# Patient Record
Sex: MALE | Race: WHITE | Employment: OTHER | URBAN - METROPOLITAN AREA
[De-identification: names, ages, dates, MRNs, and addresses within clinical notes are randomized per-mention and may not be internally consistent; named-entity substitution may affect disease eponyms.]

---

## 2017-01-19 RX ORDER — METOPROLOL SUCCINATE 25 MG/1
TABLET, EXTENDED RELEASE ORAL
Qty: 90 TAB | Refills: 0 | Status: SHIPPED | OUTPATIENT
Start: 2017-01-19 | End: 2017-04-17 | Stop reason: SDUPTHER

## 2017-01-30 RX ORDER — COLESEVELAM HYDROCHLORIDE 625 MG/1
TABLET, FILM COATED ORAL
Qty: 540 TAB | Refills: 1 | Status: SHIPPED | OUTPATIENT
Start: 2017-01-30 | End: 2017-07-29 | Stop reason: SDUPTHER

## 2017-03-09 ENCOUNTER — OFFICE VISIT (OUTPATIENT)
Dept: INTERNAL MEDICINE CLINIC | Age: 82
End: 2017-03-09

## 2017-03-09 VITALS
HEART RATE: 70 BPM | OXYGEN SATURATION: 96 % | SYSTOLIC BLOOD PRESSURE: 118 MMHG | RESPIRATION RATE: 20 BRPM | HEIGHT: 68 IN | BODY MASS INDEX: 33.34 KG/M2 | WEIGHT: 220 LBS | TEMPERATURE: 97.8 F | DIASTOLIC BLOOD PRESSURE: 72 MMHG

## 2017-03-09 DIAGNOSIS — R42 DIZZINESS: Primary | ICD-10-CM

## 2017-03-09 DIAGNOSIS — I10 ESSENTIAL HYPERTENSION: ICD-10-CM

## 2017-03-09 DIAGNOSIS — I48.91 ATRIAL FIBRILLATION, UNSPECIFIED TYPE (HCC): ICD-10-CM

## 2017-03-09 LAB
CREATININE, EXTERNAL: 1.08
INR, EXTERNAL: 1.1
PT, EXTERNAL: 11.8

## 2017-03-09 NOTE — PROGRESS NOTES
Reviewed record in preparation for visit and have obtained necessary documentation. Identified pt with two pt identifiers(name and ). Health Maintenance Due   Topic    DTaP/Tdap/Td series (1 - Tdap)    ZOSTER VACCINE AGE 60>     GLAUCOMA SCREENING Q2Y          No chief complaint on file. Wt Readings from Last 3 Encounters:   17 220 lb (99.8 kg)   10/11/16 228 lb (103.4 kg)   16 230 lb (104.3 kg)     Temp Readings from Last 3 Encounters:   17 97.8 °F (36.6 °C) (Oral)   04/08/15 97.8 °F (36.6 °C) (Oral)   14 97.5 °F (36.4 °C)     BP Readings from Last 3 Encounters:   10/11/16 115/77   16 132/70   10/08/15 (!) 142/91     Pulse Readings from Last 3 Encounters:   10/11/16 70   16 (!) 55   10/08/15 71           Learning Assessment:  :     Learning Assessment 3/9/2017 2014   PRIMARY LEARNER Patient Patient   PRIMARY LANGUAGE ENGLISH ENGLISH   LEARNER PREFERENCE PRIMARY READING READING   ANSWERED BY self patient   RELATIONSHIP SELF SELF       Depression Screening:  :     PHQ 2 / 9, over the last two weeks 2016   Little interest or pleasure in doing things Not at all   Feeling down, depressed or hopeless Not at all   Total Score PHQ 2 0       Fall Risk Assessment:  :     Fall Risk Assessment, last 12 mths 2016   Able to walk? Yes   Fall in past 12 months? No       Abuse Screening:  :     Abuse Screening Questionnaire 3/9/2017 2014   Do you ever feel afraid of your partner? N N   Are you in a relationship with someone who physically or mentally threatens you? N N   Is it safe for you to go home? Y Y       Coordination of Care Questionnaire:  :     1) Have you been to an emergency room, urgent care clinic since your last visit? yes   Hospitalized since your last visit? no             2) Have you seen or consulted any other health care providers outside of [facility] since your last visit?  yes  (Include any pap smears or colon screenings in this section.)    3) Do you have an Advance Directive on file? no    4) Are you interested in receiving information on Advance Directives? NO      Patient is accompanied by self I have received verbal consent from Alisha Cortes to discuss any/all medical information while they are present in the room.

## 2017-03-09 NOTE — PATIENT INSTRUCTIONS
N4MD Activation    Thank you for requesting access to N4MD. Please follow the instructions below to securely access and download your online medical record. N4MD allows you to send messages to your doctor, view your test results, renew your prescriptions, schedule appointments, and more. How Do I Sign Up? 1. In your internet browser, go to [email or web address]  2. Click on the First Time User? Click Here link in the Sign In box. You will be redirect to the New Member Sign Up page. 3. Enter your N4MD Access Code exactly as it appears below. You will not need to use this code after youve completed the sign-up process. If you do not sign up before the expiration date, you must request a new code. N4MD Access Code: [email or web address]  Expires: 2017  2:37 PM (This is the date your N4MD access code will )    4. Enter the last four digits of your Social Security Number (xxxx) and Date of Birth (mm/dd/yyyy) as indicated and click Submit. You will be taken to the next sign-up page. 5. Create a N4MD ID. This will be your N4MD login ID and cannot be changed, so think of one that is secure and easy to remember. 6. Create a N4MD password. You can change your password at any time. 7. Enter your Password Reset Question and Answer. This can be used at a later time if you forget your password. 8. Enter your e-mail address. You will receive e-mail notification when new information is available in [address] 9. Click Sign Up. You can now view and download portions of your medical record. 10. Click the Download Summary menu link to download a portable copy of your medical information. Additional Information    If you have questions, please visit the Frequently Asked Questions section of the N4MD website at [email or web address] Remember, N4MD is NOT to be used for urgent needs. For medical emergencies, dial 911.

## 2017-03-09 NOTE — MR AVS SNAPSHOT
Visit Information Date & Time Provider Department Dept. Phone Encounter #  
 3/9/2017  2:20 PM Brian Villalobos PA-C [facility] [phone] [id number] Your Appointments 4/12/2017 10:00 AM  
ROUTINE CARE with Aiden Collins MD  
[facility]) Appt Note: 6 month f/u  
 [place] [address] [place] [address] [facility] [address]  
[facility] [address] [facility] [address] [address] Upcoming Health Maintenance Date Due DTaP/Tdap/Td series (1 - Tdap) 9[birth year]4/1956 ZOSTER VACCINE AGE 60> 9/4/1995 GLAUCOMA SCREENING Q2Y 9/4/2000 MEDICARE YEARLY EXAM 4/9/2017 Allergies as of 3/9/2017  Review Complete On: 3/9/2017 By: Brian Villalobos PA-C Severity Noted Reaction Type Reactions Adhesive  07/07/2014    Other (comments) BLISTERS Current Immunizations  Reviewed on 10/11/2016 Name Date Influenza High Dose Vaccine PF 10/11/2016, 10/8/2015 Influenza Vaccine 9/18/2012 Pneumococcal Polysaccharide (PPSV-23) 1/18/2013 Pneumococcal Vaccine (Unspecified Type) 1/2/2006 Not reviewed this visit You Were Diagnosed With   
  
 Codes Comments Dizziness    -  Primary ICD-10-CM: D67 ICD-9-CM: 780.4 Essential hypertension     ICD-10-CM: I10 
ICD-9-CM: 401.9 Vitals BP Pulse Temp Resp Height(growth percentile) Weight(growth percentile) 118/72 (BP 1 Location: Right arm, BP Patient Position: Standing) 70 97.8 °F (36.6 °C) (Oral) 20 5' 8\" (1.727 m) 220 lb (99.8 kg) SpO2 BMI Smoking Status 96% 33.45 kg/m2 Former Smoker Vitals History BMI and BSA Data Body Mass Index Body Surface Area  
 33.45 kg/m 2 2.19 m 2 Preferred Pharmacy Pharmacy Name Phone [address] [phone] Your Updated Medication List  
  
   
 This list is accurate as of: 3/9/17  3:33 PM.  Always use your most recent med list.  
  
  
  
  
 aspirin 325 mg tablet Commonly known as:  ASPIRIN Take 325 mg by mouth daily. atorvastatin 80 mg tablet Commonly known as:  LIPITOR Take 1 Tab by mouth daily. fluticasone-salmeterol 500-50 mcg/dose diskus inhaler Commonly known as:  ADVAIR DISKUS Take 1 Puff by inhalation two (2) times daily as needed. losartan 25 mg tablet Commonly known as:  COZAAR  
TAKE ONE-HALF (1/2) TABLET DAILY MULTIVITAMIN PO Take 1 Tab by mouth daily. TOPROL XL 25 mg XL tablet Generic drug:  metoprolol succinate TAKE 1 TABLET DAILY WELCHOL 625 mg tablet Generic drug:  colesevelam  
TAKE 3 TABLETS TWICE A DAY WITH MEALS Patient Instructions Ecowellhart Activation Thank you for requesting access to LiveOffice. Please follow the instructions below to securely access and download your online medical record. LiveOffice allows you to send messages to your doctor, view your test results, renew your prescriptions, schedule appointments, and more. How Do I Sign Up? 1. In your internet browser, go to [email or web address] 
2. Click on the First Time User? Click Here link in the Sign In box. You will be redirect to the New Member Sign Up page. 3. Enter your LiveOffice Access Code exactly as it appears below. You will not need to use this code after youve completed the sign-up process. If you do not sign up before the expiration date, you must request a new code. LiveOffice Access Code: [email or web address] Expires: 2017  2:37 PM (This is the date your LiveOffice access code will ) 4. Enter the last four digits of your Social Security Number (xxxx) and Date of Birth (mm/dd/yyyy) as indicated and click Submit. You will be taken to the next sign-up page. 5. Create a LiveOffice ID.  This will be your LiveOffice login ID and cannot be changed, so think of one that is secure and easy to remember. 6. Create a micecloud password. You can change your password at any time. 7. Enter your Password Reset Question and Answer. This can be used at a later time if you forget your password. 8. Enter your e-mail address. You will receive e-mail notification when new information is available in [address] 9. Click Sign Up. You can now view and download portions of your medical record. 10. Click the Download Summary menu link to download a portable copy of your medical information. Additional Information If you have questions, please visit the Frequently Asked Questions section of the micecloud website at [email or web address] Remember, micecloud is NOT to be used for urgent needs. For medical emergencies, dial 911. Introducing [facility]! Nancy Sutherland introduces micecloud patient portal. Now you can access parts of your medical record, email your doctor's office, and request medication refills online. 1. In your internet browser, go to [email or web address] 2. Click on the First Time User? Click Here link in the Sign In box. You will see the New Member Sign Up page. 3. Enter your micecloud Access Code exactly as it appears below. You will not need to use this code after youve completed the sign-up process. If you do not sign up before the expiration date, you must request a new code. · micecloud Access Code: [email or web address] Expires: 6/7/2017  2:37 PM 
 
4. Enter the last four digits of your Social Security Number (xxxx) and Date of Birth (mm/dd/yyyy) as indicated and click Submit. You will be taken to the next sign-up page. 5. Create a Ketsut ID. This will be your micecloud login ID and cannot be changed, so think of one that is secure and easy to remember. 6. Create a micecloud password. You can change your password at any time. 7. Enter your Password Reset Question and Answer. This can be used at a later time if you forget your password. 8. Enter your e-mail address. You will receive e-mail notification when new information is available in [address] 9. Click Sign Up. You can now view and download portions of your medical record. 10. Click the Download Summary menu link to download a portable copy of your medical information. If you have questions, please visit the Frequently Asked Questions section of the Tinybeans website. Remember, Tinybeans is NOT to be used for urgent needs. For medical emergencies, dial 911. Now available from your iPhone and Android! Please provide this summary of care documentation to your next provider. Your primary care clinician is listed as Aidee Ryder. If you have any questions after today's visit, please call [phone].

## 2017-03-09 NOTE — PROGRESS NOTES
HISTORY OF PRESENT ILLNESS  Johnette Severe is a 80 y.o. male. HPI  Patient presents to the office for evaluation of dizziness. He is here today with his daughter. He reports for the past 3 weeks he has been having episodes of dizziness. It seems to have the dizziness with standing and walking. He reports the most recent episode was yesterday at the grocery store. He was walking up to the grocery store and started to feel dizzy. He had his grand daughter get a cart to lean on. Then when he went back to the car the episode happened again. His daughter states he complained about these same symptoms back in December. This am when he got up he felt sweaty for about 40 minutes but then with sitting and having his coffee it went away. Also he reports if he sleeps on his left side he feels dizzy. He does have an extensive cardiac history. With quadruple bypass in the past.  Review of Systems   Cardiovascular: Positive for leg swelling. Negative for chest pain. Neurological: Positive for dizziness. Blood pressure 118/72, pulse 70, temperature 97.8 °F (36.6 °C), temperature source Oral, resp. rate 20, height 5' 8\" (1.727 m), weight 220 lb (99.8 kg), SpO2 96 %. Physical Exam   Constitutional: No distress. Cardiovascular:   Irregular irregular rate. Pulmonary/Chest: Effort normal and breath sounds normal. He has no wheezes. Musculoskeletal: He exhibits edema. Peripheral edema noted. ASSESSMENT and PLAN  Dalila Edge was seen today for dizziness. Diagnoses and all orders for this visit:    Dizziness    Essential hypertension    Atrial fibrillation, unspecified type [facility])    advised the patient to go to the ER for new onset Atrial fibrillation. Advised him to go to [facility] because Dr. Kalyn James is at that hospital. He said he would rather go to [facility] instead. This patient was discussed with Dr. Mee Flores.

## 2017-03-10 ENCOUNTER — PATIENT OUTREACH (OUTPATIENT)
Dept: CARDIOLOGY CLINIC | Age: 82
End: 2017-03-10

## 2017-03-15 PROBLEM — I48.91 ATRIAL FIBRILLATION (HCC): Status: ACTIVE | Noted: 2017-03-15

## 2017-03-23 ENCOUNTER — PATIENT OUTREACH (OUTPATIENT)
Dept: CARDIOLOGY CLINIC | Age: 82
End: 2017-03-23

## 2017-03-24 ENCOUNTER — OFFICE VISIT (OUTPATIENT)
Dept: CARDIOLOGY CLINIC | Age: 82
End: 2017-03-24

## 2017-03-24 ENCOUNTER — CLINICAL SUPPORT (OUTPATIENT)
Dept: CARDIOLOGY CLINIC | Age: 82
End: 2017-03-24

## 2017-03-24 VITALS
HEIGHT: 68 IN | WEIGHT: 221.5 LBS | DIASTOLIC BLOOD PRESSURE: 80 MMHG | BODY MASS INDEX: 33.57 KG/M2 | HEART RATE: 64 BPM | SYSTOLIC BLOOD PRESSURE: 122 MMHG | RESPIRATION RATE: 16 BRPM

## 2017-03-24 DIAGNOSIS — I10 ESSENTIAL HYPERTENSION: ICD-10-CM

## 2017-03-24 DIAGNOSIS — E78.2 MIXED HYPERLIPIDEMIA: ICD-10-CM

## 2017-03-24 DIAGNOSIS — I48.19 PERSISTENT ATRIAL FIBRILLATION (HCC): ICD-10-CM

## 2017-03-24 DIAGNOSIS — I25.10 CORONARY ARTERY DISEASE INVOLVING NATIVE CORONARY ARTERY OF NATIVE HEART WITHOUT ANGINA PECTORIS: ICD-10-CM

## 2017-03-24 DIAGNOSIS — I25.10 CORONARY ARTERY DISEASE INVOLVING NATIVE CORONARY ARTERY OF NATIVE HEART WITHOUT ANGINA PECTORIS: Primary | ICD-10-CM

## 2017-03-24 NOTE — PROGRESS NOTES
HISTORY OF PRESENT ILLNESS  Anthony Taylor is a 80 y.o. male     SUMMARY:   Problem List  Date Reviewed: 3/24/2017          Codes Class Noted    Atrial fibrillation ([facility] [address] ICD-10-CM: I48.91  ICD-9-CM: 427.31  3/15/2017        Venous insufficiency ICD-10-CM: I49.3  ICD-9-CM: 459.81  10/11/2016        Alcohol abuse counseling and surveillance ICD-10-CM: Z71.41  ICD-9-CM: V65.42  10/11/2016        Advanced directives, counseling/discussion ICD-10-CM: Z71.89  ICD-9-CM: V65.49  10/11/2016        Venous insufficiency of both lower extremities ICD-10-CM: I87.2  ICD-9-CM: 459.81  10/11/2016        Essential hypertension ICD-10-CM: I10  ICD-9-CM: 401.9  65/4/1862        Umbilical hernia LJB-39-GY: K42.9  ICD-9-CM: 553.1  7/11/2014        Colon polyp ICD-10-CM: K63.5  ICD-9-CM: 211.3  6/8/2011        CAD (coronary artery disease) ICD-10-CM: I25.10  ICD-9-CM: 414.00  4/8/2010    Overview Addendum 2/28/2011  8:08 AM by Mango Ramachandran MD     Mr. 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 He ultimately underwent a bypass surgery with a vein graft to the right coronary, sequential vein graft to two marginals and a LIMA to the LAD. In 1997, he presented with unstable angina, at which time his right coronary and circumflex grafts were occluded. He then underwent successful stenting of one obtuse marginal branch. In 1999, he had recurrent symptoms and a second obtuse marginal branch was stented             HLD (hyperlipidemia) ICD-10-CM: E78.5  ICD-9-CM: 272.4  4/8/2010        Borderline diabetes mellitus ICD-10-CM: R73.03  ICD-9-CM: 790.29  4/8/2010        Prostate cancer ([facility] [address] ICD-10-CM: C61  ICD-9-CM: 185  4/8/2010        Asthma ICD-10-CM: J45.909  ICD-9-CM: 493.90  4/8/2010              Current Outpatient Prescriptions on File Prior to Visit   Medication Sig    apixaban (ELIQUIS) 5 mg tablet Take 5 mg by mouth two (2) times a day.     WELCHOL 625 mg tablet TAKE 3 TABLETS TWICE A DAY WITH MEALS    TOPROL XL 25 mg XL tablet TAKE 1 TABLET DAILY    losartan (COZAAR) 25 mg tablet TAKE ONE-HALF (1/2) TABLET DAILY    fluticasone-salmeterol (ADVAIR DISKUS) 500-50 mcg/dose diskus inhaler Take 1 Puff by inhalation two (2) times daily as needed.  atorvastatin (LIPITOR) 80 mg tablet Take 1 Tab by mouth daily.  MULTIVITAMIN PO Take 1 Tab by mouth daily.  aspirin (ASPIRIN) 325 mg tablet Take 325 mg by mouth daily. No current facility-administered medications on file prior to visit. CARDIOLOGY STUDIES TO DATE:  4/24/08. The type of test was a treadmill - Standard Ellis Protocol with myocardial perfusion imaging. Exercise tolerance was fair. Cardiac stress testing was positive for. for anginal equivalent of left shoulder pain. No reversible defects, no inducible ischemia. Fixed anterior defect in the distal anterior wall and fixed inferior wall defect. EF 52      Chief Complaint   Patient presents with    Coronary Artery Disease     HPI :  A few weeks ago Mr. Valdez Davis went in to see his primary care with some complaints of dizziness and was noted to have an irregular heartbeat, and an EKG showed A-fib with a controlled ventricular response. He was sent to [facility] where he had complete blood work which looked good and was started on Eliquis. He still has some dizziness, some of which sounds like it is central and some of it sounds like he may be mildly orthostatic. He freely admits that he does not drink enough water during the day, probably because he urinates frequently anyway and does not want to be bothered with that. He tries to stay fairly active but is getting no regular exercise. He has no worrisome cardiac symptoms, and he is completely unaware of his irregular heartbeat.      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     Visit Vitals    /80 (BP 1 Location: Left arm, BP Patient Position: Sitting)    Pulse 64    Resp 16    Ht 5' 8\" (1.727 m)    Wt 221 lb 8 oz (100.5 kg)    BMI 33.68 kg/m2       Wt Readings from Last 3 Encounters:   03/24/17 221 lb 8 oz (100.5 kg)   03/09/17 220 lb (99.8 kg)   10/11/16 228 lb (103.4 kg)            BP Readings from Last 3 Encounters:   03/24/17 122/80   03/09/17 118/72   10/11/16 115/77       PHYSICAL EXAM  General appearance: alert, cooperative, no distress, appears stated age  Neck: supple, symmetrical, trachea midline, no adenopathy, no carotid bruit and no JVD  Lungs: clear to auscultation bilaterally  Heart: irregularly irregular rhythm, S1, S2 normal, no S3 or S4  Extremities: extremities normal, atraumatic, no cyanosis or edema    Lab Results   Component Value Date/Time    Cholesterol, total 208 10/11/2016 11:01 AM    Cholesterol, total 168 04/08/2016 09:50 AM    Cholesterol, total 191 10/08/2015 10:42 AM    Cholesterol, total 203 04/08/2015 10:27 AM    Cholesterol, total 211 01/21/2014 12:00 AM    HDL Cholesterol 46 10/11/2016 11:01 AM    HDL Cholesterol 46 04/08/2016 09:50 AM    HDL Cholesterol 54 10/08/2015 10:42 AM    HDL Cholesterol 48 04/08/2015 10:27 AM    HDL Cholesterol 55 01/21/2014 12:00 AM    LDL, calculated 129 10/11/2016 11:01 AM    LDL, calculated 92 04/08/2016 09:50 AM    LDL, calculated 111 10/08/2015 10:42 AM    LDL, calculated 113 04/08/2015 10:27 AM    LDL, calculated 117 01/21/2014 12:00 AM    Triglyceride 167 10/11/2016 11:01 AM Triglyceride 150 04/08/2016 09:50 AM    Triglyceride 130 10/08/2015 10:42 AM    Triglyceride 209 04/08/2015 10:27 AM    Triglyceride 197 01/21/2014 12:00 AM    CHOL/HDL Ratio 3.3 06/11/2010 08:25 AM    CHOL/HDL Ratio 3.9 12/11/2009 09:06 AM    CHOL/HDL Ratio 3.3 07/01/2009 10:50 AM     ASSESSMENT  We had a long talk about atrial fibrillation, and I do not think that is contributing at all to his symptoms right now. He needs an echocardiogram to reevaluate his valves and LV function and to stay on Eliquis. I think he can stop the Cozaar, and I encouraged him to drink more fluids. current treatment plan is effective, no change in therapy  lab results and schedule of future lab studies reviewed with patient  reviewed diet, exercise and weight control    Encounter Diagnoses   Name Primary?  Coronary artery disease involving native coronary artery of native heart without angina pectoris Yes    Persistent atrial fibrillation (HCC)     Essential hypertension     Mixed hyperlipidemia      No orders of the defined types were placed in this encounter. Follow-up Disposition:  Return in about 3 months (around 6/24/2017).     Kimmy Garcia MD  3/24/2017

## 2017-03-24 NOTE — MR AVS SNAPSHOT
Visit Information Date & Time Provider Department Dept. Phone Encounter #  
 3/24/2017  8:20 AM Arminda Gomez MD [facility] [phone] [id number] Follow-up Instructions Return in about 3 months (around 6/24/2017). Your Appointments 4/12/2017 10:00 AM  
ROUTINE CARE with Derald Goodpasture, MD  
[facility]) Appt Note: 6 month f/u  
 [address] [facility] [address]  
[address] [address]  
  
    
 6/30/2017  8:40 AM  
ESTABLISHED PATIENT with Arminda Gomez MD  
[facility] ([facility]) Appt Note: 3 mo f/u  
 [address] [place] [address]  
[address] [address] Upcoming Health Maintenance Date Due DTaP/Tdap/Td series (1 - Tdap) 9/4/1956 ZOSTER VACCINE AGE 60> 9/4/1995 GLAUCOMA SCREENING Q2Y 9/4/2000 MEDICARE YEARLY EXAM 4/9/2017 Allergies as of 3/24/2017  Review Complete On: 3/24/2017 By: Arminda Gomez MD  
  
 Severity Noted Reaction Type Reactions Adhesive  07/07/2014    Other (comments) BLISTERS Current Immunizations  Reviewed on 10/11/2016 Name Date Influenza High Dose Vaccine PF 10/11/2016, 10/8/2015 Influenza Vaccine 9/18/2012 Pneumococcal Polysaccharide (PPSV-23) 1/18/2013 Pneumococcal Vaccine (Unspecified Type) 1/2/2006 Not reviewed this visit You Were Diagnosed With   
  
 Codes Comments Coronary artery disease involving native coronary artery of native heart without angina pectoris    -  Primary ICD-10-CM: I25.10 ICD-9-CM: 414.01 Persistent atrial fibrillation (HCC)     ICD-10-CM: I48.1 ICD-9-CM: 427.31 Essential hypertension     ICD-10-CM: I10 
ICD-9-CM: 401.9 Mixed hyperlipidemia     ICD-10-CM: E78.2 ICD-9-CM: 272.2 Vitals BP Pulse Resp Height(growth percentile) Weight(growth percentile) BMI  
 122/80 (BP 1 Location: Left arm, BP Patient Position: Sitting) 64 16 5' 8\" (1.727 m) 221 lb 8 oz (100.5 kg) 33.68 kg/m2 Smoking Status Former Smoker Vitals History BMI and BSA Data Body Mass Index Body Surface Area  
 33.68 kg/m 2 2.2 m 2 Preferred Pharmacy Pharmacy Name [address] [facility] [phone] Your Updated Medication List  
  
   
This list is accurate as of: 3/24/17  9:57 AM.  Always use your most recent med list.  
  
  
  
  
 apixaban 5 mg tablet Commonly known as:  Tonja Falling Take 1 Tab by mouth two (2) times a day. aspirin 325 mg tablet Commonly known as:  ASPIRIN Take 325 mg by mouth daily. atorvastatin 80 mg tablet Commonly known as:  LIPITOR Take 1 Tab by mouth daily. fluticasone-salmeterol 500-50 mcg/dose diskus inhaler Commonly known as:  ADVAIR DISKUS Take 1 Puff by inhalation two (2) times daily as needed. losartan 25 mg tablet Commonly known as:  COZAAR  
TAKE ONE-HALF (1/2) TABLET DAILY MULTIVITAMIN PO Take 1 Tab by mouth daily. TOPROL XL 25 mg XL tablet Generic drug:  metoprolol succinate TAKE 1 TABLET DAILY WELCHOL 625 mg tablet Generic drug:  colesevelam  
TAKE 3 TABLETS TWICE A DAY WITH MEALS Follow-up Instructions Return in about 3 months (around 6/24/2017). To-Do List   
 03/24/2017 ECHO:  2D ECHO COMPLETE ADULT (TTE) W OR WO CONTR Introducing [facility] & HE[facility] SERVICES! Aretha Hui introduces [employer] patient portal. Now you can access parts of your medical record, email your doctor's office, and request medication refills online. 1. In your internet browser, go to [email or web address] Online 2. Click on the First Time User? Click Here link in the Sign In box.  You will see the New Member Sign Up page. 3. Enter your [employer] Access Code exactly as it appears below. You will not need to use this code after youve completed the sign-up process. If you do not sign up before the expiration date, you must request a new code. · [employer] Access Code: [email or web address] Expires: 6/7/2017  3:37 PM 
 
4. Enter the last four digits of your Social Security Number (xxxx) and Date of Birth (mm/dd/yyyy) as indicated and click Submit. You will be taken to the next sign-up page. 5. Create a [employer] ID. This will be your [employer] login ID and cannot be changed, so think of one that is secure and easy to remember. 6. Create a [employer] password. You can change your password at any time. 7. Enter your Password Reset Question and Answer. This can be used at a later time if you forget your password. 8. Enter your e-mail address. You will receive e-mail notification when new information is available in [address]. 9. Click Sign Up. You can now view and download portions of your medical record. 10. Click the Download Summary menu link to download a portable copy of your medical information. If you have questions, please visit the Frequently Asked Questions section of the [employer] website. Remember, [employer] is NOT to be used for urgent needs. For medical emergencies, dial 911. Now available from your iPhone and Android! Please provide this summary of care documentation to your next provider. Your primary care clinician is listed as Abdifatah Zelaya. If you have any questions after today's visit, please call [phone].

## 2017-03-29 ENCOUNTER — TELEPHONE (OUTPATIENT)
Dept: CARDIOLOGY CLINIC | Age: 82
End: 2017-03-29

## 2017-03-29 DIAGNOSIS — E78.5 HYPERLIPIDEMIA, UNSPECIFIED HYPERLIPIDEMIA TYPE: ICD-10-CM

## 2017-03-29 RX ORDER — ATORVASTATIN CALCIUM 80 MG/1
80 TABLET, FILM COATED ORAL DAILY
Qty: 90 TAB | Refills: 3 | Status: SHIPPED | OUTPATIENT
Start: 2017-03-29 | End: 2018-04-05 | Stop reason: SDUPTHER

## 2017-03-29 NOTE — PROGRESS NOTES
Heart muscle slightly weak, but not bad at all and not a concern. Some calcium deposits on one heart valve, but valve functioning normally. Looks good.  Stay on meds

## 2017-03-29 NOTE — TELEPHONE ENCOUNTER
----- Message from Arminda Gomez MD sent at 3/29/2017 10:06 AM EDT -----  Heart muscle slightly weak, but not bad at all and not a concern. Some calcium deposits on one heart valve, but valve functioning normally. Looks good.  Stay on meds

## 2017-03-29 NOTE — TELEPHONE ENCOUNTER
Called patient. Verified patient's identity with two identifiers. Notified patient of results and message below. Patient verbalizes understanding and denies further questions or concerns.

## 2017-04-12 ENCOUNTER — HOSPITAL ENCOUNTER (OUTPATIENT)
Dept: LAB | Age: 82
Discharge: HOME | End: 2017-04-12
Payer: MEDICARE

## 2017-04-12 ENCOUNTER — OFFICE VISIT (OUTPATIENT)
Dept: INTERNAL MEDICINE CLINIC | Age: 82
End: 2017-04-12

## 2017-04-12 VITALS
WEIGHT: 221 LBS | HEIGHT: 68 IN | HEART RATE: 66 BPM | SYSTOLIC BLOOD PRESSURE: 126 MMHG | BODY MASS INDEX: 33.49 KG/M2 | RESPIRATION RATE: 16 BRPM | DIASTOLIC BLOOD PRESSURE: 70 MMHG | OXYGEN SATURATION: 98 %

## 2017-04-12 DIAGNOSIS — E78.00 PURE HYPERCHOLESTEROLEMIA: ICD-10-CM

## 2017-04-12 DIAGNOSIS — I48.0 PAROXYSMAL ATRIAL FIBRILLATION (HCC): Primary | ICD-10-CM

## 2017-04-12 DIAGNOSIS — I10 ESSENTIAL HYPERTENSION: ICD-10-CM

## 2017-04-12 DIAGNOSIS — Z13.39 SCREENING FOR ALCOHOLISM: ICD-10-CM

## 2017-04-12 DIAGNOSIS — Z00.00 ROUTINE GENERAL MEDICAL EXAMINATION AT A HEALTH CARE FACILITY: ICD-10-CM

## 2017-04-12 DIAGNOSIS — I87.2 VENOUS INSUFFICIENCY: ICD-10-CM

## 2017-04-12 PROCEDURE — 36415 COLL VENOUS BLD VENIPUNCTURE: CPT

## 2017-04-12 PROCEDURE — 80061 LIPID PANEL: CPT

## 2017-04-12 PROCEDURE — 84443 ASSAY THYROID STIM HORMONE: CPT

## 2017-04-12 NOTE — MR AVS SNAPSHOT
Visit Information Date & Time Provider Department Dept. Phone Encounter #  
 4/12/2017 10:00 AM Anabel Litten, MD [facility] [phone] [id number] Your Appointments 6/30/2017  8:40 AM  
ESTABLISHED PATIENT with James Saeed MD  
[facility] ([facility]) Appt Note: 3 mo f/u  
 [address] [place] [address]  
[address] [address] Upcoming Health Maintenance Date Due DTaP/Tdap/Td series (1 - Tdap) 9[birth year]4/1956 ZOSTER VACCINE AGE 60> 9/4/1995 GLAUCOMA SCREENING Q2Y 9/4/2000 MEDICARE YEARLY EXAM 4/9/2017 Allergies as of 4/12/2017  Review Complete On: 4/12/2017 By: Dave Amin LPN Severity Noted Reaction Type Reactions Adhesive  07/07/2014    Other (comments) BLISTERS Current Immunizations  Reviewed on 10/11/2016 Name Date Influenza High Dose Vaccine PF 10/11/2016, 10/8/2015 Influenza Vaccine 9/18/2012 Pneumococcal Polysaccharide (PPSV-23) 1/18/2013 Pneumococcal Vaccine (Unspecified Type) 1/2/2006 Not reviewed this visit You Were Diagnosed With   
  
 Codes Comments Paroxysmal atrial fibrillation (HCC)    -  Primary ICD-10-CM: I48.0 ICD-9-CM: 427.31 Pure hypercholesterolemia     ICD-10-CM: E78.00 ICD-9-CM: 272.0 Essential hypertension     ICD-10-CM: I10 
ICD-9-CM: 401.9 Venous insufficiency     ICD-10-CM: I87.2 ICD-9-CM: 459.81 Vitals BP Pulse Resp Height(growth percentile) Weight(growth percentile) SpO2  
 126/70 66 16 5' 8\" (1.727 m) 221 lb (100.2 kg) 98% BMI Smoking Status 33.6 kg/m2 Former Smoker Vitals History BMI and BSA Data Body Mass Index Body Surface Area  
 33.6 kg/m 2 2.19 m 2 Preferred Pharmacy Pharmacy Name Phone [address] [facility] [phone] Your Updated Medication List  
  
   
This list is accurate as of: 4/12/17 11:02 AM.  Always use your most recent med list.  
  
  
  
  
 apixaban 5 mg tablet Commonly known as:  Alayna Eleuterio Take 1 Tab by mouth two (2) times a day. aspirin 325 mg tablet Commonly known as:  ASPIRIN Take 325 mg by mouth daily. atorvastatin 80 mg tablet Commonly known as:  LIPITOR Take 1 Tab by mouth daily. fluticasone-salmeterol 500-50 mcg/dose diskus inhaler Commonly known as:  ADVAIR DISKUS Take 1 Puff by inhalation two (2) times daily as needed. losartan 25 mg tablet Commonly known as:  COZAAR  
TAKE ONE-HALF (1/2) TABLET DAILY MULTIVITAMIN PO Take 1 Tab by mouth daily. TOPROL XL 25 mg XL tablet Generic drug:  metoprolol succinate TAKE 1 TABLET DAILY WELCHOL 625 mg tablet Generic drug:  colesevelam  
TAKE 3 TABLETS TWICE A DAY WITH MEALS We Performed the Following LIPID PANEL [89688 CPT(R)] TSH 3RD GENERATION [12349 CPT(R)] Introducing [facility] SERVICES! Alena Marcos introduces [employer] patient portal. Now you can access parts of your medical record, email your doctor's office, and request medication refills online. 1. In your internet browser, go to [email or web address] 2. Click on the First Time User? Click Here link in the Sign In box. You will see the New Member Sign Up page. 3. Enter your [employer] Access Code exactly as it appears below. You will not need to use this code after youve completed the sign-up process. If you do not sign up before the expiration date, you must request a new code. · [employer] Access Code: [email or web address] Expires: 6/7/2017  3:37 PM 
 
4. Enter the last four digits of your Social Security Number (xxxx) and Date of Birth (mm/dd/yyyy) as indicated and click Submit. You will be taken to the next sign-up page. 5. Create a [employer] ID.  This will be your [employer] login ID and cannot be changed, so think of one that is secure and easy to remember. 6. Create a [employer] password. You can change your password at any time. 7. Enter your Password Reset Question and Answer. This can be used at a later time if you forget your password. 8. Enter your e-mail address. You will receive e-mail notification when new information is available in [address] 9. Click Sign Up. You can now view and download portions of your medical record. 10. Click the Download Summary menu link to download a portable copy of your medical information. If you have questions, please visit the Frequently Asked Questions section of the [employer] website. Remember, [employer] is NOT to be used for urgent needs. For medical emergencies, dial 911. Now available from your iPhone and Android! Please provide this summary of care documentation to your next provider. Your primary care clinician is listed as Erick Roberson. If you have any questions after today's visit, please call [phone].

## 2017-04-12 NOTE — PROGRESS NOTES
Wt Readings from Last 3 Encounters:   04/12/17 221 lb (100.2 kg)   03/24/17 221 lb 8 oz (100.5 kg)   03/09/17 220 lb (99.8 kg)       BP Readings from Last 3 Encounters:   04/12/17 118/70   03/24/17 122/80   03/09/17 118/72       Cardiovascular Review:  The patient has hypertension, hyperlipidemia, coronary artery disease and status post CABG. Diet and Lifestyle: generally follows a low fat low cholesterol diet, sedentary, nonsmoker  Home BP Monitoring: is not measured at home. In er with new a fib now on eliquis  Pertinent ROS: taking medications as instructed, no medication side effects noted, no TIA's, no chest pain on exertion, no dyspnea on exertion, no swelling of ankles, does note some dizziness when arising, no palpitations. Asthma Review:  The patient is being seen for follow up of asthma, not currently in exacerbation. Asthma symptoms occur: weekly, infrequently. Wheezing when present is described as mild and easily relieved with rescue bronchodilator. Current limitations in activity from asthma: none. Number of days of school or work missed in the last month: 0. Frequency of use of quick-relief meds: prn. Regimen compliance: The patient reports adherence to this regimen. Patient does not smoke cigarettes. Osteoarthritis and Chronic Pain:  Patient has osteoarthritis, primarily affecting the neck. Symptoms onset: several  years ago. Rheumatological ROS: stable, mild-to-moderate joint symptoms intermittently, reasonably well controlled by PRN meds. Response to treatment plan: stable, well controlled and basically asymptomatic.    Patient Active Problem List    Diagnosis Date Noted    Atrial fibrillation ([facility] [address] 03/15/2017    Venous insufficiency 10/11/2016    Alcohol abuse counseling and surveillance 10/11/2016    Advanced directives, counseling/discussion 10/11/2016    Venous insufficiency of both lower extremities 10/11/2016    Essential hypertension 48/97/0833    Umbilical hernia 07/11/2014    Colon polyp 06/08/2011    CAD (coronary artery disease) 04/08/2010    HLD (hyperlipidemia) 04/08/2010    Borderline diabetes mellitus 04/08/2010    Prostate cancer ([facility] [address] 04/08/2010    Asthma 04/08/2010     Current Outpatient Prescriptions   Medication Sig Dispense Refill    atorvastatin (LIPITOR) 80 mg tablet Take 1 Tab by mouth daily. 90 Tab 3    apixaban (ELIQUIS) 5 mg tablet Take 1 Tab by mouth two (2) times a day. 42 Tab 0    WELCHOL 625 mg tablet TAKE 3 TABLETS TWICE A DAY WITH MEALS 540 Tab 1    TOPROL XL 25 mg XL tablet TAKE 1 TABLET DAILY 90 Tab 0    fluticasone-salmeterol (ADVAIR DISKUS) 500-50 mcg/dose diskus inhaler Take 1 Puff by inhalation two (2) times daily as needed. 1 Inhaler 5    aspirin (ASPIRIN) 325 mg tablet Take 325 mg by mouth daily.  losartan (COZAAR) 25 mg tablet TAKE ONE-HALF (1/2) TABLET DAILY 90 Tab 2    MULTIVITAMIN PO Take 1 Tab by mouth daily. Lab Results   Component Value Date/Time    Cholesterol, total 208 10/11/2016 11:01 AM    HDL Cholesterol 46 10/11/2016 11:01 AM    LDL, calculated 129 10/11/2016 11:01 AM    Triglyceride 167 10/11/2016 11:01 AM    CHOL/HDL Ratio 3.3 06/11/2010 08:25 AM        Results for orders placed or performed in visit on 10/11/16   CBC WITH AUTOMATED DIFF   Result Value Ref Range    WBC 8.9 3.4 - 10.8 x10E3/uL    RBC 4.52 4.14 - 5.80 x10E6/uL    HGB 14.5 12.6 - 17.7 g/dL    HCT 43.8 37.5 - 51.0 %    MCV 97 79 - 97 fL    MCH 32.1 26.6 - 33.0 pg    MCHC 33.1 31.5 - 35.7 g/dL    RDW 13.8 12.3 - 15.4 %    PLATELET 804 086 - 493 x10E3/uL    NEUTROPHILS 51 %    Lymphocytes 29 %    MONOCYTES 11 %    EOSINOPHILS 9 %    BASOPHILS 0 %    ABS. NEUTROPHILS 4.5 1.4 - 7.0 x10E3/uL    Abs Lymphocytes 2.5 0.7 - 3.1 x10E3/uL    ABS. MONOCYTES 1.0 (H) 0.1 - 0.9 x10E3/uL    ABS. EOSINOPHILS 0.8 (H) 0.0 - 0.4 x10E3/uL    ABS. BASOPHILS 0.0 0.0 - 0.2 x10E3/uL    IMMATURE GRANULOCYTES 0 %    ABS. IMM.  GRANS. 0.0 0.0 - 0.1 x10E3/uL   LIPID PANEL   Result Value Ref Range    Cholesterol, total 208 (H) 100 - 199 mg/dL    Triglyceride 167 (H) 0 - 149 mg/dL    HDL Cholesterol 46 >39 mg/dL    VLDL, calculated 33 5 - 40 mg/dL    LDL, calculated 129 (H) 0 - 99 mg/dL   METABOLIC PANEL, COMPREHENSIVE   Result Value Ref Range    Glucose 108 (H) 65 - 99 mg/dL    BUN 25 8 - 27 mg/dL    Creatinine 1.09 0.76 - 1.27 mg/dL    GFR est non-AA 63 >59 mL/min/1.73    GFR est AA 73 >59 mL/min/1.73    BUN/Creatinine ratio 23 (H) 10 - 22    Sodium 142 134 - 144 mmol/L    Potassium 4.8 3.5 - 5.2 mmol/L    Chloride 99 97 - 108 mmol/L    CO2 27 18 - 29 mmol/L    Calcium 9.4 8.6 - 10.2 mg/dL    Protein, total 6.7 6.0 - 8.5 g/dL    Albumin 4.0 3.5 - 4.7 g/dL    GLOBULIN, TOTAL 2.7 1.5 - 4.5 g/dL    A-G Ratio 1.5 1.1 - 2.5    Bilirubin, total 0.5 0.0 - 1.2 mg/dL    Alk. phosphatase 54 39 - 117 IU/L    AST (SGOT) 27 0 - 40 IU/L    ALT (SGPT) 22 0 - 44 IU/L   MAGNESIUM   Result Value Ref Range    Magnesium 2.2 1.6 - 2.3 mg/dL   HEMOGLOBIN A1C W/O EAG   Result Value Ref Range    Hemoglobin A1c 6.3 (H) 4.8 - 5.6 %   CVD REPORT   Result Value Ref Range    INTERPRETATION Note        Vitals:    04/12/17 1017   BP: 118/70   Pulse: 66   Resp: 16   SpO2: 98%   Weight: 221 lb (100.2 kg)   Height: 5' 8\" (1.727 m)     Wt Readings from Last 3 Encounters:   04/12/17 221 lb (100.2 kg)   03/24/17 221 lb 8 oz (100.5 kg)   03/09/17 220 lb (99.8 kg)   S1 and S2 normal, no murmurs, clicks, gallops or rubs. Regular rate and rhythm. Chest is clear; no wheezes or rales. Trace  Edema   With venous disease  Stable status on multiple high risk medications for multiple comorbidities, will not change any of the present treatment plans       The patient is advised to begin progressive daily aerobic exercise program, follow a low fat, low cholesterol diet and attempt to lose weight.   ER labs reviewed        Hypertension controlled  Last lipids stable on high doses  Asthma stable    Low saturated fat diet with plenty of insoluble fiber advised. Eat more nuts, whole grain foods, green leafy vegetables, avoid red meats, egg yolks, fried food, and milk products made with whole milk. Drink skim milk if using milk. Refer to [address] [place] [address] for Heart Healthy Dietary advice  Reviewed plan of care, patient has provided input and agrees with goals    Stable status on multiple high risk medications for multiple comorbidities, will not change any of the present treatment plans  Hypertension controlled for age based on guidelines  Cad stable  High risk medications reviewed  There are no diagnoses linked to this encounter. Last 3 Recorded Weights in this Encounter    04/12/17 1017   Weight: 221 lb (100.2 kg)     Discussed the patient's above normal BMI with him. I have recommended the following interventions: dietary management education, guidance, and counseling . The BMI follow up plan is as follows: BMI is out of normal parameters and plan is as follows: I have counseled this patient on diet and exercise regimens    Stable status on multiple high risk medications for multiple comorbidities, will not change any of the present treatment plans  There are no diagnoses linked to this encounter. Discusses 1 pound weight loss per week is 3500 calories per week   MEDICARE WELLNESS    Current Outpatient Prescriptions   Medication Sig Dispense Refill    atorvastatin (LIPITOR) 80 mg tablet Take 1 Tab by mouth daily. 90 Tab 3    apixaban (ELIQUIS) 5 mg tablet Take 1 Tab by mouth two (2) times a day. 42 Tab 0    WELCHOL 625 mg tablet TAKE 3 TABLETS TWICE A DAY WITH MEALS 540 Tab 1    TOPROL XL 25 mg XL tablet TAKE 1 TABLET DAILY 90 Tab 0    fluticasone-salmeterol (ADVAIR DISKUS) 500-50 mcg/dose diskus inhaler Take 1 Puff by inhalation two (2) times daily as needed. 1 Inhaler 5    aspirin (ASPIRIN) 325 mg tablet Take 325 mg by mouth daily.       losartan (COZAAR) 25 mg tablet TAKE ONE-HALF (1/2) TABLET DAILY 90 Tab 2    MULTIVITAMIN PO Take 1 Tab by mouth daily. Allergies   Allergen Reactions    Adhesive Other (comments)     BLISTERS     Family History   Problem Relation Age of Onset    Stroke Father     Anesth Problems Neg Hx      Social History   Substance Use Topics    Smoking status: Former Smoker     Packs/day: 1.00     Types: Cigarettes     Quit date: 6/10/1964    Smokeless tobacco: Never Used    Alcohol use 9.0 oz/week     12 Cans of beer, 6 Standard drinks or equivalent per week      Comment: casual     Patient Active Problem List   Diagnosis Code    CAD (coronary artery disease) I25.10    HLD (hyperlipidemia) E78.5    Borderline diabetes mellitus R73.03    Prostate cancer ([facility] [address] C61    Asthma J45.909    Colon polyp R96.4    Umbilical hernia R56.6    Essential hypertension I10    Venous insufficiency I87.2    Alcohol abuse counseling and surveillance Z71.41    Advanced directives, counseling/discussion Z71.89    Venous insufficiency of both lower extremities I87.2    Atrial fibrillation (HCC) I48.91       Depression Risk Factor Screening:     PHQ 2 / 9, over the last two weeks 4/12/2017   Little interest or pleasure in doing things Not at all   Feeling down, depressed or hopeless Not at all   Total Score PHQ 2 0     Alcohol Risk Factor Screening: On any occasion during the past 3 months, have you had more than 4 drinks containing alcohol? No    Do you average more than 14 drinks per week? Borderline usually 2 per day may be less      Functional Ability and Level of Safety:     Hearing Loss   borderline normal    Activities of Daily Living   Self-care. Requires assistance with: no ADLs    Fall Risk     Fall Risk Assessment, last 12 mths 4/12/2017   Able to walk? Yes   Fall in past 12 months? No     Abuse Screen   Patient is not abused    Review of Systems   A comprehensive review of systems was negative except for that written in the HPI.     Physical Examination     Evaluation of Cognitive Function:  Mood/affect:  neutral  Appearance: age appropriate and casually dressed  Family member/caregiver input: no    Visit Vitals    /70    Pulse 66    Resp 16    Ht 5' 8\" (1.727 m)    Wt 221 lb (100.2 kg)    SpO2 98%    BMI 33.6 kg/m2     General appearance: alert, cooperative, no distress, appears stated age  Lungs: clear to auscultation bilaterally  Heart: regular rate and rhythm, S1, S2 normal, no murmur, click, rub or gallop  Abdomen: abnormal findings:  Obese  Ex      Patient Care Team:  Chinmay House MD as PCP - General (Internal Medicine)  Salvador Miller MD as Physician (Cardiology)  Nichole Renteria RN as [address] (Cardiology)    Advice/Referrals/Counseling   Education and counseling provided:  Are appropriate based on today's review and evaluation  End-of-Life planning (with patient's consent)  Pneumococcal Vaccine  Influenza Vaccine  Cardiovascular screening blood test  Diabetes screening test    Assessment/Plan         Jeannette Godoy was seen today for follow-up.     Diagnoses and all orders for this visit:    Paroxysmal atrial fibrillation ([facility] [address]  -     TSH 3RD GENERATION    Pure hypercholesterolemia  -     LIPID PANEL    Essential hypertension    Venous insufficiency    Routine general medical examination at a health care facility    Screening for alcoholism              Stable status on multiple high risk medications for multiple comorbidities, will not change any of the present treatment plans    Compression stocks advised previously and not done      He is on eliquis and a beta blocker so a fib not a big issue

## 2017-04-13 LAB
CHOLEST SERPL-MCNC: 175 MG/DL (ref 100–199)
HDLC SERPL-MCNC: 48 MG/DL
INTERPRETATION, 910389: NORMAL
LDLC SERPL CALC-MCNC: 90 MG/DL (ref 0–99)
TRIGL SERPL-MCNC: 187 MG/DL (ref 0–149)
TSH SERPL DL<=0.005 MIU/L-ACNC: 3.15 UIU/ML (ref 0.45–4.5)
VLDLC SERPL CALC-MCNC: 37 MG/DL (ref 5–40)

## 2017-04-17 RX ORDER — METOPROLOL SUCCINATE 25 MG/1
TABLET, EXTENDED RELEASE ORAL
Qty: 90 TAB | Refills: 1 | Status: SHIPPED | OUTPATIENT
Start: 2017-04-17 | End: 2017-10-14 | Stop reason: SDUPTHER

## 2017-07-29 RX ORDER — COLESEVELAM HYDROCHLORIDE 625 MG/1
TABLET, FILM COATED ORAL
Qty: 540 TAB | Refills: 0 | Status: SHIPPED | OUTPATIENT
Start: 2017-07-29 | End: 2017-10-27 | Stop reason: SDUPTHER

## 2017-09-11 ENCOUNTER — PATIENT OUTREACH (OUTPATIENT)
Dept: INTERNAL MEDICINE CLINIC | Age: 82
End: 2017-09-11

## 2017-09-11 RX ORDER — FLUTICASONE PROPIONATE AND SALMETEROL 500; 50 UG/1; UG/1
1 POWDER RESPIRATORY (INHALATION)
Qty: 1 INHALER | Refills: 5 | Status: SHIPPED | OUTPATIENT
Start: 2017-09-11 | End: 2017-09-16 | Stop reason: SDUPTHER

## 2017-09-11 NOTE — PROGRESS NOTES
Patient is listed on daily census Passport report for visit to [facility] 9/8 for vertigo. Presented with c/o dizziness, blurred vision, and the room spinning. He was given meclizine and felt much better. CT head and MRI brain positive for extensive paranasal sinus disease. Instructed to f/u with Dr. Weston Feliz, cardiology. Contacted patient for ANNETTE follow up. Introduced self and Nurse Navigator role. He reports the only dizzy episode he has had since Friday was an episode this morning lasting around 30 seconds. He took a dose of meclizine this morning. He has 10 tablets total. Encouraged him to call the office to request a refill if he is running low and is still having dizzy spells and he verbalized understanding. Reports he has eye surgery scheduled this week and will call Dr. Weston Feliz afterwards to schedule a follow up. His appointment in June was canceled d/t Dr. Weston Felzi being out of the office for about a month and he never got around to calling back to reschedule. He is aware that this is important. He is aware of appointment with Dr. Sheila Hutson next month. He requested a refill on Advair, so this NN routed request to Dr. Sheila Hutson. Denies any further questions or concerns at this time.

## 2017-09-17 RX ORDER — FLUTICASONE PROPIONATE AND SALMETEROL 500; 50 UG/1; UG/1
1 POWDER RESPIRATORY (INHALATION)
Qty: 3 INHALER | Refills: 1 | Status: SHIPPED | OUTPATIENT
Start: 2017-09-17 | End: 2018-05-23 | Stop reason: SDUPTHER

## 2017-10-11 ENCOUNTER — PATIENT OUTREACH (OUTPATIENT)
Dept: INTERNAL MEDICINE CLINIC | Age: 82
End: 2017-10-11

## 2017-10-11 ENCOUNTER — OFFICE VISIT (OUTPATIENT)
Dept: INTERNAL MEDICINE CLINIC | Age: 82
End: 2017-10-11

## 2017-10-11 ENCOUNTER — HOSPITAL ENCOUNTER (OUTPATIENT)
Dept: LAB | Age: 82
Discharge: HOME | End: 2017-10-11
Payer: MEDICARE

## 2017-10-11 VITALS
DIASTOLIC BLOOD PRESSURE: 77 MMHG | BODY MASS INDEX: 32.13 KG/M2 | RESPIRATION RATE: 18 BRPM | HEART RATE: 69 BPM | WEIGHT: 212 LBS | SYSTOLIC BLOOD PRESSURE: 120 MMHG | OXYGEN SATURATION: 95 % | TEMPERATURE: 97.5 F | HEIGHT: 68 IN

## 2017-10-11 DIAGNOSIS — I10 ESSENTIAL HYPERTENSION: Primary | ICD-10-CM

## 2017-10-11 DIAGNOSIS — Z23 ENCOUNTER FOR IMMUNIZATION: ICD-10-CM

## 2017-10-11 DIAGNOSIS — I48.0 PAROXYSMAL ATRIAL FIBRILLATION (HCC): ICD-10-CM

## 2017-10-11 DIAGNOSIS — I87.2 VENOUS INSUFFICIENCY OF BOTH LOWER EXTREMITIES: ICD-10-CM

## 2017-10-11 DIAGNOSIS — R73.03 BORDERLINE DIABETES MELLITUS: ICD-10-CM

## 2017-10-11 PROCEDURE — 85025 COMPLETE CBC W/AUTO DIFF WBC: CPT

## 2017-10-11 PROCEDURE — 36415 COLL VENOUS BLD VENIPUNCTURE: CPT

## 2017-10-11 PROCEDURE — 80053 COMPREHEN METABOLIC PANEL: CPT

## 2017-10-11 PROCEDURE — 83036 HEMOGLOBIN GLYCOSYLATED A1C: CPT

## 2017-10-11 PROCEDURE — 80061 LIPID PANEL: CPT

## 2017-10-11 NOTE — PROGRESS NOTES
Chief Complaint   Patient presents with    Hypertension     Wt Readings from Last 3 Encounters:   10/11/17 212 lb (96.2 kg)   04/12/17 221 lb (100.2 kg)   03/24/17 221 lb 8 oz (100.5 kg)       BP Readings from Last 3 Encounters:   10/11/17 118/71   04/12/17 126/70   03/24/17 122/80       Cardiovascular Review:  The patient has hypertension, hyperlipidemia, coronary artery disease and status post CABG. Diet and Lifestyle: generally follows a low fat low cholesterol diet, sedentary, nonsmoker  Home BP Monitoring: is not measured at home. In er with new a fib now on eliquis  Pertinent ROS: taking medications as instructed, no medication side effects noted, no TIA's, no chest pain on exertion, no dyspnea on exertion, no swelling of ankles, does note some dizziness when arising, no palpitations. Asthma Review:  The patient is being seen for follow up of asthma, not currently in exacerbation. Asthma symptoms occur: weekly, infrequently. Wheezing when present is described as mild and easily relieved with rescue bronchodilator. Current limitations in activity from asthma: none. Number of days of school or work missed in the last month: 0. Frequency of use of quick-relief meds: prn. Regimen compliance: The patient reports adherence to this regimen. Patient does not smoke cigarettes. Osteoarthritis and Chronic Pain:  Patient has osteoarthritis, primarily affecting the neck. Symptoms onset: several  years ago. Rheumatological ROS: stable, mild-to-moderate joint symptoms intermittently, reasonably well controlled by PRN meds. Response to treatment plan: stable, well controlled and basically asymptomatic.    Patient Active Problem List    Diagnosis Date Noted    Atrial fibrillation ([facility] [address] 03/15/2017    Venous insufficiency 10/11/2016    Alcohol abuse counseling and surveillance 10/11/2016    Advanced directives, counseling/discussion 10/11/2016    Venous insufficiency of both lower extremities 10/11/2016  Essential hypertension 04/99/3231    Umbilical hernia 33/96/8347    Colon polyp 06/08/2011    CAD (coronary artery disease) 04/08/2010    HLD (hyperlipidemia) 04/08/2010    Borderline diabetes mellitus 04/08/2010    Prostate cancer ([facility] [address] 04/08/2010    Asthma 04/08/2010     Current Outpatient Prescriptions   Medication Sig Dispense Refill    fluticasone-salmeterol (ADVAIR DISKUS) 500-50 mcg/dose diskus inhaler Take 1 Puff by inhalation two (2) times daily as needed. 3 Inhaler 1    WELCHOL 625 mg tablet TAKE 3 TABLETS TWICE A DAY WITH MEALS 540 Tab 0    TOPROL XL 25 mg XL tablet TAKE 1 TABLET DAILY 90 Tab 1    atorvastatin (LIPITOR) 80 mg tablet Take 1 Tab by mouth daily. 90 Tab 3    apixaban (ELIQUIS) 5 mg tablet Take 1 Tab by mouth two (2) times a day. 42 Tab 0    MULTIVITAMIN PO Take 1 Tab by mouth daily.  aspirin (ASPIRIN) 325 mg tablet Take 325 mg by mouth daily.  losartan (COZAAR) 25 mg tablet TAKE ONE-HALF (1/2) TABLET DAILY 90 Tab 2      Lab Results   Component Value Date/Time    Cholesterol, total 175 04/12/2017 11:18 AM    HDL Cholesterol 48 04/12/2017 11:18 AM    LDL, calculated 90 04/12/2017 11:18 AM    Triglyceride 187 04/12/2017 11:18 AM    CHOL/HDL Ratio 3.3 06/11/2010 08:25 AM        Results for orders placed or performed in visit on 05/09/17   AMB PT/INR EXTERNAL   Result Value Ref Range    Prothrombin time, External 11.8     INR, External 1.1    AMB EXT CREATININE   Result Value Ref Range    Creatinine, External 1.08        Vitals:    10/11/17 0955   BP: 118/71   Pulse: 69   Resp: 18   Temp: 97.5 °F (36.4 °C)   TempSrc: Oral   SpO2: 95%   Weight: 212 lb (96.2 kg)   Height: 5' 8\" (1.727 m)     Wt Readings from Last 3 Encounters:   10/11/17 212 lb (96.2 kg)   04/12/17 221 lb (100.2 kg)   03/24/17 221 lb 8 oz (100.5 kg)   S1 and S2 normal, no murmurs, clicks, gallops or rubs. Regular rate and rhythm. Chest is clear; no wheezes or rales.  Trace  Edema   With venous disease  Stable status on multiple high risk medications for multiple comorbidities, will not change any of the present treatment plans       The patient is advised to begin progressive daily aerobic exercise program, follow a low fat, low cholesterol diet and attempt to lose weight. Hypertension controlled  Last lipids stable on high doses  Asthma stable    Low saturated fat diet with plenty of insoluble fiber advised. Eat more nuts, whole grain foods, green leafy vegetables, avoid red meats, egg yolks, fried food, and milk products made with whole milk. Drink skim milk if using milk. Refer to [address] [place] [address] for Heart Healthy Dietary advice  Reviewed plan of care, patient has provided input and agrees with goals    Stable status on multiple high risk medications for multiple comorbidities, will not change any of the present treatment plans  Hypertension controlled for age based on guidelines  Cad stable  High risk medications reviewed    Last 3 Recorded Weights in this Encounter    10/11/17 0955   Weight: 212 lb (96.2 kg)     Discussed the patient's above normal BMI with him. I have recommended the following interventions: dietary management education, guidance, and counseling . The BMI follow up plan is as follows: BMI is out of normal parameters and plan is as follows: I have counseled this patient on diet and exercise regimens    Stable status on multiple high risk medications for multiple comorbidities, will not change any of the present treatment plans  Discusses 1 pound weight loss per week is 3500 calories per week       Review of Systems   A comprehensive review of systems was negative except for that written in the HPI.     Physical Examination     Evaluation of Cognitive Function:  Mood/affect:  neutral  Appearance: age appropriate and casually dressed  Family member/caregiver input: no    Visit Vitals    /71 (BP 1 Location: Left arm, BP Patient Position: Sitting)  Pulse 69    Temp 97.5 °F (36.4 °C) (Oral)    Resp 18    Ht 5' 8\" (1.727 m)    Wt 212 lb (96.2 kg)    SpO2 95%    BMI 32.23 kg/m2     General appearance: alert, cooperative, no distress, appears stated age  Lungs: clear to auscultation bilaterally  Heart: irreg irreg rhythm controlled rate  , S1, S2 normal, no murmur, click, rub or gallop  Abdomen: abnormal findings:  Obese        Patient Care Team:  Sylvia Gu MD as PCP - General (Internal Medicine)  Martha Yeager MD as Physician (Cardiology)  Iqra Martines RN as [address] (Cardiology)  Sonya Dasilva, RN as Ambulatory Care Navigator (Internal Medicine)    Advice/Referrals/Counseling   Education and counseling provided:  Are appropriate based on today's review and evaluation  End-of-Life planning (with patient's consent)  Pneumococcal Vaccine  Influenza Vaccine  Cardiovascular screening blood test  Diabetes screening test    Assessment/Plan                 Stable status on multiple high risk medications for multiple comorbidities, will not change any of the present treatment plans    Compression stocks advised previously and not done      He is on eliquis and a beta blocker so a fib not a big issue now    1. Encounter for immunization    - Administration fee () for Medicare insured patients  - Influenza virus vaccine ([address]) 72 years and older ([address])    2. Essential hypertension  controlled  - CBC WITH AUTOMATED DIFF  - LIPID PANEL  - METABOLIC PANEL, COMPREHENSIVE    3. Venous insufficiency of both lower extremities  Stockings advised    4. Paroxysmal atrial fibrillation (HCC)  In a fib today    5.  Borderline diabetes mellitus  monitor  - HEMOGLOBIN A1C W/O EAG

## 2017-10-11 NOTE — MR AVS SNAPSHOT
Visit Information Date & Time Provider Department Dept. Phone Encounter #  
 10/11/2017  9:45 AM Nai Whatley MD [facility] [phone] [id number] Upcoming Health Maintenance Date Due DTaP/Tdap/Td series (1 - Tdap) 9[birth year]4/1956 ZOSTER VACCINE AGE 60> 7/4/1995 GLAUCOMA SCREENING Q2Y 9/4/2000 INFLUENZA AGE 9 TO ADULT 8/1/2017 MEDICARE YEARLY EXAM 4/13/2018 Allergies as of 10/11/2017  Review Complete On: 10/11/2017 By: Matt Kline LPN Severity Noted Reaction Type Reactions Adhesive  07/07/2014    Other (comments) BLISTERS Current Immunizations  Reviewed on 10/11/2017 Name Date Influenza High Dose Vaccine PF  Incomplete, 10/11/2016, 10/8/2015 Influenza Vaccine 9/18/2012 Pneumococcal Polysaccharide (PPSV-23) 1/18/2013 ZZZ-RETIRED (DO NOT USE) Pneumococcal Vaccine (Unspecified Type) 1/2/2006 Reviewed by Matt Kline LPN on 30/94/7768 at 10:01 AM  
You Were Diagnosed With   
  
 Codes Comments Essential hypertension    -  Primary ICD-10-CM: I10 
ICD-9-CM: 401.9 Encounter for immunization     ICD-10-CM: D06 ICD-9-CM: V03.89 Venous insufficiency of both lower extremities     ICD-10-CM: I87.2 ICD-9-CM: 459.81 Paroxysmal atrial fibrillation (HCC)     ICD-10-CM: I48.0 ICD-9-CM: 427.31 Borderline diabetes mellitus     ICD-10-CM: R73.03 
ICD-9-CM: 790.29 Vitals BP Pulse Temp Resp Height(growth percentile) Weight(growth percentile) 120/77 69 97.5 °F (36.4 °C) (Oral) 18 5' 8\" (1.727 m) 212 lb (96.2 kg) SpO2 BMI Smoking Status 95% 32.23 kg/m2 Former Smoker Vitals History BMI and BSA Data Body Mass Index Body Surface Area  
 32.23 kg/m 2 2.15 m 2 Preferred Pharmacy Pharmacy Name Phone [address] [facility] [phone] Your Updated Medication List  
  
   
 This list is accurate as of: 10/11/17 10:20 AM.  Always use your most recent med list.  
  
  
  
  
 apixaban 5 mg tablet Commonly known as:  Katelin Haver Take 1 Tab by mouth two (2) times a day. atorvastatin 80 mg tablet Commonly known as:  LIPITOR Take 1 Tab by mouth daily. fluticasone-salmeterol 500-50 mcg/dose diskus inhaler Commonly known as:  ADVAIR DISKUS Take 1 Puff by inhalation two (2) times daily as needed. MULTIVITAMIN PO Take 1 Tab by mouth daily. TOPROL XL 25 mg XL tablet Generic drug:  metoprolol succinate TAKE 1 TABLET DAILY WELCHOL 625 mg tablet Generic drug:  colesevelam  
TAKE 3 TABLETS TWICE A DAY WITH MEALS We Performed the Following ADMIN INFLUENZA VIRUS VAC [ [facility]] CBC WITH AUTOMATED DIFF [48020 CPT(R)] HEMOGLOBIN A1C W/O EAG [63236 CPT(R)] INFLUENZA VIRUS VACCINE, HIGH DOSE SEASONAL, PRESERVATIVE FREE [43934 CPT(R)] LIPID PANEL [04755 CPT(R)] METABOLIC PANEL, COMPREHENSIVE [10214 CPT(R)] Introducing [facility]! 3 [facility] introduces InboxFever patient portal. Now you can access parts of your medical record, email your doctor's office, and request medication refills online. 1. In your internet browser, go to [email or web address] 2. Click on the First Time User? Click Here link in the Sign In box. You will see the New Member Sign Up page. 3. Enter your InboxFever Access Code exactly as it appears below. You will not need to use this code after youve completed the sign-up process. If you do not sign up before the expiration date, you must request a new code. · InboxFever Access Code: [email or web address] Expires: 1/9/2018 10:20 AM 
 
4. Enter the last four digits of your Social Security Number (xxxx) and Date of Birth (mm/dd/yyyy) as indicated and click Submit. You will be taken to the next sign-up page. 5. Create a InboxFever ID.  This will be your InboxFever login ID and cannot be changed, so think of one that is secure and easy to remember. 6. Create a Emotient password. You can change your password at any time. 7. Enter your Password Reset Question and Answer. This can be used at a later time if you forget your password. 8. Enter your e-mail address. You will receive e-mail notification when new information is available in [address] 9. Click Sign Up. You can now view and download portions of your medical record. 10. Click the Download Summary menu link to download a portable copy of your medical information. If you have questions, please visit the Frequently Asked Questions section of the Emotient website. Remember, Emotient is NOT to be used for urgent needs. For medical emergencies, dial 911. Now available from your iPhone and Android! Please provide this summary of care documentation to your next provider. Your primary care clinician is listed as Casey Rai. If you have any questions after today's visit, please call [phone].

## 2017-10-11 NOTE — PROGRESS NOTES
Patient has completed 30 day transition of care. Attended follow up [facility] appointment with Dr. Jamil Smith on 10/11. Contacted patient for follow up. Inquired about f/u with Dr. Debra Blanchard and he reports he will call Dr. Debra Blanchard to schedule an appointment. Reiterated the importance of f/u since he is taking Eliquis and metoprolol. He verbalized understanding. Reports he feels great and has lost 10 lbs due to cutting back on portion sizes. Denies any complaints currently. Goals met. No other needs identified to Nurse Navigator at this time. Resolving ANNETTE episode.

## 2017-10-11 NOTE — PROGRESS NOTES
Coordination of Care Questions    1. Have you been to the ER, urgent care clinic since your last visit? Yes at [facility] for dizziness      Hospitalized since your last visit? No    2. Have you seen or consulted any other health care providers outside of the [address] [place] [address] since your last visit? Include any pap smears or colon screening.  Yes seen by Dr Kemi Pinzon (cardio)

## 2017-10-12 LAB
ALBUMIN SERPL-MCNC: 4.1 G/DL (ref 3.5–4.7)
ALBUMIN/GLOB SERPL: 1.3 {RATIO} (ref 1.2–2.2)
ALP SERPL-CCNC: 76 IU/L (ref 39–117)
ALT SERPL-CCNC: 28 IU/L (ref 0–44)
AST SERPL-CCNC: 29 IU/L (ref 0–40)
BASOPHILS # BLD AUTO: 0 X10E3/UL (ref 0–0.2)
BASOPHILS NFR BLD AUTO: 0 %
BILIRUB SERPL-MCNC: 0.5 MG/DL (ref 0–1.2)
BUN SERPL-MCNC: 20 MG/DL (ref 8–27)
BUN/CREAT SERPL: 19 (ref 10–24)
CALCIUM SERPL-MCNC: 9.6 MG/DL (ref 8.6–10.2)
CHLORIDE SERPL-SCNC: 99 MMOL/L (ref 96–106)
CHOLEST SERPL-MCNC: 241 MG/DL (ref 100–199)
CO2 SERPL-SCNC: 30 MMOL/L (ref 18–29)
CREAT SERPL-MCNC: 1.08 MG/DL (ref 0.76–1.27)
EOSINOPHIL # BLD AUTO: 1 X10E3/UL (ref 0–0.4)
EOSINOPHIL NFR BLD AUTO: 10 %
ERYTHROCYTE [DISTWIDTH] IN BLOOD BY AUTOMATED COUNT: 13.8 % (ref 12.3–15.4)
GLOBULIN SER CALC-MCNC: 3.1 G/DL (ref 1.5–4.5)
GLUCOSE SERPL-MCNC: 92 MG/DL (ref 65–99)
HBA1C MFR BLD: 5.9 % (ref 4.8–5.6)
HCT VFR BLD AUTO: 45.2 % (ref 37.5–51)
HDLC SERPL-MCNC: 52 MG/DL
HGB BLD-MCNC: 15.2 G/DL (ref 12.6–17.7)
IMM GRANULOCYTES # BLD: 0 X10E3/UL (ref 0–0.1)
IMM GRANULOCYTES NFR BLD: 0 %
INTERPRETATION, 910389: NORMAL
LDLC SERPL CALC-MCNC: 141 MG/DL (ref 0–99)
LYMPHOCYTES # BLD AUTO: 2.8 X10E3/UL (ref 0.7–3.1)
LYMPHOCYTES NFR BLD AUTO: 29 %
MCH RBC QN AUTO: 33 PG (ref 26.6–33)
MCHC RBC AUTO-ENTMCNC: 33.6 G/DL (ref 31.5–35.7)
MCV RBC AUTO: 98 FL (ref 79–97)
MONOCYTES # BLD AUTO: 1 X10E3/UL (ref 0.1–0.9)
MONOCYTES NFR BLD AUTO: 11 %
NEUTROPHILS # BLD AUTO: 4.8 X10E3/UL (ref 1.4–7)
NEUTROPHILS NFR BLD AUTO: 50 %
PLATELET # BLD AUTO: 257 X10E3/UL (ref 150–379)
POTASSIUM SERPL-SCNC: 4.8 MMOL/L (ref 3.5–5.2)
PROT SERPL-MCNC: 7.2 G/DL (ref 6–8.5)
RBC # BLD AUTO: 4.6 X10E6/UL (ref 4.14–5.8)
SODIUM SERPL-SCNC: 143 MMOL/L (ref 134–144)
TRIGL SERPL-MCNC: 241 MG/DL (ref 0–149)
VLDLC SERPL CALC-MCNC: 48 MG/DL (ref 5–40)
WBC # BLD AUTO: 9.7 X10E3/UL (ref 3.4–10.8)

## 2017-10-15 RX ORDER — METOPROLOL SUCCINATE 25 MG/1
TABLET, EXTENDED RELEASE ORAL
Qty: 90 TAB | Refills: 1 | Status: SHIPPED | OUTPATIENT
Start: 2017-10-15 | End: 2018-04-05 | Stop reason: SDUPTHER

## 2017-10-17 NOTE — PROGRESS NOTES
Writer spoke with patient and informed him per Dr Amrit Herman No diabetes, Writer asked if patient was compliant with Lipitor 80mg patient states he is complaint and has not stopped taking it.  Please advise if there is anything else the patient should do

## 2017-10-27 RX ORDER — COLESEVELAM HYDROCHLORIDE 625 MG/1
TABLET, FILM COATED ORAL
Qty: 540 TAB | Refills: 0 | Status: SHIPPED | OUTPATIENT
Start: 2017-10-27 | End: 2018-01-25 | Stop reason: SDUPTHER

## 2018-01-25 RX ORDER — COLESEVELAM HYDROCHLORIDE 625 MG/1
TABLET, FILM COATED ORAL
Qty: 540 TAB | Refills: 0 | Status: SHIPPED | OUTPATIENT
Start: 2018-01-25 | End: 2018-04-05 | Stop reason: SDUPTHER

## 2018-04-05 ENCOUNTER — HOSPITAL ENCOUNTER (OUTPATIENT)
Dept: LAB | Age: 83
Discharge: HOME | End: 2018-04-05
Payer: MEDICARE

## 2018-04-05 ENCOUNTER — OFFICE VISIT (OUTPATIENT)
Dept: INTERNAL MEDICINE CLINIC | Age: 83
End: 2018-04-05

## 2018-04-05 VITALS
WEIGHT: 215 LBS | OXYGEN SATURATION: 94 % | HEIGHT: 68 IN | DIASTOLIC BLOOD PRESSURE: 80 MMHG | SYSTOLIC BLOOD PRESSURE: 141 MMHG | RESPIRATION RATE: 15 BRPM | TEMPERATURE: 97.8 F | HEART RATE: 55 BPM | BODY MASS INDEX: 32.58 KG/M2

## 2018-04-05 DIAGNOSIS — I48.91 ATRIAL FIBRILLATION BY ELECTROCARDIOGRAM (HCC): ICD-10-CM

## 2018-04-05 DIAGNOSIS — E78.5 HYPERLIPIDEMIA, UNSPECIFIED HYPERLIPIDEMIA TYPE: ICD-10-CM

## 2018-04-05 DIAGNOSIS — I25.10 CORONARY ARTERY DISEASE INVOLVING NATIVE CORONARY ARTERY OF NATIVE HEART WITHOUT ANGINA PECTORIS: Primary | ICD-10-CM

## 2018-04-05 DIAGNOSIS — I48.21 PERMANENT ATRIAL FIBRILLATION (HCC): ICD-10-CM

## 2018-04-05 DIAGNOSIS — Z71.41 ALCOHOL ABUSE COUNSELING AND SURVEILLANCE: ICD-10-CM

## 2018-04-05 DIAGNOSIS — Z00.00 MEDICARE ANNUAL WELLNESS VISIT, SUBSEQUENT: ICD-10-CM

## 2018-04-05 DIAGNOSIS — Z13.39 SCREENING FOR ALCOHOLISM: ICD-10-CM

## 2018-04-05 PROCEDURE — 80053 COMPREHEN METABOLIC PANEL: CPT

## 2018-04-05 PROCEDURE — 80061 LIPID PANEL: CPT

## 2018-04-05 PROCEDURE — 36415 COLL VENOUS BLD VENIPUNCTURE: CPT

## 2018-04-05 PROCEDURE — 85025 COMPLETE CBC W/AUTO DIFF WBC: CPT

## 2018-04-05 RX ORDER — METOPROLOL SUCCINATE 25 MG/1
TABLET, EXTENDED RELEASE ORAL
Qty: 90 TAB | Refills: 1 | Status: SHIPPED | OUTPATIENT
Start: 2018-04-05 | End: 2021-07-02 | Stop reason: SDUPTHER

## 2018-04-05 RX ORDER — COLESEVELAM 180 1/1
TABLET ORAL
Qty: 540 TAB | Refills: 0 | Status: SHIPPED | OUTPATIENT
Start: 2018-04-05 | End: 2021-07-21 | Stop reason: SDUPTHER

## 2018-04-05 RX ORDER — ATORVASTATIN CALCIUM 80 MG/1
80 TABLET, FILM COATED ORAL DAILY
Qty: 90 TAB | Refills: 3 | Status: SHIPPED | OUTPATIENT
Start: 2018-04-05 | End: 2019-10-24

## 2018-04-05 NOTE — PROGRESS NOTES
Coordination of Care Questions    1. Have you been to the ER, urgent care clinic since your last visit? No       Hospitalized since your last visit? No    2. Have you seen or consulted any other health care providers outside of the [address] [place] [address] since your last visit? Include any pap smears or colon screening.  No

## 2018-04-05 NOTE — PROGRESS NOTES
Chief Complaint   Patient presents with    Hypertension     Chief Complaint   Patient presents with    Hypertension     Wt Readings from Last 3 Encounters:   04/05/18 215 lb (97.5 kg)   10/11/17 212 lb (96.2 kg)   04/12/17 221 lb (100.2 kg)       BP Readings from Last 3 Encounters:   04/05/18 141/80   10/11/17 120/77   04/12/17 126/70       Cardiovascular Review:  The patient has hypertension, hyperlipidemia, coronary artery disease and status post CABG. Diet and Lifestyle: generally follows a low fat low cholesterol diet, sedentary, nonsmoker  Home BP Monitoring: is not measured at home. In er with new a fib now on eliquis  Pertinent ROS: taking medications as instructed, no medication side effects noted, no TIA's, no chest pain on exertion, no dyspnea on exertion, no swelling of ankles, does note some dizziness when arising, no palpitations. Asthma Review:  The patient is being seen for follow up of asthma, not currently in exacerbation. Asthma symptoms occur: weekly, infrequently. Wheezing when present is described as mild and easily relieved with rescue bronchodilator. Current limitations in activity from asthma: none. Number of days of school or work missed in the last month: 0. Frequency of use of quick-relief meds: prn. Regimen compliance: The patient reports adherence to this regimen. Patient does not smoke cigarettes. Osteoarthritis and Chronic Pain:  Patient has osteoarthritis, primarily affecting the neck. Symptoms onset: several  years ago. Rheumatological ROS: stable, mild-to-moderate joint symptoms intermittently, reasonably well controlled by PRN meds. Response to treatment plan: stable, well controlled and basically asymptomatic.    Patient Active Problem List    Diagnosis Date Noted    Atrial fibrillation ([address] 03/15/2017    Venous insufficiency 10/11/2016    Alcohol abuse counseling and surveillance 10/11/2016    Advanced directives, counseling/discussion 10/11/2016    Venous insufficiency of both lower extremities 10/11/2016    Essential hypertension 61/93/2239    Umbilical hernia 37/84/6636    Colon polyp 06/08/2011    CAD (coronary artery disease) 04/08/2010    HLD (hyperlipidemia) 04/08/2010    Borderline diabetes mellitus 04/08/2010    Prostate cancer ([address] 04/08/2010    Asthma 04/08/2010     Current Outpatient Prescriptions   Medication Sig Dispense Refill    varicella-zoster recombinant, PF, (SHINGRIX, PF,) 50 mcg/0.5 mL susr injection 0.5 mL by IntraMUSCular route once for 1 dose. Indications: PREVENTION OF HERPES ZOSTER 0.5 mL 1    WELCHOL 625 mg tablet TAKE 3 TABLETS TWICE A DAY WITH MEALS 540 Tab 0    TOPROL XL 25 mg XL tablet TAKE 1 TABLET DAILY 90 Tab 1    fluticasone-salmeterol (ADVAIR DISKUS) 500-50 mcg/dose diskus inhaler Take 1 Puff by inhalation two (2) times daily as needed. 3 Inhaler 1    atorvastatin (LIPITOR) 80 mg tablet Take 1 Tab by mouth daily. 90 Tab 3    apixaban (ELIQUIS) 5 mg tablet Take 1 Tab by mouth two (2) times a day. 42 Tab 0    MULTIVITAMIN PO Take 1 Tab by mouth daily. Lab Results   Component Value Date/Time    Cholesterol, total 241 (H) 10/11/2017 10:26 AM    HDL Cholesterol 52 10/11/2017 10:26 AM    LDL, calculated 141 (H) 10/11/2017 10:26 AM    Triglyceride 241 (H) 10/11/2017 10:26 AM    CHOL/HDL Ratio 3.3 06/11/2010 08:25 AM        Results for orders placed or performed in visit on 10/11/17   CBC WITH AUTOMATED DIFF   Result Value Ref Range    WBC 9.7 3.4 - 10.8 x10E3/uL    RBC 4.60 4.14 - 5.80 x10E6/uL    HGB 15.2 12.6 - 17.7 g/dL    HCT 45.2 37.5 - 51.0 %    MCV 98 (H) 79 - 97 fL    MCH 33.0 26.6 - 33.0 pg    MCHC 33.6 31.5 - 35.7 g/dL    RDW 13.8 12.3 - 15.4 %    PLATELET 178 495 - 850 x10E3/uL    NEUTROPHILS 50 Not Estab. %    Lymphocytes 29 Not Estab. %    MONOCYTES 11 Not Estab. %    EOSINOPHILS 10 Not Estab. %    BASOPHILS 0 Not Estab. %    ABS.  NEUTROPHILS 4.8 1.4 - 7.0 x10E3/uL    Abs Lymphocytes 2.8 0.7 - 3.1 x10E3/uL    ABS. MONOCYTES 1.0 (H) 0.1 - 0.9 x10E3/uL    ABS. EOSINOPHILS 1.0 (H) 0.0 - 0.4 x10E3/uL    ABS. BASOPHILS 0.0 0.0 - 0.2 x10E3/uL    IMMATURE GRANULOCYTES 0 Not Estab. %    ABS. IMM. GRANS. 0.0 0.0 - 0.1 x10E3/uL   LIPID PANEL   Result Value Ref Range    Cholesterol, total 241 (H) 100 - 199 mg/dL    Triglyceride 241 (H) 0 - 149 mg/dL    HDL Cholesterol 52 >39 mg/dL    VLDL, calculated 48 (H) 5 - 40 mg/dL    LDL, calculated 141 (H) 0 - 99 mg/dL   METABOLIC PANEL, COMPREHENSIVE   Result Value Ref Range    Glucose 92 65 - 99 mg/dL    BUN 20 8 - 27 mg/dL    Creatinine 1.08 0.76 - 1.27 mg/dL    GFR est non-AA 64 >59 mL/min/1.73    GFR est AA 74 >59 mL/min/1.73    BUN/Creatinine ratio 19 10 - 24    Sodium 143 134 - 144 mmol/L    Potassium 4.8 3.5 - 5.2 mmol/L    Chloride 99 96 - 106 mmol/L    CO2 30 (H) 18 - 29 mmol/L    Calcium 9.6 8.6 - 10.2 mg/dL    Protein, total 7.2 6.0 - 8.5 g/dL    Albumin 4.1 3.5 - 4.7 g/dL    GLOBULIN, TOTAL 3.1 1.5 - 4.5 g/dL    A-G Ratio 1.3 1.2 - 2.2    Bilirubin, total 0.5 0.0 - 1.2 mg/dL    Alk. phosphatase 76 39 - 117 IU/L    AST (SGOT) 29 0 - 40 IU/L    ALT (SGPT) 28 0 - 44 IU/L   HEMOGLOBIN A1C W/O EAG   Result Value Ref Range    Hemoglobin A1c 5.9 (H) 4.8 - 5.6 %   CVD REPORT   Result Value Ref Range    INTERPRETATION Note        Vitals:    04/05/18 0943 04/05/18 0959   BP: 131/83 141/80   Pulse: (!) 55    Resp: 15    Temp: 97.8 °F (36.6 °C)    TempSrc: Oral    SpO2: 94%    Weight: 215 lb (97.5 kg)    Height: 5' 8\" (1.727 m)      Wt Readings from Last 3 Encounters:   04/05/18 215 lb (97.5 kg)   10/11/17 212 lb (96.2 kg)   04/12/17 221 lb (100.2 kg)   S1 and S2 normal, no murmurs, clicks, gallops or rubs. Regular rate and rhythm. Chest is clear; no wheezes or rales.  Trace  Edema   With venous disease  Stable status on multiple high risk medications for multiple comorbidities, will not change any of the present treatment plans       The patient is advised to begin progressive daily aerobic exercise program, follow a low fat, low cholesterol diet and attempt to lose weight. Hypertension controlled  Last lipids stable on high doses  Asthma stable    Low saturated fat diet with plenty of insoluble fiber advised. Eat more nuts, whole grain foods, green leafy vegetables, avoid red meats, egg yolks, fried food, and milk products made with whole milk. Drink skim milk if using milk. Refer to [address] [place] [address] for Heart Healthy Dietary advice  Reviewed plan of care, patient has provided input and agrees with goals    Stable status on multiple high risk medications for multiple comorbidities, will not change any of the present treatment plans  Hypertension controlled for age based on guidelines  Cad stable  High risk medications reviewed    Last 3 Recorded Weights in this Encounter    04/05/18 0943   Weight: 215 lb (97.5 kg)     Discussed the patient's above normal BMI with him. I have recommended the following interventions: dietary management education, guidance, and counseling . The BMI follow up plan is as follows: BMI is out of normal parameters and plan is as follows: I have counseled this patient on diet and exercise regimens    Stable status on multiple high risk medications for multiple comorbidities, will not change any of the present treatment plans  Discusses 1 pound weight loss per week is 3500 calories per week       Review of Systems   A comprehensive review of systems was negative except for that written in the HPI.     Physical Examination     Evaluation of Cognitive Function:  Mood/affect:  neutral  Appearance: age appropriate and casually dressed  Family member/caregiver input: no    Visit Vitals    /80    Pulse (!) 55    Temp 97.8 °F (36.6 °C) (Oral)    Resp 15    Ht 5' 8\" (1.727 m)    Wt 215 lb (97.5 kg)    SpO2 94%    BMI 32.69 kg/m2     General appearance: alert, cooperative, no distress, appears stated age  Lungs: clear to auscultation bilaterally  Heart: irreg irreg rhythm controlled rate  , S1, S2 normal, no murmur, click, rub or gallop  Abdomen: abnormal findings:  Obese        Patient Care Team:  Chu Sparks MD as PCP - General (Internal Medicine)  William De La Rosa MD as Physician (Cardiology)  Zehra Schaeffer, RN as [facility] (Cardiology)  Afua Lopez, RN as Ambulatory Care Navigator (Internal Medicine)    Advice/Referrals/Counseling   Education and counseling provided:  Are appropriate based on today's review and evaluation  End-of-Life planning (with patient's consent)  Pneumococcal Vaccine  Influenza Vaccine  Cardiovascular screening blood test  Diabetes screening test    Assessment/Plan                 Stable status on multiple high risk medications for multiple comorbidities, will not change any of the present treatment plans    Compression stocks advised previously and not done      He is on eliquis and a beta blocker so a fib not a big issue now    1. Encounter for immunization    - Administration fee () for Medicare insured patients  - Influenza virus vaccine ([address]) 72 years and older ([address])    2. Essential hypertension  controlled  - CBC WITH AUTOMATED DIFF  - LIPID PANEL  - METABOLIC PANEL, COMPREHENSIVE    3. Venous insufficiency of both lower extremities  Stockings advised    4. Paroxysmal atrial fibrillation (HCC)  In a fib today    5. Borderline diabetes mellitus  monitor  - HEMOGLOBIN A1C W/O EAG      This is the Subsequent Medicare Annual Wellness Exam, performed 12 months or more after the Initial AWV or the last Subsequent AWV    I have reviewed the patient's medical history in detail and updated the computerized patient record.      History     Past Medical History:   Diagnosis Date    Asthma     BRONCITITS, INHALER USE PRN    CAD (coronary artery disease)     MI    Cancer ([facility] 2006    PROTATE    Diabetes ([facility]     BORDERLINE    Hypercholesterolemia     Hypertension     Umbilical hernia 0/41/8382      Past Surgical History:   Procedure Laterality Date    CARDIAC SURG PROCEDURE UNLIST  1993    CABG X4 VESSEL    CARDIAC SURG PROCEDURE UNLIST  1997, 1999    CARDIAC CATH, STENTS    ENDOSCOPY, COLON, DIAGNOSTIC  01/02/2006    HX GI      COLONOSCOPY    HX HEENT Bilateral     CATARACTS/ IOL    HX HERNIA REPAIR  7/2014    HX PROSTATECTOMY  01/02/2006     Current Outpatient Prescriptions   Medication Sig Dispense Refill    varicella-zoster recombinant, PF, (SHINGRIX, PF,) 50 mcg/0.5 mL susr injection 0.5 mL by IntraMUSCular route once for 1 dose. Indications: PREVENTION OF HERPES ZOSTER 0.5 mL 1    atorvastatin (LIPITOR) 80 mg tablet Take 1 Tab by mouth daily. 90 Tab 3    metoprolol succinate (TOPROL XL) 25 mg XL tablet TAKE 1 TABLET DAILY 90 Tab 1    colesevelam (WELCHOL) 625 mg tablet TAKE 3 TABLETS TWICE A DAY WITH MEALS 540 Tab 0    fluticasone-salmeterol (ADVAIR DISKUS) 500-50 mcg/dose diskus inhaler Take 1 Puff by inhalation two (2) times daily as needed. 3 Inhaler 1    apixaban (ELIQUIS) 5 mg tablet Take 1 Tab by mouth two (2) times a day. 42 Tab 0    MULTIVITAMIN PO Take 1 Tab by mouth daily.        Allergies   Allergen Reactions    Adhesive Other (comments)     BLISTERS     Family History   Problem Relation Age of Onset    Stroke Father     Anesth Problems Neg Hx      Social History   Substance Use Topics    Smoking status: Former Smoker     Packs/day: 1.00     Types: Cigarettes     Quit date: 6/10/1964    Smokeless tobacco: Never Used    Alcohol use 9.0 oz/week     12 Cans of beer, 6 Standard drinks or equivalent per week      Comment: casual     Patient Active Problem List   Diagnosis Code    CAD (coronary artery disease) I25.10    HLD (hyperlipidemia) E78.5    Borderline diabetes mellitus R73.03    Prostate cancer ([facility] C61    Asthma J45.909    Colon polyp S60.7    Umbilical hernia J19.1    Essential hypertension I10    Venous insufficiency I87.2    Alcohol abuse counseling and surveillance Z71.41    Advanced directives, counseling/discussion Z71.89    Venous insufficiency of both lower extremities I87.2    Atrial fibrillation (HCC) I48.91       Depression Risk Factor Screening:     PHQ over the last two weeks 4/5/2018   Little interest or pleasure in doing things Not at all   Feeling down, depressed or hopeless Not at all   Total Score PHQ 2 0     Alcohol Risk Factor Screening: On any occasion in the past three months you have had more than 7 drinks containing alcohol    Functional Ability and Level of Safety:   Hearing Loss  Hearing is good. Activities of Daily Living  The home contains: no safety equipment. Patient does total self care    Fall Risk  Fall Risk Assessment, last 12 mths 4/5/2018   Able to walk? Yes   Fall in past 12 months? No       Abuse Screen  Patient is not abused    Cognitive Screening   Evaluation of Cognitive Function:  Has your family/caregiver stated any concerns about your memory: no  Normal    Patient Care Team   Patient Care Team:  Eva Ordaz MD as PCP - General (Internal Medicine)  Arsalan Long MD as Physician (Cardiology)  Shady Iraheta RN as [address] [place] [address] (Cardiology)  Donte Rose RN as Ambulatory Care Navigator (Internal Medicine)    Assessment/Plan   Education and counseling provided:  Are appropriate based on today's review and evaluation  shingles vaccine    Diagnoses and all orders for this visit:    1. Coronary artery disease involving native coronary artery of native heart without angina pectoris    2. Hyperlipidemia, unspecified hyperlipidemia type  -     atorvastatin (LIPITOR) 80 mg tablet; Take 1 Tab by mouth daily. 3. Atrial fibrillation by electrocardiogram ([facility]    4. Alcohol abuse counseling and surveillance    5. Medicare annual wellness visit, subsequent    6.  Screening for alcoholism  -     Annual  Alcohol Screen 15 min ()    Other orders  - varicella-zoster recombinant, PF, (SHINGRIX, PF,) 50 mcg/0.5 mL susr injection; 0.5 mL by IntraMUSCular route once for 1 dose. Indications: PREVENTION OF HERPES ZOSTER  -     metoprolol succinate (TOPROL XL) 25 mg XL tablet; TAKE 1 TABLET DAILY  -     colesevelam (WELCHOL) 625 mg tablet; TAKE 3 TABLETS TWICE A DAY WITH MEALS        Health Maintenance Due   Topic Date Due    DTaP/Tdap/Td series (1 - Tdap) 09/04/1956    ZOSTER VACCINE AGE 60>  07/04/1995    GLAUCOMA SCREENING Q2Y  09/04/2000   1. Hyperlipidemia, unspecified hyperlipidemia type  He is compliant now  - atorvastatin (LIPITOR) 80 mg tablet; Take 1 Tab by mouth daily. Dispense: 90 Tab; Refill: 3    2. Coronary artery disease involving native coronary artery of native heart without angina pectoris  No symptoms  - CBC WITH AUTOMATED DIFF  - LIPID PANEL  - METABOLIC PANEL, COMPREHENSIVE    3. Atrial fibrillation by electrocardiogram ([facility]  At times    4. Alcohol abuse counseling and surveillance  moderatye beer only    5. Medicare annual wellness visit, subsequent  exercise    6. Screening for alcoholism  yes  - Annual  Alcohol Screen 15 min ()    7.  Permanent atrial fibrillation (HCC)  On noac

## 2018-04-05 NOTE — MR AVS SNAPSHOT
[address] [place] [address] [address] 
[phone] Patient: Bharti Hernández MRN:  [facility]:6/3/1886 Visit Information Date & Time Provider Department Dept. Phone Encounter #  
 4/5/2018  9:45 AM Sydney Carranza MD [facility] [phone] [id number] Your Appointments 10/9/2018 10:00 AM  
ROUTINE CARE with Sydney Carranza MD  
[facility] Appt Note: R F/U  
 [place] [address] [facility] [address]  
[address] [address] [address] [facility] [address] [address] Upcoming Health Maintenance Date Due DTaP/Tdap/Td series (1 - Tdap) 9/4/1956 ZOSTER VACCINE AGE 60> 7/4/1995 GLAUCOMA SCREENING Q2Y 9/4/2000 MEDICARE YEARLY EXAM 4/13/2018 Allergies as of 4/5/2018  Review Complete On: 4/5/2018 By: Josef Wooten LPN Severity Noted Reaction Type Reactions Adhesive  07/07/2014    Other (comments) BLISTERS Current Immunizations  Reviewed on 10/11/2017 Name Date Influenza High Dose Vaccine PF 10/11/2017, 10/11/2016, 10/8/2015 Influenza Vaccine 9/18/2012 Pneumococcal Polysaccharide (PPSV-23) 1/18/2013 ZZZ-RETIRED (DO NOT USE) Pneumococcal Vaccine (Unspecified Type) 1/2/2006 Not reviewed this visit You Were Diagnosed With   
  
 Codes Comments Coronary artery disease involving native coronary artery of native heart without angina pectoris    -  Primary ICD-10-CM: I25.10 ICD-9-CM: 414.01 Hyperlipidemia, unspecified hyperlipidemia type     ICD-10-CM: E78.5 ICD-9-CM: 272.4 Atrial fibrillation by electrocardiogram [facility])     ICD-10-CM: I48.91 
ICD-9-CM: 427.31 Alcohol abuse counseling and surveillance     ICD-10-CM: Z71.41 
ICD-9-CM: V65.42 Medicare annual wellness visit, subsequent     ICD-10-CM: Z00.00 ICD-9-CM: V70.0 Screening for alcoholism     ICD-10-CM: Z13.89 ICD-9-CM: V79.1 Permanent atrial fibrillation (HCC)     ICD-10-CM: H56.6 ICD-9-CM: 427.31 Vitals BP Pulse Temp Resp Height(growth percentile) Weight(growth percentile) 141/80 (!) 55 97.8 °F (36.6 °C) (Oral) 15 5' 8\" (1.727 m) 215 lb (97.5 kg) SpO2 BMI Smoking Status 94% 32.69 kg/m2 Former Smoker Vitals History BMI and BSA Data Body Mass Index Body Surface Area  
 32.69 kg/m 2 2.16 m 2 Preferred Pharmacy Pharmacy Name Phone Isidro Strange, [facility] [phone] Your Updated Medication List  
  
   
This list is accurate as of 18 10:12 AM.  Always use your most recent med list.  
  
  
  
  
 apixaban 5 mg tablet Commonly known as:  Theora Boots Take 1 Tab by mouth two (2) times a day. atorvastatin 80 mg tablet Commonly known as:  LIPITOR Take 1 Tab by mouth daily. colesevelam 625 mg tablet Commonly known as:  WELCHOL  
TAKE 3 TABLETS TWICE A DAY WITH MEALS  
  
 fluticasone-salmeterol 500-50 mcg/dose diskus inhaler Commonly known as:  ADVAIR DISKUS Take 1 Puff by inhalation two (2) times daily as needed. metoprolol succinate 25 mg XL tablet Commonly known as:  TOPROL XL  
TAKE 1 TABLET DAILY MULTIVITAMIN PO Take 1 Tab by mouth daily. varicella-zoster recombinant (PF) 50 mcg/0.5 mL Susr injection Commonly known as:  SHINGRIX (PF)  
0.5 mL by IntraMUSCular route once for 1 dose. Indications: PREVENTION OF HERPES ZOSTER Prescriptions Printed Refills  
 varicella-zoster recombinant, PF, (SHINGRIX, PF,) 50 mcg/0.5 mL susr injection 1 Si.5 mL by IntraMUSCular route once for 1 dose. Indications: PREVENTION OF HERPES ZOSTER Class: Print Route: IntraMUSCular Prescriptions Sent to Pharmacy  Refills  
 atorvastatin (LIPITOR) 80 mg tablet 3  
 Sig: Take 1 Tab by mouth daily. Class: Normal  
 Pharmacy: [address], [facility] Ph #: [phone] Route: Oral  
 metoprolol succinate (TOPROL XL) 25 mg XL tablet 1 Sig: TAKE 1 TABLET DAILY Class: Normal  
 Pharmacy: [address], [address] [place] [address] Ph #: [phone]  
 [facility]) 625 mg tablet 0 Sig: TAKE 3 TABLETS TWICE A DAY WITH MEALS Class: Normal  
 Pharmacy: [address], [address] [place] [address] Ph #: [phone] We Performed the Following CBC WITH AUTOMATED DIFF [28376 CPT(R)] LIPID PANEL [89860 CPT(R)] METABOLIC PANEL, COMPREHENSIVE [34112 CPT(R)] GA ANNUAL ALCOHOL SCREEN 15 MIN [id number] [facility]] Patient Instructions Medicare Wellness Visit, Male The best way to live healthy is to have a healthy lifestyle by eating a well-balanced diet, exercising regularly, limiting alcohol and stopping smoking. Regular physical exams and screening tests are another way to keep healthy. Preventive exams provided by your health care provider can find health problems before they become diseases or illnesses. Preventive services including immunizations, screening tests, monitoring and exams can help you take care of your own health. All people over age 72 should have a pneumovax  and and a prevnar shot to prevent pneumonia. These are once in a lifetime unless you and your provider decide differently. All people over 65 should have a yearly flu shot and a tetanus vaccine every 10 years. Screening for diabetes mellitus with a blood sugar test should be done every year.  
 
Glaucoma is a disease of the eye due to increased ocular pressure that can lead to blindness and it should be done every year by an eye professional. 
 
Cardiovascular screening tests that check for elevated lipids (fatty part of blood) which can lead to heart disease and strokes should be done every 5 years. Colorectal screening that evaluates for blood or polyps in your colon should be done yearly as a stool test or every five years as a flexible sigmoidoscope or every 10 years as a colonoscopy up to age 76. Men up to age 76 may need a screening blood test for prostate cancer at certain intervals, depending on their personal and family history. This decision is between the patient and his provider. If you have been a smoker or had family history of abdominal aortic aneurysms, you and your provider may decide to schedule an ultrasound test of your aorta. Hepatitis C screening is also recommended for anyone born between [birth year] through [address]. A shingles vaccine is also recommended once in a lifetime after age 61. Your Medicare Wellness Exam is recommended annually. Here is a list of your current Health Maintenance items with a due date: 
Health Maintenance Due Topic Date Due  
 DTaP/Tdap/Td  (1 - Tdap) 09/04/1956  Shingles Vaccine  07/04/1995  Glaucoma Screening   09/04/2000 Introducing [facility] SERVICES! Sheldon Curtis introduces [employer] patient portal. Now you can access parts of your medical record, email your doctor's office, and request medication refills online. 1. In your internet browser, go to [email or web address] 2. Click on the First Time User? Click Here link in the Sign In box. You will see the New Member Sign Up page. 3. Enter your [employer] Access Code exactly as it appears below. You will not need to use this code after youve completed the sign-up process. If you do not sign up before the expiration date, you must request a new code. · [employer] Access Code: [email or web address] Expires: 7/4/2018 10:05 AM 
 
4. Enter the last four digits of your Social Security Number (xxxx) and Date of Birth (mm/dd/yyyy) as indicated and click Submit.  You will be taken to the next sign-up page. 5. Create a [employer] ID. This will be your [employer] login ID and cannot be changed, so think of one that is secure and easy to remember. 6. Create a [employer] password. You can change your password at any time. 7. Enter your Password Reset Question and Answer. This can be used at a later time if you forget your password. 8. Enter your e-mail address. You will receive e-mail notification when new information is available in [address] 9. Click Sign Up. You can now view and download portions of your medical record. 10. Click the Download Summary menu link to download a portable copy of your medical information. If you have questions, please visit the Frequently Asked Questions section of the [employer] website. Remember, [employer] is NOT to be used for urgent needs. For medical emergencies, dial 911. Now available from your iPhone and Android! Please provide this summary of care documentation to your next provider. Your primary care clinician is listed as Carmella Estimable. If you have any questions after today's visit, please call [phone].

## 2018-04-05 NOTE — PATIENT INSTRUCTIONS

## 2018-04-05 NOTE — LETTER
4/5/2018 Orlando Riggins [address] [id number] Dear Orlando Riggins, Our records indicate that you are due for a Medicare Annual Wellness visit after 4/13/2018. This is a benefit provided by Medicare, for all part B beneficiaries. I would like all of our patients to take advantage of this visit because it allows us to work with you to review your preventative care plan in addition to your regularly scheduled follow ups. Please call our office at [phone] to schedule this appointment at your earliest convenience. Sincerely, Corinna Ward MD  
[facility] [place] [address] [facility] 
[phone]

## 2018-04-06 LAB
ALBUMIN SERPL-MCNC: 4 G/DL (ref 3.5–4.7)
ALBUMIN/GLOB SERPL: 1.2 {RATIO} (ref 1.2–2.2)
ALP SERPL-CCNC: 70 IU/L (ref 39–117)
ALT SERPL-CCNC: 23 IU/L (ref 0–44)
AST SERPL-CCNC: 29 IU/L (ref 0–40)
BASOPHILS # BLD AUTO: 0 X10E3/UL (ref 0–0.2)
BASOPHILS NFR BLD AUTO: 0 %
BILIRUB SERPL-MCNC: 0.3 MG/DL (ref 0–1.2)
BUN SERPL-MCNC: 25 MG/DL (ref 8–27)
BUN/CREAT SERPL: 21 (ref 10–24)
CALCIUM SERPL-MCNC: 9.7 MG/DL (ref 8.6–10.2)
CHLORIDE SERPL-SCNC: 102 MMOL/L (ref 96–106)
CHOLEST SERPL-MCNC: 227 MG/DL (ref 100–199)
CO2 SERPL-SCNC: 24 MMOL/L (ref 18–29)
CREAT SERPL-MCNC: 1.17 MG/DL (ref 0.76–1.27)
EOSINOPHIL # BLD AUTO: 0.9 X10E3/UL (ref 0–0.4)
EOSINOPHIL NFR BLD AUTO: 12 %
ERYTHROCYTE [DISTWIDTH] IN BLOOD BY AUTOMATED COUNT: 13.8 % (ref 12.3–15.4)
GFR SERPLBLD CREATININE-BSD FMLA CKD-EPI: 58 ML/MIN/1.73
GFR SERPLBLD CREATININE-BSD FMLA CKD-EPI: 67 ML/MIN/1.73
GLOBULIN SER CALC-MCNC: 3.3 G/DL (ref 1.5–4.5)
GLUCOSE SERPL-MCNC: 117 MG/DL (ref 65–99)
HCT VFR BLD AUTO: 45.7 % (ref 37.5–51)
HDLC SERPL-MCNC: 52 MG/DL
HGB BLD-MCNC: 14.6 G/DL (ref 13–17.7)
IMM GRANULOCYTES # BLD: 0 X10E3/UL (ref 0–0.1)
IMM GRANULOCYTES NFR BLD: 0 %
INTERPRETATION, 910389: NORMAL
INTERPRETATION: NORMAL
LDLC SERPL CALC-MCNC: 142 MG/DL (ref 0–99)
LYMPHOCYTES # BLD AUTO: 2.5 X10E3/UL (ref 0.7–3.1)
LYMPHOCYTES NFR BLD AUTO: 32 %
MCH RBC QN AUTO: 31.7 PG (ref 26.6–33)
MCHC RBC AUTO-ENTMCNC: 31.9 G/DL (ref 31.5–35.7)
MCV RBC AUTO: 99 FL (ref 79–97)
MONOCYTES # BLD AUTO: 0.8 X10E3/UL (ref 0.1–0.9)
MONOCYTES NFR BLD AUTO: 11 %
NEUTROPHILS # BLD AUTO: 3.4 X10E3/UL (ref 1.4–7)
NEUTROPHILS NFR BLD AUTO: 45 %
PDF IMAGE, 910387: NORMAL
PLATELET # BLD AUTO: 224 X10E3/UL (ref 150–379)
POTASSIUM SERPL-SCNC: 5 MMOL/L (ref 3.5–5.2)
PROT SERPL-MCNC: 7.3 G/DL (ref 6–8.5)
RBC # BLD AUTO: 4.61 X10E6/UL (ref 4.14–5.8)
SODIUM SERPL-SCNC: 145 MMOL/L (ref 134–144)
TRIGL SERPL-MCNC: 164 MG/DL (ref 0–149)
VLDLC SERPL CALC-MCNC: 33 MG/DL (ref 5–40)
WBC # BLD AUTO: 7.8 X10E3/UL (ref 3.4–10.8)

## 2018-05-23 RX ORDER — FLUTICASONE PROPIONATE AND SALMETEROL 50; 500 UG/1; UG/1
POWDER RESPIRATORY (INHALATION)
Qty: 180 EACH | Refills: 1 | Status: SHIPPED | OUTPATIENT
Start: 2018-05-23 | End: 2019-07-16 | Stop reason: SDUPTHER

## 2018-09-25 DIAGNOSIS — I25.10 CORONARY ARTERY DISEASE INVOLVING NATIVE CORONARY ARTERY OF NATIVE HEART WITHOUT ANGINA PECTORIS: ICD-10-CM

## 2018-09-25 DIAGNOSIS — I10 ESSENTIAL HYPERTENSION: ICD-10-CM

## 2018-09-25 DIAGNOSIS — E78.2 MIXED HYPERLIPIDEMIA: ICD-10-CM

## 2018-09-25 DIAGNOSIS — I48.19 PERSISTENT ATRIAL FIBRILLATION (HCC): ICD-10-CM

## 2018-10-09 ENCOUNTER — OFFICE VISIT (OUTPATIENT)
Dept: INTERNAL MEDICINE CLINIC | Age: 83
End: 2018-10-09

## 2018-10-09 ENCOUNTER — HOSPITAL ENCOUNTER (OUTPATIENT)
Dept: LAB | Age: 83
Discharge: HOME | End: 2018-10-09
Payer: MEDICARE

## 2018-10-09 VITALS
OXYGEN SATURATION: 94 % | TEMPERATURE: 96.9 F | RESPIRATION RATE: 20 BRPM | HEART RATE: 70 BPM | BODY MASS INDEX: 32.98 KG/M2 | WEIGHT: 217.6 LBS | SYSTOLIC BLOOD PRESSURE: 134 MMHG | HEIGHT: 68 IN | DIASTOLIC BLOOD PRESSURE: 83 MMHG

## 2018-10-09 DIAGNOSIS — I10 ESSENTIAL HYPERTENSION: ICD-10-CM

## 2018-10-09 DIAGNOSIS — E78.2 MIXED HYPERLIPIDEMIA: ICD-10-CM

## 2018-10-09 DIAGNOSIS — I48.19 PERSISTENT ATRIAL FIBRILLATION (HCC): ICD-10-CM

## 2018-10-09 DIAGNOSIS — I25.10 CORONARY ARTERY DISEASE INVOLVING NATIVE CORONARY ARTERY OF NATIVE HEART WITHOUT ANGINA PECTORIS: ICD-10-CM

## 2018-10-09 PROCEDURE — 80061 LIPID PANEL: CPT

## 2018-10-09 PROCEDURE — 85025 COMPLETE CBC W/AUTO DIFF WBC: CPT

## 2018-10-09 PROCEDURE — 36415 COLL VENOUS BLD VENIPUNCTURE: CPT

## 2018-10-09 PROCEDURE — 80053 COMPREHEN METABOLIC PANEL: CPT

## 2018-10-09 NOTE — PROGRESS NOTES
Chief Complaint   Patient presents with    Asthma    Coronary Artery Disease    Irregular Heart Beat     a fib    Hypertension    Cholesterol Problem    Other     hx of prostate ca     Chief Complaint   Patient presents with    Asthma    Coronary Artery Disease    Irregular Heart Beat     a fib    Hypertension    Cholesterol Problem    Other     hx of prostate ca     Wt Readings from Last 3 Encounters:   10/09/18 217 lb 9.6 oz (98.7 kg)   04/05/18 215 lb (97.5 kg)   10/11/17 212 lb (96.2 kg)       BP Readings from Last 3 Encounters:   10/09/18 134/83   04/05/18 141/80   10/11/17 120/77       Cardiovascular Review:  The patient has hypertension, hyperlipidemia, coronary artery disease and status post CABG. Diet and Lifestyle: generally follows a low fat low cholesterol diet, sedentary, nonsmoker  Home BP Monitoring: is not measured at home. In er with new a fib now on eliquis  Pertinent ROS: taking medications as instructed, no medication side effects noted, no TIA's, no chest pain on exertion, no dyspnea on exertion, no swelling of ankles, does note some dizziness when arising, no palpitations. Asthma Review:  The patient is being seen for follow up of asthma, not currently in exacerbation. Asthma symptoms occur: weekly, infrequently. Wheezing when present is described as mild and easily relieved with rescue bronchodilator. Current limitations in activity from asthma: none. Number of days of school or work missed in the last month: 0. Regimen compliance: The patient reports adherence to this regimen. Patient does not smoke cigarettes. Osteoarthritis and Chronic Pain:  Patient has osteoarthritis, primarily affecting the neck. Symptoms onset: several  years ago. Rheumatological ROS: stable, mild-to-moderate joint symptoms intermittently, reasonably well controlled by PRN meds. Response to treatment plan: stable, well controlled and basically asymptomatic.    Patient Active Problem List Diagnosis Date Noted    Atrial fibrillation ([facility] 03/15/2017    Venous insufficiency 10/11/2016    Alcohol abuse counseling and surveillance 10/11/2016    Advanced directives, counseling/discussion 10/11/2016    Venous insufficiency of both lower extremities 10/11/2016    Essential hypertension 10/84/2340    Umbilical hernia 91/50/0991    Colon polyp 06/08/2011    CAD (coronary artery disease) 04/08/2010    HLD (hyperlipidemia) 04/08/2010    Borderline diabetes mellitus 04/08/2010    Prostate cancer ([facility] 04/08/2010    Asthma 04/08/2010     Current Outpatient Prescriptions   Medication Sig Dispense Refill    apixaban (ELIQUIS) 5 mg tablet Take 1 Tab by mouth two (2) times a day. Indications: PREVENT THROMBOEMBOLISM IN CHRONIC ATRIAL FIBRILLATION 180 Tab 3    ADVAIR DISKUS 500-50 mcg/dose diskus inhaler USE 1 INHALATION TWICE A DAY AS NEEDED 180 Each 1    atorvastatin (LIPITOR) 80 mg tablet Take 1 Tab by mouth daily. 90 Tab 3    metoprolol succinate (TOPROL XL) 25 mg XL tablet TAKE 1 TABLET DAILY 90 Tab 1    colesevelam (WELCHOL) 625 mg tablet TAKE 3 TABLETS TWICE A DAY WITH MEALS 540 Tab 0    MULTIVITAMIN PO Take 1 Tab by mouth daily.         Lab Results   Component Value Date/Time    Cholesterol, total 227 (H) 04/05/2018 10:20 AM    HDL Cholesterol 52 04/05/2018 10:20 AM    LDL, calculated 142 (H) 04/05/2018 10:20 AM    Triglyceride 164 (H) 04/05/2018 10:20 AM    CHOL/HDL Ratio 3.3 06/11/2010 08:25 AM        Results for orders placed or performed in visit on 04/05/18   CBC WITH AUTOMATED DIFF   Result Value Ref Range    WBC 7.8 3.4 - 10.8 x10E3/uL    RBC 4.61 4.14 - 5.80 x10E6/uL    HGB 14.6 13.0 - 17.7 g/dL    HCT 45.7 37.5 - 51.0 %    MCV 99 (H) 79 - 97 fL    MCH 31.7 26.6 - 33.0 pg    MCHC 31.9 31.5 - 35.7 g/dL    RDW 13.8 12.3 - 15.4 %    PLATELET 893 057 - 646 x10E3/uL    NEUTROPHILS 45 Not Estab. %    Lymphocytes 32 Not Estab. %    MONOCYTES 11 Not Estab. %    EOSINOPHILS 12 Not Estab. % BASOPHILS 0 Not Estab. %    ABS. NEUTROPHILS 3.4 1.4 - 7.0 x10E3/uL    Abs Lymphocytes 2.5 0.7 - 3.1 x10E3/uL    ABS. MONOCYTES 0.8 0.1 - 0.9 x10E3/uL    ABS. EOSINOPHILS 0.9 (H) 0.0 - 0.4 x10E3/uL    ABS. BASOPHILS 0.0 0.0 - 0.2 x10E3/uL    IMMATURE GRANULOCYTES 0 Not Estab. %    ABS. IMM. GRANS. 0.0 0.0 - 0.1 x10E3/uL   LIPID PANEL   Result Value Ref Range    Cholesterol, total 227 (H) 100 - 199 mg/dL    Triglyceride 164 (H) 0 - 149 mg/dL    HDL Cholesterol 52 >39 mg/dL    VLDL, calculated 33 5 - 40 mg/dL    LDL, calculated 142 (H) 0 - 99 mg/dL   METABOLIC PANEL, COMPREHENSIVE   Result Value Ref Range    Glucose 117 (H) 65 - 99 mg/dL    BUN 25 8 - 27 mg/dL    Creatinine 1.17 0.76 - 1.27 mg/dL    GFR est non-AA 58 (L) >59 mL/min/1.73    GFR est AA 67 >59 mL/min/1.73    BUN/Creatinine ratio 21 10 - 24    Sodium 145 (H) 134 - 144 mmol/L    Potassium 5.0 3.5 - 5.2 mmol/L    Chloride 102 96 - 106 mmol/L    CO2 24 18 - 29 mmol/L    Calcium 9.7 8.6 - 10.2 mg/dL    Protein, total 7.3 6.0 - 8.5 g/dL    Albumin 4.0 3.5 - 4.7 g/dL    GLOBULIN, TOTAL 3.3 1.5 - 4.5 g/dL    A-G Ratio 1.2 1.2 - 2.2    Bilirubin, total 0.3 0.0 - 1.2 mg/dL    Alk. phosphatase 70 39 - 117 IU/L    AST (SGOT) 29 0 - 40 IU/L    ALT (SGPT) 23 0 - 44 IU/L   CVD REPORT   Result Value Ref Range    INTERPRETATION Note     PDF IMAGE Not applicable    CKD REPORT   Result Value Ref Range    Interpretation Note        Vitals:    10/09/18 1000   BP: 134/83   Pulse: 70   Resp: 20   Temp: 96.9 °F (36.1 °C)   TempSrc: Oral   SpO2: 94%   Weight: 217 lb 9.6 oz (98.7 kg)   Height: 5' 8\" (1.727 m)     Wt Readings from Last 3 Encounters:   10/09/18 217 lb 9.6 oz (98.7 kg)   04/05/18 215 lb (97.5 kg)   10/11/17 212 lb (96.2 kg)   S1 and S2 normal, no murmurs, clicks, gallops or rubs. Regular rate and rhythm. Chest is clear; no wheezes or rales.  Trace  Edema   With venous disease  Stable status on multiple high risk medications for multiple comorbidities, will not change any of the present treatment plans       The patient is advised to begin progressive daily aerobic exercise program, follow a low fat, low cholesterol diet and attempt to lose weight. Hypertension controlled  Last lipids stable on high doses  Asthma stable  As long as creatinine < 1.5 will continue 5 mg dose eliquis      Diagnoses and all orders for this visit:    1. Persistent atrial fibrillation (HCC)  -     apixaban (ELIQUIS) 5 mg tablet; Take 1 Tab by mouth two (2) times a day. Indications: PREVENT THROMBOEMBOLISM IN CHRONIC ATRIAL FIBRILLATION    2. Essential hypertension  -     apixaban (ELIQUIS) 5 mg tablet; Take 1 Tab by mouth two (2) times a day. Indications: PREVENT THROMBOEMBOLISM IN CHRONIC ATRIAL FIBRILLATION    3. Coronary artery disease involving native coronary artery of native heart without angina pectoris  -     apixaban (ELIQUIS) 5 mg tablet; Take 1 Tab by mouth two (2) times a day. Indications: PREVENT THROMBOEMBOLISM IN CHRONIC ATRIAL FIBRILLATION    4. Mixed hyperlipidemia  -     apixaban (ELIQUIS) 5 mg tablet; Take 1 Tab by mouth two (2) times a day.  Indications: PREVENT THROMBOEMBOLISM IN CHRONIC ATRIAL FIBRILLATION  -     CBC WITH AUTOMATED DIFF  -     LIPID PANEL  -     METABOLIC PANEL, COMPREHENSIVE      High risk medications reviewed  Still too much beer

## 2018-10-09 NOTE — PROGRESS NOTES
1. Have you been to the ER, urgent care clinic since your last visit? Hospitalized since your last visit? No    2. Have you seen or consulted any other health care providers outside of the [address] [place] [address] since your last visit? Include any pap smears or colon screening.  No

## 2018-10-10 LAB
ALBUMIN SERPL-MCNC: 3.9 G/DL (ref 3.5–4.7)
ALBUMIN/GLOB SERPL: 1.3 {RATIO} (ref 1.2–2.2)
ALP SERPL-CCNC: 62 IU/L (ref 39–117)
ALT SERPL-CCNC: 22 IU/L (ref 0–44)
AST SERPL-CCNC: 28 IU/L (ref 0–40)
BASOPHILS # BLD AUTO: 0 X10E3/UL (ref 0–0.2)
BASOPHILS NFR BLD AUTO: 0 %
BILIRUB SERPL-MCNC: 0.6 MG/DL (ref 0–1.2)
BUN SERPL-MCNC: 21 MG/DL (ref 8–27)
BUN/CREAT SERPL: 19 (ref 10–24)
CALCIUM SERPL-MCNC: 9.2 MG/DL (ref 8.6–10.2)
CHLORIDE SERPL-SCNC: 104 MMOL/L (ref 96–106)
CHOLEST SERPL-MCNC: 188 MG/DL (ref 100–199)
CO2 SERPL-SCNC: 26 MMOL/L (ref 20–29)
CREAT SERPL-MCNC: 1.12 MG/DL (ref 0.76–1.27)
EOSINOPHIL # BLD AUTO: 0.9 X10E3/UL (ref 0–0.4)
EOSINOPHIL NFR BLD AUTO: 11 %
ERYTHROCYTE [DISTWIDTH] IN BLOOD BY AUTOMATED COUNT: 14 % (ref 12.3–15.4)
GLOBULIN SER CALC-MCNC: 2.9 G/DL (ref 1.5–4.5)
GLUCOSE SERPL-MCNC: 97 MG/DL (ref 65–99)
HCT VFR BLD AUTO: 44 % (ref 37.5–51)
HDLC SERPL-MCNC: 48 MG/DL
HGB BLD-MCNC: 14.5 G/DL (ref 13–17.7)
IMM GRANULOCYTES # BLD: 0 X10E3/UL (ref 0–0.1)
IMM GRANULOCYTES NFR BLD: 0 %
INTERPRETATION, 910389: NORMAL
LDLC SERPL CALC-MCNC: 106 MG/DL (ref 0–99)
LYMPHOCYTES # BLD AUTO: 2.4 X10E3/UL (ref 0.7–3.1)
LYMPHOCYTES NFR BLD AUTO: 32 %
MCH RBC QN AUTO: 32.2 PG (ref 26.6–33)
MCHC RBC AUTO-ENTMCNC: 33 G/DL (ref 31.5–35.7)
MCV RBC AUTO: 98 FL (ref 79–97)
MONOCYTES # BLD AUTO: 0.8 X10E3/UL (ref 0.1–0.9)
MONOCYTES NFR BLD AUTO: 11 %
NEUTROPHILS # BLD AUTO: 3.5 X10E3/UL (ref 1.4–7)
NEUTROPHILS NFR BLD AUTO: 46 %
PLATELET # BLD AUTO: 206 X10E3/UL (ref 150–379)
POTASSIUM SERPL-SCNC: 4.9 MMOL/L (ref 3.5–5.2)
PROT SERPL-MCNC: 6.8 G/DL (ref 6–8.5)
RBC # BLD AUTO: 4.5 X10E6/UL (ref 4.14–5.8)
SODIUM SERPL-SCNC: 145 MMOL/L (ref 134–144)
TRIGL SERPL-MCNC: 170 MG/DL (ref 0–149)
VLDLC SERPL CALC-MCNC: 34 MG/DL (ref 5–40)
WBC # BLD AUTO: 7.6 X10E3/UL (ref 3.4–10.8)

## 2019-07-16 RX ORDER — FLUTICASONE PROPIONATE AND SALMETEROL 50; 500 UG/1; UG/1
POWDER RESPIRATORY (INHALATION)
Qty: 180 EACH | Refills: 1 | Status: SHIPPED | OUTPATIENT
Start: 2019-07-16 | End: 2019-07-22

## 2019-07-22 RX ORDER — FLUTICASONE PROPIONATE AND SALMETEROL 500; 50 UG/1; UG/1
1 POWDER RESPIRATORY (INHALATION) 2 TIMES DAILY
Qty: 3 INHALER | Refills: 1 | Status: SHIPPED | OUTPATIENT
Start: 2019-07-22 | End: 2019-12-03 | Stop reason: ALTCHOICE

## 2019-09-27 DIAGNOSIS — I25.10 CORONARY ARTERY DISEASE INVOLVING NATIVE CORONARY ARTERY OF NATIVE HEART WITHOUT ANGINA PECTORIS: ICD-10-CM

## 2019-09-27 DIAGNOSIS — I48.19 PERSISTENT ATRIAL FIBRILLATION (HCC): ICD-10-CM

## 2019-09-27 DIAGNOSIS — E78.2 MIXED HYPERLIPIDEMIA: ICD-10-CM

## 2019-09-27 DIAGNOSIS — I10 ESSENTIAL HYPERTENSION: ICD-10-CM

## 2019-09-28 RX ORDER — APIXABAN 5 MG/1
TABLET, FILM COATED ORAL
Qty: 180 TAB | Refills: 4 | Status: SHIPPED | OUTPATIENT
Start: 2019-09-28 | End: 2020-12-20

## 2019-10-24 ENCOUNTER — HOSPITAL ENCOUNTER (INPATIENT)
Age: 84
LOS: 2 days | Discharge: HOME | DRG: 292 | End: 2019-10-26
Attending: EMERGENCY MEDICINE | Admitting: INTERNAL MEDICINE
Payer: MEDICARE

## 2019-10-24 ENCOUNTER — APPOINTMENT (OUTPATIENT)
Dept: VASCULAR SURGERY | Age: 84
DRG: 292 | End: 2019-10-24
Attending: EMERGENCY MEDICINE
Payer: MEDICARE

## 2019-10-24 ENCOUNTER — APPOINTMENT (OUTPATIENT)
Dept: GENERAL RADIOLOGY | Age: 84
DRG: 292 | End: 2019-10-24
Attending: EMERGENCY MEDICINE
Payer: MEDICARE

## 2019-10-24 ENCOUNTER — APPOINTMENT (OUTPATIENT)
Dept: VASCULAR SURGERY | Age: 84
DRG: 292 | End: 2019-10-24
Attending: STUDENT IN AN ORGANIZED HEALTH CARE EDUCATION/TRAINING PROGRAM
Payer: MEDICARE

## 2019-10-24 DIAGNOSIS — M79.89 LEG SWELLING: Primary | ICD-10-CM

## 2019-10-24 DIAGNOSIS — E87.70 HYPERVOLEMIA, UNSPECIFIED HYPERVOLEMIA TYPE: ICD-10-CM

## 2019-10-24 DIAGNOSIS — I82.431 ACUTE DEEP VEIN THROMBOSIS (DVT) OF POPLITEAL VEIN OF RIGHT LOWER EXTREMITY (HCC): ICD-10-CM

## 2019-10-24 PROBLEM — O22.30 DVT (DEEP VEIN THROMBOSIS) IN PREGNANCY: Status: ACTIVE | Noted: 2019-10-24

## 2019-10-24 PROBLEM — L97.909 LEG ULCER (HCC): Status: ACTIVE | Noted: 2019-10-24

## 2019-10-24 PROBLEM — I10 HTN (HYPERTENSION): Status: ACTIVE | Noted: 2019-10-24

## 2019-10-24 PROBLEM — I50.9 CHF (CONGESTIVE HEART FAILURE) (HCC): Status: ACTIVE | Noted: 2019-10-24

## 2019-10-24 LAB
ALBUMIN SERPL-MCNC: 3.2 G/DL (ref 3.5–5)
ALBUMIN/GLOB SERPL: 0.7 {RATIO} (ref 1.1–2.2)
ALP SERPL-CCNC: 121 U/L (ref 45–117)
ALT SERPL-CCNC: 20 U/L (ref 12–78)
ANION GAP SERPL CALC-SCNC: 3 MMOL/L (ref 5–15)
APPEARANCE UR: CLEAR
AST SERPL-CCNC: 29 U/L (ref 15–37)
BACTERIA URNS QL MICRO: NEGATIVE /HPF
BASOPHILS # BLD: 0 K/UL (ref 0–0.1)
BASOPHILS NFR BLD: 0 % (ref 0–1)
BILIRUB SERPL-MCNC: 0.8 MG/DL (ref 0.2–1)
BILIRUB UR QL: NEGATIVE
BNP SERPL-MCNC: 2290 PG/ML
BUN SERPL-MCNC: 18 MG/DL (ref 6–20)
BUN/CREAT SERPL: 16 (ref 12–20)
CALCIUM SERPL-MCNC: 8.9 MG/DL (ref 8.5–10.1)
CHLORIDE SERPL-SCNC: 106 MMOL/L (ref 97–108)
CO2 SERPL-SCNC: 32 MMOL/L (ref 21–32)
COLOR UR: NORMAL
COMMENT, HOLDF: NORMAL
CREAT SERPL-MCNC: 1.16 MG/DL (ref 0.7–1.3)
DIFFERENTIAL METHOD BLD: ABNORMAL
EOSINOPHIL # BLD: 0.2 K/UL (ref 0–0.4)
EOSINOPHIL NFR BLD: 3 % (ref 0–7)
EPITH CASTS URNS QL MICRO: NORMAL /LPF
ERYTHROCYTE [DISTWIDTH] IN BLOOD BY AUTOMATED COUNT: 14.6 % (ref 11.5–14.5)
GLOBULIN SER CALC-MCNC: 4.4 G/DL (ref 2–4)
GLUCOSE SERPL-MCNC: 110 MG/DL (ref 65–100)
GLUCOSE UR STRIP.AUTO-MCNC: NEGATIVE MG/DL
HCT VFR BLD AUTO: 44.6 % (ref 36.6–50.3)
HGB BLD-MCNC: 14 G/DL (ref 12.1–17)
HGB UR QL STRIP: NEGATIVE
HYALINE CASTS URNS QL MICRO: NORMAL /LPF (ref 0–5)
IMM GRANULOCYTES # BLD AUTO: 0 K/UL (ref 0–0.04)
IMM GRANULOCYTES NFR BLD AUTO: 0 % (ref 0–0.5)
KETONES UR QL STRIP.AUTO: NEGATIVE MG/DL
LEUKOCYTE ESTERASE UR QL STRIP.AUTO: NEGATIVE
LYMPHOCYTES # BLD: 1.7 K/UL (ref 0.8–3.5)
LYMPHOCYTES NFR BLD: 24 % (ref 12–49)
MCH RBC QN AUTO: 32.2 PG (ref 26–34)
MCHC RBC AUTO-ENTMCNC: 31.4 G/DL (ref 30–36.5)
MCV RBC AUTO: 102.5 FL (ref 80–99)
MONOCYTES # BLD: 0.9 K/UL (ref 0–1)
MONOCYTES NFR BLD: 13 % (ref 5–13)
NEUTS SEG # BLD: 4.1 K/UL (ref 1.8–8)
NEUTS SEG NFR BLD: 60 % (ref 32–75)
NITRITE UR QL STRIP.AUTO: NEGATIVE
NRBC # BLD: 0 K/UL (ref 0–0.01)
NRBC BLD-RTO: 0 PER 100 WBC
PH UR STRIP: 5.5 [PH] (ref 5–8)
PLATELET # BLD AUTO: 159 K/UL (ref 150–400)
PMV BLD AUTO: 9.6 FL (ref 8.9–12.9)
POTASSIUM SERPL-SCNC: 3.8 MMOL/L (ref 3.5–5.1)
PROT SERPL-MCNC: 7.6 G/DL (ref 6.4–8.2)
PROT UR STRIP-MCNC: NEGATIVE MG/DL
RBC # BLD AUTO: 4.35 M/UL (ref 4.1–5.7)
RBC #/AREA URNS HPF: NORMAL /HPF (ref 0–5)
SAMPLES BEING HELD,HOLD: NORMAL
SODIUM SERPL-SCNC: 141 MMOL/L (ref 136–145)
SP GR UR REFRACTOMETRY: 1.01 (ref 1–1.03)
TROPONIN I BLD-MCNC: <0.04 NG/ML (ref 0–0.08)
UR CULT HOLD, URHOLD: NORMAL
UROBILINOGEN UR QL STRIP.AUTO: 1 EU/DL (ref 0.2–1)
WBC # BLD AUTO: 6.9 K/UL (ref 4.1–11.1)
WBC URNS QL MICRO: NORMAL /HPF (ref 0–4)

## 2019-10-24 PROCEDURE — 77030038269 HC DRN EXT URIN PURWCK BARD -A

## 2019-10-24 PROCEDURE — 93970 EXTREMITY STUDY: CPT

## 2019-10-24 PROCEDURE — 74011250636 HC RX REV CODE- 250/636: Performed by: INTERNAL MEDICINE

## 2019-10-24 PROCEDURE — 36415 COLL VENOUS BLD VENIPUNCTURE: CPT

## 2019-10-24 PROCEDURE — 77030010545

## 2019-10-24 PROCEDURE — 74011250637 HC RX REV CODE- 250/637: Performed by: STUDENT IN AN ORGANIZED HEALTH CARE EDUCATION/TRAINING PROGRAM

## 2019-10-24 PROCEDURE — 96374 THER/PROPH/DIAG INJ IV PUSH: CPT

## 2019-10-24 PROCEDURE — 84484 ASSAY OF TROPONIN QUANT: CPT

## 2019-10-24 PROCEDURE — 74011250636 HC RX REV CODE- 250/636: Performed by: EMERGENCY MEDICINE

## 2019-10-24 PROCEDURE — 74011250637 HC RX REV CODE- 250/637: Performed by: INTERNAL MEDICINE

## 2019-10-24 PROCEDURE — 65660000000 HC RM CCU STEPDOWN

## 2019-10-24 PROCEDURE — 80053 COMPREHEN METABOLIC PANEL: CPT

## 2019-10-24 PROCEDURE — 74011250637 HC RX REV CODE- 250/637: Performed by: EMERGENCY MEDICINE

## 2019-10-24 PROCEDURE — 99285 EMERGENCY DEPT VISIT HI MDM: CPT

## 2019-10-24 PROCEDURE — 94762 N-INVAS EAR/PLS OXIMTRY CONT: CPT

## 2019-10-24 PROCEDURE — 93922 UPR/L XTREMITY ART 2 LEVELS: CPT

## 2019-10-24 PROCEDURE — 65270000029 HC RM PRIVATE

## 2019-10-24 PROCEDURE — 81001 URINALYSIS AUTO W/SCOPE: CPT

## 2019-10-24 PROCEDURE — 71046 X-RAY EXAM CHEST 2 VIEWS: CPT

## 2019-10-24 PROCEDURE — 83880 ASSAY OF NATRIURETIC PEPTIDE: CPT

## 2019-10-24 PROCEDURE — 93005 ELECTROCARDIOGRAM TRACING: CPT

## 2019-10-24 PROCEDURE — 85025 COMPLETE CBC W/AUTO DIFF WBC: CPT

## 2019-10-24 RX ORDER — MUPIROCIN 20 MG/G
OINTMENT TOPICAL
COMMUNITY
Start: 2019-10-22 | End: 2020-03-04

## 2019-10-24 RX ORDER — ATORVASTATIN CALCIUM 80 MG/1
80 TABLET, FILM COATED ORAL
COMMUNITY
End: 2021-07-27 | Stop reason: SDUPTHER

## 2019-10-24 RX ORDER — ATORVASTATIN CALCIUM 20 MG/1
80 TABLET, FILM COATED ORAL
Status: DISCONTINUED | OUTPATIENT
Start: 2019-10-24 | End: 2019-10-26 | Stop reason: HOSPADM

## 2019-10-24 RX ORDER — FUROSEMIDE 10 MG/ML
40 INJECTION INTRAMUSCULAR; INTRAVENOUS 2 TIMES DAILY
Status: DISCONTINUED | OUTPATIENT
Start: 2019-10-25 | End: 2019-10-26

## 2019-10-24 RX ORDER — SODIUM CHLORIDE 0.9 % (FLUSH) 0.9 %
5-40 SYRINGE (ML) INJECTION AS NEEDED
Status: DISCONTINUED | OUTPATIENT
Start: 2019-10-24 | End: 2019-10-26 | Stop reason: HOSPADM

## 2019-10-24 RX ORDER — BUDESONIDE 0.5 MG/2ML
500 INHALANT ORAL
Status: DISCONTINUED | OUTPATIENT
Start: 2019-10-24 | End: 2019-10-26 | Stop reason: HOSPADM

## 2019-10-24 RX ORDER — ARFORMOTEROL TARTRATE 15 UG/2ML
15 SOLUTION RESPIRATORY (INHALATION)
Status: DISCONTINUED | OUTPATIENT
Start: 2019-10-24 | End: 2019-10-26 | Stop reason: HOSPADM

## 2019-10-24 RX ORDER — LISINOPRIL 5 MG/1
5 TABLET ORAL DAILY
Status: DISCONTINUED | OUTPATIENT
Start: 2019-10-24 | End: 2019-10-26 | Stop reason: HOSPADM

## 2019-10-24 RX ORDER — CEPHALEXIN 250 MG/1
500 CAPSULE ORAL 2 TIMES DAILY
COMMUNITY
Start: 2019-10-22 | End: 2019-10-26

## 2019-10-24 RX ORDER — FUROSEMIDE 10 MG/ML
20 INJECTION INTRAMUSCULAR; INTRAVENOUS
Status: COMPLETED | OUTPATIENT
Start: 2019-10-24 | End: 2019-10-24

## 2019-10-24 RX ORDER — SODIUM CHLORIDE 0.9 % (FLUSH) 0.9 %
5-40 SYRINGE (ML) INJECTION EVERY 8 HOURS
Status: DISCONTINUED | OUTPATIENT
Start: 2019-10-24 | End: 2019-10-26 | Stop reason: HOSPADM

## 2019-10-24 RX ORDER — METOPROLOL SUCCINATE 25 MG/1
25 TABLET, EXTENDED RELEASE ORAL DAILY
Status: DISCONTINUED | OUTPATIENT
Start: 2019-10-25 | End: 2019-10-24

## 2019-10-24 RX ORDER — FUROSEMIDE 10 MG/ML
40 INJECTION INTRAMUSCULAR; INTRAVENOUS DAILY
Status: DISCONTINUED | OUTPATIENT
Start: 2019-10-24 | End: 2019-10-24

## 2019-10-24 RX ADMIN — Medication 10 ML: at 15:00

## 2019-10-24 RX ADMIN — ATORVASTATIN CALCIUM 80 MG: 20 TABLET, FILM COATED ORAL at 21:46

## 2019-10-24 RX ADMIN — Medication 10 ML: at 21:47

## 2019-10-24 RX ADMIN — FUROSEMIDE 20 MG: 10 INJECTION, SOLUTION INTRAMUSCULAR; INTRAVENOUS at 14:31

## 2019-10-24 RX ADMIN — APIXABAN 5 MG: 5 TABLET, FILM COATED ORAL at 14:29

## 2019-10-24 RX ADMIN — LISINOPRIL 5 MG: 5 TABLET ORAL at 18:12

## 2019-10-24 RX ADMIN — FUROSEMIDE 40 MG: 10 INJECTION, SOLUTION INTRAMUSCULAR; INTRAVENOUS at 15:51

## 2019-10-24 RX ADMIN — APIXABAN 10 MG: 5 TABLET, FILM COATED ORAL at 18:53

## 2019-10-24 NOTE — H&P
Hospitalist Admission Note    NAME: Myriam Resendiz   :  1935   MRN:  [id number]     Date/Time:  10/24/2019 2:50 PM    Patient PCP: Shilpa Samuel MD  ________________________________________________________________________    My assessment of this patient's clinical condition and my plan of care is as follows. Assessment / Plan:    1) DVT : U/S \" Acute non-occlusive thrombus present in the right popliteal vein\" he stopped eliquis he normalle takes for A. Fib about 2 days ago because he was spending time at his daughters place and did not have it with him. Will continue Eliquis 10mg BID    2) BLE edema: 3+, likely HF related. BNP 2,290. Last time he saw a cardiologist about 2 1/2 years ago. Will do IV lasix     3) Hx HTN:  Controlled, continue home meds and adjust as needed    4) Hx A.fib: Rate controlled, continue B blocker,    5) LL Ulcer: likely related to HF, will consult wound care for eval and management    6) HF: CXR \" Probable mild pulmonary vascular congestive changes. \" BNP 2,290, BLE. Will do Lasix IV, order echocardiogram. Last echo 3/2017 EF 45%. Cardiology for eval and recs    7) Hx CAB years ago. No issues at this time, no CP     8)  COPD: Continue advir    Code Status: full  Surrogate Decision Maker:    DVT Prophylaxis: yes  GI Prophylaxis: not indicated    Baseline: lives at home        Subjective:   CHIEF COMPLAINT: BLE edema    HISTORY OF PRESENT ILLNESS:     Nino Parisi is a 80 y.o.  ,male PMH A. Fib, HTN, CABG, HTN , COPD who presents to the ED because BLE edema that has been getting worse over the last 2 weeks. In the last day he had a blister in LLE that got open and started draining clear material., Denies, fevers, chills, SOB or CP    We were asked to admit for work up and evaluation of the above problems.      Past Medical History:   Diagnosis Date    Asthma     BRONCITITS, INHALER USE PRN    CAD (coronary artery disease)     MI    Cancer ([facility] 2006 PROTATE    Chronic obstructive pulmonary disease ([facility]     Diabetes ([facility]     BORDERLINE    Hypercholesterolemia     Hypertension     Umbilical hernia [id number]        Past Surgical History:   Procedure Laterality Date    CARDIAC SURG PROCEDURE UNLIST      CABG X4 VESSEL    CARDIAC SURG PROCEDURE UNLIST  ,     CARDIAC CATH, STENTS    ENDOSCOPY, COLON, DIAGNOSTIC  2006    HX GI      COLONOSCOPY    HX HEENT Bilateral     CATARACTS/ IOL    HX HERNIA REPAIR  2014    HX PROSTATECTOMY  2006       Social History     Tobacco Use    Smoking status: Former Smoker     Packs/day: 1.00     Types: Cigarettes     Last attempt to quit: 6/10/1964     Years since quittin.4    Smokeless tobacco: Never Used   Substance Use Topics    Alcohol use: Yes     Alcohol/week: 15.0 standard drinks     Types: 12 Cans of beer, 6 Standard drinks or equivalent per week     Comment: casual        Family History   Problem Relation Age of Onset    Stroke Father     Anesth Problems Neg Hx      Allergies   Allergen Reactions    Adhesive Other (comments)     BLISTERS        Prior to Admission medications    Medication Sig Start Date End Date Taking? Authorizing Provider   ELIQUIS 5 mg tablet TAKE 1 TABLET TWICE A DAY TO PREVENT THROMBOEMBOLISM IN CHRONIC ATRIAL FIBRILLATION 19   Lori Vaughn MD   fluticasone propion-salmeterol (ADVAIR DISKUS) 500-50 mcg/dose diskus inhaler Take 1 Puff by inhalation two (2) times a day. 19   Lori Vaughn MD   atorvastatin (LIPITOR) 80 mg tablet Take 1 Tab by mouth daily. 18   Lori Vaughn MD   metoprolol succinate (TOPROL XL) 25 mg XL tablet TAKE 1 TABLET DAILY 18   Lori Vaughn MD   [facility]) 625 mg tablet TAKE 3 TABLETS TWICE A DAY WITH MEALS 18   Lori Vaughn MD   MULTIVITAMIN PO Take 1 Tab by mouth daily.     Provider, Historical       REVIEW OF SYSTEMS:     I am not able to complete the review of systems because: The patient is intubated and sedated    The patient has altered mental status due to his acute medical problems    The patient has baseline aphasia from prior stroke(s)    The patient has baseline dementia and is not reliable historian    The patient is in acute medical distress and unable to provide information           Total of 12 systems reviewed as follows:       POSITIVE= underlined text  Negative = text not underlined  General:  fever, chills, sweats, generalized weakness, weight loss/gain,      loss of appetite   Eyes:    blurred vision, eye pain, loss of vision, double vision  ENT:    rhinorrhea, pharyngitis   Respiratory:   cough, sputum production, SOB, YUAN, wheezing, pleuritic pain   Cardiology:   chest pain, palpitations, orthopnea, PND, edema, syncope   Gastrointestinal:  abdominal pain , N/V, diarrhea, dysphagia, constipation, bleeding   Genitourinary:  frequency, urgency, dysuria, hematuria, incontinence   Muskuloskeletal :  arthralgia, myalgia, back pain  Hematology:  easy bruising, nose or gum bleeding, lymphadenopathy   Dermatological: rash, ulceration, pruritis, color change / jaundice  Endocrine:   hot flashes or polydipsia   Neurological:  headache, dizziness, confusion, focal weakness, paresthesia,     Speech difficulties, memory loss, gait difficulty  Psychological: Feelings of anxiety, depression, agitation    Objective:   VITALS:    Visit Vitals  /87   Pulse 77   Temp 98.3 °F (36.8 °C)   Resp 23   Wt 98.4 kg (217 lb)   SpO2 93%   BMI 32.99 kg/m²       PHYSICAL EXAM:    General:    Alert, cooperative, no distress, appears stated age. HEENT: Atraumatic, anicteric sclerae, pink conjunctivae     No oral ulcers, mucosa moist, throat clear, dentition fair  Neck:  Supple, symmetrical,  thyroid: non tender  Lungs:   Clear to auscultation bilaterally. No Wheezing or Rhonchi. No rales. Chest wall:  No tenderness  No Accessory muscle use.   Heart:   Irregular-Regular  rhythm,  No murmur   , BLE edema  3+  Abdomen:   Soft, non-tender. Not distended. Bowel sounds normal  Extremities: No cyanosis. No clubbing,      Skin turgor normal, Capillary refill normal, Radial dial pulse 2+  Skin:     LLE ulcer 4x4 cm looks clean Not pale. Not Jaundiced  No rashes   Psych:  Good insight. Not depressed. Not anxious or agitated. Neurologic: EOMs intact. No facial asymmetry. No aphasia or slurred speech. Symmetrical strength, Sensation grossly intact. Alert and oriented X 3.     _______________________________________________________________________  Care Plan discussed with:    Comments   Patient x    Family  x    RN x    Care Manager                    Consultant:      _______________________________________________________________________  Expected  Disposition:   Home with Family x   HH/PT/OT/RN    SNF/LTC    AIMEE    ________________________________________________________________________  TOTAL TIME:  28 Minutes    Critical Care Provided     Minutes non procedure based      Comments     Reviewed previous records   >50% of visit spent in counseling and coordination of care  Discussion with patient and/or family and questions answered       ________________________________________________________________________  Signed: Alvarez Rainey MD    Procedures: see electronic medical records for all procedures/Xrays and details which were not copied into this note but were reviewed prior to creation of Plan.     LAB DATA REVIEWED:    Recent Results (from the past 24 hour(s))   NT-PRO BNP    Collection Time: 10/24/19 12:35 PM   Result Value Ref Range    NT pro-BNP 2,290 (H) <450 PG/ML   CBC WITH AUTOMATED DIFF    Collection Time: 10/24/19 12:35 PM   Result Value Ref Range    WBC 6.9 4.1 - 11.1 K/uL    RBC 4.35 4.10 - 5.70 M/uL    HGB 14.0 12.1 - 17.0 g/dL    HCT 44.6 36.6 - 50.3 %    .5 (H) 80.0 - 99.0 FL    MCH 32.2 26.0 - 34.0 PG    MCHC 31.4 30.0 - 36.5 g/dL    RDW 14.6 (H) 11.5 - 14.5 % PLATELET 432 519 - 000 K/uL    MPV 9.6 8.9 - 12.9 FL    NRBC 0.0 0  WBC    ABSOLUTE NRBC 0.00 0.00 - 0.01 K/uL    NEUTROPHILS 60 32 - 75 %    LYMPHOCYTES 24 12 - 49 %    MONOCYTES 13 5 - 13 %    EOSINOPHILS 3 0 - 7 %    BASOPHILS 0 0 - 1 %    IMMATURE GRANULOCYTES 0 0.0 - 0.5 %    ABS. NEUTROPHILS 4.1 1.8 - 8.0 K/UL    ABS. LYMPHOCYTES 1.7 0.8 - 3.5 K/UL    ABS. MONOCYTES 0.9 0.0 - 1.0 K/UL    ABS. EOSINOPHILS 0.2 0.0 - 0.4 K/UL    ABS. BASOPHILS 0.0 0.0 - 0.1 K/UL    ABS. IMM. GRANS. 0.0 0.00 - 0.04 K/UL    DF AUTOMATED     METABOLIC PANEL, COMPREHENSIVE    Collection Time: 10/24/19 12:35 PM   Result Value Ref Range    Sodium 141 136 - 145 mmol/L    Potassium 3.8 3.5 - 5.1 mmol/L    Chloride 106 97 - 108 mmol/L    CO2 32 21 - 32 mmol/L    Anion gap 3 (L) 5 - 15 mmol/L    Glucose 110 (H) 65 - 100 mg/dL    BUN 18 6 - 20 MG/DL    Creatinine 1.16 0.70 - 1.30 MG/DL    BUN/Creatinine ratio 16 12 - 20      GFR est AA >60 >60 ml/min/1.73m2    GFR est non-AA 60 (L) >60 ml/min/1.73m2    Calcium 8.9 8.5 - 10.1 MG/DL    Bilirubin, total 0.8 0.2 - 1.0 MG/DL    ALT (SGPT) 20 12 - 78 U/L    AST (SGOT) 29 15 - 37 U/L    Alk. phosphatase 121 (H) 45 - 117 U/L    Protein, total 7.6 6.4 - 8.2 g/dL    Albumin 3.2 (L) 3.5 - 5.0 g/dL    Globulin 4.4 (H) 2.0 - 4.0 g/dL    A-G Ratio 0.7 (L) 1.1 - 2.2     SAMPLES BEING HELD    Collection Time: 10/24/19 12:35 PM   Result Value Ref Range    SAMPLES BEING HELD 1SST,1RED,1BL     COMMENT        Add-on orders for these samples will be processed based on acceptable specimen integrity and analyte stability, which may vary by analyte.    URINALYSIS W/MICROSCOPIC    Collection Time: 10/24/19  1:20 PM   Result Value Ref Range    Color YELLOW/STRAW      Appearance CLEAR CLEAR      Specific gravity 1.013 1.003 - 1.030      pH (UA) 5.5 5.0 - 8.0      Protein NEGATIVE  NEG mg/dL    Glucose NEGATIVE  NEG mg/dL    Ketone NEGATIVE  NEG mg/dL    Bilirubin NEGATIVE  NEG      Blood NEGATIVE  NEG Urobilinogen 1.0 0.2 - 1.0 EU/dL    Nitrites NEGATIVE  NEG      Leukocyte Esterase NEGATIVE  NEG      WBC 0-4 0 - 4 /hpf    RBC 0-5 0 - 5 /hpf    Epithelial cells FEW FEW /lpf    Bacteria NEGATIVE  NEG /hpf    Hyaline cast 0-2 0 - 5 /lpf   URINE CULTURE HOLD SAMPLE    Collection Time: 10/24/19  1:20 PM   Result Value Ref Range    Urine culture hold        URINE ON HOLD IN MICROBIOLOGY DEPT FOR 3 DAYS. IF UNPRESERVED URINE IS SUBMITTED, IT CANNOT BE USED FOR ADDITIONAL TESTING AFTER 24 HRS, RECOLLECTION WILL BE REQUIRED.    POC TROPONIN-I    Collection Time: 10/24/19  2:23 PM   Result Value Ref Range    Troponin-I (POC) <0.04 0.00 - 0.08 ng/mL

## 2019-10-24 NOTE — ED PROVIDER NOTES
80 y.o. male with past medical history significant for hypercholesterolemia, HTN, CABG, CAD, MI, asthma, DM, COPD, and prostate cancer who presents from home with chief complaint of leg swelling. Pt complains of increased leg swelling and weeping for the past 2 days. Family reports clear drainage. Pt reports chronic redness and states he was previously diagnosed with venous stasis. Pt states he was evaluated at [facility] 2 days and was given an ointment and keflex to cover for possible cellulitis. Pt notes he had a clear chest x-ray at that visit. Pt also reports a chronic cough and wheeze and states he uses an Advair inhaler with relief and thinks his cough and wheezing is 2/2 to COPD. Pt denies anticoagulation. Pt denies fever, or shortness of breath. There are no other acute medical concerns at this time. Social hx: Former Smoker. EtOH Use. PCP: Luisito Liao MD    Note written by Izabel Gallagher, as dictated by Nadeen Maradiaga MD 1:12 PM      The history is provided by the patient and a relative.         Past Medical History:   Diagnosis Date    Asthma     BRONCITITS, INHALER USE PRN    CAD (coronary artery disease)     MI    Cancer ([facility] 2006    PROTATE    Chronic obstructive pulmonary disease (HCC)     Diabetes ([facility]     BORDERLINE    Hypercholesterolemia     Hypertension     Umbilical hernia 8/58/1541       Past Surgical History:   Procedure Laterality Date    CARDIAC SURG PROCEDURE UNLIST  1993    CABG X4 VESSEL    CARDIAC SURG PROCEDURE UNLIST  1997, 1999    CARDIAC CATH, STENTS    ENDOSCOPY, COLON, DIAGNOSTIC  01/02/2006    HX GI      COLONOSCOPY    HX HEENT Bilateral     CATARACTS/ IOL    HX HERNIA REPAIR  7/2014    HX PROSTATECTOMY  01/02/2006         Family History:   Problem Relation Age of Onset    Stroke Father     Anesth Problems Neg Hx        Social History     Socioeconomic History    Marital status: SINGLE     Spouse name: Not on file    Number of children: Not on file    Years of education: Not on file    Highest education level: Not on file   Occupational History    Not on file   Social Needs    Financial resource strain: Not on file    Food insecurity:     Worry: Not on file     Inability: Not on file    Transportation needs:     Medical: Not on file     Non-medical: Not on file   Tobacco Use    Smoking status: Former Smoker     Packs/day: 1.00     Types: Cigarettes     Last attempt to quit: 6/10/1964     Years since quittin.4    Smokeless tobacco: Never Used   Substance and Sexual Activity    Alcohol use: Yes     Alcohol/week: 15.0 standard drinks     Types: 12 Cans of beer, 6 Standard drinks or equivalent per week     Comment: casual    Drug use: No    Sexual activity: Not Currently   Lifestyle    Physical activity:     Days per week: Not on file     Minutes per session: Not on file    Stress: Not on file   Relationships    Social connections:     Talks on phone: Not on file     Gets together: Not on file     Attends Orthodox service: Not on file     Active member of club or organization: Not on file     Attends meetings of clubs or organizations: Not on file     Relationship status: Not on file    Intimate partner violence:     Fear of current or ex partner: Not on file     Emotionally abused: Not on file     Physically abused: Not on file     Forced sexual activity: Not on file   Other Topics Concern    Not on file   Social History Narrative    Not on file         ALLERGIES: Adhesive    Review of Systems   Constitutional: Negative for chills and fever. HENT: Negative for congestion. Eyes: Negative for visual disturbance. Respiratory: Positive for cough and wheezing. Negative for shortness of breath. Cardiovascular: Positive for leg swelling. Negative for chest pain. Gastrointestinal: Negative for abdominal pain, nausea and vomiting. Genitourinary: Negative for dysuria and hematuria.    Musculoskeletal: Negative for arthralgias and myalgias. Skin: Positive for color change and wound. Negative for rash. Neurological: Negative for dizziness, numbness and headaches. Psychiatric/Behavioral: The patient is not nervous/anxious. All other systems reviewed and are negative. Vitals:    10/24/19 1222 10/24/19 1245   BP: 136/80 137/72   Pulse: 94 77   Resp: 16 24   Temp: 98.3 °F (36.8 °C)    SpO2: 93% 91%   Weight: 98.4 kg (217 lb)             Physical Exam   Constitutional: He is oriented to person, place, and time. He appears well-developed and well-nourished. HENT:   Head: Normocephalic and atraumatic. Nose: Nose normal.   Eyes: Pupils are equal, round, and reactive to light. Conjunctivae and EOM are normal.   Neck: Normal range of motion. Neck supple. Cardiovascular: Normal rate, regular rhythm, normal heart sounds and intact distal pulses. Pulmonary/Chest: Effort normal. He has wheezes. Abdominal: Soft. Bowel sounds are normal.   Musculoskeletal: Normal range of motion. He exhibits edema. Pitting edema bilaterally. Pedal edema bilaterally. Neurological: He is oriented to person, place, and time. He has normal reflexes. Skin: Skin is warm and dry. Skin tear over the left anterior tibial area. Two wounds on the right lower leg- 1 cm with serosanguinous drainage and the other looks like a puncture wound. Chronic skin changes to bilateral lower legs- mostly redness, little warmth, but non tender. Psychiatric: He has a normal mood and affect. His behavior is normal.   Nursing note and vitals reviewed. Note written by Joy Louise. Ron Gonzalez, as dictated by Zina Hood MD 1:13 PM       [facility]       Procedures      Hospitalist Lainey for Admission  2:25 PM    ED Room Number: ER04/04  Patient Name and age:  Linnette Pearce 80 y.o.  male  Working Diagnosis:   1. Leg swelling    2. Acute deep vein thrombosis (DVT) of popliteal vein of right lower extremity (HCC)    3.  Hypervolemia, unspecified hypervolemia type      Readmission: no  Isolation Requirements:  no  Recommended Level of Care:  telemetry  Code Status:  Full Code  Department:[facility] - [phone]  Other:  Leg swelling on keflex for possible infection. [facility]  Has DVT right popliteal.  Also has elevated bnp without known CHF. He has been having SOB and cough and wheezing but felt it was from copd. Congestion noted on CXR. Given lasix and eliquis in ED. Recommend admit for atleaast obs for echo and eval for possible chf    CONSULT NOTE:  2:32 PM Beverly Wright MD communicated with Dr. Elsie Sahu, Consult for Hospitalist via [facility] Text. Discussed available diagnostic tests and clinical findings. Dr. Elsie Sahu will admit the patient to the hospital.     ED EKG interpretation:  Rhythm: atrial fib; and irregular. Rate (approx.): 81; Axis: normal; ST/T wave: non-specific changes; Intervals: Prolonged QT. Mild prolonged QRS. No P wave consistent with a-fib; Incomplete LBBB. Note written by Jinny Deluca  [facility], as dictated by Beverly Wright MD 2:42 PM       Bryant Burns MD

## 2019-10-24 NOTE — ED NOTES
TRANSFER - OUT REPORT:    Verbal report given to Rosaura Obrien (name) on Hattie Roberts  being transferred to [facility](unit) for routine progression of care       Report consisted of patients Situation, Background, Assessment and   Recommendations(SBAR). Information from the following report(s) SBAR, Kardex, ED Summary, Procedure Summary, Intake/Output, MAR, Accordion, Recent Results and Med Rec Status was reviewed with the receiving nurse. Lines:   Peripheral IV 10/24/19 Left Antecubital (Active)   Site Assessment Clean, dry, & intact 10/24/2019 12:38 PM   Phlebitis Assessment 0 10/24/2019 12:38 PM   Infiltration Assessment 0 10/24/2019 12:38 PM   Dressing Status Clean, dry, & intact 10/24/2019 12:38 PM   Dressing Type Transparent;Tape 10/24/2019 12:38 PM   Hub Color/Line Status Pink 10/24/2019 12:38 PM        Opportunity for questions and clarification was provided.       Patient transported with:   Monitor

## 2019-10-24 NOTE — ED NOTES
Patient Throughput:  Charge nurse on [facility] made aware of patient's room assignment, room 331    Current MEWS score of 2          Myra Krabbe, RN  Shift Resource Nurse  Emergency Department

## 2019-10-24 NOTE — CONSULTS
[address] [place] [address]., [address], [id number] [place] [address] [phone]; Fax [phone]      Patient: Jeremiah Jimenez  : 1935      Today's Date: 10/24/2019      HISTORY OF PRESENT ILLNESS:     History of Present Illness:    81 y/o male with past medical history of Afib, CAD s/p CABG in , COPD and  Type 2 DM who presented to the ER for worsening of bilateral LE edema. History provided by patient and daughter who was at bedside. Daughter reports that patient came to stay with her 3 days ago when she noticed worsening of bilateral leg swelling and drainage from the left leg. Subsequently she took him to [facility] where he was treated with Keflex and mupirocin with no improvement of symptoms, which prompted presentation to the ER. Patient is now found to have DVT and eliquis was restarted. Daughter states that  he miissed doses of eliquis for the past 3 days. On further work up, CXR showed vascular congestion and pro-BNP was elevated to 2k. Patient states that he has sob at baseline due to COPD and uses 2 pillows to sleep at night time. Also has lower extremity swelling L>R s/p venous graft for CABG since . Patient states that it has been 2.5 years since he saw his cardiologist- Dr. Jeffrey Mosley.         PAST MEDICAL HISTORY:     Past Medical History:   Diagnosis Date    Asthma     BRONCITITS, INHALER USE PRN    CAD (coronary artery disease)     MI    Cancer ([facility] [address]     PROTATE    Chronic obstructive pulmonary disease ([facility] [address] [facility]     Diabetes ([facility] [address] [facility]     BORDERLINE    Hypercholesterolemia     Hypertension     Umbilical hernia 9099           Past Surgical History:   Procedure Laterality Date    CARDIAC SURG PROCEDURE UNLIST      CABG X4 VESSEL   [address] CATH, STENTS    ENDOSCOPY, COLON, DIAGNOSTIC  2006    HX GI      COLONOSCOPY    HX HEENT Bilateral     CATARACTS/ IOL    HX HERNIA REPAIR  2014    HX PROSTATECTOMY  2006           Patient Active Problem List   Diagnosis Code    CAD (coronary artery disease) I25.10    HLD (hyperlipidemia) E78.5    Borderline diabetes mellitus R73.03    Prostate cancer ([facility] C61    Asthma J45.909    Colon polyp I90.7    Umbilical hernia P72.2    Essential hypertension I10    Venous insufficiency I87.2    Alcohol abuse counseling and surveillance Z71.41    Advanced directives, counseling/discussion Z71.89    Venous insufficiency of both lower extremities I87.2    Atrial fibrillation (HCC) I48.91    CHF (congestive heart failure) ([facility] I50.9    HTN (hypertension) I10    Leg ulcer ([facility] L97.909    DVT (deep vein thrombosis) in pregnancy O22.30             FAMILY HISTORY:     Family History   Problem Relation Age of Onset    Stroke Father     Anesth Problems Neg Hx              SOCIAL HISTORY:     Social History     Tobacco Use    Smoking status: Former Smoker     Packs/day: 1.00     Types: Cigarettes     Last attempt to quit: 6/10/1964     Years since quittin.4    Smokeless tobacco: Never Used   Substance Use Topics    Alcohol use: Yes     Alcohol/week: 15.0 standard drinks     Types: 12 Cans of beer, 6 Standard drinks or equivalent per week     Comment: casual    Drug use: No               REVIEW OF SYMPTOMS:     Review of Symptoms:  Constitutional: Negative for fever, chills  HEENT: Negative for vision changes. Respiratory: positive for cough, wheezing  Cardiovascular: positive for orthopnea, negative for claudication, syncope, and PND. Gastrointestinal: Negative for abdominal pain, diarrhea, melena. Genitourinary: Negative for dysuria  Musculoskeletal: Negative for myalgias. Skin: Negative for rash  Heme: No problems bleeding. Neurological: Negative for speech change and focal weakness.              PHYSICAL EXAM:     Physical Exam:  Visit Vitals  BP (!) 162/109   Pulse 70   Temp 98.3 °F (36.8 °C)   Resp 23   Wt 217 lb (98.4 kg)   SpO2 93% BMI 32.99 kg/m²     Patient appears generally well, mood and affect are appropriate and pleasant. HEENT:  Hearing intact, non-icteric, normocephalic, atraumatic. Neck Exam: Supple, no carotid bruits. Lung Exam: + air movement, diffuse expiratory wheezing  Cardiac Exam: irregular rate and rhythm with no murmur  Abdomen: Soft, non-tender  Extremities: 2+/3+ pitting edema L>R, with venous stasis ulcer & serosanguinous drainage in the left lower extremity   Vascular: 1+ dorsalis pedis pulses bilaterally. Psych: Appropriate affect  Neuro - Grossly intact          LABS / OTHER STUDIES:     Recent Results (from the past 24 hour(s))   NT-PRO BNP    Collection Time: 10/24/19 12:35 PM   Result Value Ref Range    NT pro-BNP 2,290 (H) <450 PG/ML   CBC WITH AUTOMATED DIFF    Collection Time: 10/24/19 12:35 PM   Result Value Ref Range    WBC 6.9 4.1 - 11.1 K/uL    RBC 4.35 4.10 - 5.70 M/uL    HGB 14.0 12.1 - 17.0 g/dL    HCT 44.6 36.6 - 50.3 %    .5 (H) 80.0 - 99.0 FL    MCH 32.2 26.0 - 34.0 PG    MCHC 31.4 30.0 - 36.5 g/dL    RDW 14.6 (H) 11.5 - 14.5 %    PLATELET 046 032 - 687 K/uL    MPV 9.6 8.9 - 12.9 FL    NRBC 0.0 0  WBC    ABSOLUTE NRBC 0.00 0.00 - 0.01 K/uL    NEUTROPHILS 60 32 - 75 %    LYMPHOCYTES 24 12 - 49 %    MONOCYTES 13 5 - 13 %    EOSINOPHILS 3 0 - 7 %    BASOPHILS 0 0 - 1 %    IMMATURE GRANULOCYTES 0 0.0 - 0.5 %    ABS. NEUTROPHILS 4.1 1.8 - 8.0 K/UL    ABS. LYMPHOCYTES 1.7 0.8 - 3.5 K/UL    ABS. MONOCYTES 0.9 0.0 - 1.0 K/UL    ABS. EOSINOPHILS 0.2 0.0 - 0.4 K/UL    ABS. BASOPHILS 0.0 0.0 - 0.1 K/UL    ABS. IMM.  GRANS. 0.0 0.00 - 0.04 K/UL    DF AUTOMATED     METABOLIC PANEL, COMPREHENSIVE    Collection Time: 10/24/19 12:35 PM   Result Value Ref Range    Sodium 141 136 - 145 mmol/L    Potassium 3.8 3.5 - 5.1 mmol/L    Chloride 106 97 - 108 mmol/L    CO2 32 21 - 32 mmol/L    Anion gap 3 (L) 5 - 15 mmol/L    Glucose 110 (H) 65 - 100 mg/dL    BUN 18 6 - 20 MG/DL    Creatinine 1.16 0.70 - 1.30 MG/DL    BUN/Creatinine ratio 16 12 - 20      GFR est AA >60 >60 ml/min/1.73m2    GFR est non-AA 60 (L) >60 ml/min/1.73m2    Calcium 8.9 8.5 - 10.1 MG/DL    Bilirubin, total 0.8 0.2 - 1.0 MG/DL    ALT (SGPT) 20 12 - 78 U/L    AST (SGOT) 29 15 - 37 U/L    Alk. phosphatase 121 (H) 45 - 117 U/L    Protein, total 7.6 6.4 - 8.2 g/dL    Albumin 3.2 (L) 3.5 - 5.0 g/dL    Globulin 4.4 (H) 2.0 - 4.0 g/dL    A-G Ratio 0.7 (L) 1.1 - 2.2     SAMPLES BEING HELD    Collection Time: 10/24/19 12:35 PM   Result Value Ref Range    SAMPLES BEING HELD 1SST,1RED,1BL     COMMENT        Add-on orders for these samples will be processed based on acceptable specimen integrity and analyte stability, which may vary by analyte. URINALYSIS W/MICROSCOPIC    Collection Time: 10/24/19  1:20 PM   Result Value Ref Range    Color YELLOW/STRAW      Appearance CLEAR CLEAR      Specific gravity 1.013 1.003 - 1.030      pH (UA) 5.5 5.0 - 8.0      Protein NEGATIVE  NEG mg/dL    Glucose NEGATIVE  NEG mg/dL    Ketone NEGATIVE  NEG mg/dL    Bilirubin NEGATIVE  NEG      Blood NEGATIVE  NEG      Urobilinogen 1.0 0.2 - 1.0 EU/dL    Nitrites NEGATIVE  NEG      Leukocyte Esterase NEGATIVE  NEG      WBC 0-4 0 - 4 /hpf    RBC 0-5 0 - 5 /hpf    Epithelial cells FEW FEW /lpf    Bacteria NEGATIVE  NEG /hpf    Hyaline cast 0-2 0 - 5 /lpf   URINE CULTURE HOLD SAMPLE    Collection Time: 10/24/19  1:20 PM   Result Value Ref Range    Urine culture hold        URINE ON HOLD IN MICROBIOLOGY DEPT FOR 3 DAYS. IF UNPRESERVED URINE IS SUBMITTED, IT CANNOT BE USED FOR ADDITIONAL TESTING AFTER 24 HRS, RECOLLECTION WILL BE REQUIRED.    POC TROPONIN-I    Collection Time: 10/24/19  2:23 PM   Result Value Ref Range    Troponin-I (POC) <0.04 0.00 - 0.08 ng/mL   EKG, 12 LEAD, INITIAL    Collection Time: 10/24/19  2:24 PM   Result Value Ref Range    Ventricular Rate 81 BPM    Atrial Rate 68 BPM    QRS Duration 118 ms    Q-T Interval 430 ms    QTC Calculation (Bezet) 499 ms    Calculated R Axis -9 degrees    Calculated T Axis 173 degrees    Diagnosis       Atrial fibrillation  Incomplete left bundle branch block  Nonspecific ST and T wave abnormality  Prolonged QT  Abnormal ECG  No previous ECGs available               CARDIAC DIAGNOSTICS:     Cardiac Evaluation Includes:      4/24/08. treadmill - Standard Ellis Protocol with myocardial perfusion imaging. Exercise tolerance was fair. Cardiac stress testing was positive for. for anginal equivalent of left shoulder pain. No reversible defects, no inducible ischemia. Fixed anterior defect in the distal anterior wall and fixed inferior wall defect. EF 47    3/24/2017: LV EF of 45% with no regional wall motion abnormalities        10/24/2019: EKG Afib with LBBB, non specific ST and T wave abnormality, prolonged QT            ASSESSMENT AND PLAN:     Assessment and Plan:    1. Acute on Chronic HFrEF: Worsening of LE edema, Orthopnea, COPD, non compliance and history of Afib. BNP elevated, CXR with increased vascular congestion.   - Continue diuresis Lasix 40 mg BID at 8 AM & 2 PM  - Optimize CHF meds, will add low dose Ace-I,  - Hold BB for now, once volume status is stabilized consider adding coreg  - Strict I&Os and monitor electrolytes  -  Check TSH& A1C  - Follow up on echo    2. Lower extremity edema with venous stasis  - Duplex with non occlusive right popliteal DVT  - Will gent an GIBSON    3. Afib: Irregular rhythm, non compliant with eliquis  - Continue eliquis   - Start coreg once volume status is stable  - Check TSH,     4. CAD s/p CABG in 1995  - Last echo EF 45% in 2017, given symptomatic changes will repeat an echo  - Consider stress test once volume status is optimized     5.  COPD: medical management per primary      Patient seen and discussed with Dr. Erickson Hopkins MD  Family Medicine Resident

## 2019-10-24 NOTE — ED NOTES
Patient requested urinary assistance d/t diuretic; purewick placed with device, padding and underwear and patient education provided.

## 2019-10-24 NOTE — ED NOTES
Verbal shift change report given to Miriam (oncoming nurse) by Mario Segura  (offgoing nurse). Report included the following information SBAR, Kardex, ED Summary, Procedure Summary, Intake/Output, MAR, Accordion, Recent Results and Med Rec Status.

## 2019-10-24 NOTE — PROGRESS NOTES
[facility]: MED RECONCILIATION    Comments/Recommendations:   Pharmacist was unable to verify a recent prescription refill for all of the medications listed below. The patient reports he has a back log of medications at home due to using mail order. The patient verifies he is taking the medications as listed below. Patient's daughter is present for the interview. Verifies allergies and verifies preferred pharmacy listed. The patient has not taken his medications except for his inhaler and new antibiotic since Tuesday morning. He has been staying with his daughter as he was feeling unwell and he did not have his medications with him at his daughter's house. Patient counseled that missing doses of Eliquis places him at increased risk of a clot. Medications added:     Mupirocin  cephalexin    Medications removed:    None    Medications adjusted:    None    Allergies: Adhesive    Prior to Admission Medications:     Medication Documentation Review Audit       Reviewed by JESÚS SouthD (Pharmacist) on 10/24/19 at [phone]      Medication Sig Documenting Provider Last Dose Status Taking?   atorvastatin (LIPITOR) 80 mg tablet Take 80 mg by mouth nightly. Provider, Historical 10/23/2019 Unknown time Active Yes   cephALEXin (KEFLEX) 250 mg capsule Take 500 mg by mouth two (2) times a day. Provider, Historical 10/24/2019 am Active Yes   colesevelam (WELCHOL) 625 mg tablet TAKE 3 TABLETS TWICE A DAY WITH MEALS Vanesa Mckinley MD 10/22/2019 am Active Yes   ELIQUIS 5 mg tablet TAKE 1 TABLET TWICE A DAY TO PREVENT THROMBOEMBOLISM IN CHRONIC ATRIAL FIBRILLATION Vanesa Mckinley MD 10/22/2019 am Active Yes   fluticasone propion-salmeterol (ADVAIR DISKUS) 500-50 mcg/dose diskus inhaler Take 1 Puff by inhalation two (2) times a day.  Vanesa Mckinley MD 10/24/2019 Unknown time Active Yes   metoprolol succinate (TOPROL XL) 25 mg XL tablet TAKE 1 TABLET DAILY Vanesa Mckinley MD 10/22/2019 am Active Yes   MULTIVITAMIN PO Take 1 Tab by mouth daily. Provider, Historical 10/22/2019 am Active Yes   mupirocin (BACTROBAN) 2 % ointment Apply  to affected area nightly.  Apply to wounds on legs when changing bandages Provider, Historical 10/23/2019 Unknown time Active Yes                    Thank you,      Sohail Monday, PharmD, BCPS

## 2019-10-25 ENCOUNTER — APPOINTMENT (OUTPATIENT)
Dept: NON INVASIVE DIAGNOSTICS | Age: 84
DRG: 292 | End: 2019-10-25
Attending: INTERNAL MEDICINE
Payer: MEDICARE

## 2019-10-25 PROBLEM — E11.59 DM TYPE 2 CAUSING VASCULAR DISEASE (HCC): Status: ACTIVE | Noted: 2019-10-25

## 2019-10-25 PROBLEM — O22.30 DVT (DEEP VEIN THROMBOSIS) IN PREGNANCY: Status: RESOLVED | Noted: 2019-10-24 | Resolved: 2019-10-25

## 2019-10-25 PROBLEM — I82.459 DEEP VENOUS THROMBOSIS (DVT) OF PERONEAL VEIN (HCC): Status: ACTIVE | Noted: 2019-10-25

## 2019-10-25 LAB
ANION GAP SERPL CALC-SCNC: 2 MMOL/L (ref 5–15)
ATRIAL RATE: 68 BPM
BUN SERPL-MCNC: 18 MG/DL (ref 6–20)
BUN/CREAT SERPL: 16 (ref 12–20)
CALCIUM SERPL-MCNC: 8.9 MG/DL (ref 8.5–10.1)
CALCULATED R AXIS, ECG10: -9 DEGREES
CALCULATED T AXIS, ECG11: 173 DEGREES
CHLORIDE SERPL-SCNC: 103 MMOL/L (ref 97–108)
CO2 SERPL-SCNC: 34 MMOL/L (ref 21–32)
CREAT SERPL-MCNC: 1.11 MG/DL (ref 0.7–1.3)
DIAGNOSIS, 93000: NORMAL
ECHO AO ASC DIAM: 3.54 CM
ECHO AO ROOT DIAM: 3.41 CM
ECHO AV PEAK GRADIENT: 13.2 MMHG
ECHO AV PEAK VELOCITY: 181.76 CM/S
ECHO IVC SNIFF: 2.11 CM
ECHO LA AREA 4C: 22.7 CM2
ECHO LA MAJOR AXIS: 4.38 CM
ECHO LA TO AORTIC ROOT RATIO: 1.28
ECHO LA VOL 2C: 91.88 ML (ref 18–58)
ECHO LA VOL 4C: 66.77 ML (ref 18–58)
ECHO LA VOL BP: 78 ML (ref 18–58)
ECHO LA VOL/BSA BIPLANE: 37.28 ML/M2 (ref 16–28)
ECHO LA VOLUME INDEX A2C: 43.91 ML/M2 (ref 16–28)
ECHO LA VOLUME INDEX A4C: 31.91 ML/M2 (ref 16–28)
ECHO LV E' SEPTAL VELOCITY: 6.92 CENTIMETER/SECOND
ECHO LV EDV A2C: 76.8 ML
ECHO LV EDV A4C: 88 ML
ECHO LV EDV BP: 82.3 ML (ref 67–155)
ECHO LV EDV INDEX A4C: 42.1 ML/M2
ECHO LV EDV INDEX BP: 39.3 ML/M2
ECHO LV EDV NDEX A2C: 36.7 ML/M2
ECHO LV EDV TEICHHOLZ: 47.54 ML
ECHO LV EJECTION FRACTION A2C: 39 %
ECHO LV EJECTION FRACTION A4C: 41 %
ECHO LV EJECTION FRACTION BIPLANE: 39.6 % (ref 55–100)
ECHO LV ESV A2C: 47.3 ML
ECHO LV ESV A4C: 51.7 ML
ECHO LV ESV BP: 49.7 ML (ref 22–58)
ECHO LV ESV INDEX A2C: 22.6 ML/M2
ECHO LV ESV INDEX A4C: 24.7 ML/M2
ECHO LV ESV INDEX BP: 23.8 ML/M2
ECHO LV ESV TEICHHOLZ: 32.61 ML
ECHO LV INTERNAL DIMENSION DIASTOLIC: 4.72 CM (ref 4.2–5.9)
ECHO LV INTERNAL DIMENSION SYSTOLIC: 4.02 CM
ECHO LV IVSD: 0.95 CM (ref 0.6–1)
ECHO LV MASS 2D: 175.4 G (ref 88–224)
ECHO LV MASS INDEX 2D: 83.8 G/M2 (ref 49–115)
ECHO LV POSTERIOR WALL DIASTOLIC: 0.93 CM (ref 0.6–1)
ECHO LVOT DIAM: 1.92 CM
ECHO MV A VELOCITY: 0.08 CM/S
ECHO MV AREA PHT: 3.6 CM2
ECHO MV E DECELERATION TIME (DT): 211.4 MS
ECHO MV E VELOCITY: 82.09 CM/S
ECHO MV E/A RATIO: 1026.13
ECHO MV PRESSURE HALF TIME (PHT): 61.3 MS
ECHO PV MAX VELOCITY: 132.66 CM/S
ECHO PV PEAK GRADIENT: 7 MMHG
ECHO RV INTERNAL DIMENSION: 4.43 CM
ECHO RV TAPSE: 0.91 CM (ref 1.5–2)
ECHO TV REGURGITANT MAX VELOCITY: 211.94 CM/S
ECHO TV REGURGITANT PEAK GRADIENT: 18 MMHG
ERYTHROCYTE [DISTWIDTH] IN BLOOD BY AUTOMATED COUNT: 14.5 % (ref 11.5–14.5)
EST. AVERAGE GLUCOSE BLD GHB EST-MCNC: 128 MG/DL
GLUCOSE BLD STRIP.AUTO-MCNC: 107 MG/DL (ref 65–100)
GLUCOSE BLD STRIP.AUTO-MCNC: 117 MG/DL (ref 65–100)
GLUCOSE BLD STRIP.AUTO-MCNC: 126 MG/DL (ref 65–100)
GLUCOSE BLD STRIP.AUTO-MCNC: 129 MG/DL (ref 65–100)
GLUCOSE SERPL-MCNC: 96 MG/DL (ref 65–100)
HBA1C MFR BLD: 6.1 % (ref 4.2–6.3)
HCT VFR BLD AUTO: 43.5 % (ref 36.6–50.3)
HGB BLD-MCNC: 13.9 G/DL (ref 12.1–17)
LEFT ABI: 1.09
LEFT ANTERIOR TIBIAL: 160 MMHG
LEFT ARM BP: 133 MMHG
LEFT POSTERIOR TIBIAL: 149 MMHG
LEFT TBI: 0.95
LEFT TOE PRESSURE: 139 MMHG
LVFS 2D: 14.79 %
LVSV (MOD BI): 14.97 ML
LVSV (MOD SINGLE 4C): 16.69 ML
LVSV (MOD SINGLE): 13.61 ML
LVSV (TEICH): 14.93 ML
MCH RBC QN AUTO: 32.3 PG (ref 26–34)
MCHC RBC AUTO-ENTMCNC: 32 G/DL (ref 30–36.5)
MCV RBC AUTO: 100.9 FL (ref 80–99)
MV DEC SLOPE: 3.88
NRBC # BLD: 0 K/UL (ref 0–0.01)
NRBC BLD-RTO: 0 PER 100 WBC
PLATELET # BLD AUTO: 172 K/UL (ref 150–400)
PMV BLD AUTO: 9.8 FL (ref 8.9–12.9)
POTASSIUM SERPL-SCNC: 3.4 MMOL/L (ref 3.5–5.1)
Q-T INTERVAL, ECG07: 430 MS
QRS DURATION, ECG06: 118 MS
QTC CALCULATION (BEZET), ECG08: 499 MS
RBC # BLD AUTO: 4.31 M/UL (ref 4.1–5.7)
RIGHT ABI: 1.1
RIGHT ANTERIOR TIBIAL: 161 MMHG
RIGHT ARM BP: 147 MMHG
RIGHT TBI: 0.76
RIGHT TOE PRESSURE: 112 MMHG
SERVICE CMNT-IMP: ABNORMAL
SODIUM SERPL-SCNC: 139 MMOL/L (ref 136–145)
TSH SERPL DL<=0.05 MIU/L-ACNC: 3.72 UIU/ML (ref 0.36–3.74)
VENTRICULAR RATE, ECG03: 81 BPM
WBC # BLD AUTO: 7.3 K/UL (ref 4.1–11.1)

## 2019-10-25 PROCEDURE — 82962 GLUCOSE BLOOD TEST: CPT

## 2019-10-25 PROCEDURE — 74011250637 HC RX REV CODE- 250/637: Performed by: INTERNAL MEDICINE

## 2019-10-25 PROCEDURE — 83036 HEMOGLOBIN GLYCOSYLATED A1C: CPT

## 2019-10-25 PROCEDURE — 65660000000 HC RM CCU STEPDOWN

## 2019-10-25 PROCEDURE — 94664 DEMO&/EVAL PT USE INHALER: CPT

## 2019-10-25 PROCEDURE — 77010033678 HC OXYGEN DAILY

## 2019-10-25 PROCEDURE — 85027 COMPLETE CBC AUTOMATED: CPT

## 2019-10-25 PROCEDURE — 77030038269 HC DRN EXT URIN PURWCK BARD -A

## 2019-10-25 PROCEDURE — 36415 COLL VENOUS BLD VENIPUNCTURE: CPT

## 2019-10-25 PROCEDURE — 94760 N-INVAS EAR/PLS OXIMETRY 1: CPT

## 2019-10-25 PROCEDURE — 74011250637 HC RX REV CODE- 250/637: Performed by: STUDENT IN AN ORGANIZED HEALTH CARE EDUCATION/TRAINING PROGRAM

## 2019-10-25 PROCEDURE — 74011000250 HC RX REV CODE- 250: Performed by: INTERNAL MEDICINE

## 2019-10-25 PROCEDURE — 74011250636 HC RX REV CODE- 250/636: Performed by: STUDENT IN AN ORGANIZED HEALTH CARE EDUCATION/TRAINING PROGRAM

## 2019-10-25 PROCEDURE — 80048 BASIC METABOLIC PNL TOTAL CA: CPT

## 2019-10-25 PROCEDURE — 84443 ASSAY THYROID STIM HORMONE: CPT

## 2019-10-25 PROCEDURE — 94640 AIRWAY INHALATION TREATMENT: CPT

## 2019-10-25 PROCEDURE — 74011250637 HC RX REV CODE- 250/637: Performed by: NURSE PRACTITIONER

## 2019-10-25 PROCEDURE — 77030029684 HC NEB SM VOL KT MONA -A

## 2019-10-25 PROCEDURE — 93306 TTE W/DOPPLER COMPLETE: CPT

## 2019-10-25 RX ORDER — LISINOPRIL 5 MG/1
5 TABLET ORAL DAILY
Qty: 30 TAB | Refills: 0 | Status: SHIPPED | OUTPATIENT
Start: 2019-10-26 | End: 2019-12-03 | Stop reason: SDUPTHER

## 2019-10-25 RX ORDER — BUMETANIDE 1 MG/1
2 TABLET ORAL DAILY
Status: DISCONTINUED | OUTPATIENT
Start: 2019-10-26 | End: 2019-10-26 | Stop reason: HOSPADM

## 2019-10-25 RX ORDER — METOPROLOL SUCCINATE 25 MG/1
25 TABLET, EXTENDED RELEASE ORAL DAILY
Status: DISCONTINUED | OUTPATIENT
Start: 2019-10-26 | End: 2019-10-26 | Stop reason: HOSPADM

## 2019-10-25 RX ORDER — BUMETANIDE 2 MG/1
2 TABLET ORAL DAILY
Qty: 30 TAB | Refills: 0 | Status: SHIPPED | OUTPATIENT
Start: 2019-10-26 | End: 2019-11-25

## 2019-10-25 RX ORDER — POTASSIUM CHLORIDE 750 MG/1
40 TABLET, FILM COATED, EXTENDED RELEASE ORAL 2 TIMES DAILY
Status: COMPLETED | OUTPATIENT
Start: 2019-10-25 | End: 2019-10-25

## 2019-10-25 RX ADMIN — POTASSIUM CHLORIDE 40 MEQ: 750 TABLET, FILM COATED, EXTENDED RELEASE ORAL at 17:52

## 2019-10-25 RX ADMIN — APIXABAN 5 MG: 5 TABLET, FILM COATED ORAL at 20:45

## 2019-10-25 RX ADMIN — ATORVASTATIN CALCIUM 80 MG: 20 TABLET, FILM COATED ORAL at 20:45

## 2019-10-25 RX ADMIN — Medication 10 ML: at 23:21

## 2019-10-25 RX ADMIN — BUDESONIDE 500 MCG: 0.5 INHALANT RESPIRATORY (INHALATION) at 19:54

## 2019-10-25 RX ADMIN — ARFORMOTEROL TARTRATE 15 MCG: 15 SOLUTION RESPIRATORY (INHALATION) at 07:44

## 2019-10-25 RX ADMIN — BUDESONIDE 500 MCG: 0.5 INHALANT RESPIRATORY (INHALATION) at 07:44

## 2019-10-25 RX ADMIN — Medication 10 ML: at 15:19

## 2019-10-25 RX ADMIN — Medication 10 ML: at 06:45

## 2019-10-25 RX ADMIN — FUROSEMIDE 40 MG: 10 INJECTION, SOLUTION INTRAMUSCULAR; INTRAVENOUS at 15:19

## 2019-10-25 RX ADMIN — FUROSEMIDE 40 MG: 10 INJECTION, SOLUTION INTRAMUSCULAR; INTRAVENOUS at 09:23

## 2019-10-25 RX ADMIN — LISINOPRIL 5 MG: 5 TABLET ORAL at 09:23

## 2019-10-25 RX ADMIN — APIXABAN 10 MG: 5 TABLET, FILM COATED ORAL at 09:23

## 2019-10-25 RX ADMIN — ARFORMOTEROL TARTRATE 15 MCG: 15 SOLUTION RESPIRATORY (INHALATION) at 19:54

## 2019-10-25 RX ADMIN — POTASSIUM CHLORIDE 40 MEQ: 750 TABLET, FILM COATED, EXTENDED RELEASE ORAL at 12:51

## 2019-10-25 NOTE — PROGRESS NOTES
Cardiology Progress Note                             [address] [facility] [address], [place], [address] [facility]                                 Phone [phone]; Fax [phone]        10/25/2019 11:20 AM     Admit Date:           10/24/2019  Admit Diagnosis:  DVT (deep vein thrombosis) in pregnancy [O22.30]  CHF (congestive heart failure) ([address] [I50.9]  Leg ulcer ([address] [C63.880]  HTN (hypertension) [I10]  :          1935   MRN:          [id number]   ASSESSMENT/RECOMMENDATION:   Acute on Chronic HFrEF:EF 45% in 2017- awaiting echo today. Worsening of LE edema, Orthopnea, COPD, non compliance and history of Afib. BNP elevated, CXR with increased vascular congestion. Has not followed up w Dr Lynette Abreu since 2017  - Continue IV Lasix 40 mg BID- good UOP per patient and daughter - not accurate UOP recorded in computer   - Optimize CHF meds, tolerating low dose ACE that was added  - Hold BB for now, consider adding toprol once wheezing has improved   - Strict I&Os and monitor electrolytes    Acute DVT  - Duplex with non occlusive right popliteal DVT  - GIBSON negative   -eliquis DVT dose started      Afib: Irregular rhythm, non compliant with eliquis. Rate controlled intrinsically   - Continue eliquis   - resume PTA toprol- once wheezing is better  - TSH WNL     CAD s/p CABG in   - Last echo EF 45% in 2017, given symptomatic changes will repeat an echo  - cath vs stress test once compensated /echo results      Acute COPD, exacerbation : appears this may be more an acute COPD excerbation than acute CHF. Medical management per primary    Leg ulcer - Left -GIBSON normal bilaterally. Probably venous ulcer. Disp: continue IV diuretics. Consider pulmonary consult              Pt personally seen and examined. Chart reviewed. Agree with advanced NP's history, exam and  A/P with changes/additons.     Neck-JVD+  CVS-S1-S2 present, Irr.  2/6 systolic murmur present  RS- Bilateral rhonchi present  Abdomen-  Obese/soft/NT  LE-  2+ edema    Continue IV diuretics today. NT proBNP in a.m. Probable transition to p.o. in a.m. On Advair/albuterol MDI. Significant rhonchi on exam.?  May need steroids. Per primary care/consider pulmonary consult. Patient's family states that they have seen pulmonary previously-Dr. Dori Abel      Discussed with patient/nursing- RN/family (daughters)    Ehsan Chavarria MD, [facility]          10/25 0701 - 10/25 1900  In: 240 [P.O.:240]  Out: -     Last 3 Recorded Weights in this Encounter    10/24/19 1222 10/24/19 1953 10/25/19 0626   Weight: 217 lb (98.4 kg) 209 lb 7 oz (95 kg) 206 lb 2.1 oz (93.5 kg)         10/23 1901 - 10/25 0700  In: -   Out: 150 [Urine:150]    [address] denies palpitations, irregular heart beat,  chest pain   + SOB- slightly improved   +LE edema.    No lightheadedness or dizziness          Current Facility-Administered Medications   Medication Dose Route Frequency    potassium chloride SR (KLOR-CON 10) tablet 40 mEq  40 mEq Oral BID    [START ON 10/26/2019] bumetanide (BUMEX) tablet 2 mg  2 mg Oral DAILY    apixaban (ELIQUIS) tablet 5 mg  5 mg Oral BID    atorvastatin (LIPITOR) tablet 80 mg  80 mg Oral QHS    sodium chloride (NS) flush 5-40 mL  5-40 mL IntraVENous Q8H    sodium chloride (NS) flush 5-40 mL  5-40 mL IntraVENous PRN    budesonide (PULMICORT) 500 mcg/2 ml nebulizer suspension  500 mcg Nebulization BID RT    And    arformoterol (BROVANA) neb solution 15 mcg  15 mcg Nebulization BID RT    furosemide (LASIX) injection 40 mg  40 mg IntraVENous BID    lisinopril (PRINIVIL, ZESTRIL) tablet 5 mg  5 mg Oral DAILY      OBJECTIVE               Intake/Output Summary (Last 24 hours) at 10/25/2019 1120  Last data filed at 10/25/2019 0811  Gross per 24 hour   Intake 240 ml   Output 150 ml   Net 90 ml       Review of Systems - History obtained from the patient AS PER  HPI    Telemetry AF v rate 70's    PHYSICAL EXAM        Visit Vitals  /75 (BP 1 Location: Right arm, BP Patient Position: Sitting; At rest)   Pulse 75   Temp 98 °F (36.7 °C)   Resp 16   Ht 5' 9\" (1.753 m)   Wt 206 lb 2.1 oz (93.5 kg)   SpO2 93%   BMI 30.44 kg/m²       Gen: Well-developed, elderly, in no acute distress  alert and oriented x 3  HEENT:  Pink conjunctivae, Hearing grossly normal.No scleral icterus or conjunctival, moist mucous membranes  Neck: Supple,+ JVD  Resp: No accessory muscle use, coarse breath sounds/exp wheezing  Card: irregular Rate,Rythm, 2/6 murmur, no rubs or gallop. No thrills. GI:          soft, non-tender   MSK: No cyanosis or clubbing, good capillary refill  Skin: No rashes or ulcers, +bruising  Neuro:  Cranial nerves are grossly intact, moving all four extremities, no focal deficit, follows commands appropriately  Psych:  Good insight, oriented to person, place and time, alert, Nml Affect  LE: +2 BLE LE edema  Edema. LLE wrapped in CDI gauze        DATA REVIEW            Laboratory and Imaging have been reviewed by me and are notable for  No results for input(s): CPK, CKMB, TROIQ in the last 72 hours.   Recent Labs     10/25/19  0142 10/24/19  1235    141   K 3.4* 3.8   CO2 34* 32   BUN 18 18   CREA 1.11 1.16   GLU 96 110*   WBC 7.3 6.9   HGB 13.9 14.0   HCT 43.5 44.6    [address] [facility], NP

## 2019-10-25 NOTE — DISCHARGE SUMMARY
Physician Discharge Summary     Patient ID:  Hattie Roberts  [id number]  80 y.o.  1935    Admit date: 10/24/2019    Discharge date and time: 10/26/2019    Admission Diagnoses: DVT (deep vein thrombosis) in pregnancy [O22.30]  CHF (congestive heart failure) ([address] [I50.9]  Leg ulcer ([address] [L97.909]  HTN (hypertension) [I10]    Discharge Diagnoses:    Principal Diagnosis     Acute and chronic CHF (congestive heart failure) exacerbation                                             Other Diagnoses  Active Problems:    CAD (coronary artery disease) (4/8/2010)    HLD (hyperlipidemia) (4/8/2010)    Borderline diabetes mellitus (4/8/2010)    Prostate cancer ([address] (4/8/2010)    Asthma (4/8/2010)    Essential hypertension (10/8/2015)    Venous insufficiency (10/11/2016)    Atrial fibrillation ([address] (3/15/2017)    CHF (congestive heart failure) ([address] (10/24/2019)    HTN (hypertension) (10/24/2019)    Leg ulcer ([address] (10/24/2019)    DM type 2 causing vascular disease ([address] (10/25/2019)    Deep venous thrombosis (DVT) of peroneal vein (10/25/2019)     Hospital Course:   Acute and chronic CHF (congestive heart failure) exacerbation - POA. Cariology consulted. ECHO showed \"Left Ventricle: Normal cavity size and wall thickness. Mild systolic dysfunction. Estimated left ventricular ejection fraction is 41 - 45%. Abnormal left ventricular wall motion. \"  Improved breathing and edema after lasix IV, with PO ACE. Daily weight. Na and fluid restriction. Check 6 minute walk. Can go home today, outpatient cardiology follow up     CAD (coronary artery disease) / HTN (hypertension) - Appears stable. Continue statin, metoprolol, Eliquis, lasix, lisinopril.       Atrial fibrillation / Chronic anticoagulation - Stable rate. Continue Eliquis and metoprolol     LE edema / Leg ulcer / Venous insufficiency - POA, driven by CHF. Wound consulted, now with dressing.   Diuresis     HLD (hyperlipidemia) - Continue atorvastatin     Hypokalemia - Replete PO     COPD / Asthma - cough is due to CHF, not COPD. Xray with edema. Not COPD exacerbation. Continue brovana/pulmicort.  Prn nebs. Okay for metoprolol.     Obese - Advise weight loss.     Prostate cancer - Outpatient follow up.     DM type 2 causing vascular disease - Dx in chart, but BG normal.  Diabetic diet and counseling. SSI per protocol was net needed. Not listing home meds. Check A1c. I think this Dx is wrong.     Deep venous thrombosis (DVT) of peroneal vein - One small non occlusive clot. Unlikely to be causing any symptoms. Already on Eliquis. No further workup.      PCP: Jaskaran Mendes MD    Consults: Cardiology    Significant Diagnostic Studies: See Hospital Course    Discharged home in improved condition. Discharge Exam:  /68 (BP 1 Location: Left arm, BP Patient Position: At rest)   Pulse 88   Temp 98.1 °F (36.7 °C)   Resp 18   Ht 5' 9\" (1.753 m)   Wt 93.5 kg (206 lb 2.1 oz)   SpO2 92%   BMI 30.44 kg/m²      Gen:  Obese, in no acute distress  HEENT:  Pink conjunctivae, PERRL, hearing intact to voice, moist mucous membranes  Neck:  Supple, without masses, thyroid non-tender  Resp:  No accessory muscle use, clear breath sounds without wheezes rales or rhonchi  Card:  No murmurs, normal S1, S2 without thrills, bruits, 2+ peripheral edema  Abd:  Soft, non-tender, non-distended, normoactive bowel sounds are present, no mass  Lymph:  No cervical or inguinal adenopathy  Musc:  No cyanosis or clubbing  Skin:  No rashes or ulcers, skin turgor is good  Neuro:  Cranial nerves are grossly intact, no focal motor weakness, follows commands   Psych:  Good insight, oriented to person, place and time, alert    Patient Instructions:   Current Discharge Medication List      START taking these medications    Details   bumetanide (BUMEX) 2 mg tablet Take 1 Tab by mouth daily for 30 days.   Qty: 30 Tab, Refills: 0      lisinopril (PRINIVIL, ZESTRIL) 5 mg tablet Take 1 Tab by mouth daily for 30 days.  Qty: 30 Tab, Refills: 0         CONTINUE these medications which have NOT CHANGED    Details   atorvastatin (LIPITOR) 80 mg tablet Take 80 mg by mouth nightly. mupirocin (BACTROBAN) 2 % ointment Apply  to affected area nightly. Apply to wounds on legs when changing bandages      ELIQUIS 5 mg tablet TAKE 1 TABLET TWICE A DAY TO PREVENT THROMBOEMBOLISM IN CHRONIC ATRIAL FIBRILLATION  Qty: 180 Tab, Refills: 4    Associated Diagnoses: Persistent atrial fibrillation; Essential hypertension; Coronary artery disease involving native coronary artery of native heart without angina pectoris; Mixed hyperlipidemia      fluticasone propion-salmeterol (ADVAIR DISKUS) 500-50 mcg/dose diskus inhaler Take 1 Puff by inhalation two (2) times a day. Qty: 3 Inhaler, Refills: 1      metoprolol succinate (TOPROL XL) 25 mg XL tablet TAKE 1 TABLET DAILY  Qty: 90 Tab, Refills: 1      colesevelam (WELCHOL) 625 mg tablet TAKE 3 TABLETS TWICE A DAY WITH MEALS  Qty: 540 Tab, Refills: 0      MULTIVITAMIN PO Take 1 Tab by mouth daily. STOP taking these medications       cephALEXin (KEFLEX) 250 mg capsule Comments:   Reason for Stopping:             Activity: Activity as tolerated  Diet: Cardiac Diet, Diabetic Diet, Low fat, Low cholesterol and Resume previous diet  Wound Care: Keep wound clean and dry, Reinforce dressing PRN and As directed    Follow-up with your PCP and cardiology in a few weeks.   Follow-up tests/labs - none    Signed:  Kev Lilly MD  10/26/2019  11:10 AM

## 2019-10-25 NOTE — WOUND CARE
80 y.o. Male with PMH including A. Fib, HTN, Prostate Ca, and COPD presented to the ED with BLE edema worse over the last 2 weeks with a blister to the LLE. Admitted for work up and evaluation. Assessment: 
Patient is sitting up on the side of the bed. States he had an accident on the floor. PCT in room to help clean up floor and patient. Assisted back to bed for skin evaluation. Patient has Bilateral LE edema. Pulses are intermittently/minimally palpable (irregular pulse to radial palp). There is mild discoloration to bilateral calf regions. Small scabbed areas to the right anterior shin and a large 7x7cm partial thickness open wound to the anterior left leg (appearance of a ruptured blister. Cleansed wound and removed loose devitalized tissue with gauze. Applied lotion to BLE. Xeroform to cover the left left wound. Covered with 4x4 gauze. Secured with kerlex and tape. Recommendation: 
Daily with skin care apply lotion to extremities and bony prominences for protection from friction/shear. Float heels and protect when in bed. Have patient Turn Q2h while in bed. Protect from lines and devices. When up in chair use a waffle cushion and reposition every 20 minutes. To the Left leg Wound: Cleanse, apply Xeroform to cover the wound. Cover with 4x4 gauze. Secure with kerlex and tape. Change every other day.   
 
Consult as needed,  
Red RUSSON LUCIO [facility]

## 2019-10-25 NOTE — PROGRESS NOTES
[facility]    Medical Progress Note      NAME: Schuyler Zhu   :  1935  MRM:  [id number]    Date/Time: 10/25/2019  11:53 AM          Assessment and Plan:     Acute and chronic CHF (congestive heart failure) exacerbation - POA. Cariology consulted. ECHO pending. Improved symptoms after lasix IV, with PO ACE. Daily weight. Na and fluid restriction. Check 6 minute walk. Can go home when cleared by cardiology. CAD (coronary artery disease) / HTN (hypertension) - Appears stable. Continue statin, Eliquis, lasix, lisinopril. Not on BB, unclear why. Atrial fibrillation / Chronic anticoagulation - Stable rate. Continue Eliquis, but not on rate control. LE edema / Leg ulcer / Venous insufficiency - POA, driven by CHF. Wound consulted, now with dressing. Diuresis    HLD (hyperlipidemia) - Continue atorvastatin    Hypokalemia - Replete PO    COPD / Asthma - cough is due to CHF, not COPD. Xray with edema. Not COPD exacerbation. Continue brovana/pulmicort. Prn nebs. Okay for metoprolol.     Obese - Advise weight loss. Prostate cancer - Outpatient follow up. DM type 2 causing vascular disease - Dx in chart, but BG normal.  Diabetic diet and counseling. SSI per protocol was net needed. Not listing home meds. Check A1c. I think this Dx is wrong. Deep venous thrombosis (DVT) of peroneal vein - One small non occlusive clot. Unlikely to be causing any symptoms. Already on Eliquis. No further workup. Subjective:     Chief Complaint:  Edema of legs is better, wants to go home    ROS:  (bold if positive, if negative)    Tolerating some PT  Tolerating Diet        Objective:     Last 24hrs VS reviewed since prior progress note.  Most recent are:    Visit Vitals  /63 (BP 1 Location: Left arm, BP Patient Position: At rest)   Pulse 83   Temp 98 °F (36.7 °C)   Resp 18   Ht 5' 9\" (1.753 m)   Wt 93.5 kg (206 lb 2.1 oz)   SpO2 94%   BMI 30.44 kg/m²     SpO2 Readings from Last 6 Encounters:   10/25/19 94%   10/09/18 94%   04/05/18 94%   10/11/17 95%   04/12/17 98%   03/09/17 96%    O2 Flow Rate (L/min): 2 l/min   No intake or output data in the 24 hours ending 10/25/19 7314     Physical Exam:    Gen:  Obese, in no acute distress  HEENT:  Pink conjunctivae, PERRL, hearing intact to voice, moist mucous membranes  Neck:  Supple, without masses, thyroid non-tender  Resp:  No accessory muscle use, clear breath sounds without wheezes rales or rhonchi  Card:  No murmurs, normal S1, S2 without thrills, bruits, 2+ peripheral edema  Abd:  Soft, non-tender, non-distended, normoactive bowel sounds are present, no mass  Lymph:  No cervical or inguinal adenopathy  Musc:  No cyanosis or clubbing  Skin:  No rashes or ulcers, skin turgor is good  Neuro:  Cranial nerves are grossly intact, no focal motor weakness, follows commands   Psych:  Good insight, oriented to person, place and time, alert    Telemetry reviewed:   AFIB  __________________________________________________________________  Medications Reviewed: (see below)  Medications:     Current Facility-Administered Medications   Medication Dose Route Frequency    atorvastatin (LIPITOR) tablet 80 mg  80 mg Oral QHS    sodium chloride (NS) flush 5-40 mL  5-40 mL IntraVENous Q8H    sodium chloride (NS) flush 5-40 mL  5-40 mL IntraVENous PRN    budesonide (PULMICORT) 500 mcg/2 ml nebulizer suspension  500 mcg Nebulization BID RT    And    arformoterol (BROVANA) neb solution 15 mcg  15 mcg Nebulization BID RT    apixaban (ELIQUIS) tablet 10 mg  10 mg Oral BID    furosemide (LASIX) injection 40 mg  40 mg IntraVENous BID    lisinopril (PRINIVIL, ZESTRIL) tablet 5 mg  5 mg Oral DAILY        Lab Data Reviewed: (see below)  Lab Review:     Recent Labs     10/25/19  0142 10/24/19  1235   WBC 7.3 6.9   HGB 13.9 14.0   HCT 43.5 44.6    159     Recent Labs     10/25/19  0142 10/24/19  1235    141   K 3.4* 3.8    106   CO2 34* 32   GLU 96 110*   BUN 18 18   CREA 1.11 1.16   CA 8.9 8.9   ALB  --  3.2*   TBILI  --  0.8   SGOT  --  29   ALT  --  20     Lab Results   Component Value Date/Time    Glucose (POC) 107 (H) 10/25/2019 06:59 AM     No results for input(s): PH, PCO2, PO2, HCO3, FIO2 in the last 72 hours. No results for input(s): INR, INREXT in the last 72 hours. All Micro Results     Procedure Component Value Units Date/Time    URINE CULTURE HOLD SAMPLE [[id number]] Collected:  10/24/19 1320    Order Status:  Completed Specimen:  Urine from Serum Updated:  10/24/19 1326     Urine culture hold       URINE ON HOLD IN MICROBIOLOGY DEPT FOR 3 DAYS. IF UNPRESERVED URINE IS SUBMITTED, IT CANNOT BE USED FOR ADDITIONAL TESTING AFTER 24 HRS, RECOLLECTION WILL BE REQUIRED. Other pertinent lab: none    Total time spent with patient: 39 Minutes I reviewed chart, notes, data and current medications in the medical record. I have examined and treated the patient at bedside during this period.                  Care Plan discussed with: Patient, Care Manager, Nursing Staff, Consultant/Specialist and >50% of time spent in counseling and coordination of care    Discussed:  Care Plan and D/C Planning    Prophylaxis:  H2B/PPI    Disposition:  Home w/Family           ___________________________________________________    Attending Physician: Carlo Syed MD

## 2019-10-25 NOTE — NURSE NAVIGATOR
Chart reviewed by Heart Failure Nurse Navigator. Heart Failure database completed. EF:  Current Echo is pending. ACEi/ARB/ARNi:  Lisinopril 5 mg daily. BB: Metoprolol succinate 25 mg daily. Aldosterone Antagonist: **    Obstructive Sleep Apnea Screening:    CRT **. NYHA Functional Class **. Heart Failure Teach Back in Patient Education. Heart Failure Avoiding Triggers on Discharge Instructions. Cardiologist:  Has been lost to f/u for 2 1/2 years. Previously had seen Dr. Lillian Duke. Cardiology consult with [facility]STER. Post discharge follow up phone call to be made within 48-72 hours of discharge.

## 2019-10-25 NOTE — PHYSICIAN ADVISORY
Short Stay Review Pt Name:  Fallon Harrell MR#  [id number] [facility]#   [id number] Room and Hospital  331/01  @ [address] [facility] Hospitalization date  10/24/2019 12:24 PM 
No discharge date for patient encounter. Current Attending Physician  Carlos Petty MD  
 
A discharge order has been placed for this episode of hospital care for Mr. Fallon Harrell; since this hospital stay is less than two midnights, I reviewed Mr. Kristin WALLACE OF [facility] chart. Mr. Kristin WALLACE OF [facility] insurance/benefit include: 
Payor: [place] MEDICARE / Plan: [place] MEDICARE PART A & B / Product Type: Medicare /  
 
Utilization Review related case summary:  
Age  80 y.o.  
BMI  Body mass index is 30.44 kg/m². PMHx includes  CAD, HTN, COPD,  CA Prostate hx . Hospital course  The pt developed acute on chronic CHF along with finding of acute non-occlusive DVT in the popliteal vein. Multiple tasks are still pending including six min walk test, echo. Risk of deterioration at the time this patient  was hospitalized     Moderate On the basis of chart review, this patient's hospitalization status     
is appropriate for INPATIENT The information in this document is a recommendation to be used for utilization review and utilization management purposes only. This recommendation is not an order. The recommendation is made based on the information reviewed at the time of the referral, is pursuant to the [facility] Conditions of Participation (42 CFR Part 482), and is neither a judgment nor an assessment with regard to the appropriateness or quality of clinical care. For all Managed Care patients:  The Criteria are intended solely for use as screening guidelines with respect to the medical appropriateness of healthcare services and not for final clinical or payment determinations concerning the type or level of medical care provided, or proposed to be provided, to a patient. They help the reviewers determine whether a patient is appropriate for observation or inpatient admission at the time a decision to admit the patient is being made. All efforts are made to apply the pertinent payor criteria ([employer] or [employer]) as well as the clinical judgements based on the information reviewed at the time of the referral.\" Nothing in this document may be used to limit, alter, or affect clinical services provided to the patient named below. Wilford Meckel MD MPH FACP Cell : [phone] Physician Advisor Adriana  [address]  
Utilization Review, Care Management CSN:  [id number] LUIS:   [id number] Admitted on :  10/24/2019 Discharge order

## 2019-10-25 NOTE — PROGRESS NOTES
Pharmacist Discharge Medication Reconciliation    Discharge Provider:  Dr. Twin Stapleton       Discharge Medications:      My Medications        START taking these medications        Instructions Each Dose to Equal Morning Noon Evening Bedtime   bumetanide 2 mg tablet  Commonly known as:  [address]  Start taking on:  10/26/2019    Your last dose was: Your next dose is: Take 1 Tab by mouth daily for 30 days. 2 mg                 lisinopril 5 mg tablet  Commonly known as:  Elenore Boone  Start taking on:  10/26/2019    Your last dose was: Your next dose is: Take 1 Tab by mouth daily for 30 days. 5 mg                        CONTINUE taking these medications        Instructions Each Dose to Equal Morning Noon Evening Bedtime   colesevelam 625 mg tablet  Commonly known as:  [facility]    Your last dose was: Your next dose is:          TAKE 3 TABLETS TWICE A DAY WITH MEALS                  ELIQUIS 5 mg tablet  Generic drug:  apixaban    Your last dose was: Your next dose is:          TAKE 1 TABLET TWICE A DAY TO PREVENT THROMBOEMBOLISM IN CHRONIC ATRIAL FIBRILLATION                  fluticasone propion-salmeterol 500-50 mcg/dose diskus inhaler  Commonly known as:  ADVAIR/WIXELA    Your last dose was: Your next dose is: Take 1 Puff by inhalation two (2) times a day. 1 Puff                 LIPITOR 80 mg tablet  Generic drug:  atorvastatin    Your last dose was: Your next dose is: Take 80 mg by mouth nightly. 80 mg                 metoprolol succinate 25 mg XL tablet  Commonly known as:  TOPROL-XL    Your last dose was: Your next dose is:          TAKE 1 TABLET DAILY                  MULTIVITAMIN PO    Your last dose was: Your next dose is: Take 1 Tab by mouth daily. 1 Tab                 mupirocin 2 % ointment  Commonly known as:  BACTROBAN    Your last dose was: Your next dose is:          Apply  to affected area nightly.  Apply to wounds on legs when changing bandages                         STOP taking these medications      cephALEXin 250 mg capsule  Commonly known as:  Verdis Bence                  Where to Get Your Medications        Information on where to get these meds will be given to you by the nurse or doctor.     Ask your nurse or doctor about these medications  bumetanide 2 mg tablet  lisinopril 5 mg tablet       Reviewed by:  Kae Jimenez, WandaD

## 2019-10-25 NOTE — PROGRESS NOTES
Bedside and Verbal shift change report given to Flores Rose (oncoming nurse) by Nereida Gallo (offgoing nurse). Report included the following information SBAR, Kardex, ED Summary, Intake/Output, MAR and Recent Results. 1500 pt on 1.5L NC and continuous pulse ox placed on pt.    1515 incontinent of urine episode. Sissy changed, 1527 Delilah changed. Melissa Moctezuma RN and student RN assumed care for this pt at [phone].

## 2019-10-25 NOTE — PROGRESS NOTES
Care Plan Summary:     Problem: Falls - Risk of  Goal: *Absence of Falls  Description  Document Dai Louise Fall Risk and appropriate interventions in the flowsheet.   Outcome: Progressing Towards Goal  Note:   Fall Risk Interventions:  Mobility Interventions: PT Consult for assist device competence, PT Consult for mobility concerns, OT consult for ADLs, Strengthening exercises (ROM-active/passive)    Medication Interventions: Patient to call before getting OOB, Teach patient to arise slowly    Problem: Patient Education: Go to Patient Education Activity  Goal: Patient/Family Education  Outcome: Progressing Towards Goal

## 2019-10-25 NOTE — PROGRESS NOTES
Reason for Admission:  CHF exacerbation                RRAT Score:  21                Resources/supports as identified by patient/family:   Patient has 2 daughters that assist with care. Huong Weldon [phone]   Rolando Lesch [phone]                Top Challenges facing patient (as identified by patient/family and CM): Finances/Medication cost?      Patient has Medicare A/B and               [facility]              Support system or lack thereof? Patient received help/support from 2 daughters. Living arrangements? Lives alone           Self-care/ADLs/Cognition? Patient may need assistance with care needs upon discharge          Current Advanced Directive/Advance Care Plan:  Not on file. Plan for utilizing home health:    Has not used home health the past.  DME - has walker. Transition of Care Plan:       Chart reviewed. Demographics verified. Met with patient at bedside. Daughter Rolando Lesch present. 1. Patient admitted for CHF exacerbation, current DVT and LLE wound. Hx of CAD, HTN, diabetes type 2, CHF. 2. Patient lives alone but receives assistance with care needs from his 2 daughters. 3. Has prescription drug coverage, uses [address] [facility] [address] mail order pharmacy for routine medications. 4. Lives in 2 story home with bedroom on the 2nd level, 3 steps to enter home and 12 steps to 2nd level. 5. Care management discussed, will continue to monitor for discharge needs. May benefit from [facility] - CHF home visit vs consider home health for wound management and disease management. Care Management Interventions  PCP Verified by CM: Yes(Dr. Brigitte Barton)  Palliative Care Criteria Met (RRAT>21 & CHF Dx)?: Yes  Palliative Consult Recommended?: No  Reason Palliative Care Not Recommended?: Provider preference to wait  Mode of Transport at Discharge:  Other (see comment)(per family)  Transition of Care Consult (CM Consult): Discharge Planning  Discharge Durable Medical Equipment: No  Physical Therapy Consult: No  Occupational Therapy Consult: No  Speech Therapy Consult: No  Current Support Network: Lives Alone  Confirm Follow Up Transport: Family  Plan discussed with Pt/Family/Caregiver: Yes  Discharge Location  Discharge Placement: Unable to determine at this time     Jose Greer RN, MSN/Care manager

## 2019-10-25 NOTE — DISCHARGE SUMMARY
Physician Discharge Summary     Patient ID:  Vance Mijares  [id number]  80 y.o.  1935    Admit date: 10/24/2019    Discharge date and time: 10/25/2019    Admission Diagnoses: DVT (deep vein thrombosis) in pregnancy [O22.30]  CHF (congestive heart failure) ([facility] [I50.9]  Leg ulcer ([address] [L97.909]  HTN (hypertension) [I10]    Discharge Diagnoses:    Principal Diagnosis     Acute and chronic CHF (congestive heart failure) exacerbation                                             Other Diagnoses  Active Problems:    CAD (coronary artery disease) (4/8/2010)    HLD (hyperlipidemia) (4/8/2010)    Borderline diabetes mellitus (4/8/2010)    Prostate cancer ([address] (4/8/2010)    Asthma (4/8/2010)    Essential hypertension (10/8/2015)    Venous insufficiency (10/11/2016)    Atrial fibrillation ([address] (3/15/2017)    CHF (congestive heart failure) ([facility] (10/24/2019)    HTN (hypertension) (10/24/2019)    Leg ulcer ([facility] (10/24/2019)    DM type 2 causing vascular disease ([facility] [address] (10/25/2019)    Deep venous thrombosis (DVT) of peroneal vein (10/25/2019)     Hospital Course:   Acute and chronic CHF (congestive heart failure) exacerbation - POA. Cariology consulted. ECHO pending. Improved symptoms after lasix IV, with PO ACE. Daily weight. Na and fluid restriction. Check 6 minute walk. Can go home when cleared by cardiology.     CAD (coronary artery disease) / HTN (hypertension) - Appears stable. Resume statin, metoprolol, Eliquis, adding lasix, lisinopril.      Atrial fibrillation / Chronic anticoagulation - Stable rate. Continue Eliquis, and resume metoprolol     LE edema / Leg ulcer / Venous insufficiency - POA, driven by CHF. Wound consulted, now with dressing. Diuresis     HLD (hyperlipidemia) - Continue atorvastatin     Hypokalemia - Replete PO     COPD / Asthma - cough is due to CHF, not COPD. Xray with edema. Not COPD exacerbation. Continue brovana/pulmicort. Prn nebs.   Okay for metoprolol.     Obese - Advise weight loss.     Prostate cancer - Outpatient follow up.     DM type 2 causing vascular disease - Dx in chart, but BG normal.  Diabetic diet and counseling. SSI per protocol was net needed. Not listing home meds. Check A1c. I think this Dx is wrong.     Deep venous thrombosis (DVT) of peroneal vein - One small non occlusive clot. Unlikely to be causing any symptoms. Already on Eliquis. No further workup.     PCP: Baron Laura MD    Consults: Cardiology    Significant Diagnostic Studies: See Hospital Course    Discharged home in improved condition. Discharge Exam:  /63 (BP 1 Location: Left arm, BP Patient Position: At rest)   Pulse 83   Temp 98 °F (36.7 °C)   Resp 18   Ht 5' 9\" (1.753 m)   Wt 93.5 kg (206 lb 2.1 oz)   SpO2 94%   BMI 30.44 kg/m²      Gen:  Obese, in no acute distress  HEENT:  Pink conjunctivae, PERRL, hearing intact to voice, moist mucous membranes  Neck:  Supple, without masses, thyroid non-tender  Resp:  No accessory muscle use, clear breath sounds without wheezes rales or rhonchi  Card:  No murmurs, normal S1, S2 without thrills, bruits, 2+ peripheral edema  Abd:  Soft, non-tender, non-distended, normoactive bowel sounds are present, no mass  Lymph:  No cervical or inguinal adenopathy  Musc:  No cyanosis or clubbing  Skin:  No rashes or ulcers, skin turgor is good  Neuro:  Cranial nerves are grossly intact, no focal motor weakness, follows commands   Psych:  Good insight, oriented to person, place and time, alert    Patient Instructions:   Current Discharge Medication List      START taking these medications    Details   bumetanide (BUMEX) 2 mg tablet Take 1 Tab by mouth daily for 30 days. Qty: 30 Tab, Refills: 0      lisinopril (PRINIVIL, ZESTRIL) 5 mg tablet Take 1 Tab by mouth daily for 30 days. Qty: 30 Tab, Refills: 0         CONTINUE these medications which have NOT CHANGED    Details   atorvastatin (LIPITOR) 80 mg tablet Take 80 mg by mouth nightly.       mupirocin (BACTROBAN) 2 % ointment Apply  to affected area nightly. Apply to wounds on legs when changing bandages      ELIQUIS 5 mg tablet TAKE 1 TABLET TWICE A DAY TO PREVENT THROMBOEMBOLISM IN CHRONIC ATRIAL FIBRILLATION  Qty: 180 Tab, Refills: 4    Associated Diagnoses: Persistent atrial fibrillation; Essential hypertension; Coronary artery disease involving native coronary artery of native heart without angina pectoris; Mixed hyperlipidemia      fluticasone propion-salmeterol (ADVAIR DISKUS) 500-50 mcg/dose diskus inhaler Take 1 Puff by inhalation two (2) times a day. Qty: 3 Inhaler, Refills: 1      metoprolol succinate (TOPROL XL) 25 mg XL tablet TAKE 1 TABLET DAILY  Qty: 90 Tab, Refills: 1      colesevelam (WELCHOL) 625 mg tablet TAKE 3 TABLETS TWICE A DAY WITH MEALS  Qty: 540 Tab, Refills: 0      MULTIVITAMIN PO Take 1 Tab by mouth daily. STOP taking these medications       cephALEXin (KEFLEX) 250 mg capsule Comments:   Reason for Stopping:             Activity: Activity as tolerated  Diet: Cardiac Diet, Diabetic Diet, Low fat, Low cholesterol and Resume previous diet  Wound Care: Keep wound clean and dry, Reinforce dressing PRN and As directed    Follow-up with your PCP and cardiology in a few weeks.   Follow-up tests/labs - none    Signed:  Josiah George MD  10/25/2019  10:23 AM

## 2019-10-25 NOTE — PROGRESS NOTES
Bedside shift change report given to [id number] and Black Nguyen (oncoming nurse) by Collin Serrano (offgoing nurse). Report included the following information SBAR, Intake/Output, MAR, Accordion and Cardiac Rhythm Afib.

## 2019-10-25 NOTE — PROGRESS NOTES
Clinical Shift Summary:     1900: TRANSFER - IN REPORT:    Verbal report received from [facility], RN(name) on Chu Wood  being received from ED(unit) for routine progression of care      Report consisted of patients Situation, Background, Assessment and   Recommendations(SBAR). Information from the following report(s) SBAR, Kardex, ED Summary, Intake/Output, MAR, Accordion, Recent Results, Med Rec Status and Cardiac Rhythm A-fib was reviewed with the receiving nurse. Opportunity for questions and clarification was provided. Assessment completed upon patients arrival to unit and care assumed. 2018: Patient arrived on the unit. Conducted admission assessment. Patient left for duplex imaging. 2137: Patient returned from duplex imaging. Administered scheduled medications (see MAR). 2303: Patient O2 saturation decreased to high 80s. Administered 2 L/Min via nasal cannula. 0145: Patient c/o cramping discomfort in the lower extremities. He stated \"it is not pain. It is as if muscles are costantly maria luisa. \" Notified MD.  MD does not recommend muscle relaxant or severe pain medication regimen. No changes occurred overnight. Patient rested throughout the night. Patient stating cramping improved after Lasix administration before entering the unit.    0700: Bedside and Verbal shift change report given to Maldonado Ring RN (oncoming nurse) by Nico Stearns RN (offgoing nurse). Report included the following information SBAR, Kardex, ED Summary, Intake/Output, MAR, Accordion, Recent Results and Cardiac Rhythm A-fib.

## 2019-10-26 VITALS
HEART RATE: 89 BPM | HEIGHT: 69 IN | WEIGHT: 206.13 LBS | TEMPERATURE: 98.4 F | RESPIRATION RATE: 18 BRPM | DIASTOLIC BLOOD PRESSURE: 74 MMHG | OXYGEN SATURATION: 93 % | SYSTOLIC BLOOD PRESSURE: 119 MMHG | BODY MASS INDEX: 30.53 KG/M2

## 2019-10-26 LAB
BNP SERPL-MCNC: 2017 PG/ML
GLUCOSE BLD STRIP.AUTO-MCNC: 114 MG/DL (ref 65–100)
SERVICE CMNT-IMP: ABNORMAL

## 2019-10-26 PROCEDURE — 74011250637 HC RX REV CODE- 250/637: Performed by: STUDENT IN AN ORGANIZED HEALTH CARE EDUCATION/TRAINING PROGRAM

## 2019-10-26 PROCEDURE — 77010033678 HC OXYGEN DAILY

## 2019-10-26 PROCEDURE — 77030038269 HC DRN EXT URIN PURWCK BARD -A

## 2019-10-26 PROCEDURE — 74011250637 HC RX REV CODE- 250/637: Performed by: INTERNAL MEDICINE

## 2019-10-26 PROCEDURE — 94761 N-INVAS EAR/PLS OXIMETRY MLT: CPT

## 2019-10-26 PROCEDURE — 83880 ASSAY OF NATRIURETIC PEPTIDE: CPT

## 2019-10-26 PROCEDURE — 94760 N-INVAS EAR/PLS OXIMETRY 1: CPT

## 2019-10-26 PROCEDURE — 36415 COLL VENOUS BLD VENIPUNCTURE: CPT

## 2019-10-26 PROCEDURE — 74011250636 HC RX REV CODE- 250/636: Performed by: STUDENT IN AN ORGANIZED HEALTH CARE EDUCATION/TRAINING PROGRAM

## 2019-10-26 PROCEDURE — 82962 GLUCOSE BLOOD TEST: CPT

## 2019-10-26 RX ADMIN — METOPROLOL SUCCINATE 25 MG: 25 TABLET, EXTENDED RELEASE ORAL at 08:40

## 2019-10-26 RX ADMIN — FUROSEMIDE 40 MG: 10 INJECTION, SOLUTION INTRAMUSCULAR; INTRAVENOUS at 08:40

## 2019-10-26 RX ADMIN — LISINOPRIL 5 MG: 5 TABLET ORAL at 08:40

## 2019-10-26 RX ADMIN — Medication 10 ML: at 15:16

## 2019-10-26 RX ADMIN — BUMETANIDE 2 MG: 1 TABLET ORAL at 08:40

## 2019-10-26 RX ADMIN — Medication 10 ML: at 06:21

## 2019-10-26 RX ADMIN — APIXABAN 5 MG: 5 TABLET, FILM COATED ORAL at 08:40

## 2019-10-26 NOTE — PROGRESS NOTES
Called and informed respiratory tech that client still had nebs from this am that had not been documented against.

## 2019-10-26 NOTE — PROGRESS NOTES
10/26/2019  3:08 PM  Case management note    Patient had been reluctant to accept oxygen, he decided to take oxygen  His daughter was in room, we call [facility] and they are bringing a machine he can use 24/7 till he gets back to his home after staying with daughter for a couple of days.   Referral sent to [facility]  Choice letter signed  SKYLAR Nichols

## 2019-10-26 NOTE — PROGRESS NOTES
Progress Note    Patient: Aranza Arguelles MRN: [id number]  SSN: [id number]    YOB: 1935  Age: 80 y.o. Sex: male      Admit Date: 10/24/2019    LOS: 2 days     Subjective:     Mr. Pepe Espino is an 79-year-old white male with known history of coronary artery disease status post coronary artery bypass graft surgery , COPD, atrial fibrillation, right popliteal DVT, mild LV dysfunction, admitted with shortness of breath and lower extremity edema. Repeat echocardiogram shows stability of his mild LV dysfunction. He has done well with diuresis with resolution of symptoms. Objective:     Vitals:    10/26/19 0743 10/26/19 0813 10/26/19 1139 10/26/19 1406   BP:  122/68 105/55    Pulse: (!) 101 88 77 85   Resp:  18 16    Temp:  98.1 °F (36.7 °C) 98.3 °F (36.8 °C)    SpO2:  92% 91%    Weight:       Height:          Temp (24hrs), Av.1 °F (36.7 °C), Min:97.7 °F (36.5 °C), Max:98.4 °F (36.9 °C)      Intake and Output:  Current Shift: 10/26 0701 - 10/26 1900  In: 480 [P.O.:480]  Out: -   Last three shifts: 10/24 1901 - 10/26 07  In: 720 [P.O.:720]  Out: 1790 [Urine:1790]    Physical Exam: Overweight  General:  Alert, cooperative, well nourished, well developed, appears stated age   Eyes:  Conjunctivae/corneas clear    Nose: Nares normal. Septum midline. Mucosa normal. No drainage or sinus tenderness. Neck: Supple, symmetrical, trachea midline, no JVD   Lungs:   Clear to auscultation bilaterally, good effort   Heart:  Irregular rate   Abdomen:   Soft, non-tender, bowel sounds normal, non-distended   Extremities: 2+ Edema   Skin: Ulcer left leg in bandage.    Neurologic: Non focal       Current Facility-Administered Medications:     bumetanide (BUMEX) tablet 2 mg, 2 mg, Oral, DAILY, Rebekah Mcleod MD, 2 mg at 10/26/19 0840    apixaban (ELIQUIS) tablet 5 mg, 5 mg, Oral, BID, Rebekah Mcleod MD, 5 mg at 10/26/19 0840    metoprolol succinate (TOPROL-XL) XL tablet 25 mg, 25 mg, Oral, DAILY, Amanda Sorensen MD, 25 mg at 10/26/19 0840    atorvastatin (LIPITOR) tablet 80 mg, 80 mg, Oral, QHS, Jovana Ayon MD, 80 mg at 10/25/19 2045    sodium chloride (NS) flush 5-40 mL, 5-40 mL, IntraVENous, Q8H, Jose Luis Ayon MD, 10 mL at 10/26/19 9923    sodium chloride (NS) flush 5-40 mL, 5-40 mL, IntraVENous, PRN, Jose Luis Ayon MD    budesonide (PULMICORT) 500 mcg/2 ml nebulizer suspension, 500 mcg, Nebulization, BID RT, Stopped at 10/26/19 0800 **AND** arformoterol (BROVANA) neb solution 15 mcg, 15 mcg, Nebulization, BID RT, Jose Luis Ayon MD, Stopped at 10/26/19 0800    lisinopril (PRINIVIL, ZESTRIL) tablet 5 mg, 5 mg, Oral, DAILY, Matthew Briggs MD, 5 mg at 10/26/19 0840    Lab/Data Review:  Labs Latest Ref Rng & Units 10/25/2019 10/24/2019   WBC 4.1 - 11.1 K/uL 7.3 6.9   RBC 4.10 - 5.70 M/uL 4.31 4.35   Hemoglobin 12.1 - 17.0 g/dL 13.9 14.0   Hematocrit 36.6 - 50.3 % 43.5 44.6   MCV 80.0 - 99.0 . 9(H) 102. 5(H)   Platelets 790 - 071 K/uL 172 159   Lymphocytes 12 - 49 % - 24   Monocytes 5 - 13 % - 13   Eosinophils 0 - 7 % - 3   Basophils 0 - 1 % - 0   Albumin 3.5 - 5.0 g/dL - 3. 2(L)   Calcium 8.5 - 10.1 MG/DL 8.9 8.9   SGOT 15 - 37 U/L - 29   Glucose 65 - 100 mg/dL 96 110(H)   BUN 6 - 20 MG/DL 18 18   Creatinine 0.70 - 1.30 MG/DL 1.11 1.16   Sodium 136 - 145 mmol/L 139 141   Potassium 3.5 - 5.1 mmol/L 3.4(L) 3.8   TSH 0.36 - 3.74 uIU/mL 3.72 -   Some recent data might be hidden     10/24/19   ECHO ADULT COMPLETE 10/25/2019 10/25/2019    Narrative · Left Ventricle: Normal cavity size and wall thickness. Mild systolic   dysfunction. Estimated left ventricular ejection fraction is 41 - 45%. Abnormal left ventricular wall motion. · Left Atrium: Mildly dilated left atrium. · Aortic Valve: Probably trileaflet aortic valve. Aortic valve sclerosis. Aortic valve leaflet calcification present. · Mitral Valve: Mild mitral annular calcification. Signed by: Luzmaria Santillan MD           Assessment:     Active Problems:    CAD (coronary artery disease) (4/8/2010)      Overview: Mr. Ariel Carolina presented in 1993 with a non-Q wave MI. He ultimately       underwent a bypass surgery with a vein graft to the right coronary,       sequential vein graft to two marginals and a LIMA to the LAD. In 1997, he       presented with unstable angina, at which time his right coronary and       circumflex grafts were occluded. He then underwent successful stenting of       one obtuse marginal branch. In 1999, he had recurrent symptoms and a       second obtuse marginal branch was stented      HLD (hyperlipidemia) (4/8/2010)      Borderline diabetes mellitus (4/8/2010)      Prostate cancer ([address] (4/8/2010)      Asthma (4/8/2010)      Overview: advair jhelpful      Essential hypertension (10/8/2015)      Venous insufficiency (10/11/2016)      Atrial fibrillation ([address] (3/15/2017)      CHF (congestive heart failure) ([address] (10/24/2019)      HTN (hypertension) (10/24/2019)      Leg ulcer ([address] (10/24/2019)      DM type 2 causing vascular disease ([address] (10/25/2019)      Deep venous thrombosis (DVT) of peroneal vein (10/25/2019)        Plan:     Mr. Ariel Carolina is an 80-year-old white male with mild LV dysfunction, atrial fibrillation, and venous insufficiency admitted with shortness of breath and worsening lower extremity edema. The patient has diuresed well with significant improvement in symptoms. Wound care to the left leg ulcer will continue. Further ischemic evaluation will be considered in outpatient setting. Agree with discharge planning.     Signed By: Fitz Bartholomew MD     October 26, 2019      Interventional Cardiology  [facility] [address] [place] [address]  P: [phone]  F: [phone]

## 2019-10-26 NOTE — PROGRESS NOTES
CMS Note   10/26/2019    Patient received and signed their 2nd IM letter. Patient was given a copy for their record.   Lonny Hough CMS

## 2019-10-26 NOTE — PROGRESS NOTES
Client received oxygen concentrator and has been prepared for discharge. Client and family have a question based on statements made by Dr. Tamir Gomez regarding how long to stay on diuretics. They believe he stated to remain on diuretics for two days. Will clarify.

## 2019-10-26 NOTE — PROGRESS NOTES
Dr. Ger See states client needs to stay on Bumex as prescribed. Will inform client and assist him to family POV.

## 2019-10-26 NOTE — PROGRESS NOTES
Home Care Face to Face Encounter    Patients Name: Aranza Arguelles    YOB: 1935    Primary Diagnosis: Acute and chronic systolic CHF    Date of Face to Face: October 26, 2019                                  Face to Face Encounter findings are related to primary reason for home care:   yes. 1. I certify that the patient needs home oxygen by [place] at 2L/min continuously with portable oxygen    2. I certify that this patient is under my care and that I had a Face-to-Face Encounter that meets the physician Face-to-Face Encounter requirements.   The following are the clinical findings from the [address] [place] [address] encounter that support the need for skilled services and is a summary of the encounter: RT note reports    10/26/19 1200 10/26/19 1202 10/26/19 1204   RT Walking Oximetry   Stage Resting (Room Air) During Walk (Room Air) During Walk (Room Air)   SpO2 91 % 93 % (!) 88 %   HR 76 bpm 76 bpm 60 bpm   Rate of Dyspnea 0 0 0   Symptoms  --   --     (No sob, however skin color grayish and lips cyanotic)   O2 Device None (Room air) None (Room air) None (Room air)   O2 Flow Rate (L/min)  --   --   --         10/26/19 1205   RT Walking Oximetry   Stage During Walk (on O2)   SpO2 93 %   HR 54 bpm   Rate of Dyspnea 0   Symptoms  --    O2 Device Nasal cannula   O2 Flow Rate (L/min) 2 l/min          See discharge summary      Kev Lilly MD  10/26/2019

## 2019-10-26 NOTE — PROGRESS NOTES
Bedside and Verbal shift change report given to [id number] and Vida YI (oncoming nurse) by Santino Espinal RN (offgoing nurse). Report included the following information SBAR, Kardex, Intake/Output, Recent Results and Cardiac Rhythm afib.

## 2019-10-26 NOTE — DISCHARGE INSTRUCTIONS
Patient Education        Patient Discharge Instructions    Madhav Rhodes / [id number] : 1935    Admitted 10/24/2019 Discharged: 10/25/2019     Primary Diagnoses  Problem List as of 10/25/2019 Date Reviewed: 10/9/2018           DM type 2 causing vascular disease ([facility] [address]   Deep venous thrombosis (DVT) of peroneal vein   CHF (congestive heart failure) (HCC)   HTN (hypertension)   Leg ulcer ([facility] [address]   Atrial fibrillation ([facility] [address]   Venous insufficiency   Essential hypertension   CAD (coronary artery disease)   HLD (hyperlipidemia)   Borderline diabetes mellitus   Prostate cancer ([facility] [address]   Asthma          Take Home Medications     · It is important that you take the medication exactly as they are prescribed. · Keep your medication in the bottles provided by the pharmacist and keep a list of the medication names, dosages, and times to be taken in your wallet. · Do not take other medications without consulting your doctor. What to do at Home    Recommended diet: Cardiac Diet, Diabetic Diet, Low fat, Low cholesterol and limit salt and fluid intake    Recommended activity: Activity as tolerated    If you experience worse symptoms, please follow up with your PCP or cardiology. Follow-up with your PCP in a few weeks    Patient Education        Avoiding Triggers With Heart Failure: Care Instructions  Your Care Instructions    Triggers are anything that make your heart failure flare up. A flare-up is also called \"sudden heart failure\" or \"acute heart failure. \" When you have a flare-up, fluid builds up in your lungs, and you have problems breathing. You might need to go to the hospital. By watching for changes in your condition and avoiding triggers, you can prevent heart failure flare-ups. Follow-up care is a key part of your treatment and safety. Be sure to make and go to all appointments, and call your doctor if you are having problems.  It's also a good idea to know your test results and keep a list of the medicines you take. How can you care for yourself at home? Watch for changes in your weight and condition  · Weigh yourself without clothing at the same time each day. Record your weight. Call your doctor if you have sudden weight gain, such as more than 2 to 3 pounds in a day or 5 pounds in a week. (Your doctor may suggest a different range of weight gain.) A sudden weight gain may mean that your heart failure is getting worse. · Keep a daily record of your symptoms. Write down any changes in how you feel, such as new shortness of breath, cough, or problems eating. Also record if your ankles are more swollen than usual and if you feel more tired than usual. Note anything that you ate or did that could have triggered these changes. Limit sodium  Sodium causes your body to hold on to extra water. This may cause your heart failure symptoms to get worse. People get most of their sodium from processed foods. Fast food and restaurant meals also tend to be very high in sodium. · Your doctor may suggest that you limit sodium to 2,000 milligrams (mg) a day or less. That is less than 1 teaspoon of salt a day, including all the salt you eat in cooking or in packaged foods. · Read food labels on cans and food packages. They tell you how much sodium you get in one serving. Check the serving size. If you eat more than one serving, you are getting more sodium. · Be aware that sodium can come in forms other than salt, including monosodium glutamate (MSG), sodium citrate, and sodium bicarbonate (baking soda). MSG is often added to Asian food. You can sometimes ask for food without MSG or salt. · Slowly reducing salt will help you adjust to the taste. Take the salt shaker off the table. · Flavor your food with garlic, lemon juice, onion, vinegar, herbs, and spices instead of salt.  Do not use soy sauce, steak sauce, onion salt, garlic salt, mustard, or ketchup on your food, unless it is labeled \"low-sodium\" or \"low-salt. \"  · Make your own salad dressings, sauces, and ketchup without adding salt. · Use fresh or frozen ingredients, instead of canned ones, whenever you can. Choose low-sodium canned goods. · Eat less processed food and food from restaurants, including fast food. Exercise as directed  Moderate, regular exercise is very good for your heart. It improves your blood flow and helps control your weight. But too much exercise can stress your heart and cause a heart failure flare-up. · Check with your doctor before you start an exercise program.  · Walking is an easy way to get exercise. Start out slowly. Gradually increase the length and pace of your walk. Swimming, riding a bike, and using a treadmill are also good forms of exercise. · When you exercise, watch for signs that your heart is working too hard. You are pushing yourself too hard if you cannot talk while you are exercising. If you become short of breath or dizzy or have chest pain, stop, sit down, and rest.  · Do not exercise when you do not feel well. Take medicines correctly  · Take your medicines exactly as prescribed. Call your doctor if you think you are having a problem with your medicine. · Make a list of all the medicines you take. Include those prescribed to you by other doctors and any over-the-counter medicines, vitamins, or supplements you take. Take this list with you when you go to any doctor. · Take your medicines at the same time every day. It may help you to post a list of all the medicines you take every day and what time of day you take them. · Make taking your medicine as simple as you can. Plan times to take your medicines when you are doing other things, such as eating a meal or getting ready for bed. This will make it easier to remember to take your medicines. · Get organized. Use helpful tools, such as daily or weekly pill containers. When should you call for help?   Call 911 if you have symptoms of sudden heart failure such as:    · You have severe trouble breathing.     · You cough up pink, foamy mucus.     · You have a new irregular or rapid heartbeat.    Call your doctor now or seek immediate medical care if:    · You have new or increased shortness of breath.     · You are dizzy or lightheaded, or you feel like you may faint.     · You have sudden weight gain, such as more than 2 to 3 pounds in a day or 5 pounds in a week. (Your doctor may suggest a different range of weight gain.)     · You have increased swelling in your legs, ankles, or feet.     · You are suddenly so tired or weak that you cannot do your usual activities.    Watch closely for changes in your health, and be sure to contact your doctor if you develop new symptoms. Where can you learn more? Go to http://yoli-vamsi.info/. Enter Q726 in the search box to learn more about \"Avoiding Triggers With Heart Failure: Care Instructions. \"  Current as of: April 9, 2019  Content Version: 12.2  © 5622-2276 [employer]. Care instructions adapted under license by [employer] (which disclaims liability or warranty for this information). If you have questions about a medical condition or this instruction, always ask your healthcare professional. [address] any warranty or liability for your use of this information. Information obtained by :  I understand that if any problems occur once I am at home I am to contact my physician. I understand and acknowledge receipt of the instructions indicated above.                                                                                                                                            Physician's or R.N.'s Signature                                                                  Date/Time                                                                                                                                              Patient or Representative Signature                                                          Date/Time

## 2019-10-26 NOTE — PROGRESS NOTES
[facility]    Medical Progress Note      NAME: Dale Oswald   :  1935  MRM:  [id number]    Date/Time: 10/26/2019  11:07 AM          Assessment and Plan:     Acute and chronic CHF (congestive heart failure) exacerbation - POA. Cariology consulted. ECHO showed \"Left Ventricle: Normal cavity size and wall thickness. Mild systolic dysfunction. Estimated left ventricular ejection fraction is 41 - 45%. Abnormal left ventricular wall motion. \"  Improved breathing and edema after lasix IV, with PO ACE. Daily weight. Na and fluid restriction. Check 6 minute walk. Can go home today, outpatient cardiology follow up    CAD (coronary artery disease) / HTN (hypertension) - Appears stable. Continue statin, metoprolol, Eliquis, lasix, lisinopril. Atrial fibrillation / Chronic anticoagulation - Stable rate. Continue Eliquis and metoprolol    LE edema / Leg ulcer / Venous insufficiency - POA, driven by CHF. Wound consulted, now with dressing. Diuresis    HLD (hyperlipidemia) - Continue atorvastatin    Hypokalemia - Replete PO    COPD / Asthma - cough is due to CHF, not COPD. Xray with edema. Not COPD exacerbation. Continue brovana/pulmicort. Prn nebs. Okay for metoprolol.     Obese - Advise weight loss. Prostate cancer - Outpatient follow up. DM type 2 causing vascular disease - Dx in chart, but BG normal.  Diabetic diet and counseling. SSI per protocol was net needed. Not listing home meds. Check A1c. I think this Dx is wrong. Deep venous thrombosis (DVT) of peroneal vein - One small non occlusive clot. Unlikely to be causing any symptoms. Already on Eliquis. No further workup. Subjective:     Chief Complaint:  Edema of legs is better, wants to go home    ROS:  (bold if positive, if negative)    Tolerating some PT  Tolerating Diet        Objective:     Last 24hrs VS reviewed since prior progress note.  Most recent are:    Visit Vitals  /68 (BP 1 Location: Left arm, BP Patient Position: At rest)   Pulse 88   Temp 98.1 °F (36.7 °C)   Resp 18   Ht 5' 9\" (1.753 m)   Wt 93.5 kg (206 lb 2.1 oz)   SpO2 92%   BMI 30.44 kg/m²     SpO2 Readings from Last 6 Encounters:   10/26/19 92%   10/09/18 94%   04/05/18 94%   10/11/17 95%   04/12/17 98%   03/09/17 96%    O2 Flow Rate (L/min): 1 l/min       Intake/Output Summary (Last 24 hours) at 10/26/2019 1107  Last data filed at 10/26/2019 1010  Gross per 24 hour   Intake 960 ml   Output 1640 ml   Net -680 ml        Physical Exam:    Gen:  Obese, in no acute distress  HEENT:  Pink conjunctivae, PERRL, hearing intact to voice, moist mucous membranes  Neck:  Supple, without masses, thyroid non-tender  Resp:  No accessory muscle use, clear breath sounds without wheezes rales or rhonchi  Card:  No murmurs, normal S1, S2 without thrills, bruits, 2+ peripheral edema  Abd:  Soft, non-tender, non-distended, normoactive bowel sounds are present, no mass  Lymph:  No cervical or inguinal adenopathy  Musc:  No cyanosis or clubbing  Skin:  No rashes or ulcers, skin turgor is good  Neuro:  Cranial nerves are grossly intact, no focal motor weakness, follows commands   Psych:  Good insight, oriented to person, place and time, alert    Telemetry reviewed:   AFIB  __________________________________________________________________  Medications Reviewed: (see below)  Medications:     Current Facility-Administered Medications   Medication Dose Route Frequency    bumetanide (BUMEX) tablet 2 mg  2 mg Oral DAILY    apixaban (ELIQUIS) tablet 5 mg  5 mg Oral BID    metoprolol succinate (TOPROL-XL) XL tablet 25 mg  25 mg Oral DAILY    atorvastatin (LIPITOR) tablet 80 mg  80 mg Oral QHS    sodium chloride (NS) flush 5-40 mL  5-40 mL IntraVENous Q8H    sodium chloride (NS) flush 5-40 mL  5-40 mL IntraVENous PRN    budesonide (PULMICORT) 500 mcg/2 ml nebulizer suspension  500 mcg Nebulization BID RT    And    arformoterol (BROVANA) neb solution 15 mcg  15 mcg Nebulization BID RT    furosemide (LASIX) injection 40 mg  40 mg IntraVENous BID    lisinopril (PRINIVIL, ZESTRIL) tablet 5 mg  5 mg Oral DAILY        Lab Data Reviewed: (see below)  Lab Review:     Recent Labs     10/25/19  0142 10/24/19  1235   WBC 7.3 6.9   HGB 13.9 14.0   HCT 43.5 44.6    159     Recent Labs     10/25/19  0142 10/24/19  1235    141   K 3.4* 3.8    106   CO2 34* 32   GLU 96 110*   BUN 18 18   CREA 1.11 1.16   CA 8.9 8.9   ALB  --  3.2*   TBILI  --  0.8   SGOT  --  29   ALT  --  20     Lab Results   Component Value Date/Time    Glucose (POC) 114 (H) 10/26/2019 06:40 AM    Glucose (POC) 117 (H) 10/25/2019 08:42 PM    Glucose (POC) 126 (H) 10/25/2019 04:36 PM    Glucose (POC) 129 (H) 10/25/2019 11:41 AM    Glucose (POC) 107 (H) 10/25/2019 06:59 AM     No results for input(s): PH, PCO2, PO2, HCO3, FIO2 in the last 72 hours. No results for input(s): INR, INREXT, INREXT in the last 72 hours. All Micro Results     Procedure Component Value Units Date/Time    URINE CULTURE HOLD SAMPLE [[id number]] Collected:  10/24/19 1320    Order Status:  Completed Specimen:  Urine from Serum Updated:  10/24/19 1326     Urine culture hold       URINE ON HOLD IN MICROBIOLOGY DEPT FOR 3 DAYS. IF UNPRESERVED URINE IS SUBMITTED, IT CANNOT BE USED FOR ADDITIONAL TESTING AFTER 24 HRS, RECOLLECTION WILL BE REQUIRED. Other pertinent lab: none    Total time spent with patient: 35 Minutes I reviewed chart, notes, data and current medications in the medical record. I have examined and treated the patient at bedside during this period.                  Care Plan discussed with: Patient, Care Manager, Nursing Staff, Consultant/Specialist and >50% of time spent in counseling and coordination of care    Discussed:  Care Plan and D/C Planning    Prophylaxis:  H2B/PPI    Disposition:  Home w/Family           ___________________________________________________    Attending Physician: Chico Nunez Rubin Avalos MD

## 2019-10-26 NOTE — PROGRESS NOTES
Pharmacist Discharge Medication Reconciliation    Discharge Provider:  Dr Juan Ritchie       Discharge Medications:      My Medications        START taking these medications        Instructions Each Dose to Equal Morning Noon Evening Bedtime   bumetanide 2 mg tablet  Commonly known as:  [address] next dose is:  10/26/2019 8am      Take 1 Tab by mouth daily for 30 days. 2 mg         lisinopril 5 mg tablet  Commonly known as:  Madisyn Morel  Your last dose was:  10/25/2019 923am  Your next dose is:  10/26/2019 9am      Take 1 Tab by mouth daily for 30 days. 5 mg                CONTINUE taking these medications        Instructions Each Dose to Equal Morning Noon Evening Bedtime   colesevelam 625 mg tablet  Commonly known as:  [address] next dose is:  10/26/2019 with dinner      TAKE 3 TABLETS TWICE A DAY WITH MEALS          ELIQUIS 5 mg tablet  Generic drug:  apixaban  Your last dose was:  10/25/2019 930am  Your next dose is:  10/25/2019 7pm      TAKE 1 TABLET TWICE A DAY TO PREVENT THROMBOEMBOLISM IN CHRONIC ATRIAL FIBRILLATION          fluticasone propion-salmeterol 500-50 mcg/dose diskus inhaler  Commonly known as:  ADVAIR/WIXELA  Your next dose is:  10/25/2019 7pm      Take 1 Puff by inhalation two (2) times a day. 1 Puff         LIPITOR 80 mg tablet  Generic drug:  atorvastatin  Your last dose was:  10/24/2019 2146  Your next dose is:  10/25/2019 bedtime      Take 80 mg by mouth nightly. 80 mg         metoprolol succinate 25 mg XL tablet  Commonly known as:  TOPROL-XL  Your next dose is:  10/26/2019 9am      TAKE 1 TABLET DAILY          MULTIVITAMIN PO  Your next dose is:  10/26/2019 8am      Take 1 Tab by mouth daily. 1 Tab         mupirocin 2 % ointment  Commonly known as:  BACTROBAN  Your next dose is: With bandage changes. Apply  to affected area nightly.  Apply to wounds on legs when changing bandages                 STOP taking these medications      cephALEXin 250 mg capsule  Commonly known as:  Malena Ritchie                  Where to Get Your Medications        Information on where to get these meds will be given to you by the nurse or doctor.     Ask your nurse or doctor about these medications  bumetanide 2 mg tablet  lisinopril 5 mg tablet         The patient's chart, MAR, and AVS were reviewed by   Harriett Epps,   Contact: [phone]

## 2019-10-26 NOTE — PROGRESS NOTES
10/26/19 1200 10/26/19 1202 10/26/19 1204   RT Walking Oximetry   Stage Resting (Room Air) During Walk (Room Air) During Walk (Room Air)   SpO2 91 % 93 % (!) 88 %   HR 76 bpm 76 bpm 60 bpm   Rate of Dyspnea 0 0 0   Symptoms  --   --    (No sob, however skin color grayish and lips cyanotic)   O2 Device None (Room air) None (Room air) None (Room air)   O2 Flow Rate (L/min)  --   --   --       10/26/19 1205   RT Walking Oximetry   Stage During Walk (on O2)   SpO2 93 %   HR 54 bpm   Rate of Dyspnea 0   Symptoms  --    O2 Device Nasal cannula   O2 Flow Rate (L/min) 2 l/min

## 2019-10-26 NOTE — PROGRESS NOTES
Bedside and Verbal shift change report given to LUCIO Foreman (oncoming nurse) by Nino cardenas and Barrett Orozco RN (offgoing nurse). Report included the following information SBAR, Kardex, Intake/Output, Recent Results and Cardiac Rhythm afib. Introduced self as primary RN. Initial assessment completed. VSS. Pt denies additional complaints at this time. Plan for the evening discussed with pt and he verbalized understanding. Bed locked and in low position with call bell within reach. Instructed pt to press call ghosh when needed. White board updated with this RN's name. 2045 scheduled medication administered. Family/visitors present. No acute distress noted. Rates pain 0/10. Call bell within reach. 2130 Pt resting quietly in bed with eyes closed, respirations even and unlabored. No acute distress noted. Call bell within reach. 2315 No acute distress. Call bell within reach. 0000 Incontinence care provided. 0100 Purposeful hourly rounding completed. Patient has no complaints at this time. Rates pain a 0/10. Incontinence care provided. Patient in a position of comfort. Belongings secure in their room, side rails up x 2, bedside table within reach, and call bell within reach. Will continue to monitor and re-assess as appropriate. 0230 Pt resting quietly in bed with eyes closed, respirations even and unlabored. No acute distress noted. Call bell within reach. 0430 No acute distress. Incontinence care provided. 6430 Incontinence care provided. 0730 Bedside and Verbal shift change report given to Barrett Orozco RN (oncoming nurse) by Duke Umana RN (offgoing nurse). Report included the following information SBAR, Kardex, Intake/Output, Recent Results and Cardiac Rhythm Afib.

## 2019-10-28 ENCOUNTER — PATIENT OUTREACH (OUTPATIENT)
Dept: FAMILY MEDICINE CLINIC | Age: 84
End: 2019-10-28

## 2019-10-28 NOTE — PROGRESS NOTES
5:33 PM  Uploaded all 02 orders and sent to [email or web address] in [employer]. PAOLO Jack        I was asked by weekend case Yvette Marx to fax Oxygen order to [email or web address] [phone].  PAOLO Jack

## 2019-10-30 ENCOUNTER — PATIENT OUTREACH (OUTPATIENT)
Dept: FAMILY MEDICINE CLINIC | Age: 84
End: 2019-10-30

## 2019-11-20 ENCOUNTER — OFFICE VISIT (OUTPATIENT)
Dept: CARDIOLOGY CLINIC | Age: 84
End: 2019-11-20

## 2019-11-20 VITALS
HEIGHT: 69 IN | SYSTOLIC BLOOD PRESSURE: 110 MMHG | WEIGHT: 205 LBS | DIASTOLIC BLOOD PRESSURE: 70 MMHG | BODY MASS INDEX: 30.36 KG/M2 | HEART RATE: 52 BPM | OXYGEN SATURATION: 93 %

## 2019-11-20 DIAGNOSIS — I50.32 CHRONIC DIASTOLIC CONGESTIVE HEART FAILURE (HCC): ICD-10-CM

## 2019-11-20 DIAGNOSIS — I10 ESSENTIAL HYPERTENSION: ICD-10-CM

## 2019-11-20 DIAGNOSIS — E11.59 DM TYPE 2 CAUSING VASCULAR DISEASE (HCC): ICD-10-CM

## 2019-11-20 DIAGNOSIS — I87.2 VENOUS INSUFFICIENCY: ICD-10-CM

## 2019-11-20 DIAGNOSIS — E78.00 PURE HYPERCHOLESTEROLEMIA: ICD-10-CM

## 2019-11-20 DIAGNOSIS — I48.21 PERMANENT ATRIAL FIBRILLATION (HCC): ICD-10-CM

## 2019-11-20 DIAGNOSIS — I25.10 CORONARY ARTERY DISEASE INVOLVING NATIVE CORONARY ARTERY OF NATIVE HEART WITHOUT ANGINA PECTORIS: Primary | ICD-10-CM

## 2019-11-20 NOTE — PATIENT INSTRUCTIONS
Compress script given to pt. Venous doppler test in 2 months Follow up in 3 months with Dr. Ivy Rivers

## 2019-11-20 NOTE — PROGRESS NOTES
Progress Note    Patient: Thierry Way MRN: [id number]  SSN: [id number]    YOB: 1935  Age: 80 y.o. Sex: male        Subjective:     Mr. Ulysses Fortune is an 28-year-old white male with known history of coronary artery disease status post coronary artery bypass graft surgery 1995, COPD, atrial fibrillation, right popliteal DVT, mild LV dysfunction, recently admitted to [facility] with shortness of breath and lower extremity edema. Repeat echocardiogram showed stability of his mild LV dysfunction. He did done well with diuresis with resolution of symptoms. Anticoagulated for DVT. Edema improved    Moving around and trying to increase activity. Objective:     Vitals:    11/20/19 1159   BP: 110/70   Pulse: (!) 52   SpO2: 93%   Weight: 205 lb (93 kg)   Height: 5' 9\" (1.753 m)        Physical Exam:   Head: Normocephalic, atraumatic. Eyes: Pupils equal, round, reactive to light and accomodation, Extra ocular muscles intact. Sclera anicteric. Ears: Grossly responsive to sound. Neck: No adenopathy. No bruits. Throat: No sores or erythema. Heart: Regular rate and rhythm. Normal S1 and S2. No murmurs, gallops, or rub. Lungs: Clear to auscultation bilaterally. No wheezing, rales, or rhonchi. Abdomen: Soft, non-tender. No guarding or rebound. No hepatosplenomegaly. Bowel sounds active. Ext: 1+ pedal edema, stasis dermatitis, healed ulcer left shin  Skin: Normal coloration. Warm and dry. No rash. Neuro: Cranial nerves II through XII intact. Motor and sensory grossly intact. Affect: Appropriate. Alert and interactive. Current Outpatient Medications   Medication Sig Dispense    atorvastatin (LIPITOR) 80 mg tablet Take 80 mg by mouth nightly.  mupirocin (BACTROBAN) 2 % ointment Apply  to affected area nightly.  Apply to wounds on legs when changing bandages     ELIQUIS 5 mg tablet TAKE 1 TABLET TWICE A DAY TO PREVENT THROMBOEMBOLISM IN CHRONIC ATRIAL FIBRILLATION 180 Tab    metoprolol succinate (TOPROL XL) 25 mg XL tablet TAKE 1 TABLET DAILY 90 Tab    colesevelam (WELCHOL) 625 mg tablet TAKE 3 TABLETS TWICE A DAY WITH MEALS 540 Tab    MULTIVITAMIN PO Take 1 Tab by mouth daily.  bumetanide (BUMEX) 2 mg tablet Take 1 Tab by mouth daily for 30 days. 30 Tab    lisinopril (PRINIVIL, ZESTRIL) 5 mg tablet Take 1 Tab by mouth daily for 30 days. 30 Tab    fluticasone propion-salmeterol (ADVAIR DISKUS) 500-50 mcg/dose diskus inhaler Take 1 Puff by inhalation two (2) times a day. 3 Inhaler     No current facility-administered medications for this visit. Lab/Data Review:  Labs Latest Ref Rng & Units 10/25/2019 10/24/2019   WBC 4.1 - 11.1 K/uL 7.3 6.9   RBC 4.10 - 5.70 M/uL 4.31 4.35   Hemoglobin 12.1 - 17.0 g/dL 13.9 14.0   Hematocrit 36.6 - 50.3 % 43.5 44.6   MCV 80.0 - 99.0 . 9(H) 102. 5(H)   Platelets 571 - 896 K/uL 172 159   Lymphocytes 12 - 49 % - 24   Monocytes 5 - 13 % - 13   Eosinophils 0 - 7 % - 3   Basophils 0 - 1 % - 0   Albumin 3.5 - 5.0 g/dL - 3. 2(L)   Calcium 8.5 - 10.1 MG/DL 8.9 8.9   SGOT 15 - 37 U/L - 29   Glucose 65 - 100 mg/dL 96 110(H)   BUN 6 - 20 MG/DL 18 18   Creatinine 0.70 - 1.30 MG/DL 1.11 1.16   Sodium 136 - 145 mmol/L 139 141   Potassium 3.5 - 5.1 mmol/L 3.4(L) 3.8   TSH 0.36 - 3.74 uIU/mL 3.72 -   Some recent data might be hidden     10/24/19   ECHO ADULT COMPLETE 10/25/2019 10/25/2019    Narrative · Left Ventricle: Normal cavity size and wall thickness. Mild systolic   dysfunction. Estimated left ventricular ejection fraction is 41 - 45%. Abnormal left ventricular wall motion. · Left Atrium: Mildly dilated left atrium. · Aortic Valve: Probably trileaflet aortic valve. Aortic valve sclerosis. Aortic valve leaflet calcification present. · Mitral Valve: Mild mitral annular calcification. Signed by: Alex See MD     10/24/19 GIBSON:  1. No evidence of significant peripheral arterial disease at rest in the right leg.   2. No evidence of significant peripheral arterial disease at rest in the left leg. 3. The right ankle/brachial index is 1.10 and the left ankle/brachial index is 1.09.  4. The right toe/brachial index is 0.76 and the left toe/brachial index is 0.95.     10/24/19 Venous duplex:  Interpretation Summary     Acute non-occlusive thrombus present in the right popliteal vein. Bilateral lower extremity venous duplex positive for deep venous thrombosis. Negative for thrombophlebitis. Lower Extremity Venous Findings     Right Lower Venous     Acute non-occlusive thrombus present in the right popliteal vein. The common femoral, greater saphenous, profunda femoral, proximal femoral, mid femoral, distal femoral, gastrocnemius and saphenous femoral junction vein(s) were imaged in the transverse and longitudinal planes. The vessels showed normal color filling and compressibility. Doppler interrogation of the veins showed phasic and spontaneous flow. The posterior tibial and peroneal vein(s) were not well visualized. Left Lower Venous     The common femoral, greater saphenous, saphenous femoral junction, profunda femoral, proximal femoral, mid femoral, distal femoral and popliteal vein(s) were imaged in the transverse and longitudinal planes. The vessels showed normal color filling and compressibility. Doppler interrogation of the veins showed phasic and spontaneous flow. The peroneal vein(s) were not well visualized. Assessment/Plan:       ICD-10-CM ICD-9-CM    1. Coronary artery disease involving native coronary artery of native heart without angina pectoris I25.10 414.01    2. Essential hypertension I10 401.9    3. Venous insufficiency I87.2 459.81    4. Permanent atrial fibrillation I48.21 427.31    5. Chronic diastolic congestive heart failure (HCC) I50.32 428.32      428.0    6. DM type 2 causing vascular disease (HCC) E11.59 250.70      443.81    7. Pure hypercholesterolemia E78.00 272.0      Mr. Saji Abel is an 60-year-old white male with coronary artery disease, mild LV dysfunction, dyslipidemia, diastolic heart failure, lower extremity edema with recent right popliteal DVT The patient also has left stasis ulcer of the shin. Overall he is doing well since hospitalization with loss of 20 pounds. He is tolerating anticoagulation without issues. He is still very weak compared to one year ago due to medical issues and hospitalization. We discussed control of edema with support stockings elevation and size. Emilie Marie Also discussed the need to exercise for cardiovascular health and strengthening. We discussed follow-up of venous disease with ultrasound in 8 weeks. I discussed strategies to control his edema and venous insufficiency with support stockings. The patient understands and agrees with plan. Diet and exercise  Discussed cardio- 30 minutes per day and strengthening with weights or rubber bands. Lower extremity socks or stockings. Try OTC first. Jobst 18-20 mmHg if that fails.     Venous ultrasound in 8 weeks to f/u DVT and assess reflux  RTC 3 months    Signed By: Adela Barrow MD     November 20, 2019

## 2019-12-03 ENCOUNTER — HOSPITAL ENCOUNTER (OUTPATIENT)
Dept: LAB | Age: 84
Discharge: HOME | End: 2019-12-03

## 2019-12-03 ENCOUNTER — OFFICE VISIT (OUTPATIENT)
Dept: INTERNAL MEDICINE CLINIC | Age: 84
End: 2019-12-03

## 2019-12-03 VITALS
RESPIRATION RATE: 20 BRPM | DIASTOLIC BLOOD PRESSURE: 72 MMHG | WEIGHT: 206 LBS | BODY MASS INDEX: 30.51 KG/M2 | HEART RATE: 68 BPM | SYSTOLIC BLOOD PRESSURE: 128 MMHG | HEIGHT: 69 IN | OXYGEN SATURATION: 92 % | TEMPERATURE: 97.1 F

## 2019-12-03 DIAGNOSIS — I25.10 CORONARY ARTERY DISEASE INVOLVING NATIVE CORONARY ARTERY OF NATIVE HEART WITHOUT ANGINA PECTORIS: ICD-10-CM

## 2019-12-03 DIAGNOSIS — Z00.00 MEDICARE ANNUAL WELLNESS VISIT, SUBSEQUENT: ICD-10-CM

## 2019-12-03 DIAGNOSIS — F10.10 ALCOHOL ABUSE: ICD-10-CM

## 2019-12-03 DIAGNOSIS — Z13.39 SCREENING FOR ALCOHOLISM: ICD-10-CM

## 2019-12-03 DIAGNOSIS — E78.2 MIXED HYPERLIPIDEMIA: ICD-10-CM

## 2019-12-03 DIAGNOSIS — E87.70 HYPERVOLEMIA, UNSPECIFIED HYPERVOLEMIA TYPE: ICD-10-CM

## 2019-12-03 DIAGNOSIS — J45.40 MODERATE PERSISTENT ASTHMA WITHOUT COMPLICATION: ICD-10-CM

## 2019-12-03 DIAGNOSIS — E87.70 HYPERVOLEMIA, UNSPECIFIED HYPERVOLEMIA TYPE: Primary | ICD-10-CM

## 2019-12-03 DIAGNOSIS — I50.41 ACUTE COMBINED SYSTOLIC AND DIASTOLIC ACC/AHA STAGE C CONGESTIVE HEART FAILURE (HCC): ICD-10-CM

## 2019-12-03 LAB
ALBUMIN SERPL-MCNC: 3.1 G/DL (ref 3.5–5)
ALBUMIN/GLOB SERPL: 0.7 {RATIO} (ref 1.1–2.2)
ALP SERPL-CCNC: 144 U/L (ref 45–117)
ALT SERPL-CCNC: 47 U/L (ref 12–78)
ANION GAP SERPL CALC-SCNC: 4 MMOL/L (ref 5–15)
AST SERPL-CCNC: 48 U/L (ref 15–37)
BILIRUB SERPL-MCNC: 0.5 MG/DL (ref 0.2–1)
BUN SERPL-MCNC: 18 MG/DL (ref 6–20)
BUN/CREAT SERPL: 18 (ref 12–20)
CALCIUM SERPL-MCNC: 9.2 MG/DL (ref 8.5–10.1)
CHLORIDE SERPL-SCNC: 106 MMOL/L (ref 97–108)
CHOLEST SERPL-MCNC: 166 MG/DL
CO2 SERPL-SCNC: 31 MMOL/L (ref 21–32)
CREAT SERPL-MCNC: 1 MG/DL (ref 0.7–1.3)
GLOBULIN SER CALC-MCNC: 4.5 G/DL (ref 2–4)
GLUCOSE SERPL-MCNC: 105 MG/DL (ref 65–100)
HDLC SERPL-MCNC: 57 MG/DL
HDLC SERPL: 2.9 {RATIO} (ref 0–5)
LDLC SERPL CALC-MCNC: 88.8 MG/DL (ref 0–100)
LIPID PROFILE,FLP: NORMAL
MAGNESIUM SERPL-MCNC: 2.2 MG/DL (ref 1.6–2.4)
POTASSIUM SERPL-SCNC: 4.6 MMOL/L (ref 3.5–5.1)
PROT SERPL-MCNC: 7.6 G/DL (ref 6.4–8.2)
SODIUM SERPL-SCNC: 141 MMOL/L (ref 136–145)
TRIGL SERPL-MCNC: 101 MG/DL (ref ?–150)
VLDLC SERPL CALC-MCNC: 20.2 MG/DL

## 2019-12-03 RX ORDER — LISINOPRIL 5 MG/1
5 TABLET ORAL DAILY
Qty: 90 TAB | Refills: 1 | Status: SHIPPED | OUTPATIENT
Start: 2019-12-03 | End: 2020-06-08

## 2019-12-03 NOTE — PROGRESS NOTES
This is the Subsequent Medicare Annual Wellness Exam, performed 12 months or more after the Initial AWV or the last Subsequent AWV    I have reviewed the patient's medical history in detail and updated the computerized patient record. History     Patient Active Problem List   Diagnosis Code    CAD (coronary artery disease) I25.10    HLD (hyperlipidemia) E78.5    Borderline diabetes mellitus R73.03    Prostate cancer ([facility] [address] C61    Asthma J45.909    Essential hypertension I10    Venous insufficiency I87.2    Atrial fibrillation (HCC) I48.91    CHF (congestive heart failure) (HCC) I50.9    HTN (hypertension) I10    Leg ulcer ([facility] [address] L97.909    DM type 2 causing vascular disease (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 [address]     Diabetes ([facility] [address]     BORDERLINE    Hypercholesterolemia     Hypertension     Umbilical hernia 1/33/0678      Past Surgical History:   Procedure Laterality Date    CARDIAC SURG PROCEDURE UNLIST  1993    CABG X4 VESSEL    CARDIAC SURG PROCEDURE UNLIST  1997, 1999    CARDIAC CATH, STENTS    ENDOSCOPY, COLON, DIAGNOSTIC  01/02/2006    HX GI      COLONOSCOPY    HX HEENT Bilateral     CATARACTS/ IOL    HX HERNIA REPAIR  7/2014    HX PROSTATECTOMY  01/02/2006     Current Outpatient Medications   Medication Sig Dispense Refill    fluticasone-umeclidinium-vilanterol (TRELEGY ELLIPTA) 100-62.5-25 mcg inhaler Take 1 Puff by inhalation daily.  lisinopril (PRINIVIL, ZESTRIL) 5 mg tablet Take 1 Tab by mouth daily. 90 Tab 1    atorvastatin (LIPITOR) 80 mg tablet Take 80 mg by mouth nightly.  mupirocin (BACTROBAN) 2 % ointment Apply  to affected area nightly.  Apply to wounds on legs when changing bandages      ELIQUIS 5 mg tablet TAKE 1 TABLET TWICE A DAY TO PREVENT THROMBOEMBOLISM IN CHRONIC ATRIAL FIBRILLATION 180 Tab 4    metoprolol succinate (TOPROL XL) 25 mg XL tablet TAKE 1 TABLET DAILY 90 Tab 1    colesevelam (WELCHOL) 625 mg tablet TAKE 3 TABLETS TWICE A DAY WITH MEALS 540 Tab 0    MULTIVITAMIN PO Take 1 Tab by mouth daily. Allergies   Allergen Reactions    Adhesive Other (comments)     BLISTERS       Family History   Problem Relation Age of Onset    Stroke Father     Anesth Problems Neg Hx      Social History     Tobacco Use    Smoking status: Former Smoker     Packs/day: 1.00     Types: Cigarettes     Last attempt to quit: 6/10/1964     Years since quittin.5    Smokeless tobacco: Never Used   Substance Use Topics    Alcohol use: Yes     Alcohol/week: 15.0 standard drinks     Types: 12 Cans of beer, 6 Standard drinks or equivalent per week     Comment: casual       Depression Risk Factor Screening:     3 most recent PHQ Screens 2018   Little interest or pleasure in doing things Not at all   Feeling down, depressed, irritable, or hopeless Not at all   Total Score PHQ 2 0       Alcohol Risk Factor Screening (MALE > 65): Do you average more 1 drink per night or more than 7 drinks a week: No    In the past three months have you have had more than 4 drinks containing alcohol on one occasion: Yes  Prior to hospital stay    Functional Ability and Level of Safety:   Hearing: The patient needs further evaluation. Activities of Daily Living: The home contains: no safety equipment. Patient does total self care    Ambulation: with mild difficulty    Fall Risk:  Fall Risk Assessment, last 12 mths 2018   Able to walk? Yes   Fall in past 12 months?  No       Abuse Screen:  Patient is not abused    Cognitive Screening   Has your family/caregiver stated any concerns about your memory: no  Cognitive Screening: Normal - Verbal Fluency Test    Patient Care Team   Patient Care Team:  Galdino Dawson MD as PCP - General (Internal Medicine)  Sergio De León MD as PCP - [facility]  Isaias Bee MD as Physician (Cardiology)  Liliana Davila MD (Cardiology)    Assessment/Plan   Education and counseling provided:  Are appropriate based on today's review and evaluation  End-of-Life planning (with patient's consent)    Diagnoses and all orders for this visit:    1. Hypervolemia, unspecified hypervolemia type  -     METABOLIC PANEL, COMPREHENSIVE; Future    2. Coronary artery disease involving native coronary artery of native heart without angina pectoris  -     METABOLIC PANEL, COMPREHENSIVE; Future  -     MAGNESIUM; Future    3. Mixed hyperlipidemia  -     LIPID PANEL; Future    4. Acute combined systolic and diastolic ACC/AHA stage C congestive heart failure ([address]    5. Moderate persistent asthma without complication    6. Alcohol abuse    7. Medicare annual wellness visit, subsequent    8. Screening for alcoholism  -     [place] ANNUAL ALCOHOL SCREEN 15 MIN    Other orders  -     lisinopril (PRINIVIL, ZESTRIL) 5 mg tablet; Take 1 Tab by mouth daily. Health Maintenance Due   Topic Date Due    DTaP/Tdap/Td series (1 - Tdap) 09/04/1946    Shingrix Vaccine Age 50> (1 of 2) 09/04/1985    GLAUCOMA SCREENING Q2Y  09/04/2000    MICROALBUMIN Q1  01/18/2014    EYE EXAM RETINAL OR DILATED  03/18/2014    FOOT EXAM Q1  07/20/2014    MEDICARE YEARLY EXAM  04/06/2019    LIPID PANEL Q1  10/09/2019     Subjective:  Mr. Little Fang was hospitalized with new onset congestive heart failure associated with his atrial fibrillation. His ejection fraction on echo showed reduced EF of 40-45%. He was also noted to have a clot in one of his deep veins. It is not clear that he was compliant taking his Eliquis. He has not been seen here in a year and has not seen cardiology in over a year. He is now seeing Dr. Marcia Johnson, he has gone back to see him once.  The patient says he is not drinking alcohol any longer and states he is taking his meds, but he is not taking Bumex any longer and his ACE inhibitor, Lisinopril 5, ran out and he never went to get it refilled and he did not follow up with me within 30 days of that hospital stay. His leg ulcer that he had healed completely. He is staying on a low sodium diet, eating healthy, and has kept his weight down. Physical Examination:  His BP today was normal.  Lungs were clear. He had venous stasis changes in both lower legs, but no significant swelling, and the ulceration on the anterior shin has healed nicely. Plan:    1. I recommended lab work. 2. I will restart him on 5 mg of Lisinopril because he at least needs to be on an ACE and a beta blocker and possibly Spironolactone may work for him. 3. He is going to see cardiology again in a month. 4. Lab work from today was reasonably normal, although his LDL cholesterol still does not meet goal at a fairly high dose of statin plus Welchol. Vitals:    12/03/19 1339   BP: 128/72   Pulse: 68   Resp: 20   Temp: 97.1 °F (36.2 °C)   TempSrc: Oral   SpO2: 92%   Weight: 206 lb (93.4 kg)   Height: 5' 9\" (1.753 m)     Chest clear    1. Hypervolemia, unspecified hypervolemia type  Better labs now  - METABOLIC PANEL, COMPREHENSIVE; Future    2. Coronary artery disease involving native coronary artery of native heart without angina pectoris  mnoted  - METABOLIC PANEL, COMPREHENSIVE; Future  - MAGNESIUM; Future    3. Mixed hyperlipidemia  Get LDL < 70  - LIPID PANEL; Future    4. Acute combined systolic and diastolic ACC/AHA stage C congestive heart failure (HCC)  Restart ace now    5. Moderate persistent asthma without complication  On inhaler    6. Alcohol abuse  Seems to be off now    7. Medicare annual wellness visit, subsequent      8.  Screening for alcoholism    - [place] ANNUAL ALCOHOL SCREEN 15 MIN

## 2019-12-03 NOTE — PROGRESS NOTES
1. Have you been to the ER, urgent care clinic since your last visit? Hospitalized since your last visit?yse. [facility] admission for CHF    2. Have you seen or consulted any other health care providers outside of the [address] [place] [address] since your last visit? Include any pap smears or colon screening.  Pulmonary-dr magaly raymundo

## 2019-12-03 NOTE — PROGRESS NOTES
Admission Diagnoses: DVT (deep vein thrombosis) in pregnancy [O22.30]  CHF (congestive heart failure) ([address] [I50.9]  Leg ulcer ([address] [L97.909]  HTN (hypertension) [I10]     Discharge Diagnoses:    Principal Diagnosis     Acute and chronic CHF (congestive heart failure) exacerbation                                             Other Diagnoses  Active Problems:    CAD (coronary artery disease) (4/8/2010)    HLD (hyperlipidemia) (4/8/2010)    Borderline diabetes mellitus (4/8/2010)    Prostate cancer ([address] (4/8/2010)    Asthma (4/8/2010)    Essential hypertension (10/8/2015)    Venous insufficiency (10/11/2016)    Atrial fibrillation ([address] (3/15/2017)    CHF (congestive heart failure) ([address] (10/24/2019)    HTN (hypertension) (10/24/2019)    Leg ulcer ([address] (10/24/2019)    DM type 2 causing vascular disease ([address] (10/25/2019)    Deep venous thrombosis (DVT) of peroneal vein (10/25/2019)      Hospital Course:   Acute and chronic CHF (congestive heart failure) exacerbation - POA.  Cariology consulted. ECHO showed \"Left Ventricle: Normal cavity size and wall thickness. Mild systolic dysfunction. Estimated left ventricular ejection fraction is 41 - 45%. Abnormal left ventricular wall motion. \"  Improved breathing and edema after lasix IV, with PO ACE.  Daily weight.  Na and fluid restriction.  Check 6 minute walk. Can go home today, outpatient cardiology follow up     CAD (coronary artery disease) / HTN (hypertension) - Appears stable. Continue statin, metoprolol, Eliquis, lasix, lisinopril.       Atrial fibrillation / Chronic anticoagulation - Stable rate.  Continue Eliquis and metoprolol     LE edema / Leg ulcer / Venous insufficiency - POA, driven by CHF.  Wound consulted, now with dressing.  Diuresis     HLD (hyperlipidemia) - Continue atorvastatin     Hypokalemia - Replete PO     COPD / Asthma - cough is due to CHF, not COPD.  Xray with edema. Not COPD exacerbation. Continue brovana/pulmicort.  Prn nebs.  Okay for metoprolol.     Obese - Advise weight loss.     Prostate cancer - Outpatient follow up.     DM type 2 causing vascular disease - Dx in chart, but BG normal.  Diabetic diet and counseling.  SSI per protocol was net needed.  Not listing home meds. Check A1c.  I think this Dx is wrong.     Deep venous thrombosis (DVT) of peroneal vein - One small non occlusive clot.  Unlikely to be causing any symptoms. Curvin Damir on Eliquis.  No further workup.      PCP: Hilton Contreras MD     Consults: Cardiology     Significant Diagnostic Studies: See Hospital Course     Discharged home in improved condition.   4/24/08. treadmill - Standard Ellis Protocol with myocardial perfusion imaging. Exercise tolerance was fair. Cardiac stress testing was positive for. for anginal equivalent of left shoulder pain. No reversible defects, no inducible ischemia. Fixed anterior defect in the distal anterior wall and fixed inferior wall defect.  EF 47     3/24/2017: LV EF of 45% with no regional wall motion abnormalities           10/24/2019: EKG Afib with LBBB, non specific ST and T wave abnormality, prolonged QT           Discharge Exam:  /68 (BP 1 Location: Left arm, BP Patient Position: At rest)   Pulse 88   Temp 98.1 °F (36.7 °C)   Resp 18   Ht 5' 9\" (1.753 m)   Wt 93.5 kg (206 lb 2.1 oz)   SpO2 92%   BMI 30.44 kg/m²      Gen:  Obese, in no acute distress  HEENT:  Pink conjunctivae, PERRL, hearing intact to voice, moist mucous membranes  Neck:  Supple, without masses, thyroid non-tender  Resp:  No accessory muscle use, clear breath sounds without wheezes rales or rhonchi  Card:  No murmurs, normal S1, S2 without thrills, bruits, 2+ peripheral edema  Abd:  Soft, non-tender, non-distended, normoactive bowel sounds are present, no mass  Lymph:  No cervical or inguinal adenopathy  Musc:  No cyanosis or clubbing  Skin:  No rashes or ulcers, skin turgor is good  Neuro:  Cranial nerves are grossly intact, no focal motor weakness, follows commands   Psych:  Good insight, oriented to person, place and time, alert     Patient Instructions:         Current Discharge Medication List             START taking these medications     Details   bumetanide (BUMEX) 2 mg tablet Take 1 Tab by mouth daily for 30 days. Qty: 30 Tab, Refills: 0       lisinopril (PRINIVIL, ZESTRIL) 5 mg tablet Take 1 Tab by mouth daily for 30 days. Qty: 30 Tab, Refills: 0                 CONTINUE these medications which have NOT CHANGED     Details   atorvastatin (LIPITOR) 80 mg tablet Take 80 mg by mouth nightly.       mupirocin (BACTROBAN) 2 % ointment Apply  to affected area nightly. Apply to wounds on legs when changing bandages       ELIQUIS 5 mg tablet TAKE 1 TABLET TWICE A DAY TO PREVENT THROMBOEMBOLISM IN CHRONIC ATRIAL FIBRILLATION  Qty: 180 Tab, Refills: 4     Associated Diagnoses: Persistent atrial fibrillation; Essential hypertension; Coronary artery disease involving native coronary artery of native heart without angina pectoris; Mixed hyperlipidemia       fluticasone propion-salmeterol (ADVAIR DISKUS) 500-50 mcg/dose diskus inhaler Take 1 Puff by inhalation two (2) times a day. Qty: 3 Inhaler, Refills: 1       metoprolol succinate (TOPROL XL) 25 mg XL tablet TAKE 1 TABLET DAILY  Qty: 90 Tab, Refills: 1       colesevelam (WELCHOL) 625 mg tablet TAKE 3 TABLETS TWICE A DAY WITH MEALS  Qty: 540 Tab, Refills: 0       MULTIVITAMIN PO Take 1 Tab by mouth daily.           STOP taking these medications         cephALEXin (KEFLEX) 250 mg capsule Comments:   Reason for Stopping:                 Activity: Activity as tolerated  Diet: Cardiac Diet, Diabetic Diet, Low fat, Low cholesterol and Resume previous diet  Wound Care: Keep wound clean and dry, Reinforce dressing PRN and As directed     Follow-up with your PCP and cardiology in a few weeks.   Follow-up tests/labs - none     Signed:  Jackie Avalos MD  10/26/2019  11:10 AM      Electronically signed by Jason Wooten MD at 10/25/19 1152  Electronically signed by Angy Olivares MD at 10/26/19 1110   N

## 2020-03-04 ENCOUNTER — OFFICE VISIT (OUTPATIENT)
Dept: CARDIOLOGY CLINIC | Age: 85
End: 2020-03-04

## 2020-03-04 VITALS
OXYGEN SATURATION: 93 % | HEIGHT: 69 IN | DIASTOLIC BLOOD PRESSURE: 64 MMHG | BODY MASS INDEX: 30.96 KG/M2 | HEART RATE: 51 BPM | RESPIRATION RATE: 16 BRPM | SYSTOLIC BLOOD PRESSURE: 110 MMHG | WEIGHT: 209 LBS

## 2020-03-04 DIAGNOSIS — I48.21 PERMANENT ATRIAL FIBRILLATION (HCC): ICD-10-CM

## 2020-03-04 DIAGNOSIS — E11.59 DM TYPE 2 CAUSING VASCULAR DISEASE (HCC): ICD-10-CM

## 2020-03-04 DIAGNOSIS — I10 ESSENTIAL HYPERTENSION: ICD-10-CM

## 2020-03-04 DIAGNOSIS — I82.552 CHRONIC DEEP VEIN THROMBOSIS (DVT) OF LEFT PERONEAL VEIN (HCC): ICD-10-CM

## 2020-03-04 DIAGNOSIS — I50.32 CHRONIC DIASTOLIC CONGESTIVE HEART FAILURE (HCC): ICD-10-CM

## 2020-03-04 DIAGNOSIS — I25.10 CORONARY ARTERY DISEASE INVOLVING NATIVE CORONARY ARTERY OF NATIVE HEART WITHOUT ANGINA PECTORIS: Primary | ICD-10-CM

## 2020-03-04 DIAGNOSIS — I87.2 VENOUS INSUFFICIENCY: ICD-10-CM

## 2020-03-04 NOTE — PROGRESS NOTES
Progress Note    Patient: Brooks Hutton MRN: [id number]  SSN: [id number]    YOB: 1935  Age: 80 y.o. Sex: male        Subjective:     Mr. Valdez Davis is an 80-year-old white male with known history of coronary artery disease status post coronary artery bypass graft surgery 1995, COPD, atrial fibrillation, right popliteal DVT, mild LV dysfunction, recently admitted to [facility] with shortness of breath and lower extremity edema. Repeat echocardiogram showed stability of his mild LV dysfunction. He did done well with diuresis with resolution of symptoms. Anticoagulated for DVT. Edema improved    Recent URI with cough- treated with antibiotics and steroids. Legs \"not too bad. \" Elevated while watching TV. Not wearing support stockings. Doesn't like them. No chest pain. No dizziness. Exercising at home. Objective:     Vitals:    03/04/20 1355   BP: 110/64   Pulse: (!) 51   Resp: 16   SpO2: 93%   Weight: 209 lb (94.8 kg)   Height: 5' 9\" (1.753 m)        Physical Exam:   Head: Normocephalic, atraumatic. Eyes: Pupils equal, round, reactive to light and accomodation, Extra ocular muscles intact. Sclera anicteric. Ears: Grossly responsive to sound. Neck: No adenopathy. No bruits. Throat: No sores or erythema. Heart: Regular rate and rhythm. Normal S1 and S2. No murmurs, gallops, or rub. Lungs; Rhonchi bilaterally  Abdomen: Soft, non-tender. No guarding or rebound. No hepatosplenomegaly. Bowel sounds active. Ext: 1+ pedal edema, stasis dermatitis, healed ulcer left shin  Skin: Normal coloration. Warm and dry. No rash. Neuro: Cranial nerves II through XII intact. Motor and sensory grossly intact. Affect: Appropriate. Alert and interactive. Current Outpatient Medications   Medication Sig Dispense    fluticasone-umeclidinium-vilanterol (TRELEGY ELLIPTA) 100-62.5-25 mcg inhaler Take 1 Puff by inhalation daily.  lisinopril (PRINIVIL, ZESTRIL) 5 mg tablet Take 1 Tab by mouth daily.  90 Tab  atorvastatin (LIPITOR) 80 mg tablet Take 80 mg by mouth nightly.  ELIQUIS 5 mg tablet TAKE 1 TABLET TWICE A DAY TO PREVENT THROMBOEMBOLISM IN CHRONIC ATRIAL FIBRILLATION (Patient taking differently: Take 5 mg by mouth two (2) times a day.) 180 Tab    metoprolol succinate (TOPROL XL) 25 mg XL tablet TAKE 1 TABLET DAILY 90 Tab    colesevelam (WELCHOL) 625 mg tablet TAKE 3 TABLETS TWICE A DAY WITH MEALS 540 Tab    MULTIVITAMIN PO Take 1 Tab by mouth daily. No current facility-administered medications for this visit. Lab/Data Review:  No flowsheet data found. 10/24/19   ECHO ADULT COMPLETE 10/25/2019 10/25/2019    Narrative · Left Ventricle: Normal cavity size and wall thickness. Mild systolic   dysfunction. Estimated left ventricular ejection fraction is 41 - 45%. Abnormal left ventricular wall motion. · Left Atrium: Mildly dilated left atrium. · Aortic Valve: Probably trileaflet aortic valve. Aortic valve sclerosis. Aortic valve leaflet calcification present. · Mitral Valve: Mild mitral annular calcification. Signed by: Patricia Pimentel MD     10/24/19 GIBSON:  1. No evidence of significant peripheral arterial disease at rest in the right leg. 2. No evidence of significant peripheral arterial disease at rest in the left leg. 3. The right ankle/brachial index is 1.10 and the left ankle/brachial index is 1.09.  4. The right toe/brachial index is 0.76 and the left toe/brachial index is 0.95.     10/24/19 Venous duplex:  Interpretation Summary     Acute non-occlusive thrombus present in the right popliteal vein. Bilateral lower extremity venous duplex positive for deep venous thrombosis. Negative for thrombophlebitis. Lower Extremity Venous Findings     Right Lower Venous     Acute non-occlusive thrombus present in the right popliteal vein.      The common femoral, greater saphenous, profunda femoral, proximal femoral, mid femoral, distal femoral, gastrocnemius and saphenous femoral junction vein(s) were imaged in the transverse and longitudinal planes. The vessels showed normal color filling and compressibility. Doppler interrogation of the veins showed phasic and spontaneous flow. The posterior tibial and peroneal vein(s) were not well visualized. Left Lower Venous     The common femoral, greater saphenous, saphenous femoral junction, profunda femoral, proximal femoral, mid femoral, distal femoral and popliteal vein(s) were imaged in the transverse and longitudinal planes. The vessels showed normal color filling and compressibility. Doppler interrogation of the veins showed phasic and spontaneous flow. The peroneal vein(s) were not well visualized. 11/20/2019 Venous ultrasound:  Interpretation Summary     · There is no evidence of deep venous thrombosis within the bilateral common femoral, femoral or popliteal veins. · Pulsatility of venous flow is consistent with increased central venous pressure. · Bilateral deep venous reflux (>1.0 seconds) is present within the popliteal veins, right > left. · Study is negative for superficial venous reflux. Assessment/Plan:       ICD-10-CM ICD-9-CM    1. Coronary artery disease involving native coronary artery of native heart without angina pectoris I25.10 414.01    2. Venous insufficiency I87.2 459.81    3. Permanent atrial fibrillation I48.21 427.31    4. DM type 2 causing vascular disease (HCC) E11.59 250.70      443.81    5. 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 Essential hypertension I10 401.9    7. Chronic diastolic congestive heart failure (HCC) I50.32 428.32      428.0      Mr. Aleida Cho is an 70-year-old white male with coronary artery disease, mild LV dysfunction, dyslipidemia, diastolic heart failure, lower extremity edema with recent right popliteal DVT- now resolved. Venous reflux that we will treat with stockings. The patient also has left stasis ulcer of the shin. Overall he is doing well since hospitalization with loss of 20 pounds. He is tolerating anticoagulation without issues. Current limitation is with respiratory infection. We discussed control of edema with support stockings elevation and size. Kathryn Batista Also discussed the need to exercise for cardiovascular health and strengthening. I discussed strategies to control his edema and venous insufficiency with support stockings. The patient understands and agrees with plan. Diet and exercise  Discussed cardio- 30 minutes per day and strengthening with weights or rubber bands. Lower extremity socks or stockings. Try OTC first. Jobst 18-20 mmHg if that fails.       RTC 3 months    Signed By: Tamar Medrano MD     March 4, 2020

## 2020-05-21 ENCOUNTER — TELEPHONE (OUTPATIENT)
Dept: CARDIOLOGY CLINIC | Age: 85
End: 2020-05-21

## 2020-05-21 NOTE — TELEPHONE ENCOUNTER
Attempted to reach patient on patient on 5/20 & 5/21 to offer VV for same day and same time as office visit on 6/3 @ 1pm. No Answer & unable to LVM.

## 2020-05-26 NOTE — TELEPHONE ENCOUNTER
TC to patient's daughter, Pt ID verified. States pt is starting to get swelling and she would like for her father to see Dr. Mor Garcia in person to evaluate it. Pt is not currently short of breath. Advised we could keep his in office visit as previously scheduled. Also advised to keep an eye on his weight and if it went up 3/4 pounds over the next few days or he became short of breath to be seen in the ER. Pt's daughter understands.

## 2020-05-26 NOTE — TELEPHONE ENCOUNTER
Patient's daughter, Rose French, stated that the patient is not interested in rescheduling his appointment to a VV and would like to speak with a nurse to discuss getting him in the office to see Dr. Nelly Hagen. She stated that he is having a \"repeat in symptoms\" and needs to be seen. Please advise.     Phone #: [phone]  Thanks

## 2020-06-03 ENCOUNTER — OFFICE VISIT (OUTPATIENT)
Dept: CARDIOLOGY CLINIC | Age: 85
End: 2020-06-03

## 2020-06-03 VITALS
DIASTOLIC BLOOD PRESSURE: 64 MMHG | WEIGHT: 217 LBS | HEIGHT: 69 IN | HEART RATE: 88 BPM | SYSTOLIC BLOOD PRESSURE: 122 MMHG | BODY MASS INDEX: 32.14 KG/M2

## 2020-06-03 DIAGNOSIS — I82.552 CHRONIC DEEP VEIN THROMBOSIS (DVT) OF LEFT PERONEAL VEIN (HCC): ICD-10-CM

## 2020-06-03 DIAGNOSIS — L97.921 ULCER OF LEFT LOWER EXTREMITY, LIMITED TO BREAKDOWN OF SKIN (HCC): ICD-10-CM

## 2020-06-03 DIAGNOSIS — I25.10 CORONARY ARTERY DISEASE INVOLVING NATIVE CORONARY ARTERY OF NATIVE HEART WITHOUT ANGINA PECTORIS: Primary | ICD-10-CM

## 2020-06-03 DIAGNOSIS — I87.2 VENOUS INSUFFICIENCY: ICD-10-CM

## 2020-06-03 DIAGNOSIS — I10 ESSENTIAL HYPERTENSION: ICD-10-CM

## 2020-06-03 DIAGNOSIS — E11.59 DM TYPE 2 CAUSING VASCULAR DISEASE (HCC): ICD-10-CM

## 2020-06-03 DIAGNOSIS — I50.32 CHRONIC DIASTOLIC CONGESTIVE HEART FAILURE (HCC): ICD-10-CM

## 2020-06-03 DIAGNOSIS — I48.21 PERMANENT ATRIAL FIBRILLATION (HCC): ICD-10-CM

## 2020-06-03 RX ORDER — PREDNISONE 10 MG/1
10 TABLET ORAL DAILY
COMMUNITY
Start: 2020-05-15 | End: 2020-06-15

## 2020-06-03 NOTE — PROGRESS NOTES
Progress Note    Patient: Beth Santacruz MRN: [id number]  SSN: [id number]    YOB: 1935  Age: 80 y.o. Sex: male        Subjective:     Mr. Vikki Nicholson is an 80-year-old white male with known history of coronary artery disease status post coronary artery bypass graft surgery 1995, COPD, atrial fibrillation, right popliteal DVT, mild LV dysfunction, recently admitted to [facility] with shortness of breath and lower extremity edema. Repeat echocardiogram showed stability of his mild LV dysfunction. He did done well with diuresis with resolution of symptoms. Anticoagulated for DVT. Edema initially improved, but since April has worsened. Blistered started developing in last couple of weeks. Has not obtained support stockings. Tries to elevate for 15 -20 mins in the day. No fevers or chills. No Covid exposure. Has not been out of the house in 2 months. Not wearing support stockings. Doesn't like them. No chest pain. No dizziness. Exercising at home- walking. On prednisone for lung issues. Due for re-evaluation    Objective:     Vitals:    06/03/20 1308   BP: 122/64   Pulse: 88   Weight: 217 lb (98.4 kg)   Height: 5' 9\" (1.753 m)        Physical Exam:   Head: Normocephalic, atraumatic. Eyes: Pupils equal, round, reactive to light and accomodation, Extra ocular muscles intact. Sclera anicteric. Ears: Grossly responsive to sound. Neck: No adenopathy. No bruits. Throat: No sores or erythema. Heart: Regular rate and rhythm. Normal S1 and S2. No murmurs, gallops, or rub. Lungs; clear to auscultation  Abdomen: Soft, non-tender. No guarding or rebound. No hepatosplenomegaly. Bowel sounds active. Ext: 3+ pedal edema, stasis dermatitis, excoriations left anterior shin. No erythema or eschars. Skin: Normal coloration. Warm and dry. No rash. Neuro: Cranial nerves II through XII intact. Motor and sensory grossly intact. Affect: Appropriate. Alert and interactive.                    Current Outpatient Medications   Medication Sig Dispense    predniSONE (DELTASONE) 10 mg tablet 10 mg daily.  fluticasone-umeclidinium-vilanterol (TRELEGY ELLIPTA) 100-62.5-25 mcg inhaler Take 1 Puff by inhalation daily.  atorvastatin (LIPITOR) 80 mg tablet Take 80 mg by mouth nightly.  ELIQUIS 5 mg tablet TAKE 1 TABLET TWICE A DAY TO PREVENT THROMBOEMBOLISM IN CHRONIC ATRIAL FIBRILLATION (Patient taking differently: Take 5 mg by mouth two (2) times a day.) 180 Tab    metoprolol succinate (TOPROL XL) 25 mg XL tablet TAKE 1 TABLET DAILY 90 Tab    colesevelam (WELCHOL) 625 mg tablet TAKE 3 TABLETS TWICE A DAY WITH MEALS 540 Tab    MULTIVITAMIN PO Take 1 Tab by mouth daily.  lisinopril (PRINIVIL, ZESTRIL) 5 mg tablet Take 1 Tab by mouth daily. 90 Tab     No current facility-administered medications for this visit. Lab/Data Review:  No flowsheet data found. 10/24/19   ECHO ADULT COMPLETE 10/25/2019 10/25/2019    Narrative · Left Ventricle: Normal cavity size and wall thickness. Mild systolic   dysfunction. Estimated left ventricular ejection fraction is 41 - 45%. Abnormal left ventricular wall motion. · Left Atrium: Mildly dilated left atrium. · Aortic Valve: Probably trileaflet aortic valve. Aortic valve sclerosis. Aortic valve leaflet calcification present. · Mitral Valve: Mild mitral annular calcification. Signed by: Aftab Galo MD     10/24/19 GIBSON:  1. No evidence of significant peripheral arterial disease at rest in the right leg. 2. No evidence of significant peripheral arterial disease at rest in the left leg. 3. The right ankle/brachial index is 1.10 and the left ankle/brachial index is 1.09.  4. The right toe/brachial index is 0.76 and the left toe/brachial index is 0.95.     10/24/19 Venous duplex:  Interpretation Summary     Acute non-occlusive thrombus present in the right popliteal vein.     Bilateral lower extremity venous duplex positive for deep venous thrombosis. Negative for thrombophlebitis. Lower Extremity Venous Findings     Right Lower Venous     Acute non-occlusive thrombus present in the right popliteal vein. The common femoral, greater saphenous, profunda femoral, proximal femoral, mid femoral, distal femoral, gastrocnemius and saphenous femoral junction vein(s) were imaged in the transverse and longitudinal planes. The vessels showed normal color filling and compressibility. Doppler interrogation of the veins showed phasic and spontaneous flow. The posterior tibial and peroneal vein(s) were not well visualized. Left Lower Venous     The common femoral, greater saphenous, saphenous femoral junction, profunda femoral, proximal femoral, mid femoral, distal femoral and popliteal vein(s) were imaged in the transverse and longitudinal planes. The vessels showed normal color filling and compressibility. Doppler interrogation of the veins showed phasic and spontaneous flow. The peroneal vein(s) were not well visualized. 11/20/2019 Venous ultrasound:  Interpretation Summary     · There is no evidence of deep venous thrombosis within the bilateral common femoral, femoral or popliteal veins. · Pulsatility of venous flow is consistent with increased central venous pressure. · Bilateral deep venous reflux (>1.0 seconds) is present within the popliteal veins, right > left. · Study is negative for superficial venous reflux. Assessment/Plan:       ICD-10-CM ICD-9-CM    1. Coronary artery disease involving native coronary artery of native heart without angina pectoris I25.10 414.01    2. Essential hypertension I10 401.9    3. Permanent atrial fibrillation ([facility]) I48.21 427.31    4. Venous insufficiency I87.2 459.81    5. Chronic diastolic congestive heart failure (HCC) I50.32 428.32      428.0    6. DM type 2 causing vascular disease (HCC) E11.59 250.70      443.81    7.  Chronic deep vein thrombosis (DVT) of left peroneal vein ([facility]) I82.552 453.52    8. Ulcer of left lower extremity, limited to breakdown of skin [facility]) L97.921 707.10      Mr. Nik Hicks is an 70-year-old white male with coronary artery disease, mild LV dysfunction, dyslipidemia, diastolic heart failure, lower extremity edema with recent right popliteal DVT- now resolved. Venous reflux that we will treat with stockings. The patient also has left stasis ulcer of the shin that has worsened. He has weight gain that is likely fluid. Although, his baseline weight is still elevated. He is tolerating anticoagulation without issues. Reviewed arterial and venous dopplers with patient and daughter in detail. He needs aggressive control of edema and wound care to prevent hospitalization. No signs of active infection. Diet and exercise  Discussed cardio- 30 minutes per day and strengthening with weights or rubber bands. Lower extremity socks or stockings. Try OTC first. Jobst 18-20 mmHg if that fails.     Discuss steroid taper with pulmonology  Lasix 20 mg daily for 5 days  RTC 2-3 weeks- VV OK  Refer to wound care in [address] for LE stockings assesment        Signed By: Isabel Almanzar MD     Gertrude 3, 2020

## 2020-06-04 RX ORDER — FUROSEMIDE 20 MG/1
20 TABLET ORAL DAILY
Qty: 5 TAB | Refills: 0 | Status: SHIPPED | OUTPATIENT
Start: 2020-06-04 | End: 2020-06-09

## 2020-06-08 ENCOUNTER — HOSPITAL ENCOUNTER (OUTPATIENT)
Dept: LAB | Age: 85
Discharge: HOME | End: 2020-06-08
Payer: MEDICARE

## 2020-06-08 ENCOUNTER — HOSPITAL ENCOUNTER (OUTPATIENT)
Dept: WOUND CARE | Age: 85
Discharge: HOME | End: 2020-06-08
Payer: MEDICARE

## 2020-06-08 VITALS
HEART RATE: 64 BPM | DIASTOLIC BLOOD PRESSURE: 71 MMHG | TEMPERATURE: 97.6 F | SYSTOLIC BLOOD PRESSURE: 145 MMHG | HEIGHT: 69 IN | RESPIRATION RATE: 16 BRPM | WEIGHT: 216 LBS | BODY MASS INDEX: 31.99 KG/M2

## 2020-06-08 PROCEDURE — 87186 SC STD MICRODIL/AGAR DIL: CPT

## 2020-06-08 PROCEDURE — 11045 DBRDMT SUBQ TISS EACH ADDL: CPT

## 2020-06-08 PROCEDURE — 87070 CULTURE OTHR SPECIMN AEROBIC: CPT

## 2020-06-08 PROCEDURE — 11042 DBRDMT SUBQ TIS 1ST 20SQCM/<: CPT

## 2020-06-08 PROCEDURE — 74011000250 HC RX REV CODE- 250: Performed by: FAMILY MEDICINE

## 2020-06-08 PROCEDURE — 87077 CULTURE AEROBIC IDENTIFY: CPT

## 2020-06-08 RX ORDER — LIDOCAINE HYDROCHLORIDE 20 MG/ML
JELLY TOPICAL
Status: COMPLETED | OUTPATIENT
Start: 2020-06-08 | End: 2020-06-08

## 2020-06-08 RX ADMIN — LIDOCAINE HYDROCHLORIDE: 20 JELLY TOPICAL at 14:57

## 2020-06-08 NOTE — WOUND CARE
06/08/20 1534   Wound Leg lower Left   Date First Assessed/Time First Assessed: 06/08/20 1448   Present on Hospital Admission: Yes  Primary Wound Type: Venous Ulcer  Location: Leg lower  Wound Location Orientation: Left   Dressing Type Applied Alginate; Absorptive;Gauze;Gauze wrap (gaetano); Special tape (comment)   Wound Leg lower Right; Anterior   Date First Assessed/Time First Assessed: 06/08/20 1451   Present on Hospital Admission: Yes  Primary Wound Type: Venous Ulcer  Location: Leg lower  Wound Location Orientation: Right; Anterior   Dressing Type Applied Alginate; Absorptive;Gauze;Gauze wrap (gaetano); Special tape (comment)   Discharge Condition: Stable     Pain: 1    Ambulatory Status: Walking    Discharge Destination: Home     Transportation: Car    Accompanied by: Self  and Family/Caregiver     Discharge instructions reviewed with Patient and Family/Caregiver  and copy or written instructions have been provided. All questions/concerns have been addressed at this time.

## 2020-06-08 NOTE — WOUND CARE
06/08/20 1448   Wound Leg lower Left   Date First Assessed/Time First Assessed: 06/08/20 1448   Present on Hospital Admission: Yes  Primary Wound Type: Venous Ulcer  Location: Leg lower  Wound Location Orientation: Left   Wound Length (cm) 18.5 cm   Wound Width (cm) 44.45 cm   Wound Depth (cm) 0.2 cm   Wound Surface Area (cm^2) 822.32 cm^2   Wound Volume (cm^3) 164.46 cm^3   Condition of Base Pink;Slough   Condition of Edges Open   Drainage Amount Large   Drainage Color Serous   Wound Odor None   Wound Leg lower Right; Anterior   Date First Assessed/Time First Assessed: 06/08/20 1451   Present on Hospital Admission: Yes  Primary Wound Type: Venous Ulcer  Location: Leg lower  Wound Location Orientation: Right; Anterior   Wound Length (cm) 9.5 cm   Wound Width (cm) 7 cm   Wound Depth (cm) 0.1 cm   Wound Surface Area (cm^2) 66.5 cm^2   Wound Volume (cm^3) 6.65 cm^3   Condition of Base Pink;Slough   Drainage Amount Large   Drainage Color Serous   Wound Odor None     Visit Vitals  /71   Pulse 64   Temp 97.6 °F (36.4 °C)   Resp 16   Ht 5' 9\" (1.753 m)   Wt 98 kg (216 lb)   BMI 31.90 kg/m²

## 2020-06-12 ENCOUNTER — TELEPHONE (OUTPATIENT)
Dept: CARDIOLOGY CLINIC | Age: 85
End: 2020-06-12

## 2020-06-12 NOTE — TELEPHONE ENCOUNTER
TC from Dr. Deb Walden with wound care, pt ID verified. Per Dr. Beverly Quiroz, pt has significant venous insufficiency with cellulitis and would like to discuss what would be  appropriate compressions for this patient, and also discuss his CV back ground. Would appreciate a call from Dr. Chelsea Billy If possible. Patient has a follow up appt Monday 6/15/20.  Can be reached at [phone]

## 2020-06-12 NOTE — WOUND CARE
Patient's Cardiologist is Dr. Lissy Osborn. I spoke with his Nurse, Elmo Emerson, and asked her to please have Dr. Lissy Osborn call me so that we can discuss compression therapy for the patient. This occurred on 6/12/20.

## 2020-06-12 NOTE — WOUND CARE
History and Physical    Patient: Owen Norman MRN: [id number]  SSN: [id number]    YOB: 1935  Age: 80 y.o. Sex: male      Subjective:     Chief Complaint:  Wounds on legs. Owen Norman is an 80 y.o. male who presents today for discussion and evaluation about leg wounds.  Pt's daughter, Franko Waldron, is here giving history as well. Pt states that he has had the wounds for approximately 4 months, has been using dry dressings for treatment and states nothing makes the wounds better or worse.  Pt denies any pain associated with his wounds. Past Medical History:   Diagnosis Date    Asthma     BRONCITITS, INHALER USE PRN    CAD (coronary artery disease)     MI    Cancer ([facility] [address] [facility]     PROTATE    Chronic obstructive pulmonary disease ([facility]     Diabetes ([facility] [address] [facility]     BORDERLINE    Hypercholesterolemia     Hypertension     Umbilical hernia      Past Surgical History:   Procedure Laterality Date    CARDIAC SURG PROCEDURE UNLIST      CABG X4 VESSEL    CARDIAC SURG PROCEDURE UNLIST  ,     CARDIAC CATH, STENTS    ENDOSCOPY, COLON, DIAGNOSTIC  2006    HX GI      COLONOSCOPY    HX HEENT Bilateral     CATARACTS/ IOL    HX HERNIA REPAIR  2014    HX PROSTATECTOMY  2006      Family History   Problem Relation Age of Onset    Stroke Father     Anesth Problems Neg Hx      Social History     Tobacco Use    Smoking status: Former Smoker     Packs/day: 1.00     Types: Cigarettes     Last attempt to quit: 6/10/1964     Years since quittin.0    Smokeless tobacco: Never Used   Substance Use Topics    Alcohol use: Yes     Alcohol/week: 15.0 standard drinks     Types: 12 Cans of beer, 6 Standard drinks or equivalent per week     Comment: casual      Prior to Admission medications    Medication Sig Start Date End Date Taking? Authorizing Provider   predniSONE (DELTASONE) 10 mg tablet 10 mg daily.  5/15/20  Yes Provider, Historical   atorvastatin (LIPITOR) 80 mg tablet Take 80 mg by mouth nightly. Yes Provider, Historical   metoprolol succinate (TOPROL XL) 25 mg XL tablet TAKE 1 TABLET DAILY 4/5/18  Yes Cheryle Quintana MD   [facility]) 625 mg tablet TAKE 3 TABLETS TWICE A DAY WITH MEALS 4/5/18  Yes Cheryle Quintana MD   MULTIVITAMIN PO Take 1 Tab by mouth daily. Yes Provider, Historical   fluticasone-umeclidinium-vilanterol (TRELEGY ELLIPTA) 100-62.5-25 mcg inhaler Take 1 Puff by inhalation daily. Provider, Historical   ELIQUIS 5 mg tablet TAKE 1 TABLET TWICE A DAY TO PREVENT THROMBOEMBOLISM IN CHRONIC ATRIAL FIBRILLATION  Patient taking differently: Take 5 mg by mouth two (2) times a day.  9/28/19   Cheryle Quintana MD        Allergies   Allergen Reactions    Adhesive Other (comments)     BLISTERS       Review of Systems:  Constitutional: negative  Eyes: negative  Ears, Nose, Mouth, Throat, and Face: negative  Respiratory: negative  Cardiovascular: negative  Gastrointestinal: negative  Integument/Breast: wounds on legs  Musculoskeletal:negative  Neurological: negative  Behavioral/Psychiatric: negative       Objective:     Vitals:    06/08/20 1440 06/08/20 1443   BP: 145/71    Pulse: 64    Resp: 16    Temp: 97.6 °F (36.4 °C)    Weight:  98 kg (216 lb)   Height:  5' 9\" (1.753 m)        Physical Exam:  GENERAL: alert, cooperative, no distress, appears stated age  EYE: conjunctivae/corneas clear, EOM's intact  THROAT & NECK: normal and no erythema or exudates noted.   LUNG: clear to auscultation bilaterally  HEART: regular rate and rhythm  ABDOMEN: soft, non-tender, non-distended, no guarding   EXTREMITIES:  bilateral lower extremities show good sensation, positive doppler at PT and DP bilaterally, normal capillary refill of 3 seconds, and are nontender, moderate edema in right leg and severe edema in left leg which is patient's baseline per patient and daughter, erythema noted in both lower extremities c/w cellulitis  SKIN: wound on left anterior first toe  NEUROLOGIC: AOx3  PSYCHIATRIC: non focal     LEFT LOWER LEG WOUND     Etiology:  Lymphedema. Objective:  Healing. Wound Size (L x W x D):  18.5x44.5x0.2 cm  Surface Area:  823.25 cm2  Exudate:  Serous moderate. Red:  0%. Yellow Necrotic:  20%. Skin:  80%. Wound Progress:  N/A.          SURGICAL EXCISIONAL DEBRIDEMENT PROCEDURE       INDICATION FOR PROCEDURE:     Remove necrotic tissue and establish the margins of viable tissue.     CONSENT FOR PROCEDURE:     Treatment options, risks, and benefits, and the possible need for subsequent additional procedures on this wound were explained to the patient, and patient agreed to proceed with the procedures(s).     PROCEDURE NOTE:     Post-Procedure Wound Size (L x W x D):  18.6x44.6x0.3            After questions were answered and consent was signed, the wound was cleansed with normal saline and 2% Lidocaine gel was applied to achieve anesthesia. Time out was performed. With clean surgical technique, a #15 blade was used to surgically excise 165.9 cm2 of devitalized tissue, and necrotic subcutaneous fat and surrounding connective tissues were removed to a depth of 0.3 cm. Healthy bleeding tissue was observed. Hemostasis was achieved and no complications were noted. Patient tolerated the procedure well. A clean dressing was applied. Post-operative recommendations and updates to the plan of care are documented in the Assessment and Plan section below. RIGHT LOWER LEG WOUND     Etiology:  Venous. Objective:  Healing. Wound Size (L x W x D):  9.5x7x0.1 cm  Surface Area:  66.5 cm2  Exudate:  Serous moderate. Red:  0%. Yellow Necrotic:  35%. Skin:  65%.   Wound Progress:  N/A.          SURGICAL EXCISIONAL DEBRIDEMENT PROCEDURE       INDICATION FOR PROCEDURE:     Remove necrotic tissue and establish the margins of viable tissue.     CONSENT FOR PROCEDURE:     Treatment options, risks, and benefits, and the possible need for subsequent additional procedures on this wound were explained to the patient, and patient agreed to proceed with the procedures(s).     PROCEDURE NOTE:     Post-Procedure Wound Size (L x W x D): 9.6x7.1x0.2            After questions were answered and consent was signed, the wound was cleansed with normal saline and 2% Lidocaine gel was applied to achieve anesthesia. Time out was performed. With clean surgical technique, a #15 blade was used to surgically excise 23.86 cm2 of devitalized tissue, and necrotic subcutaneous fat and surrounding connective tissues were removed to a depth of 0.2 cm. Healthy bleeding tissue was observed. Hemostasis was achieved and no complications were noted. Patient tolerated the procedure well. A clean dressing was applied. Post-operative recommendations and updates to the plan of care are documented in the Assessment and Plan section below. Assessment:     Cellulitis of right lower limb-L03.115  Cellulitis of left lower limb-L03. 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  Discussed with Nurse the plan of care, etc...  Discussed with patient and his daughter risks of follow up given COVID-19 concerns, both voice complete understanding and would like to return as noted below.     Clinic Wound Care:  Cleanse wounds with Normal Saline, apply Silver Alginate, 4x4, Optilock, Hugo and Tape to left and right leg wounds. Do not put tape on skin.     Home Wound Care:  Cleanse wounds with Normal Saline, apply Silver Alginate, 4x4, Optilock, Hugo and Tape to left and right leg wounds q day. Do not put tape on skin.     Offload wounds. Elevate legs. Culture left and right lower leg wounds and send. Rx given for Keflex. Bilateral lower extremity GIBSON and Arterial Doppler studies ordered. Home Health 3x/week. Pt and daughter instructed to go to ER if fever, chills, shortness of breath, increasing leg redness of either leg, etc...     RTC 1 week.     All questions answered to patient's satisfaction.     Written instructions given to patient.     Patient and daughter voice complete understanding.     Signed By: Kun Roberts MD     June 12, 2020

## 2020-06-12 NOTE — TELEPHONE ENCOUNTER
Dr Jatinder De Souza is requesting a call from Dr Eusebio Jj or his nurse regarding this patient.      Phone: [phone] (cell, please leave VM if unavailable)

## 2020-06-15 ENCOUNTER — HOSPITAL ENCOUNTER (OUTPATIENT)
Dept: WOUND CARE | Age: 85
Discharge: HOME | End: 2020-06-15
Payer: MEDICARE

## 2020-06-15 VITALS
SYSTOLIC BLOOD PRESSURE: 157 MMHG | DIASTOLIC BLOOD PRESSURE: 72 MMHG | TEMPERATURE: 97.3 F | HEART RATE: 65 BPM | RESPIRATION RATE: 16 BRPM

## 2020-06-15 PROCEDURE — 11042 DBRDMT SUBQ TIS 1ST 20SQCM/<: CPT

## 2020-06-15 PROCEDURE — 74011000250 HC RX REV CODE- 250: Performed by: FAMILY MEDICINE

## 2020-06-15 RX ADMIN — Medication: at 15:40

## 2020-06-15 NOTE — WOUND CARE
06/15/20 1527   Wound Leg lower Left   Date First Assessed/Time First Assessed: 06/08/20 1448   Present on Hospital Admission: Yes  Primary Wound Type: Venous Ulcer  Location: Leg lower  Wound Location Orientation: Left   Dressing Type Xeroform   Wound Length (cm) 1.5 cm   Wound Width (cm) 11.5 cm   Wound Depth (cm) 0.1 cm   Wound Surface Area (cm^2) 17.25 cm^2   Wound Volume (cm^3) 1.72 cm^3   Change in Wound Size % 97.9   Condition of Base Pink;Slough   Drainage Amount Scant   Drainage Color Serosanguinous   Wound Odor None   Wound Leg lower Right; Anterior   Date First Assessed/Time First Assessed: 06/08/20 1451   Present on Hospital Admission: Yes  Primary Wound Type: Venous Ulcer  Location: Leg lower  Wound Location Orientation: Right; Anterior   Dressing Type Xeroform   Wound Length (cm) 2.5 cm   Wound Width (cm) 6 cm   Wound Depth (cm) 0.1 cm   Wound Surface Area (cm^2) 15 cm^2   Wound Volume (cm^3) 1.5 cm^3   Change in Wound Size % 77.44   Condition of Base Slough   Drainage Amount Scant   Drainage Color Serosanguinous   Wound Odor None   Blood pressure 157/72, pulse 65, temperature 97.3 °F (36.3 °C), resp. rate 16.

## 2020-06-15 NOTE — DISCHARGE INSTRUCTIONS
Discharge Instructions for  [facility]  [address] [place], [address] [facility]  Telephone: [phone]    NAME:  Carmella Hatfield OF BIRTH:  1935  ACCOUNT NUMBER :  [de-identified]  DATE:  6/8/2020    Needs wound care referral    Wound Cleansing:   Do not scrub or use excessive force. Cleanse wound prior to applying a clean dressing with:      [x] Cleanse wound with: cleanse with Normal Saline      [] May Shower at Discharge     []  Shower on days of dressing change, remove dressing first    [] Keep Wound Dry in Shower (may purchase a cast cover if needed)     Dressings:           Wound Location Left and Right leg     [x] Apply Primary Dressing:       [] MediHoney Gel  [] Alginate   [x] Alginate with Silver  [] Alginate        [] Collagen [] Collagen with Silver {[facility] collagen :49952}   []  Moisten saline gauze     [] Hydrocolloid   [] MediHoney Alginate [] Foam with Silver   [] Foam   [] Hydrofera Blue    [] Mepilex Border    [] Moisten with Saline [] Hydrogel [] Mepitel     [] Bactroban/Mupirocin [] Polysporin  [] Other:      [] Pack wound loosely with  [] Iodoform   [] Plain Packing  [] Other     [x] Cover and Secure with:     [x] Gauze [x] Hugo [] Kerlix   [] Ace Wrap [x] Cover Roll Tape [] ABD     [x] Other: large optilock   Avoid contact of tape with skin.   [x] Change dressing: [x] Daily    [] Every Other Day [] Three times per week   [] Once a week [] Do Not Change Dressing   [] Other:      Edema Control:  Apply: [] Compression Stocking []Right Leg []Left Leg   [] Tubigrip []Right Leg Double Layer []Left Leg Double Layer       []Right Leg Single Layer []Left Leg Single Layer   [] SpandaGrip []Right Leg  []Left Leg      []Low compression 5-10 mm/Hg      []Medium compression 10-20 mm/Hg     []High compression  20-30 mm/Hg   every morning immediately when getting up should be applied to affected leg(s) from mid foot to knee making sure to cover the heel.  Remove every night before going to bed. [] Elevate leg(s) above the level of the heart when sitting. [] Avoid prolonged standing in one place. [] Elevate arm/hand above the level of the heart []RightArm []LeftArm       Off-Loading:   [x] Off-loading when [] walking  [x] in bed [x] sitting  [] Total non-weight bearing  [] Right Leg  [] Left Leg   [] Assistive Device [] Walker [] Cane  [] Wheelchair  [] Crutches   [] Surgical shoe    [] Podus Boot(s)   [] Foam Boot(s)  [] Roll About    [] Cast Boot [] CROW Boot  [] Other:    Dietary:  [x] Increase Protein: examples ( Meat, cheese, eggs, greek yogurt, premier protein drink, fish, nuts )        Activity:  [x] Activity as tolerated:  [] Patient has no activity restrictions     [] Strict Bedrest: [] Remain off Work:     [] May return to full duty work:                                   [] Return to work with restrictions:             Return Appointment:    [] Nurse visit scheduled in *** day(s) or *** week(s)   [] Return Appointment: With Dr. Regi Potts  in  [facility])  [x] Ordered tests: Cultures of both legs, antibiotic given, Daniel's and Arterial dopplers on both legs per physician     Electronically signed on 6/8/2020 at 3:16 PM     Celina Hunter 281: Should you experience any significant changes in your wound(s) or have questions about your wound care, please contact the [facility] at [address] [place] [address] 8:00 am - 4:30. If you need help with your wound outside these hours and cannot wait until we are again available, contact your PCP or go to the hospital emergency room. PLEASE NOTE: IF YOU ARE UNABLE TO OBTAIN WOUND SUPPLIES, CONTINUE TO USE THE SUPPLIES YOU HAVE AVAILABLE UNTIL YOU ARE ABLE TO REACH US. IT IS MOST IMPORTANT TO KEEP THE WOUND COVERED AT ALL TIMES.      Physician Signature:_______________________ Dr. Regi Potts

## 2020-06-15 NOTE — WOUND CARE
06/15/20 1608   Wound Leg lower Left   Date First Assessed/Time First Assessed: 06/08/20 1448   Present on Hospital Admission: Yes  Primary Wound Type: Venous Ulcer  Location: Leg lower  Wound Location Orientation: Left   Dressing Type Applied Alginate;Silver products;Gauze wrap (gaetano);Gauze; Absorptive  (tubigrip)   Wound Leg lower Right; Anterior   Date First Assessed/Time First Assessed: 06/08/20 1451   Present on Hospital Admission: Yes  Primary Wound Type: Venous Ulcer  Location: Leg lower  Wound Location Orientation: Right; Anterior   Dressing Type Applied Alginate;Silver products; Absorptive;Gauze;Gauze wrap (gaetano)  (tubigrip)   Discharge Condition: Stable     Pain: 0    Ambulatory Status: Walking    Discharge Destination: Home     Transportation: Car    Accompanied by: Self  and Family/Caregiver     Discharge instructions reviewed with Patient and Family/Caregiver  and copy or written instructions have been provided. All questions/concerns have been addressed at this time.

## 2020-06-20 NOTE — WOUND CARE
History and Physical    Patient: Bahman Banegas MRN: [id number]  SSN: [id number]    YOB: 1935  Age: 80 y.o. Sex: male      Subjective:      Chief Complaint:  Wounds on legs. Bahman Banegas is an 80 y.o. male who presents today for follow up discussion and evaluation of leg wounds.  Pt's daughter, Karmen Bruno, is here giving history as well. Pt denies any pain associated with his wounds. Pt denies any fever, chills, or shortness of breath. Past Medical History:   Diagnosis Date    Asthma     BRONCITITS, INHALER USE PRN    CAD (coronary artery disease)     MI    Cancer ([facility] 2006    PROTATE    Chronic obstructive pulmonary disease ([facility]     Diabetes ([facility]     BORDERLINE    Hypercholesterolemia     Hypertension     Umbilical hernia [id number]     Past Surgical History:   Procedure Laterality Date    CARDIAC SURG PROCEDURE UNLIST      CABG X4 VESSEL    CARDIAC SURG PROCEDURE UNLIST  ,     CARDIAC CATH, STENTS    ENDOSCOPY, COLON, DIAGNOSTIC  2006    HX GI      COLONOSCOPY    HX HEENT Bilateral     CATARACTS/ IOL    HX HERNIA REPAIR  2014    HX PROSTATECTOMY  2006      Family History   Problem Relation Age of Onset    Stroke Father     Anesth Problems Neg Hx      Social History     Tobacco Use    Smoking status: Former Smoker     Packs/day: 1.00     Types: Cigarettes     Last attempt to quit: 6/10/1964     Years since quittin.0    Smokeless tobacco: Never Used   Substance Use Topics    Alcohol use: Yes     Alcohol/week: 15.0 standard drinks     Types: 12 Cans of beer, 6 Standard drinks or equivalent per week     Comment: casual      Prior to Admission medications    Medication Sig Start Date End Date Taking? Authorizing Provider   atorvastatin (LIPITOR) 80 mg tablet Take 80 mg by mouth nightly.    Yes Provider, Historical   ELIQUIS 5 mg tablet TAKE 1 TABLET TWICE A DAY TO PREVENT THROMBOEMBOLISM IN CHRONIC ATRIAL FIBRILLATION  Patient taking differently: Take 5 mg by mouth two (2) times a day. 9/28/19  Yes Grace Frances MD   metoprolol succinate (TOPROL XL) 25 mg XL tablet TAKE 1 TABLET DAILY 4/5/18  Yes Grace Frances MD   [facility]) 625 mg tablet TAKE 3 TABLETS TWICE A DAY WITH MEALS 4/5/18  Yes Grace Frances MD   MULTIVITAMIN PO Take 1 Tab by mouth daily. Yes Provider, Historical   fluticasone-umeclidinium-vilanterol (TRELEGY ELLIPTA) 100-62.5-25 mcg inhaler Take 1 Puff by inhalation daily. Provider, Historical        Allergies   Allergen Reactions    Adhesive Other (comments)     BLISTERS       Review of Systems:  Constitutional: negative  Eyes: negative  Ears, Nose, Mouth, Throat, and Face: negative  Respiratory: negative  Cardiovascular: negative  Gastrointestinal: negative  Integument/Breast: wounds on legs  Musculoskeletal:negative  Neurological: negative  Behavioral/Psychiatric: negative       Objective:     Vitals:    06/15/20 1523   BP: 157/72   Pulse: 65   Resp: 16   Temp: 97.3 °F (36.3 °C)        Physical Exam:  GENERAL: alert, cooperative, no distress, appears stated age  EYE: conjunctivae/corneas clear, EOM's intact  THROAT & NECK: normal and no erythema or exudates noted.   LUNG: no distress  HEART: normal capillary refill  ABDOMEN: no guarding   EXTREMITIES:  bilateral lower extremities show good sensation, positive doppler at PT and DP bilaterally, normal capillary refill of 3 seconds, and are nontender, mild edema in right leg and moderate edema in left leg which is patient's baseline per patient and daughter, erythema noted in both lower extremities is significantly improved  SKIN: wounds on left and right legs  NEUROLOGIC: AOx3  PSYCHIATRIC: non focal     LEFT LOWER LEG WOUND     Etiology:  Lymphedema. Objective:  Healing. Wound Size (L x W x D):  1.5x11.5x0.1 cm  Surface Area:  17.25 cm2  Exudate:  Serous moderate. Red:  0%. Yellow Necrotic:  80%. Skin:  20%.   Wound Progress:  Wound is improved with decreased surface area.       SURGICAL EXCISIONAL DEBRIDEMENT PROCEDURE       INDICATION FOR PROCEDURE:     Remove necrotic tissue and establish the margins of viable tissue.     CONSENT FOR PROCEDURE:     Treatment options, risks, and benefits, and the possible need for subsequent additional procedures on this wound were explained to the patient, and patient agreed to proceed with the procedures(s).     PROCEDURE NOTE:     Post-Procedure Wound Size (L x W x D):  1.6x11.6x0. 2            After questions were answered and consent was signed, the wound was cleansed with normal saline and 2% Lidocaine gel was applied to achieve anesthesia.  Time out was performed.  With clean surgical technique, a #15 blade was used to surgically excise 14.85 cm2 of devitalized tissue, and necrotic subcutaneous fat and surrounding connective tissues were removed to a depth of 0.2 cm.  Healthy bleeding tissue was observed.  Hemostasis was achieved and no complications were noted.  Patient tolerated the procedure well.  A clean dressing was applied.  Post-operative recommendations and updates to the plan of care are documented in the Assessment and Plan section below.     RIGHT LOWER LEG WOUND     Etiology:  Venous. Objective:  Healing. Wound Size (L x W x D):  2.5x6x0. 1 cm  Surface Area:  15 cm2  Exudate:  Serous moderate. Red:  0%. Yellow Necrotic:  70%. Skin:  30%. Wound Progress:  Wound is improved with decreased surface area.          SURGICAL EXCISIONAL DEBRIDEMENT PROCEDURE       INDICATION FOR PROCEDURE:     Remove necrotic tissue and establish the margins of viable tissue.     CONSENT FOR PROCEDURE:     Treatment options, risks, and benefits, and the possible need for subsequent additional procedures on this wound were explained to the patient, and patient agreed to proceed with the procedures(s).     PROCEDURE NOTE:     Post-Procedure Wound Size (L x W x D):  2.6x6. 1x0. 3            After questions were answered and consent was signed, the wound was cleansed with normal saline and 2% Lidocaine gel was applied to achieve anesthesia.  Time out was performed.  With clean surgical technique, a #15 blade was used to surgically excise 11.1 cm2 of devitalized tissue, and necrotic subcutaneous fat and surrounding connective tissues were removed to a depth of 0.3 cm.  Healthy bleeding tissue was observed.  Hemostasis was achieved and no complications were noted.  Patient tolerated the procedure well.  A clean dressing was applied.  Post-operative recommendations and updates to the plan of care are documented in the Assessment and Plan section below.       Assessment:     Cellulitis of right lower limb-L03.115  Cellulitis of left lower limb-L03. 116  Chronic venous hypertension (idiopathic) with ulcer and inflammation of right lower extremity- I87.331  Chronic venous hypertension (idiopathic) with ulcer and inflammation of left lower extremity-I87.332  Non-pressure chronic ulcer of unspecified part of left lower leg with fat layer exposed-L97.922  Non-pressure chronic ulcer of unspecified part of right lower leg with fat layer exposed-L97.912    Hospital Problems  Date Reviewed: 3/4/2020    None          Plan:     Discussed with patient and his daughter risks of follow up given COVID-19 concerns, both voice complete understanding and would like to return as noted below.     Clinic Wound Care:  Cleanse wounds with Normal Saline, apply Silver Alginate, 4x4, Optilock, Hugo and Tape to left and right leg wounds. Do not put tape on skin. Apply SLC F to both legs.     Home Wound Care:  Cleanse wounds with Normal Saline, apply Silver Alginate, 4x4, Optilock, Hugo and Tape to left and right leg wounds q day. Do not put tape on skin.   Apply SLC F to both legs q day.     Offload wounds.     Elevate legs.     Cultures of left and right lower leg wounds from 6/8/20 grew out Staph Aureus and Aeromonas Hydrophila sensitive to Bactrim.     Rx given for Bactrim DS and patient told to stop Keflex.     Bilateral lower extremity GIBSON and Arterial Doppler studies from 6/12/20 showed normal waveform pulsatility in the digits bilaterally.     Home Health 3x/week.     Pt and daughter instructed to go to ER if fever, chills, shortness of breath, increasing leg redness of either leg, etc...     RTC 1 week.     All questions answered to patient's satisfaction.     Written instructions given to patient.     Patient and daughter voice complete understanding.     Signed By: Erna Roman MD     June 20, 2020

## 2020-06-22 ENCOUNTER — HOSPITAL ENCOUNTER (OUTPATIENT)
Dept: WOUND CARE | Age: 85
Discharge: HOME | End: 2020-06-22
Payer: MEDICARE

## 2020-06-22 VITALS
DIASTOLIC BLOOD PRESSURE: 74 MMHG | RESPIRATION RATE: 18 BRPM | TEMPERATURE: 96.3 F | SYSTOLIC BLOOD PRESSURE: 121 MMHG | HEART RATE: 70 BPM

## 2020-06-22 PROCEDURE — 74011000250 HC RX REV CODE- 250: Performed by: FAMILY MEDICINE

## 2020-06-22 PROCEDURE — 11043 DBRDMT MUSC&/FSCA 1ST 20/<: CPT

## 2020-06-22 RX ADMIN — Medication: at 15:16

## 2020-06-22 NOTE — WOUND CARE
06/22/20 1511   Wound Leg lower Left #1   Date First Assessed/Time First Assessed: 06/08/20 1448   Present on Hospital Admission: Yes  Primary Wound Type: Venous Ulcer  Location: Leg lower  Wound Location Orientation: Left  Wound Description: #1   Dressing Status Removed   Dressing Type Aquacel;Gauze;Gauze wrap (gaetano); Absorptive   Wound Length (cm) 0.3 cm   Wound Width (cm) 0.5 cm   Wound Depth (cm) 0.2 cm   Wound Surface Area (cm^2) 0.15 cm^2   Wound Volume (cm^3) 0.03 cm^3   Change in Wound Size % 99.98   Condition of Base Slough;Pink   Condition of Edges Open   Drainage Amount Moderate   Drainage Color Serosanguinous   Wound Odor None   Nathalie-wound Assessment Dry; Intact; Blanchable erythema   Wound Leg lower Right; Anterior #2   Date First Assessed/Time First Assessed: 06/08/20 1451   Present on Hospital Admission: Yes  Primary Wound Type: Venous Ulcer  Location: Leg lower  Wound Location Orientation: Right; Anterior  Wound Description: #2   Dressing Status Removed   Dressing Type Aquacel;Gauze;Gauze wrap (gaetano); Absorptive   Wound Length (cm) 0.1 cm   Wound Width (cm) 0.1 cm   Wound Depth (cm) 0.1 cm   Wound Surface Area (cm^2) 0.01 cm^2   Wound Volume (cm^3) 0 cm^3   Change in Wound Size % 99.98   Condition of Base Epithelializing   Drainage Amount None   Wound Odor None     Visit Vitals  /74   Pulse 70   Temp (!) 96.3 °F (35.7 °C)   Resp 18

## 2020-06-22 NOTE — DISCHARGE INSTRUCTIONS
Discharge Instructions for  [facility]  [address] [place], [address] [address]  Telephone: [phone]    NAME:  Flex Ip OF BIRTH:  1935  ACCOUNT NUMBER :  [de-identified]  DATE:  6/15/2020    Needs wound care referral    Wound Cleansing:   Do not scrub or use excessive force. Cleanse wound prior to applying a clean dressing with:      [x] Cleanse wound with: cleanse with Normal Saline      [] May Shower at Discharge     []  Shower on days of dressing change, remove dressing first    [] Keep Wound Dry in Shower (may purchase a cast cover if needed)     Dressings:           Wound Location Left and Right leg     [x] Apply Primary Dressing:       [] MediHoney Gel  [] Alginate   [x] Alginate with Silver  [] Alginate        [] Collagen [] Collagen with Silver {lucero collagen :51644}   []  Moisten saline gauze     [] Hydrocolloid   [] MediHoney Alginate [] Foam with Silver   [] Foam   [] Hydrofera Blue    [] Mepilex Border    [] Moisten with Saline [] Hydrogel [] Mepitel     [] Bactroban/Mupirocin [] Polysporin  [] Other:      [] Pack wound loosely with  [] Iodoform   [] Plain Packing  [] Other     [x] Cover and Secure with:     [x] Gauze [x] Hugo [] Kerlix   [] Ace Wrap [x] Cover Roll Tape [] ABD     [x] Other: large optilock   Avoid contact of tape with skin.   [x] Change dressing: [] Daily    [x] Every Other Day [] Three times per week   [] Once a week [] Do Not Change Dressing   [] Other:      Edema Control:  Apply: [] Compression Stocking []Right Leg []Left Leg   [x] Tubigrip []Right Leg Double Layer []Left Leg Double Layer       [x]Right Leg Single Layer [x]Left Leg Single Layer     Off-Loading:   [x] Off-loading when [] walking  [x] in bed [x] sitting  [] Total non-weight bearing  [] Right Leg  [] Left Leg   [] Assistive Device [] Walker [] Cane  [] Wheelchair  [] Crutches   [] Surgical shoe    [] Podus Boot(s)   [] Foam Boot(s)  [] Roll About    [] Cast Boot [] CROW Boot  [] Other:    Dietary:  [x] Increase Protein: examples ( Meat, cheese, eggs, greek yogurt, premier protein drink, fish, nuts )        Activity:  [x] Activity as tolerated:  [] Patient has no activity restrictions     [] Strict Bedrest: [] Remain off Work:     [] May return to full duty work:                                   [] Return to work with restrictions:             Return Appointment:    [] Nurse visit scheduled in *** day(s) or *** week(s)   [] Return Appointment: With Dr. Nimco Mendoza  in  [facility])  [x] Ordered tests: prescription for Bactrim given     Electronically signed on 6/15/2020    [address] Information: Should you experience any significant changes in your wound(s) or have questions about your wound care, please contact the [facility] at [address] [place] [address] 8:00 am - 4:30. If you need help with your wound outside these hours and cannot wait until we are again available, contact your PCP or go to the hospital emergency room. PLEASE NOTE: IF YOU ARE UNABLE TO OBTAIN WOUND SUPPLIES, CONTINUE TO USE THE SUPPLIES YOU HAVE AVAILABLE UNTIL YOU ARE ABLE TO REACH US. IT IS MOST IMPORTANT TO KEEP THE WOUND COVERED AT ALL TIMES.      Physician Signature:_______________________ Dr. Nimco Mendoza

## 2020-06-22 NOTE — WOUND CARE
06/22/20 1550   Wound Leg lower Left #1   Date First Assessed/Time First Assessed: 06/08/20 1448   Present on Hospital Admission: Yes  Primary Wound Type: Venous Ulcer  Location: Leg lower  Wound Location Orientation: Left  Wound Description: #1   Dressing Type Applied Alginate;Silver products;Gauze;Gauze wrap (gaetano);ABD pad  (single tubi )   Discharge Condition: Stable     Pain: 0    Ambulatory Status: Walking and Walker     Discharge Destination: Home     Transportation: Car    Accompanied by: Self  and Family/Caregiver     Discharge instructions reviewed with Patient and Family/Caregiver  and copy or written instructions have been provided. All questions/concerns have been addressed at this time. Cont home wellbutrin and can take home trintellix

## 2020-06-26 ENCOUNTER — VIRTUAL VISIT (OUTPATIENT)
Dept: CARDIOLOGY CLINIC | Age: 85
End: 2020-06-26

## 2020-06-26 DIAGNOSIS — R73.03 BORDERLINE DIABETES MELLITUS: ICD-10-CM

## 2020-06-26 DIAGNOSIS — I82.552 CHRONIC DEEP VEIN THROMBOSIS (DVT) OF LEFT PERONEAL VEIN (HCC): ICD-10-CM

## 2020-06-26 DIAGNOSIS — I10 ESSENTIAL HYPERTENSION: ICD-10-CM

## 2020-06-26 DIAGNOSIS — I87.2 VENOUS INSUFFICIENCY: ICD-10-CM

## 2020-06-26 DIAGNOSIS — I25.10 CORONARY ARTERY DISEASE INVOLVING NATIVE CORONARY ARTERY OF NATIVE HEART WITHOUT ANGINA PECTORIS: ICD-10-CM

## 2020-06-26 DIAGNOSIS — E78.00 PURE HYPERCHOLESTEROLEMIA: ICD-10-CM

## 2020-06-26 DIAGNOSIS — I48.21 PERMANENT ATRIAL FIBRILLATION (HCC): ICD-10-CM

## 2020-06-26 DIAGNOSIS — L97.921 ULCER OF LEFT LOWER EXTREMITY, LIMITED TO BREAKDOWN OF SKIN (HCC): Primary | ICD-10-CM

## 2020-06-26 DIAGNOSIS — E11.59 DM TYPE 2 CAUSING VASCULAR DISEASE (HCC): ICD-10-CM

## 2020-06-26 DIAGNOSIS — I50.32 CHRONIC DIASTOLIC CONGESTIVE HEART FAILURE (HCC): ICD-10-CM

## 2020-06-26 RX ORDER — FUROSEMIDE 20 MG/1
TABLET ORAL
Qty: 30 TAB | Refills: 3 | Status: SHIPPED | OUTPATIENT
Start: 2020-06-26 | End: 2020-07-24

## 2020-06-26 NOTE — PROGRESS NOTES
[facility] Telemedicine Encounter                                                         Pursuant to the emergency declaration under the [facility], [facility] waiver authority and the [employer] Act, this Virtual  Visit was conducted, with patient's consent, to reduce the patient's risk of exposure to COVID-19 and provide continuity of care for an established patient. He and/or his healthcare decision maker is aware that this patient-initiated Telehealth encounter is a billable service, with coverage as determined by his insurance carrier. He is aware that he may receive a bill and has provided verbal consent to proceed. Services were provided through a video synchronous discussion virtually to substitute for in-person clinic visit. Due to this being a TeleHealth evaluation, many elements of the physical examination are unable to be assessed. Due to patient preference this visit was held over the phone. Subjective/HPI:   Silvia Weeks is a 80 y.o. male who was seen by synchronous (real-time) audio-video technology on 6/26/2020. Mr. Marialuisa Corbin is an 70-year-old white male with known history of coronary artery disease status post coronary artery bypass graft surgery 1995, COPD, atrial fibrillation, right popliteal DVT, mild LV dysfunction, recently admitted to [facility] 10/24/2020 to 10/26/2020 with shortness of breath and lower extremity edema. Repeat echocardiogram showed stability of his mild LV dysfunction.  He has well with diuresis with resolution of symptoms. Anticoagulated for DVT.      Edema initially improved, but since April 2020 has worsened. Blisters started developing with subsequent skin breakdown. Arterial studies do not suggest obstruction. Venous studies confirm reflux in popliteal veins. Conservative therapy pursued with support stockings, diuresis, and wound care.     His daughter reports that Lasix was very beneficial in reducing his lower extremity edema. However upon discontinuation edema recurred. Mr. Jennifer Lim was not happy about taking the water pill and does not want to take it if he does not have to. Has been wearing tubular socks on his legs, but no support stockings. Visualization of his legs today shows air edema edema and scaly skin; however, there are no ulcers. He continues to have COPD. No recent change in his breathing. No chest pain. No fevers or chills. He is now off of steroids. PCP Provider  Angelic Flores MD   Wound Care: Joan Hardy MD    Past Medical History:   Diagnosis Date    Asthma     BRONCITITS, INHALER USE PRN    CAD (coronary artery disease)     MI    Cancer [facility] 2006    PROTATE    Chronic obstructive pulmonary disease ([facility]     Diabetes ([facility]     BORDERLINE    Hypercholesterolemia     Hypertension     Umbilical hernia 1/02/2807      Past Surgical History:   Procedure Laterality Date    CARDIAC SURG PROCEDURE UNLIST  1993    CABG X4 VESSEL    CARDIAC SURG PROCEDURE UNLIST  1997, 1999    CARDIAC CATH, STENTS    ENDOSCOPY, COLON, DIAGNOSTIC  01/02/2006    HX GI      COLONOSCOPY    HX HEENT Bilateral     CATARACTS/ IOL    HX HERNIA REPAIR  7/2014    HX PROSTATECTOMY  01/02/2006     Allergies   Allergen Reactions    Adhesive Other (comments)     BLISTERS      Family History   Problem Relation Age of Onset    Stroke Father     Anesth Problems Neg Hx       Current Outpatient Medications   Medication Sig    fluticasone-umeclidinium-vilanterol (TRELEGY ELLIPTA) 100-62.5-25 mcg inhaler Take 1 Puff by inhalation daily.  atorvastatin (LIPITOR) 80 mg tablet Take 80 mg by mouth nightly.     ELIQUIS 5 mg tablet TAKE 1 TABLET TWICE A DAY TO PREVENT THROMBOEMBOLISM IN CHRONIC ATRIAL FIBRILLATION (Patient taking differently: Take 5 mg by mouth two (2) times a day.)    metoprolol succinate (TOPROL XL) 25 mg XL tablet TAKE 1 TABLET DAILY    colesevelam (WELCHOL) 625 mg tablet TAKE 3 TABLETS TWICE A DAY WITH MEALS    MULTIVITAMIN PO Take 1 Tab by mouth daily. No current facility-administered medications for this visit. There were no vitals filed for this visit. Social History     Socioeconomic History    Marital status: SINGLE     Spouse name: Not on file    Number of children: Not on file    Years of education: Not on file    Highest education level: Not on file   Occupational History    Not on file   Social Needs    Financial resource strain: Not on file    Food insecurity     Worry: Not on file     Inability: Not on file    Transportation needs     Medical: Not on file     Non-medical: Not on file   Tobacco Use    Smoking status: Former Smoker     Packs/day: 1.00     Types: Cigarettes     Last attempt to quit: 6/10/1964     Years since quittin.0    Smokeless tobacco: Never Used   Substance and Sexual Activity    Alcohol use:  Yes     Alcohol/week: 15.0 standard drinks     Types: 12 Cans of beer, 6 Standard drinks or equivalent per week     Comment: casual    Drug use: No    Sexual activity: Not Currently   Lifestyle    Physical activity     Days per week: Not on file     Minutes per session: Not on file    Stress: Not on file   Relationships    Social connections     Talks on phone: Not on file     Gets together: Not on file     Attends Evangelical service: Not on file     Active member of club or organization: Not on file     Attends meetings of clubs or organizations: Not on file     Relationship status: Not on file    Intimate partner violence     Fear of current or ex partner: Not on file     Emotionally abused: Not on file     Physically abused: Not on file     Forced sexual activity: Not on file   Other Topics Concern    Not on file   Social History Narrative    Not on file       Review of Symptoms  11 systems reviewed, negative other than as stated in the HPI    Physical Exam:    Due to this being a TeleHealth evaluation, many elements of the physical examination are unable to be assessed. General: Well developed, in no acute distress, cooperative and alert  HEENT: Pupils equal/round. No marked JVD visible on video. Respiratory: No audible wheezing, no signs of respiratory distress, lips non cyanotic  Extremities:  No edema  Neuro: A&Ox3, speech clear, no facial droop, answering questions appropriately  LE: Erythema and edema of bilateral ankles and calves. No skin breakdown. Cardiology Labs:  Lab Results   Component Value Date/Time    Cholesterol, total 166 12/03/2019 02:30 PM    HDL Cholesterol 57 12/03/2019 02:30 PM    LDL, calculated 88.8 12/03/2019 02:30 PM    Triglyceride 101 12/03/2019 02:30 PM    CHOL/HDL Ratio 2.9 12/03/2019 02:30 PM       Lab Results   Component Value Date/Time    Hemoglobin A1c 6.1 10/25/2019 01:42 AM    Hemoglobin A1c (POC) 5.8 01/18/2013 10:50 AM        Lab Results   Component Value Date/Time    Sodium 141 12/03/2019 02:30 PM    Potassium 4.6 12/03/2019 02:30 PM    Chloride 106 12/03/2019 02:30 PM    CO2 31 12/03/2019 02:30 PM    Anion gap 4 (L) 12/03/2019 02:30 PM    Glucose 105 (H) 12/03/2019 02:30 PM    BUN 18 12/03/2019 02:30 PM    Creatinine 1.00 12/03/2019 02:30 PM    BUN/Creatinine ratio 18 12/03/2019 02:30 PM    GFR est AA >60 12/03/2019 02:30 PM    GFR est non-AA >60 12/03/2019 02:30 PM    Calcium 9.2 12/03/2019 02:30 PM    Bilirubin, total 0.5 12/03/2019 02:30 PM    Alk. phosphatase 144 (H) 12/03/2019 02:30 PM    Protein, total 7.6 12/03/2019 02:30 PM    Albumin 3.1 (L) 12/03/2019 02:30 PM    Globulin 4.5 (H) 12/03/2019 02:30 PM    A-G Ratio 0.7 (L) 12/03/2019 02:30 PM    ALT (SGPT) 47 12/03/2019 02:30 PM        Lab Results   Component Value Date/Time    TSH 3.72 10/25/2019 01:42 AM        EKG Results     None        10/24/19   ECHO ADULT COMPLETE 10/25/2019 10/25/2019    Narrative · Left Ventricle: Normal cavity size and wall thickness. Mild systolic   dysfunction. Estimated left ventricular ejection fraction is 41 - 45%. Abnormal left ventricular wall motion. · Left Atrium: Mildly dilated left atrium. · Aortic Valve: Probably trileaflet aortic valve. Aortic valve sclerosis. Aortic valve leaflet calcification present. · Mitral Valve: Mild mitral annular calcification. Signed by: Velvet Love MD     10/24/19 GIBSON:  1. No evidence of significant peripheral arterial disease at rest in the right leg. 2. No evidence of significant peripheral arterial disease at rest in the left leg. 3. The right ankle/brachial index is 1.10 and the left ankle/brachial index is 1.09.  4. The right toe/brachial index is 0.76 and the left toe/brachial index is 0.95.     10/24/19 Venous duplex:  Interpretation Summary      Acute non-occlusive thrombus present in the right popliteal vein.     Bilateral lower extremity venous duplex positive for deep venous thrombosis. Negative for thrombophlebitis. Lower Extremity Venous Findings      Right Lower Venous      Acute non-occlusive thrombus present in the right popliteal vein. The common femoral, greater saphenous, profunda femoral, proximal femoral, mid femoral, distal femoral, gastrocnemius and saphenous femoral junction vein(s) were imaged in the transverse and longitudinal planes. The vessels showed normal color filling and compressibility. Doppler interrogation of the veins showed phasic and spontaneous flow. The posterior tibial and peroneal vein(s) were not well visualized. Left Lower Venous      The common femoral, greater saphenous, saphenous femoral junction, profunda femoral, proximal femoral, mid femoral, distal femoral and popliteal vein(s) were imaged in the transverse and longitudinal planes. The vessels showed normal color filling and compressibility. Doppler interrogation of the veins showed phasic and spontaneous flow.      The peroneal vein(s) were not well visualized.      11/20/2019 Venous ultrasound:  Interpretation Summary      · There is no evidence of deep venous thrombosis within the bilateral common femoral, femoral or popliteal veins. · Pulsatility of venous flow is consistent with increased central venous pressure. · Bilateral deep venous reflux (>1.0 seconds) is present within the popliteal veins, right > left. · Study is negative for superficial venous reflux. Assessment:     Diagnoses and all orders for this visit:    1. Ulcer of left lower extremity, limited to breakdown of skin ([address]    2. Pure hypercholesterolemia    3. Borderline diabetes mellitus    4. Essential hypertension    5. Coronary artery disease involving native coronary artery of native heart without angina pectoris    6. Venous insufficiency    7. Permanent atrial fibrillation ([address]    8. Chronic diastolic congestive heart failure ([address]    9. DM type 2 causing vascular disease ([address]    10. 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 Ulcer of left lower extremity, limited to breakdown of skin ([address] L97.921 707.10    2. Pure hypercholesterolemia E78.00 272.0    3. Borderline diabetes mellitus R73.03 790.29    4. Essential hypertension I10 401.9    5. Coronary artery disease involving native coronary artery of native heart without angina pectoris I25.10 414.01    6. Venous insufficiency I87.2 459.81    7. Permanent atrial fibrillation (HCC) I48.21 427.31    8. Chronic diastolic congestive heart failure (HCC) I50.32 428.32      428.0    9. DM type 2 causing vascular disease (HCC) E11.59 250.70      443.81    10. 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 Ashly Echevarria is an 79-year-old white male with coronary artery disease, mild LV dysfunction, dyslipidemia, diastolic heart failure, lower extremity edema with recent right popliteal DVT- now resolved.  Venous reflux that we will treat with stockings.       The patient also has left stasis ulcer of the shin that have resolved with diuresis and aggressive wound care. Unfortunately he still has significant edema and erythema which increases risk for skin breakdown. I discussed treatment with the patient, his wife, and his daughter in detail. We will resume Lasix as this did seem to control the swelling. I discussed use of Ace wraps and support stockings. Patient does not like wearing these, but I explained to him that this is the mainstay of therapy and without control of the edema the ulcers will return.     Reviewed arterial and venous dopplers with patient and daughter in detail. He needs aggressive control of edema and wound care to prevent hospitalization. Furthermore, we are unable to treat the venous insufficiency until edema and erythema have resolved. Regrettably, the patient is not motivated to make the changes necessary to control his edema. Appreciate the expert care provided by Dr. Kanika Shirley and the Vipin Parker. I have tried to call him, but have been unsuccessful in speaking with him.      Diet and exercise  Discussed cardio- 30 minutes per day and strengthening with weights or rubber bands. Lower extremity socks or stockings. Try OTC first. Jobst 18-20 mmHg if that fails.     Discuss steroid taper with pulmonology  Lasix 20 mg daily  Check BMP 2 weeks  ACE-wraps and support stockings. We discussed the expected course, resolution and complications of the diagnosis(es) in detail. Medication risks, benefits, costs, interactions, and alternatives were discussed as indicated. I advised him to contact the office if his condition worsens, changes or fails to improve as anticipated. He expressed understanding with the diagnosis(es) and plan    Sergio Dennison MD    Greater than 25 minutes was spent in direct video/phone patient care, planning and chart review.

## 2020-06-28 NOTE — WOUND CARE
History and Physical    Patient: Saintclair Council MRN: [id number]  SSN: [id number]    YOB: 1935  Age: 80 y.o. Sex: male      Subjective:      Chief complaint:  Wounds on legs. Saintclair Council is a 80 y.o. male who presents today for follow up discussion and evaluation of leg wounds.  Pt's daughter, Ting Jimenez, is here giving history as well.  Pt denies any pain associated with his wounds.  Pt denies any fever, chills, or shortness of breath. Past Medical History:   Diagnosis Date    Asthma     BRONCITITS, INHALER USE PRN    CAD (coronary artery disease)     MI    Cancer ([facility] [address] 2006    PROTATE    Chronic obstructive pulmonary disease ([facility] [facility]     Diabetes ([facility] [address]     BORDERLINE    Hypercholesterolemia     Hypertension     Umbilical hernia      Past Surgical History:   Procedure Laterality Date    CARDIAC SURG PROCEDURE UNLIST      CABG X4 VESSEL    CARDIAC SURG PROCEDURE UNLIST  ,     CARDIAC CATH, STENTS    ENDOSCOPY, COLON, DIAGNOSTIC  2006    HX GI      COLONOSCOPY    HX HEENT Bilateral     CATARACTS/ IOL    HX HERNIA REPAIR  2014    HX PROSTATECTOMY  2006      Family History   Problem Relation Age of Onset    Stroke Father     Anesth Problems Neg Hx      Social History     Tobacco Use    Smoking status: Former Smoker     Packs/day: 1.00     Types: Cigarettes     Last attempt to quit: 6/10/1964     Years since quittin.0    Smokeless tobacco: Never Used   Substance Use Topics    Alcohol use: Yes     Alcohol/week: 15.0 standard drinks     Types: 12 Cans of beer, 6 Standard drinks or equivalent per week     Comment: casual      Prior to Admission medications    Medication Sig Start Date End Date Taking? Authorizing Provider   fluticasone-umeclidinium-vilanterol (TRELEGY ELLIPTA) 100-62.5-25 mcg inhaler Take 1 Puff by inhalation daily. Yes Provider, Historical   atorvastatin (LIPITOR) 80 mg tablet Take 80 mg by mouth nightly. Yes Provider, Historical   ELIQUIS 5 mg tablet TAKE 1 TABLET TWICE A DAY TO PREVENT THROMBOEMBOLISM IN CHRONIC ATRIAL FIBRILLATION  Patient taking differently: Take 5 mg by mouth two (2) times a day. 9/28/19  Yes Jung Viramontes MD   metoprolol succinate (TOPROL XL) 25 mg XL tablet TAKE 1 TABLET DAILY 4/5/18  Yes Jung Viramontes MD   [facility]) 625 mg tablet TAKE 3 TABLETS TWICE A DAY WITH MEALS 4/5/18  Yes Jung Viramontes MD   MULTIVITAMIN PO Take 1 Tab by mouth daily. Yes Provider, Historical   furosemide (LASIX) 20 mg tablet One tab daily PO 6/26/20   Haim Bah MD        Allergies   Allergen Reactions    Adhesive Other (comments)     BLISTERS       Review of Systems:  Constitutional: negative  Eyes: negative  Ears, Nose, Mouth, Throat, and Face: negative  Respiratory: negative  Cardiovascular: negative  Gastrointestinal: negative  Integument/Breast: wounds on legs  Musculoskeletal:negative  Neurological: negative  Behavioral/Psychiatric: negative       Objective:     Vitals:    06/22/20 1509   BP: 121/74   Pulse: 70   Resp: 18   Temp: (!) 96.3 °F (35.7 °C)        Physical Exam:  GENERAL: alert, cooperative, no distress, appears stated age  EYE: conjunctivae/corneas clear, EOM's intact  THROAT & NECK: normal and no erythema or exudates noted.   LUNG: no distress  HEART: normal capillary refill  ABDOMEN: no guarding   EXTREMITIES:  bilateral lower extremities show good sensation, positive doppler at PT and DP bilaterally, normal capillary refill of 3 seconds, and are nontender, mild edema in right leg and moderate edema in left leg which is patient's baseline per patient and daughter, erythema noted in both lower extremities is significantly improved  SKIN: wound on left leg  NEUROLOGIC: AOx3  PSYCHIATRIC: non focal     LEFT LOWER LEG WOUND     Etiology:  Lymphedema. Objective:  Healing.   Wound Size (L x W x D):  0.3x0.5x0.2 cm  Surface Area:  0.15 cm2  Exudate:  Serous moderate. Red:  40%. Yellow Necrotic:  60%. Skin:  0%. Wound Progress:  Wound is improved with decreased surface area.       SURGICAL EXCISIONAL DEBRIDEMENT PROCEDURE      Indication For Procedure:    Remove necrotic tissue and establish the margins of viable tissue. Consent For Procedure:    Treatment options, risks, and benefits and the possible need for subsequent additional procedures on this wound were discussed with the patient who agreed to proceed with the procedures(s). Procedure Note:    Post-Procedure Wound Size (L x W x D):  0.4x0.6x0.4 cm           After questions were answered and consent was signed, wound was cleansed with Normal Saline and 2% Lidocaine gel was applied to achieve anesthesia. Time out was performed. With clean surgical technique, a #15 blade was used to surgically excise 0.24 cm2  of devitalized tissue, and necrotic tendinous and aponeurotic fibers were removed to a depth of 0.4 cm. Healthy bleeding tissue was observed. Hemostasis was achieved and no complications were noted. Patient tolerated the procedure well. A clean dressing was applied. Post-operative recommendations and updates to the plan of care are documented in the Assessment and Plan section below.     RIGHT LOWER LEG WOUND     This wound is resolved. 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     Clinic Wound Care:  Cleanse wound with Normal Saline, apply Silver Alginate, 4x4, Optilock, Hugo and Tape to left leg wound.  Do not put tape on skin.   Apply SLC F to both legs.     Home Wound Care: [facility] wound with Normal Saline, apply Silver Alginate, 4x4, Optilock, Hugo and Tape to left leg wound q day.  Do not put tape on skin. Apply SLC F to both legs q day.     Offload wound.     Elevate legs.     Bilateral lower extremity GIBSON and Arterial Doppler studies from 6/12/20 showed normal waveform pulsatility in the digits bilaterally.     Home Health 2x/week.     Pt and daughter instructed to go to ER if fever, chills, shortness of breath, increasing leg redness of either leg, etc... Pt and daughter instructed to call PCP re: Follow up for erythema and edema in legs.     RTC 1 week.     All questions answered to patient's satisfaction.     Written instructions given to patient.     Patient and daughter voice complete understanding.     Signed By: Berenice Snowden MD     June 28, 2020

## 2020-06-29 ENCOUNTER — HOSPITAL ENCOUNTER (OUTPATIENT)
Dept: WOUND CARE | Age: 85
Discharge: HOME | End: 2020-06-29
Payer: MEDICARE

## 2020-06-29 VITALS
RESPIRATION RATE: 18 BRPM | HEART RATE: 70 BPM | SYSTOLIC BLOOD PRESSURE: 137 MMHG | DIASTOLIC BLOOD PRESSURE: 60 MMHG | TEMPERATURE: 98.7 F

## 2020-06-29 PROCEDURE — 99212 OFFICE O/P EST SF 10 MIN: CPT

## 2020-06-29 NOTE — WOUND CARE
06/29/20 1539 Wound Leg lower Left #1 Date First Assessed/Time First Assessed: 06/08/20 1448   Present on Hospital Admission: Yes  Primary Wound Type: Venous Ulcer  Location: Leg lower  Wound Location Orientation: Left  Wound Description: #1 Dressing Type Applied  
(A&D oinment and single tubi ) Discharge Condition: Stable Pain: 0 Ambulatory Status: Walking Discharge Destination: Home Transportation: Car Accompanied by: patient and family Discharge instructions reviewed with Family/Caregiver  and copy or written instructions have been provided. All questions/concerns have been addressed at this time.

## 2020-06-29 NOTE — DISCHARGE INSTRUCTIONS
Discharge Instructions for  [facility]  [address] [place], [address] [facility]  Telephone: [phone]   FAX [phone]    NAME:  Leatha Saenz OF BIRTH:  1935  ACCOUNT NUMBER :  [de-identified]  DATE:  6/29/2020      Wound Cleansing:   Do not scrub or use excessive force.   Cleanse wound prior to applying a clean dressing with:      [] Cleanse wound with: cleanse with Normal Saline      [x] May Shower at Discharge     []  Shower on days of dressing change, remove dressing first    [] Keep Wound Dry in Shower (may purchase a cast cover if needed)       Right and left leg wounds healed- place A&D ointment prior to placing tubi     Edema Control:  Apply: [] Compression Stocking []Right Leg []Left Leg   [x] Tubigrip []Right Leg Double Layer []Left Leg Double Layer       [x]Right Leg Single Layer [x]Left Leg Single Layer     Off-Loading:   [x] Off-loading when [] walking  [x] in bed [x] sitting  [] Total non-weight bearing  [] Right Leg  [] Left Leg   [] Assistive Device [] Walker [] Cane  [] Wheelchair  [] Crutches   [] Surgical shoe    [] Podus Boot(s)   [] Foam Boot(s)  [] Roll About    [] Cast Boot [] CROW Boot  [] Other:    Dietary:  [x] Increase Protein: examples ( Meat, cheese, eggs, greek yogurt, premier protein drink, fish, nuts )        Activity:  [x] Activity as tolerated:  [] Patient has no activity restrictions     [] Strict Bedrest: [] Remain off Work:     [] May return to full duty work:                                   [] Return to work with restrictions:             Return Appointment:    [] Nurse visit scheduled in *** day(s) or *** week(s)   [x] Return Appointment: Healed  [] Ordered tests:      Electronically signed on 6/29/2020    [address] [place] [address] Information: Should you experience any significant changes in your wound(s) or have questions about your wound care, please contact the [facility] Main at [address] [place] [address] 8:00 am - 4:30.  If you need help with your wound outside these hours and cannot wait until we are again available, contact your PCP or go to the hospital emergency room. PLEASE NOTE: IF YOU ARE UNABLE TO OBTAIN WOUND SUPPLIES, CONTINUE TO USE THE SUPPLIES YOU HAVE AVAILABLE UNTIL YOU ARE ABLE TO REACH US. IT IS MOST IMPORTANT TO KEEP THE WOUND COVERED AT ALL TIMES. Physician Signature:_______________________ Dr. Neha Moya treatment has been completed at [facility] outpatient wound clinic on 6/29/2020  , per Dr. Floresita Curtis. We know patients have several options when choosing a health care provider. We would like to express our sincere appreciation for having had the chance to be yours. More importantly, we enjoy having you as part of our family. We also hope your experience with us has surpassed your expectations and that you have been pleased with our service. We appreciate the confidence you've shown in selecting us as your health care provider and we will continue or commitment to provide the highest quality of service. Again, thank you for choosing us for your health care needs. If you have any further questions or concerns, please call [phone]. It has been our pleasure serving you and we hope to continue our relationship in the future, if the need occurs.        Sincerely,    [facility] Team

## 2020-06-29 NOTE — WOUND CARE
06/29/20 1456 Wound Leg lower Left #1 Date First Assessed/Time First Assessed: 06/08/20 1448   Present on Hospital Admission: Yes  Primary Wound Type: Venous Ulcer  Location: Leg lower  Wound Location Orientation: Left  Wound Description: #1 Dressing Status Removed Dressing Type Absorptive;Elastic bandage Wound Length (cm) 0.1 cm Wound Width (cm) 0.1 cm Wound Depth (cm) 0.1 cm Wound Surface Area (cm^2) 0.01 cm^2 Wound Volume (cm^3) 0 cm^3 Change in Wound Size % 100 Condition of Base Epithelializing Drainage Amount Scant Drainage Color Serous Wound Odor None Nathalie-wound Assessment Intact;Dry;Edema; Red Cleansing and Cleansing Agents  Soap and water 06/29/20 1456 Wound Leg lower Left #1 Date First Assessed/Time First Assessed: 06/08/20 1448   Present on Hospital Admission: Yes  Primary Wound Type: Venous Ulcer  Location: Leg lower  Wound Location Orientation: Left  Wound Description: #1 Dressing Status Removed Dressing Type Absorptive;Elastic bandage Wound Length (cm) 0.1 cm Wound Width (cm) 0.1 cm Wound Depth (cm) 0.1 cm Wound Surface Area (cm^2) 0.01 cm^2 Wound Volume (cm^3) 0 cm^3 Change in Wound Size % 100 Condition of Base Epithelializing Drainage Amount Scant Drainage Color Serous Wound Odor None Nathalie-wound Assessment Intact;Dry;Edema; Red Cleansing and Cleansing Agents  Soap and water Visit Vitals /60 Pulse 70 Temp 98.7 °F (37.1 °C) Resp 18

## 2020-06-29 NOTE — DISCHARGE INSTRUCTIONS
Discharge Instructions for  [facility]  [address] [place], [address] [facility]  Telephone: [phone]    NAME:  Laurence Todd OF BIRTH:  1935  ACCOUNT NUMBER :  [de-identified]  DATE:  6/22/2020    Needs wound care referral    Wound Cleansing:   Do not scrub or use excessive force. Cleanse wound prior to applying a clean dressing with:      [x] Cleanse wound with: cleanse with Normal Saline      [] May Shower at Discharge     []  Shower on days of dressing change, remove dressing first    [] Keep Wound Dry in Shower (may purchase a cast cover if needed)     Dressings:           Wound Location Left      [x] Apply Primary Dressing:       [] MediHoney Gel  [] Alginate   [x] Alginate with Silver  [] Alginate        [] Collagen [] Collagen with Silver {Agra collagen :25334}   []  Moisten saline gauze     [] Hydrocolloid   [] MediHoney Alginate [] Foam with Silver   [] Foam   [] Hydrofera Blue    [] Mepilex Border    [] Moisten with Saline [] Hydrogel [] Mepitel     [] Bactroban/Mupirocin [] Polysporin  [] Other:      [] Pack wound loosely with  [] Iodoform   [] Plain Packing  [] Other     [x] Cover and Secure with:     [x] Gauze [x] Hugo [] Kerlix   [] Ace Wrap [x] Cover Roll Tape [] ABD     [] Other:    Avoid contact of tape with skin.   [x] Change dressing: [] Daily    [] Every Other Day [x] Three times per week   [] Once a week [] Do Not Change Dressing   [] Other:  Right leg wound healed- place A&D ointment prior to placing tubi     Edema Control:  Apply: [] Compression Stocking []Right Leg []Left Leg   [x] Tubigrip []Right Leg Double Layer []Left Leg Double Layer       [x]Right Leg Single Layer [x]Left Leg Single Layer     Off-Loading:   [x] Off-loading when [] walking  [x] in bed [x] sitting  [] Total non-weight bearing  [] Right Leg  [] Left Leg   [] Assistive Device [] Walker [] Cane  [] Wheelchair  [] Crutches   [] Surgical shoe    [] Podus Boot(s)   [] Foam Boot(s)  [] Roll About    [] Cast Boot [] CROW Boot  [] Other:    Dietary:  [x] Increase Protein: examples ( Meat, cheese, eggs, greek yogurt, premier protein drink, fish, nuts )        Activity:  [x] Activity as tolerated:  [] Patient has no activity restrictions     [] Strict Bedrest: [] Remain off Work:     [] May return to full duty work:                                   [] Return to work with restrictions:             Return Appointment:    [] Nurse visit scheduled in *** day(s) or *** week(s)   [] Return Appointment: With Dr. Pozo Has  in  [facility])  [x] Ordered tests: prescription for Bactrim given     Electronically signed on 6/22/2020    [address] [facility] Information: Should you experience any significant changes in your wound(s) or have questions about your wound care, please contact the [facility] at [address] [place] [address] 8:00 am - 4:30. If you need help with your wound outside these hours and cannot wait until we are again available, contact your PCP or go to the hospital emergency room. PLEASE NOTE: IF YOU ARE UNABLE TO OBTAIN WOUND SUPPLIES, CONTINUE TO USE THE SUPPLIES YOU HAVE AVAILABLE UNTIL YOU ARE ABLE TO REACH US. IT IS MOST IMPORTANT TO KEEP THE WOUND COVERED AT ALL TIMES.      Physician Signature:_______________________ Dr. Pozo Has

## 2020-07-06 NOTE — WOUND CARE
Called and spoke with Dr. Loren Rivera on 7/6/20 and informed him of need for patient follow up for monitoring for cellulitis and for ongoing compression. He stated he would have his staff set up appt. Called patient's daughter, Ms. Solomon, and left message. I was able to reach Ms. Harvinder Todd and informed her that I spoke with Dr. Valeriy Travis and Dr. Loren Rivera. Also spoke with patient today (7/6/20) and told him that I spoke with Dr. Valeriy Travis and Dr. Loren Rivera and that if he has any fever, chills, leg redness/warmth, etc... to call his PCP or Dr. Valeriy Travis or go to the ER and to make appt to follow up with Dr. Loren Rivera. Pt voiced complete understanding.

## 2020-07-06 NOTE — WOUND CARE
History and Physical 
 
Patient: Damaris Salas MRN: [id number]  SSN: [id number] YOB: 1935  Age: 80 y.o. Sex: male Subjective: Chief complaint:  Wound on leg. Damaris Salas is an 80 y.o. male who presents today for follow up discussion and evaluation of a leg wound.  Pt's daughter, Howard Menon, is here giving history as well.  Pt denies any pain associated with his wound.  Pt denies any fever, chills, or shortness of breath. 
  
 
 
Past Medical History:  
Diagnosis Date  Asthma BRONCITITS, INHALER USE PRN  
 CAD (coronary artery disease) MI  
 Cancer [facility])  PROTATE  Chronic obstructive pulmonary disease ([facility] [address]  Diabetes ([facility] [address] BORDERLINE  
 Hypercholesterolemia  Hypertension  Umbilical hernia  Past Surgical History:  
[facility] [address] CARDIAC CATH, STENTS  
 ENDOSCOPY, COLON, DIAGNOSTIC  2006  HX GI    
 COLONOSCOPY  
 HX HEENT Bilateral   
 CATARACTS/ IOL  HX HERNIA REPAIR  2014  HX PROSTATECTOMY  2006 Family History Problem Relation Age of Onset  Stroke Father  Anesth Problems Neg Hx Social History Tobacco Use  Smoking status: Former Smoker Packs/day: 1.00 Types: Cigarettes Last attempt to quit: 6/10/1964 Years since quittin.1  Smokeless tobacco: Never Used Substance Use Topics  Alcohol use: Yes Alcohol/week: 15.0 standard drinks Types: 12 Cans of beer, 6 Standard drinks or equivalent per week Comment: casual  
  
Prior to Admission medications Medication Sig Start Date End Date Taking?  Authorizing Provider  
furosemide (LASIX) 20 mg tablet One tab daily PO 20  Yes Aman Neves MD  
fluticasone-umeclidinium-vilanterol (TRELEGY ELLIPTA) 100-62.5-25 mcg inhaler Take 1 Puff by inhalation daily. Yes Provider, Historical  
atorvastatin (LIPITOR) 80 mg tablet Take 80 mg by mouth nightly. Yes Provider, Historical  
ELIQUIS 5 mg tablet TAKE 1 TABLET TWICE A DAY TO PREVENT THROMBOEMBOLISM IN CHRONIC ATRIAL FIBRILLATION Patient taking differently: Take 5 mg by mouth two (2) times a day. 9/28/19  Yes Emma Marie MD  
metoprolol succinate (TOPROL XL) 25 mg XL tablet TAKE 1 TABLET DAILY 4/5/18  Yes Emma Marie MD  
[facility]) 625 mg tablet TAKE 3 TABLETS TWICE A DAY WITH MEALS 4/5/18  Yes Emma Marie MD  
MULTIVITAMIN PO Take 1 Tab by mouth daily. Yes Provider, Historical  
  
 
Allergies Allergen Reactions  Adhesive Other (comments) BLISTERS Review of Systems: 
Constitutional: negative Eyes: negative Ears, Nose, Mouth, Throat, and Face: negative Respiratory: negative Cardiovascular: negative Gastrointestinal: negative Integument/Breast: wound on leg Musculoskeletal:negative Neurological: negative Behavioral/Psychiatric: negative Objective:  
 
Vitals:  
 06/29/20 1455 BP: 137/60 Pulse: 70 Resp: 18 Temp: 98.7 °F (37.1 °C) Physical Exam: 
GENERAL: alert, cooperative, no distress, appears stated age EYE: conjunctivae/corneas clear, EOM's intact THROAT & NECK: normal and no erythema or exudates noted.  LUNG: no distress HEART: normal capillary refill ABDOMEN: no guarding  EXTREMITIES:  bilateral lower extremities show good sensation, positive doppler at PT and DP bilaterally, normal capillary refill of 3 seconds, and are nontender, mild edema in right leg and moderate edema in left leg which is patient's baseline per patient and daughter, no erythema noted SKIN: dry skin noted on bilateral lower extremities NEUROLOGIC: AOx3 PSYCHIATRIC: non focal 
  
LEFT LOWER LEG WOUND 
  
This wound is resolved. Assessment:  
 
Xerosis cutis-L85.3 Edema, unspecified-R60.9 Hospital Problems  Date Reviewed: 3/4/2020 None Plan:  
 
Time taken to speak with the Nurse about the patient prior to the visit, reviewed the records of the patient, discussed with the patient and his daughter the need to moisturize the bilateral lower extremities q day to maintain and enhance skin integrity and how to do it (not between toes), the continued need for offloading the recently healed wounds as the new skin is very fragile, the importance of compression therapy moving forward in order to prevent wounds from occurring on the legs, the extreme importance of following up with his Cardiologist and PCP to help manage his compression therapy and any concerns of cellulitis, the plan of care, etc... Discussed with Nurse the plan of care, etc... Clinic Wound Care:  Moisturize bilateral lower extremities, not between toes.  Apply [address] [facility] [address] F to both legs. 
  
Home Wound Care:  Moisturize bilateral lower extremities q day, not between toes.  Apply [address] [facility] [address] F to both legs. 
  
Bob [place]. 
  
Pt and daughter instructed to call PCP re: Follow up for erythema and edema in legs. 
  
All questions answered to patient's satisfaction. 
  
Written instructions given to patient. 
  
Patient and daughter voice complete understanding. D/c from [address] [place] [address]. Signed By: Tutu Parker MD   
 July 5, 2020

## 2020-07-06 NOTE — WOUND CARE
Pt had an appt with his Cardiologist, Dr. Kaiden Lopez, on 6/26/20 who recommended ACE wraps and support stockings. I spoke with Dr. Kaiden Lopez on 7/3/20 and discussed the need for the patient to follow up with him and his PCP for further management of compression therapy. On the same day I called Dr. Jeff Nettles office a number of times but was unable to get anyone to answer the phone.

## 2020-07-08 ENCOUNTER — HOSPITAL ENCOUNTER (OUTPATIENT)
Dept: LAB | Age: 85
Discharge: HOME | End: 2020-07-08
Payer: MEDICARE

## 2020-07-08 PROCEDURE — 36415 COLL VENOUS BLD VENIPUNCTURE: CPT

## 2020-07-08 PROCEDURE — 80048 BASIC METABOLIC PNL TOTAL CA: CPT

## 2020-07-09 LAB
BUN SERPL-MCNC: 20 MG/DL (ref 8–27)
BUN/CREAT SERPL: 20 (ref 10–24)
CALCIUM SERPL-MCNC: 9 MG/DL (ref 8.6–10.2)
CHLORIDE SERPL-SCNC: 103 MMOL/L (ref 96–106)
CO2 SERPL-SCNC: 27 MMOL/L (ref 20–29)
CREAT SERPL-MCNC: 1 MG/DL (ref 0.76–1.27)
GLUCOSE SERPL-MCNC: 107 MG/DL (ref 65–99)
POTASSIUM SERPL-SCNC: 4.2 MMOL/L (ref 3.5–5.2)
SODIUM SERPL-SCNC: 145 MMOL/L (ref 134–144)

## 2020-07-15 ENCOUNTER — VIRTUAL VISIT (OUTPATIENT)
Dept: CARDIOLOGY CLINIC | Age: 85
End: 2020-07-15

## 2020-07-15 DIAGNOSIS — I82.552 CHRONIC DEEP VEIN THROMBOSIS (DVT) OF LEFT PERONEAL VEIN (HCC): ICD-10-CM

## 2020-07-15 DIAGNOSIS — I50.32 CHRONIC DIASTOLIC CONGESTIVE HEART FAILURE (HCC): ICD-10-CM

## 2020-07-15 DIAGNOSIS — I10 ESSENTIAL HYPERTENSION: ICD-10-CM

## 2020-07-15 DIAGNOSIS — I25.10 CORONARY ARTERY DISEASE INVOLVING NATIVE CORONARY ARTERY OF NATIVE HEART WITHOUT ANGINA PECTORIS: Primary | ICD-10-CM

## 2020-07-15 DIAGNOSIS — I87.2 VENOUS INSUFFICIENCY: ICD-10-CM

## 2020-07-15 DIAGNOSIS — E11.59 DM TYPE 2 CAUSING VASCULAR DISEASE (HCC): ICD-10-CM

## 2020-07-15 DIAGNOSIS — I48.21 PERMANENT ATRIAL FIBRILLATION (HCC): ICD-10-CM

## 2020-07-15 NOTE — PROGRESS NOTES
[facility] Cardiology Telemedicine Encounter                                                         Pursuant to the emergency declaration under the [facility] [place] 2[place], [facility] waiver authority and the [employer] Act, this Virtual  Visit was conducted, with patient's consent, to reduce the patient's risk of exposure to COVID-19 and provide continuity of care for an established patient. He and/or his healthcare decision maker is aware that this patient-initiated Telehealth encounter is a billable service, with coverage as determined by his insurance carrier. He is aware that he may receive a bill and has provided verbal consent to proceed. Services were provided through a video synchronous discussion virtually to substitute for in-person clinic visit. Due to this being a TeleHealth evaluation, many elements of the physical examination are unable to be assessed. Due to patient preference this visit was held over the phone. Subjective/HPI:   Neal Sánchez is a 80 y.o. male who was seen by synchronous (real-time) audio-video technology on 7/15/2020. Mr. Reinaldo Gant is an 71-year-old white male with known history of coronary artery disease status post coronary artery bypass graft surgery 1995, COPD, atrial fibrillation, right popliteal DVT, mild LV dysfunction, recently admitted to [facility] 10/24/2020 to 10/26/2020 with shortness of breath and lower extremity edema. Repeat echocardiogram showed stability of his mild LV dysfunction.  He has well with diuresis with resolution of symptoms. Anticoagulated for DVT.      Edema initially improved, but since April 2020 has worsened. Blisters started developing with subsequent skin breakdown. Arterial studies do not suggest obstruction. Venous studies confirm reflux in popliteal veins. Conservative therapy pursued with support stockings, diuresis, and wound care.     Wounds resolved with assistance of Dr. Marilia Carrasco. Daughter notes last visit last week with no sores. Now he has recurrent edema and blisters with weeping. He states he is taking lasix and applying ACE wraps. Unable to put stockings on. Missed maybe one dose of lasix because he was tired of urinating all the time. He continues to have COPD. No recent change in his breathing. No chest pain. No fevers or chills. He is now off of steroids. PCP Provider  Alex Mayorga MD   Wound Care: Wilfredo Maurer MD    Past Medical History:   Diagnosis Date    Asthma     BRONCITITS, INHALER USE PRN    CAD (coronary artery disease)     MI    Cancer ([facility] [address] 2006    PROTATE    Chronic obstructive pulmonary disease ([facility] [address]     Diabetes ([facility] [address]     BORDERLINE    Hypercholesterolemia     Hypertension     Umbilical hernia 0/31/4239      Past Surgical History:   Procedure Laterality Date    CARDIAC SURG PROCEDURE UNLIST  1993    CABG X4 VESSEL    CARDIAC SURG PROCEDURE UNLIST  1997, 1999    CARDIAC CATH, STENTS    ENDOSCOPY, COLON, DIAGNOSTIC  01/02/2006    HX GI      COLONOSCOPY    HX HEENT Bilateral     CATARACTS/ IOL    HX HERNIA REPAIR  7/2014    HX PROSTATECTOMY  01/02/2006     Allergies   Allergen Reactions    Adhesive Other (comments)     BLISTERS      Family History   Problem Relation Age of Onset    Stroke Father     Anesth Problems Neg Hx       Current Outpatient Medications   Medication Sig    furosemide (LASIX) 20 mg tablet One tab daily PO    fluticasone-umeclidinium-vilanterol (TRELEGY ELLIPTA) 100-62.5-25 mcg inhaler Take 1 Puff by inhalation daily.  atorvastatin (LIPITOR) 80 mg tablet Take 80 mg by mouth nightly.     ELIQUIS 5 mg tablet TAKE 1 TABLET TWICE A DAY TO PREVENT THROMBOEMBOLISM IN CHRONIC ATRIAL FIBRILLATION (Patient taking differently: Take 5 mg by mouth two (2) times a day.)    metoprolol succinate (TOPROL XL) 25 mg XL tablet TAKE 1 TABLET DAILY    colesevelam (WELCHOL) 625 mg tablet TAKE 3 TABLETS TWICE A DAY WITH MEALS    MULTIVITAMIN PO Take 1 Tab by mouth daily. No current facility-administered medications for this visit. There were no vitals filed for this visit. Social History     Socioeconomic History    Marital status: SINGLE     Spouse name: Not on file    Number of children: Not on file    Years of education: Not on file    Highest education level: Not on file   Occupational History    Not on file   Social Needs    Financial resource strain: Not on file    Food insecurity     Worry: Not on file     Inability: Not on file    Transportation needs     Medical: Not on file     Non-medical: Not on file   Tobacco Use    Smoking status: Former Smoker     Packs/day: 1.00     Types: Cigarettes     Last attempt to quit: 6/10/1964     Years since quittin.1    Smokeless tobacco: Never Used   Substance and Sexual Activity    Alcohol use:  Yes     Alcohol/week: 15.0 standard drinks     Types: 12 Cans of beer, 6 Standard drinks or equivalent per week     Comment: casual    Drug use: No    Sexual activity: Not Currently   Lifestyle    Physical activity     Days per week: Not on file     Minutes per session: Not on file    Stress: Not on file   Relationships    Social connections     Talks on phone: Not on file     Gets together: Not on file     Attends [employer]ars[employer] service: Not on file     Active member of club or organization: Not on file     Attends meetings of clubs or organizations: Not on file     Relationship status: Not on file    Intimate partner violence     Fear of current or ex partner: Not on file     Emotionally abused: Not on file     Physically abused: Not on file     Forced sexual activity: Not on file   Other Topics Concern    Not on file   Social History Narrative    Not on file       Review of Symptoms  11 systems reviewed, negative other than as stated in the HPI    Physical Exam:    Due to this being a TeleHealth evaluation, many elements of the physical examination are unable to be assessed. General: Well developed, in no acute distress, cooperative and alert  HEENT: Pupils equal/round. No marked JVD visible on video. Respiratory: No audible wheezing, no signs of respiratory distress, lips non cyanotic  Extremities:  No edema  Neuro: A&Ox3, speech clear, no facial droop, answering questions appropriately  LE: Erythema and edema of bilateral ankles and calves. Stasis dermatitis. Open sores noted on shins. Cardiology Labs:  Lab Results   Component Value Date/Time    Cholesterol, total 166 12/03/2019 02:30 PM    HDL Cholesterol 57 12/03/2019 02:30 PM    LDL, calculated 88.8 12/03/2019 02:30 PM    Triglyceride 101 12/03/2019 02:30 PM    CHOL/HDL Ratio 2.9 12/03/2019 02:30 PM       Lab Results   Component Value Date/Time    Hemoglobin A1c 6.1 10/25/2019 01:42 AM    Hemoglobin A1c (POC) 5.8 01/18/2013 10:50 AM        Lab Results   Component Value Date/Time    Sodium 145 (H) 07/08/2020 01:17 PM    Potassium 4.2 07/08/2020 01:17 PM    Chloride 103 07/08/2020 01:17 PM    CO2 27 07/08/2020 01:17 PM    Anion gap 4 (L) 12/03/2019 02:30 PM    Glucose 107 (H) 07/08/2020 01:17 PM    BUN 20 07/08/2020 01:17 PM    Creatinine 1.00 07/08/2020 01:17 PM    BUN/Creatinine ratio 20 07/08/2020 01:17 PM    GFR est AA 80 07/08/2020 01:17 PM    GFR est non-AA 69 07/08/2020 01:17 PM    Calcium 9.0 07/08/2020 01:17 PM    Bilirubin, total 0.5 12/03/2019 02:30 PM    Alk. phosphatase 144 (H) 12/03/2019 02:30 PM    Protein, total 7.6 12/03/2019 02:30 PM    Albumin 3.1 (L) 12/03/2019 02:30 PM    Globulin 4.5 (H) 12/03/2019 02:30 PM    A-G Ratio 0.7 (L) 12/03/2019 02:30 PM    ALT (SGPT) 47 12/03/2019 02:30 PM        Lab Results   Component Value Date/Time    TSH 3.72 10/25/2019 01:42 AM        EKG Results     None        10/24/19   ECHO ADULT COMPLETE 10/25/2019 10/25/2019    Narrative · Left Ventricle: Normal cavity size and wall thickness. Mild systolic   dysfunction.  Estimated left ventricular ejection fraction is 41 - 45%. Abnormal left ventricular wall motion. · Left Atrium: Mildly dilated left atrium. · Aortic Valve: Probably trileaflet aortic valve. Aortic valve sclerosis. Aortic valve leaflet calcification present. · Mitral Valve: Mild mitral annular calcification. Signed by: Sintia Raya MD     10/24/19 GIBSON:  1. No evidence of significant peripheral arterial disease at rest in the right leg. 2. No evidence of significant peripheral arterial disease at rest in the left leg. 3. The right ankle/brachial index is 1.10 and the left ankle/brachial index is 1.09.  4. The right toe/brachial index is 0.76 and the left toe/brachial index is 0.95.     10/24/19 Venous duplex:  Interpretation Summary      Acute non-occlusive thrombus present in the right popliteal vein.     Bilateral lower extremity venous duplex positive for deep venous thrombosis. Negative for thrombophlebitis. Lower Extremity Venous Findings      Right Lower Venous      Acute non-occlusive thrombus present in the right popliteal vein. The common femoral, greater saphenous, profunda femoral, proximal femoral, mid femoral, distal femoral, gastrocnemius and saphenous femoral junction vein(s) were imaged in the transverse and longitudinal planes. The vessels showed normal color filling and compressibility. Doppler interrogation of the veins showed phasic and spontaneous flow. The posterior tibial and peroneal vein(s) were not well visualized. Left Lower Venous      The common femoral, greater saphenous, saphenous femoral junction, profunda femoral, proximal femoral, mid femoral, distal femoral and popliteal vein(s) were imaged in the transverse and longitudinal planes. The vessels showed normal color filling and compressibility. Doppler interrogation of the veins showed phasic and spontaneous flow.      The peroneal vein(s) were not well visualized.      11/20/2019 Venous ultrasound:  Interpretation Summary      · There is no evidence of deep venous thrombosis within the bilateral common femoral, femoral or popliteal veins. · Pulsatility of venous flow is consistent with increased central venous pressure. · Bilateral deep venous reflux (>1.0 seconds) is present within the popliteal veins, right > left. · Study is negative for superficial venous reflux. Assessment:     Diagnoses and all orders for this visit:    1. Coronary artery disease involving native coronary artery of native heart without angina pectoris    2. Essential hypertension    3. Venous insufficiency    4. Permanent atrial fibrillation ([facility] [address]    5. DM type 2 causing vascular disease ([facility] [address]    6. Chronic diastolic congestive heart failure ([facility] [address]    7. 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 Coronary artery disease involving native coronary artery of native heart without angina pectoris  I25.10 414.01    2. Essential hypertension  I10 401.9    3. Venous insufficiency  I87.2 459.81    4. Permanent atrial fibrillation (HCC)  I48.21 427.31    5. DM type 2 causing vascular disease (HCC)  E11.59 250.70      443.81    6. Chronic diastolic congestive heart failure (HCC)  I50.32 428.32      428.0    7. 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 Kevin Sommers is an 80-year-old white male with coronary artery disease, mild LV dysfunction, dyslipidemia, diastolic heart failure, lower extremity edema with recent right popliteal DVT- now resolved. Venous reflux that we will treat with stockings.       The patient also has left stasis ulcer of the shin that had resolved with diuresis and aggressive wound care. Unfortunately symptoms have recurred. I discussed the need for aggressive diuresis and compression with the patient and his daughter. Will have to increase lasix to BID for 5 days.  Continue support stockings/ACE wraps for compression. Appreciate the expert care provided by Dr. Zoe Sparrow and the Vipin Parker.       Lasix 20 mg BID   VV next week  May need hospital admission for aggressive diuresis and wound care. Patient and daughter understand. ACE-wraps and support stockings. We discussed the expected course, resolution and complications of the diagnosis(es) in detail. Medication risks, benefits, costs, interactions, and alternatives were discussed as indicated. I advised him to contact the office if his condition worsens, changes or fails to improve as anticipated. He expressed understanding with the diagnosis(es) and plan    Sergio Drake MD    Greater than 25 minutes was spent in direct video/phone patient care, planning and chart review.

## 2020-07-20 ENCOUNTER — TELEPHONE (OUTPATIENT)
Dept: CARDIOLOGY | Age: 85
End: 2020-07-20

## 2020-07-20 ENCOUNTER — TELEPHONE (OUTPATIENT)
Dept: CARDIOLOGY CLINIC | Age: 85
End: 2020-07-20

## 2020-07-20 NOTE — TELEPHONE ENCOUNTER
Verified patient with two patient identifiers. Spoke with [employer] and Radha Conway regarding a direct admit and they will call me tomorrow with a bed. Spoke with Mrs. Luzmaria Rogers also regarding this matter.

## 2020-07-21 ENCOUNTER — HOSPITAL ENCOUNTER (INPATIENT)
Age: 85
LOS: 3 days | Discharge: HOME | DRG: 292 | End: 2020-07-24
Attending: INTERNAL MEDICINE | Admitting: INTERNAL MEDICINE
Payer: MEDICARE

## 2020-07-21 DIAGNOSIS — I87.2 VENOUS INSUFFICIENCY OF BOTH LOWER EXTREMITIES: Primary | ICD-10-CM

## 2020-07-21 PROBLEM — E87.6 HYPOKALEMIA: Status: ACTIVE | Noted: 2020-07-21

## 2020-07-21 PROBLEM — I50.33 ACUTE ON CHRONIC DIASTOLIC CHF (CONGESTIVE HEART FAILURE) (HCC): Status: ACTIVE | Noted: 2020-07-21

## 2020-07-21 PROBLEM — I83.009 VENOUS STASIS ULCER (HCC): Status: ACTIVE | Noted: 2020-07-21

## 2020-07-21 PROBLEM — L97.909 VENOUS STASIS ULCER (HCC): Status: ACTIVE | Noted: 2020-07-21

## 2020-07-21 LAB
ALBUMIN SERPL-MCNC: 2.8 G/DL (ref 3.5–5)
ALBUMIN/GLOB SERPL: 0.6 {RATIO} (ref 1.1–2.2)
ALP SERPL-CCNC: 85 U/L (ref 45–117)
ALT SERPL-CCNC: 15 U/L (ref 12–78)
ANION GAP SERPL CALC-SCNC: 5 MMOL/L (ref 5–15)
AST SERPL-CCNC: 21 U/L (ref 15–37)
BILIRUB SERPL-MCNC: 0.5 MG/DL (ref 0.2–1)
BNP SERPL-MCNC: 1629 PG/ML
BUN SERPL-MCNC: 20 MG/DL (ref 6–20)
BUN/CREAT SERPL: 19 (ref 12–20)
CALCIUM SERPL-MCNC: 8.9 MG/DL (ref 8.5–10.1)
CHLORIDE SERPL-SCNC: 102 MMOL/L (ref 97–108)
CO2 SERPL-SCNC: 32 MMOL/L (ref 21–32)
CREAT SERPL-MCNC: 1.03 MG/DL (ref 0.7–1.3)
ERYTHROCYTE [DISTWIDTH] IN BLOOD BY AUTOMATED COUNT: 13.6 % (ref 11.5–14.5)
EST. AVERAGE GLUCOSE BLD GHB EST-MCNC: 117 MG/DL
GLOBULIN SER CALC-MCNC: 4.7 G/DL (ref 2–4)
GLUCOSE SERPL-MCNC: 118 MG/DL (ref 65–100)
HBA1C MFR BLD: 5.7 % (ref 4–5.6)
HCT VFR BLD AUTO: 41.9 % (ref 36.6–50.3)
HGB BLD-MCNC: 13.1 G/DL (ref 12.1–17)
MAGNESIUM SERPL-MCNC: 2 MG/DL (ref 1.6–2.4)
MCH RBC QN AUTO: 32 PG (ref 26–34)
MCHC RBC AUTO-ENTMCNC: 31.3 G/DL (ref 30–36.5)
MCV RBC AUTO: 102.4 FL (ref 80–99)
NRBC # BLD: 0 K/UL (ref 0–0.01)
NRBC BLD-RTO: 0 PER 100 WBC
PLATELET # BLD AUTO: 244 K/UL (ref 150–400)
PMV BLD AUTO: 9.3 FL (ref 8.9–12.9)
POTASSIUM SERPL-SCNC: 3.3 MMOL/L (ref 3.5–5.1)
PROT SERPL-MCNC: 7.5 G/DL (ref 6.4–8.2)
RBC # BLD AUTO: 4.09 M/UL (ref 4.1–5.7)
SODIUM SERPL-SCNC: 139 MMOL/L (ref 136–145)
WBC # BLD AUTO: 8.8 K/UL (ref 4.1–11.1)

## 2020-07-21 PROCEDURE — 74011250636 HC RX REV CODE- 250/636: Performed by: NURSE PRACTITIONER

## 2020-07-21 PROCEDURE — 36415 COLL VENOUS BLD VENIPUNCTURE: CPT

## 2020-07-21 PROCEDURE — 94640 AIRWAY INHALATION TREATMENT: CPT

## 2020-07-21 PROCEDURE — 65660000000 HC RM CCU STEPDOWN

## 2020-07-21 PROCEDURE — 80053 COMPREHEN METABOLIC PANEL: CPT

## 2020-07-21 PROCEDURE — 74011250637 HC RX REV CODE- 250/637: Performed by: INTERNAL MEDICINE

## 2020-07-21 PROCEDURE — 83036 HEMOGLOBIN GLYCOSYLATED A1C: CPT

## 2020-07-21 PROCEDURE — 85027 COMPLETE CBC AUTOMATED: CPT

## 2020-07-21 PROCEDURE — 74011000258 HC RX REV CODE- 258: Performed by: NURSE PRACTITIONER

## 2020-07-21 PROCEDURE — 74011000250 HC RX REV CODE- 250: Performed by: NURSE PRACTITIONER

## 2020-07-21 PROCEDURE — 83735 ASSAY OF MAGNESIUM: CPT

## 2020-07-21 PROCEDURE — 83880 ASSAY OF NATRIURETIC PEPTIDE: CPT

## 2020-07-21 PROCEDURE — 74011250637 HC RX REV CODE- 250/637: Performed by: NURSE PRACTITIONER

## 2020-07-21 PROCEDURE — 94664 DEMO&/EVAL PT USE INHALER: CPT

## 2020-07-21 RX ORDER — POTASSIUM CHLORIDE 750 MG/1
40 TABLET, FILM COATED, EXTENDED RELEASE ORAL ONCE
Status: COMPLETED | OUTPATIENT
Start: 2020-07-21 | End: 2020-07-21

## 2020-07-21 RX ORDER — ATORVASTATIN CALCIUM 40 MG/1
80 TABLET, FILM COATED ORAL
Status: DISCONTINUED | OUTPATIENT
Start: 2020-07-21 | End: 2020-07-24 | Stop reason: HOSPADM

## 2020-07-21 RX ORDER — BUDESONIDE 0.5 MG/2ML
500 INHALANT ORAL
Status: DISCONTINUED | OUTPATIENT
Start: 2020-07-21 | End: 2020-07-24 | Stop reason: HOSPADM

## 2020-07-21 RX ORDER — ARFORMOTEROL TARTRATE 15 UG/2ML
15 SOLUTION RESPIRATORY (INHALATION)
Status: DISCONTINUED | OUTPATIENT
Start: 2020-07-21 | End: 2020-07-24 | Stop reason: HOSPADM

## 2020-07-21 RX ORDER — IPRATROPIUM BROMIDE 0.5 MG/2.5ML
0.5 SOLUTION RESPIRATORY (INHALATION)
Status: DISCONTINUED | OUTPATIENT
Start: 2020-07-21 | End: 2020-07-24

## 2020-07-21 RX ORDER — METOPROLOL SUCCINATE 25 MG/1
25 TABLET, EXTENDED RELEASE ORAL DAILY
Status: DISCONTINUED | OUTPATIENT
Start: 2020-07-21 | End: 2020-07-22

## 2020-07-21 RX ORDER — COLESEVELAM 180 1/1
1875 TABLET ORAL 2 TIMES DAILY WITH MEALS
Status: DISCONTINUED | OUTPATIENT
Start: 2020-07-21 | End: 2020-07-24 | Stop reason: HOSPADM

## 2020-07-21 RX ORDER — POTASSIUM CHLORIDE 750 MG/1
40 TABLET, FILM COATED, EXTENDED RELEASE ORAL
Status: COMPLETED | OUTPATIENT
Start: 2020-07-21 | End: 2020-07-21

## 2020-07-21 RX ADMIN — ARFORMOTEROL TARTRATE 15 MCG: 15 SOLUTION RESPIRATORY (INHALATION) at 20:36

## 2020-07-21 RX ADMIN — BUDESONIDE 500 MCG: 0.5 INHALANT RESPIRATORY (INHALATION) at 20:36

## 2020-07-21 RX ADMIN — APIXABAN 5 MG: 5 TABLET, FILM COATED ORAL at 14:52

## 2020-07-21 RX ADMIN — METOPROLOL SUCCINATE 25 MG: 25 TABLET, EXTENDED RELEASE ORAL at 14:52

## 2020-07-21 RX ADMIN — WHITE PETROLATUM: 1.75 OINTMENT TOPICAL at 18:15

## 2020-07-21 RX ADMIN — COLESEVELAM HCL 1875 MG: 625 TABLET, FILM COATED ORAL at 18:15

## 2020-07-21 RX ADMIN — FUROSEMIDE 10 MG/HR: 10 INJECTION, SOLUTION INTRAMUSCULAR; INTRAVENOUS at 14:52

## 2020-07-21 RX ADMIN — ATORVASTATIN CALCIUM 80 MG: 40 TABLET, FILM COATED ORAL at 22:08

## 2020-07-21 RX ADMIN — POTASSIUM CHLORIDE 40 MEQ: 750 TABLET, FILM COATED, EXTENDED RELEASE ORAL at 14:52

## 2020-07-21 RX ADMIN — IPRATROPIUM BROMIDE 0.5 MG: 0.5 SOLUTION RESPIRATORY (INHALATION) at 20:36

## 2020-07-21 RX ADMIN — APIXABAN 5 MG: 5 TABLET, FILM COATED ORAL at 22:08

## 2020-07-21 RX ADMIN — POTASSIUM CHLORIDE 40 MEQ: 750 TABLET, FILM COATED, EXTENDED RELEASE ORAL at 22:09

## 2020-07-21 NOTE — PROGRESS NOTES
Admission Medication Reconciliation:    Information obtained from:  patient via telephone, rx query  RxQuery data available¹:  YES    Comments/Recommendations: Updated PTA meds/reviewed patient's allergies. 1)  Patient was a good historian. Required some prompting, but remembered the majority of his medications and how he takes them. 2)  Medication changes (since last review): Added  - none    Adjusted  - none    Removed  - none     ¹RxQuery pharmacy benefit data reflects medications filled and processed through the patient's insurance, however   this data does NOT capture whether the medication was picked up or is currently being taken by the patient. Allergies:  Adhesive    Significant PMH/Disease States:   Past Medical History:   Diagnosis Date    Asthma     BRONCITITS, INHALER USE PRN    CAD (coronary artery disease)     MI    Cancer ([facility] [address] 2006    PROTATE    Chronic obstructive pulmonary disease (HCC)     Diabetes ([facility] [address]     BORDERLINE    Hypercholesterolemia     Hypertension     Umbilical hernia 5/78/8980     Chief Complaint for this Admission:  No chief complaint on file. Prior to Admission Medications:   Prior to Admission Medications   Prescriptions Last Dose Informant Taking? ELIQUIS 5 mg tablet 7/20/2020 at Unknown time Self Yes   Sig: TAKE 1 TABLET TWICE A DAY TO PREVENT THROMBOEMBOLISM IN CHRONIC ATRIAL FIBRILLATION   MULTIVITAMIN PO 7/20/2020 at Unknown time Self Yes   Sig: Take 1 Tab by mouth daily. atorvastatin (LIPITOR) 80 mg tablet 7/20/2020 at Unknown time Self Yes   Sig: Take 80 mg by mouth nightly. [facility]) 625 mg tablet 7/20/2020 at Unknown time Self Yes   Sig: TAKE 3 TABLETS TWICE A DAY WITH MEALS   fluticasone-umeclidinium-vilanterol (TRELEGY ELLIPTA) 100-62.5-25 mcg inhaler 7/20/2020 at Unknown time  Yes   Sig: Take 1 Puff by inhalation daily.    furosemide (LASIX) 20 mg tablet 7/20/2020 at Unknown time  Yes   Sig: One tab daily PO   metoprolol succinate (TOPROL XL) 25 mg XL tablet 7/20/2020 at Unknown time Self Yes   Sig: TAKE 1 TABLET DAILY      Facility-Administered Medications: None       Please contact the main inpatient pharmacy with any questions or concerns at [phone] and we will direct you to the clinical pharmacist covering this patient's care while in-house.    Christofer Allen

## 2020-07-21 NOTE — WOUND CARE
WOCN Note:     New consult placed for assessment of low legs. Assessed in room 447. PPE: mask and gloves    Chart reviewed. Admitted DX:  Venous insufficiency; CHF (congestive heart failure)    Assessment:   Patient is A&O x 3, communicative and requires assist of one with repositioning. Bed: p500 air mattress  Patient has a Vanegas. Patient reports no pain. Heels offloaded with pillows. Heels intact without erythema. Sacrum and buttocks intact without erythema. 1. POA Left low leg, recently resurfaced wounds (0.5 x 6 x 0 cm). 2. POA  Bilateral low legs, dry flaky skin. Wound, Pressure Prevention & Skin Care Recommendations:    1. Minimize layers of linen/pads under patient to optimize support surface. 2.  Turn/reposition approximately every 2 hours and offload heels. 3.   Apply Aquaphor to bilateral legs BID. Discussed above plan with patient and Anusha VALDES.     Transition of Care: Plan to follow as needed while admitted to hospital.    RIC Mayorga RN [facility] Inpatient Wound Care  Available on Perfect Serve  Pager [phone]  Office [phone]

## 2020-07-21 NOTE — H&P
Cardiovascular H&P    Patient: Raisa Saenz MRN: [id number]  SSN: [id number]    YOB: 1935  Age: 80 y.o. Sex: male       Subjective:      Date of  Admission: 7/21/2020   Primary Care Provider: Mike Cartagena MD      Raisa Saenz is a 80 y.o.  male admitted for Venous insufficiency [I87.2]  Acute on chronic diastolic CHF (congestive heart failure) ([facility] [I50.33]. He has PMH of  coronary artery disease, mild LV dysfunction, dyslipidemia, diastolic heart failure, lower extremity edema with recent right popliteal DVT- now resolved. Venous reflux disease being treated with support stockings. He also has history of left shin venous stasis ulcer that had resolved with diuresis and aggressive wound care but now has reocurring blister and wound at recent virtual visit . He had previously followed with Dr. Josefa Rivers for wound care management. At last virtual visit, his lasix dose was incresed for 5 days. However, he continued to have BLE edema, erythema and was poorly compliant with diuretics. Thus, pt was admitted for IV diuresis. He denies chest pain, SOB, dizziness.         Past Medical History:   Diagnosis Date    Asthma     BRONCITITS, INHALER USE PRN    CAD (coronary artery disease)     MI    Cancer ([facility] 2006    PROTATE    Chronic obstructive pulmonary disease ([facility]     Diabetes ([facility]     BORDERLINE    Hypercholesterolemia     Hypertension     Umbilical hernia 5/22/1769      Past Surgical History:   Procedure Laterality Date    CARDIAC SURG PROCEDURE UNLIST  1993    CABG X4 VESSEL    CARDIAC SURG PROCEDURE [address] CATH, STENTS    ENDOSCOPY, COLON, DIAGNOSTIC  01/02/2006    HX GI      COLONOSCOPY    HX HEENT Bilateral     CATARACTS/ IOL    HX HERNIA REPAIR  7/2014    HX PROSTATECTOMY  01/02/2006     Family History   Problem Relation Age of Onset    Stroke Father     Anesth Problems Neg Hx       Social History     Tobacco Use    Smoking status: Former Smoker     Packs/day: 1.00     Types: Cigarettes     Last attempt to quit: 6/10/1964     Years since quittin.1    Smokeless tobacco: Never Used   Substance Use Topics    Alcohol use: Yes     Alcohol/week: 15.0 standard drinks     Types: 12 Cans of beer, 6 Standard drinks or equivalent per week     Comment: casual      Current Facility-Administered Medications   Medication Dose Route Frequency    atorvastatin (LIPITOR) tablet 80 mg  80 mg Oral QHS    colesevelam (WELCHOL) tablet 1,875 mg  1,875 mg Oral BID WITH MEALS    apixaban (ELIQUIS) tablet 5 mg  5 mg Oral BID    metoprolol succinate (TOPROL-XL) XL tablet 25 mg  25 mg Oral DAILY    furosemide (LASIX) 200 mg in 0.9% sodium chloride 100 mL infusion  10 mg/hr IntraVENous CONTINUOUS    arformoteroL (BROVANA) neb solution 15 mcg  15 mcg Nebulization BID RT    And    budesonide (PULMICORT) 500 mcg/2 ml nebulizer suspension  500 mcg Nebulization BID RT    ipratropium (ATROVENT) 0.02 % nebulizer solution 0.5 mg  0.5 mg Nebulization Q6H RT    potassium chloride SR (KLOR-CON 10) tablet 40 mEq  40 mEq Oral NOW        Allergies   Allergen Reactions    Adhesive Other (comments)     BLISTERS        Review of Symptoms:  Constitutional: No fevers or chills. HEET: No trauma. No visual changes. No hearing loss. No sore throat. Neck: No adenopathy or swelling. Heart: No chest pain or palpitations. Lungs: No shortness of breath. No cough. Abdomen: No pain. No nausea of vomiting. . No diarrhea. Urology: No dysuria or hematuria. Skin: No acute rashes or ulcers. Endocrine: No heat or cold intolerance. Neuro: No transient neurologic deficits.   Extremities: BLE edema, seeping left leg wound            Subjective:     Visit Vitals  /63 (BP 1 Location: Right arm, BP Patient Position: Sitting)   Temp 98.7 °F (37.1 °C)   Resp 20   Ht 5' 9\" (1.753 m)   Wt 216 lb 11.4 oz (98.3 kg)   SpO2 91%   BMI 32.00 kg/m²     Physical Exam:  Head: Normocephalic, atraumatic. Eyes: Pupils equal, round, reactive to light and accomodation. , Extra ocular muscles intact. Sclera anicteric. Ears: Grossly responsive to sound. Neck: No adenopathy. No bruits. Throat: No sores or erythema. Heart: Irregularly irregular rate and rhythm. Grade I/VI systolic murmur best at LUSB. Lungs: course bilaterally, mild wheeze. Abdomen: Soft, non-tender. No guarding or rebound. No hepatosplenomegaly. Bowel sounds active. Ext: 2+ BLE edema. Erythema bilateral BLE ankles to just distal to knees. Stasis dermatitis noted bilaterally. Left shin with shonda sized wound, appears dry. Skin: warm dry, see above  Neuro: Cranial nerves II through XII intact. Motor and sensory grossly intact. Affect: Appropriate. Alert and interactive. Right leg        Left leg        Left leg wound  Cardiographics:  ECG:  EKG Results     None        Echocardiogram:  10/24/19   ECHO ADULT COMPLETE 10/25/2019 10/25/2019    Narrative · Left Ventricle: Normal cavity size and wall thickness. Mild systolic   dysfunction. Estimated left ventricular ejection fraction is 41 - 45%. Abnormal left ventricular wall motion. · Left Atrium: Mildly dilated left atrium. · Aortic Valve: Probably trileaflet aortic valve. Aortic valve sclerosis. Aortic valve leaflet calcification present. · Mitral Valve: Mild mitral annular calcification.         Signed by: Mary Schaeffer MD       Cath:         Data Reviewed:   Telemetry afib  CMP:   Lab Results   Component Value Date/Time     07/21/2020 12:40 PM    K 3.3 (L) 07/21/2020 12:40 PM     07/21/2020 12:40 PM    CO2 32 07/21/2020 12:40 PM    AGAP 5 07/21/2020 12:40 PM     (H) 07/21/2020 12:40 PM    BUN 20 07/21/2020 12:40 PM    CREA 1.03 07/21/2020 12:40 PM    GFRAA >60 07/21/2020 12:40 PM    GFRNA >60 07/21/2020 12:40 PM    CA 8.9 07/21/2020 12:40 PM    MG 2.0 07/21/2020 12:40 PM    ALB 2.8 (L) 07/21/2020 12:40 PM    TP 7.5 07/21/2020 12:40 PM    GLOB 4.7 (H) 07/21/2020 12:40 PM    AGRAT 0.6 (L) 07/21/2020 12:40 PM    ALT 15 07/21/2020 12:40 PM     CBC:   Lab Results   Component Value Date/Time    WBC 8.8 07/21/2020 12:40 PM    HGB 13.1 07/21/2020 12:40 PM    HCT 41.9 07/21/2020 12:40 PM     07/21/2020 12:40 PM       Lab Results   Component Value Date/Time    NT pro-BNP 2,017 (H) 10/26/2019 02:25 AM    NT pro-BNP 2,290 (H) 10/24/2019 12:35 PM        No results found for: CPK, RCK1, RCK2, RCK3, RCK4, CKMB, CKNDX, CKND1, TROPT, TROIQ, BNPP, BNP     Last Lipid:    Lab Results   Component Value Date/Time    Cholesterol, total 166 12/03/2019 02:30 PM    HDL Cholesterol 57 12/03/2019 02:30 PM    LDL, calculated 88.8 12/03/2019 02:30 PM    Triglyceride 101 12/03/2019 02:30 PM    CHOL/HDL Ratio 2.9 12/03/2019 02:30 PM       ABG: No results found for: PH, PHI, PCO2, PCO2I, PO2, PO2I, HCO3, HCO3I, FIO2, FIO2I  COAGS: No results found for: APTT, PTP, INR, INREXT, INREXT, INREXT         EKG Results      None              10/24/19   ECHO ADULT COMPLETE 10/25/2019 10/25/2019     Narrative · Left Ventricle: Normal cavity size and wall thickness. Mild systolic   dysfunction. Estimated left ventricular ejection fraction is 41 - 45%. Abnormal left ventricular wall motion. · Left Atrium: Mildly dilated left atrium. · Aortic Valve: Probably trileaflet aortic valve. Aortic valve sclerosis. Aortic valve leaflet calcification present. · Mitral Valve: Mild mitral annular calcification.          Signed by: Rosie Messer MD      10/24/19 GIBSON:  1. No evidence of significant peripheral arterial disease at rest in the right leg. 2. No evidence of significant peripheral arterial disease at rest in the left leg. 3. The right ankle/brachial index is 1.10 and the left ankle/brachial index is 1.09.  4.  The right toe/brachial index is 0.76 and the left toe/brachial index is 0.95.     10/24/19 Venous duplex:  Interpretation Summary      Acute non-occlusive thrombus present in the right popliteal vein.     Bilateral lower extremity venous duplex positive for deep venous thrombosis. Negative for thrombophlebitis. Lower Extremity Venous Findings      Right Lower Venous      Acute non-occlusive thrombus present in the right popliteal vein. The common femoral, greater saphenous, profunda femoral, proximal femoral, mid femoral, distal femoral, gastrocnemius and saphenous femoral junction vein(s) were imaged in the transverse and longitudinal planes. The vessels showed normal color filling and compressibility. Doppler interrogation of the veins showed phasic and spontaneous flow. The posterior tibial and peroneal vein(s) were not well visualized. Left Lower Venous      The common femoral, greater saphenous, saphenous femoral junction, profunda femoral, proximal femoral, mid femoral, distal femoral and popliteal vein(s) were imaged in the transverse and longitudinal planes. The vessels showed normal color filling and compressibility. Doppler interrogation of the veins showed phasic and spontaneous flow. The peroneal vein(s) were not well visualized.      11/20/2019 Venous ultrasound:  Interpretation Summary      · There is no evidence of deep venous thrombosis within the bilateral common femoral, femoral or popliteal veins. · Pulsatility of venous flow is consistent with increased central venous pressure. · Bilateral deep venous reflux (>1.0 seconds) is present within the popliteal veins, right > left. · Study is negative for superficial venous reflux.           Assessment:         Hospital Problems  Date Reviewed: 3/4/2020          Codes Class Noted POA    Acute on chronic diastolic CHF (congestive heart failure) (HCC) ICD-10-CM: I50.33  ICD-9-CM: 428.33, 428.0  7/21/2020 Unknown        Venous stasis ulcer (HCC) ICD-10-CM: I83.009, L97.909  ICD-9-CM: 454.0  7/21/2020 Yes        Hypokalemia ICD-10-CM: E87.6  ICD-9-CM: 276.8  7/21/2020 Unknown        Venous insufficiency ICD-10-CM: I87.2  ICD-9-CM: 459.81  10/11/2016 Unknown        Venous insufficiency of both lower extremities ICD-10-CM: I87.2  ICD-9-CM: 459.81  10/11/2016 Unknown               Plan:   80 y.o. male with  Hx of venous insufficiency, CAD, HLD and mild LV dysfunction, chronic afib. COPD, HLD. Presented as direct admit from home for management of BLE edema due to venous insuffiencey and mild LV dysfunction. Small wound on LLE appears dry. Will ask wound care to evaluate. He has not been compliant with home diuretic therapy. Will start IV lasix drip at 10 mg/hr. Insert kim for accurate I/Os and replete potassium. Daily labs while on lasix infusion. Continue home Eliquis for stroke ppx for Afib and Toprol XL for rate control (currently afib, rate controlled). Continue statin and Welchol. Check Hgb A1C. Liv Lora NP  7/21/2020, 12:34 PM    [facility] Office:  [address]  [address] [address] [place] [address]  P: [phone]  F: 9 [facility] Office:  [address] [facility]  [address] [facility],[address]  [address]  P: [phone]  F: [phone]

## 2020-07-22 LAB
ANION GAP SERPL CALC-SCNC: 5 MMOL/L (ref 5–15)
BUN SERPL-MCNC: 16 MG/DL (ref 6–20)
BUN/CREAT SERPL: 16 (ref 12–20)
CALCIUM SERPL-MCNC: 8.6 MG/DL (ref 8.5–10.1)
CHLORIDE SERPL-SCNC: 98 MMOL/L (ref 97–108)
CO2 SERPL-SCNC: 34 MMOL/L (ref 21–32)
CREAT SERPL-MCNC: 0.98 MG/DL (ref 0.7–1.3)
GLUCOSE SERPL-MCNC: 114 MG/DL (ref 65–100)
MAGNESIUM SERPL-MCNC: 1.8 MG/DL (ref 1.6–2.4)
POTASSIUM SERPL-SCNC: 3.5 MMOL/L (ref 3.5–5.1)
SODIUM SERPL-SCNC: 137 MMOL/L (ref 136–145)

## 2020-07-22 PROCEDURE — 74011250636 HC RX REV CODE- 250/636: Performed by: NURSE PRACTITIONER

## 2020-07-22 PROCEDURE — 80048 BASIC METABOLIC PNL TOTAL CA: CPT

## 2020-07-22 PROCEDURE — 65660000000 HC RM CCU STEPDOWN

## 2020-07-22 PROCEDURE — 36415 COLL VENOUS BLD VENIPUNCTURE: CPT

## 2020-07-22 PROCEDURE — 94640 AIRWAY INHALATION TREATMENT: CPT

## 2020-07-22 PROCEDURE — 74011000250 HC RX REV CODE- 250: Performed by: NURSE PRACTITIONER

## 2020-07-22 PROCEDURE — 74011000258 HC RX REV CODE- 258: Performed by: NURSE PRACTITIONER

## 2020-07-22 PROCEDURE — 74011250637 HC RX REV CODE- 250/637: Performed by: NURSE PRACTITIONER

## 2020-07-22 PROCEDURE — 83735 ASSAY OF MAGNESIUM: CPT

## 2020-07-22 RX ORDER — MAGNESIUM SULFATE 1 G/100ML
1 INJECTION INTRAVENOUS ONCE
Status: COMPLETED | OUTPATIENT
Start: 2020-07-22 | End: 2020-07-22

## 2020-07-22 RX ORDER — POTASSIUM CHLORIDE 750 MG/1
40 TABLET, FILM COATED, EXTENDED RELEASE ORAL ONCE
Status: COMPLETED | OUTPATIENT
Start: 2020-07-22 | End: 2020-07-22

## 2020-07-22 RX ORDER — METOPROLOL SUCCINATE 25 MG/1
25 TABLET, EXTENDED RELEASE ORAL DAILY
Status: DISCONTINUED | OUTPATIENT
Start: 2020-07-22 | End: 2020-07-24 | Stop reason: HOSPADM

## 2020-07-22 RX ORDER — FUROSEMIDE 10 MG/ML
40 INJECTION INTRAMUSCULAR; INTRAVENOUS 2 TIMES DAILY
Status: DISCONTINUED | OUTPATIENT
Start: 2020-07-22 | End: 2020-07-23

## 2020-07-22 RX ORDER — CEPHALEXIN 250 MG/1
250 CAPSULE ORAL 4 TIMES DAILY
Status: DISCONTINUED | OUTPATIENT
Start: 2020-07-22 | End: 2020-07-24

## 2020-07-22 RX ADMIN — MAGNESIUM SULFATE HEPTAHYDRATE 1 G: 1 INJECTION, SOLUTION INTRAVENOUS at 12:16

## 2020-07-22 RX ADMIN — ATORVASTATIN CALCIUM 80 MG: 40 TABLET, FILM COATED ORAL at 21:16

## 2020-07-22 RX ADMIN — COLESEVELAM HCL 1875 MG: 625 TABLET, FILM COATED ORAL at 18:56

## 2020-07-22 RX ADMIN — ARFORMOTEROL TARTRATE 15 MCG: 15 SOLUTION RESPIRATORY (INHALATION) at 08:36

## 2020-07-22 RX ADMIN — ARFORMOTEROL TARTRATE 15 MCG: 15 SOLUTION RESPIRATORY (INHALATION) at 20:50

## 2020-07-22 RX ADMIN — APIXABAN 5 MG: 5 TABLET, FILM COATED ORAL at 10:14

## 2020-07-22 RX ADMIN — METOPROLOL SUCCINATE 25 MG: 25 TABLET, EXTENDED RELEASE ORAL at 12:17

## 2020-07-22 RX ADMIN — FUROSEMIDE 10 MG/HR: 10 INJECTION, SOLUTION INTRAMUSCULAR; INTRAVENOUS at 10:14

## 2020-07-22 RX ADMIN — BUDESONIDE 500 MCG: 0.5 INHALANT RESPIRATORY (INHALATION) at 20:50

## 2020-07-22 RX ADMIN — IPRATROPIUM BROMIDE 0.5 MG: 0.5 SOLUTION RESPIRATORY (INHALATION) at 20:50

## 2020-07-22 RX ADMIN — FUROSEMIDE 40 MG: 10 INJECTION, SOLUTION INTRAMUSCULAR; INTRAVENOUS at 19:01

## 2020-07-22 RX ADMIN — CEPHALEXIN 250 MG: 250 CAPSULE ORAL at 12:17

## 2020-07-22 RX ADMIN — WHITE PETROLATUM: 1.75 OINTMENT TOPICAL at 21:16

## 2020-07-22 RX ADMIN — IPRATROPIUM BROMIDE 0.5 MG: 0.5 SOLUTION RESPIRATORY (INHALATION) at 08:36

## 2020-07-22 RX ADMIN — COLESEVELAM HCL 1875 MG: 625 TABLET, FILM COATED ORAL at 10:13

## 2020-07-22 RX ADMIN — POTASSIUM CHLORIDE 40 MEQ: 750 TABLET, FILM COATED, EXTENDED RELEASE ORAL at 10:14

## 2020-07-22 RX ADMIN — CEPHALEXIN 250 MG: 250 CAPSULE ORAL at 21:16

## 2020-07-22 RX ADMIN — APIXABAN 5 MG: 5 TABLET, FILM COATED ORAL at 21:16

## 2020-07-22 RX ADMIN — BUDESONIDE 500 MCG: 0.5 INHALANT RESPIRATORY (INHALATION) at 08:36

## 2020-07-22 RX ADMIN — WHITE PETROLATUM: 1.75 OINTMENT TOPICAL at 10:19

## 2020-07-22 RX ADMIN — FUROSEMIDE 40 MG: 10 INJECTION, SOLUTION INTRAMUSCULAR; INTRAVENOUS at 12:16

## 2020-07-22 RX ADMIN — CEPHALEXIN 250 MG: 250 CAPSULE ORAL at 18:57

## 2020-07-22 NOTE — CDMP QUERY
Patient admitted with venous insufficiency. . Noted documentation of acute on chronic diastolic CHF in the H&P. Please address the documentation of this diagnosis in one of the following ways in medical record:       Please provide the clinical indicators to support the documented diagnosis of acute diastolic CHF   Acute diastolic CHF was ruled out after study    Clinically unable to provide further clarity regarding acute diastolic CHF   Other, please specify    The medical record reflects the following:       Risk Factors: Hx. Diastolic heart failure     Clinical Indicators:  no cxr yet, le edema, probnp is <3000 (6202), on ra  Per H&P-Presented as direct admit from home for management of BLE edema due to venous insuffiencey and mild LV dysfunction. 7/22-Per Cardiology-BLE edema d/t venous insufficiency and acute on chronic CHF: Urine output 6+Liters since admission. Improved BLE edema. Will transition IV lasix gtt to IV lasix 40 mg IV BID.       Treatment: Lasix IV @ 10 mg/hr, Vanegas, I&O, monitor labwork, vital signs, imaging

## 2020-07-22 NOTE — PROGRESS NOTES
Aquaphor applied to both legs. Instructed pt on use of CHG wipes and importance of standing daily weights.

## 2020-07-22 NOTE — PROGRESS NOTES
Bedside shift change report given to France Barrera [id number] (oncoming nurse) by George Mejia (offgoing nurse). Report included the following information SBAR, Kardex, Intake/Output, MAR and Cardiac Rhythm A fib.

## 2020-07-22 NOTE — PROGRESS NOTES
BCPI-A letter regarding Heart Failure has been delivered to the patient/caregiver. ANNETTE PLAN:    RUR-10%    Patient was admitted from home and will be discharged home in care of daughter and grandchildren    Patient's daughter will provide transportation    Reason for Admission:   Venous insufficiency, CHF                   RUR Score:   10%                  Plan for utilizing home health:   Not indicated        PCP: First and Last name: Lauren Davis   Name of Practice: [facility]   Are you a current patient: Yes   Approximate date of last visit: 2 years ago   Can you participate in a virtual visit with your PCP: Yes                    Current Advanced Directive/Advance Care Plan: No. Patient is not interested. However, he said his 2 daughters, Osvaldo Snow and John Neely will speak on his behalf                         Transition of Care Plan: CM met with patient to discuss discharge planning. Patient lives with his daughter Estuardo Araya and grandchildren in his private residence. He is independent without any assistive devices. Patient said he has not seen his PCP in 2 years. He was seeing Dr. Chikis Shannon with the [address] [place] [address] for a while for his leg wound. Patient did not voice any discharge barriers. CM will follow as needed. Carmenza Phan MSA, RN, CRM  Care Management Interventions  PCP Verified by CM: Yes  Palliative Care Criteria Met (RRAT>21 & CHF Dx)?: No  Mode of Transport at Discharge: Other (see comment)  Transition of Care Consult (CM Consult): Discharge Planning  MyChart Signup: No  Discharge Durable Medical Equipment: No  Health Maintenance Reviewed: Yes  Physical Therapy Consult: No  Occupational Therapy Consult: No  Speech Therapy Consult: No  Current Support Network:  Other(Daughter Estuardo Araya and 2 grandchildren live with him)  Confirm Follow Up Transport: Family  Discharge Location  Discharge Placement: Home with family assistance

## 2020-07-22 NOTE — PROGRESS NOTES
Problem: Falls - Risk of  Goal: *Absence of Falls  Description: Document Oneida Augustin Fall Risk and appropriate interventions in the flowsheet.   Outcome: Progressing Towards Goal  Note: Fall Risk Interventions:  Mobility Interventions: Patient to call before getting OOB, PT Consult for mobility concerns         Medication Interventions: Patient to call before getting OOB, Teach patient to arise slowly

## 2020-07-22 NOTE — PROGRESS NOTES
Cardiology Progress Note            Admit Date: 7/21/2020  Admit Diagnosis: Venous insufficiency [I87.2]  Acute on chronic diastolic CHF (congestive heart failure) ([facility] [I50.33]  Date: 7/22/2020     Time: 7:41 AM    Subjective:   Gee Mims denies chest pain, palpitations, dizziness and SOB. Reports cough (not new) in the a.m. Worst when first getting up. Kim in, will leave one more day given volume of diuresis, It is not bothering him much. Assessment and Plan     1. BLE edema d/t venous insufficiency and acute on chronic CHF: Urine output 6+Liters since admission. Improved BLE edema. Will transition IV lasix gtt to IV lasix 40 mg IV BID. 2. 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 NYHA II. On Toprol XL. No ace-I for now as BP low NL. Pro BNP 1629 on admission. Plan to discontinue kim tomorrow. K=3.5 today. Will give additional 40 meq po today. Change iv lasix gtt to IV lasix 40 mg BID. 3. Hx of CAD: s/p CABG 1995.not on ASA PTA, on Eliquis. On high intensity statin. No chest pain    4. Hx of afib, chronic:  Rate controlled. Continue toprol XL, eliqis 5 mg BID  5. Left venous stasis ulcer: appears filled in. Appreciate wound care recommendations. 6. Hx of venous insufficiency. Support hose recommended. 7.HLD. HDL 57, LDL 88. Continue Lipitor 80 mg daily  8. COPD: continue inhaler/neb. Add flutter valve device   9. BLE cellulitis: start keflex 250 mg QID. HPI:  Gee Mims is a 80 y.o.  male admitted for Venous insufficiency [I87.2]  Acute on chronic diastolic CHF (congestive heart failure) ([facility] [I50.33]. He has PMH of  coronary artery disease, mild LV dysfunction, dyslipidemia, diastolic heart failure, lower extremity edema with recent right popliteal DVT- now resolved. Venous reflux disease being treated with support stockings.  He also has history of left shin venous stasis ulcer that had resolved with diuresis and aggressive wound care but now has reocurring blister and wound at recent virtual visit . He had previously followed with Dr. Shelley Washington for wound care management. At last virtual visit, his lasix dose was incresed for 5 days. However, he continued to have BLE edema, erythema and was poorly compliant with diuretics. Thus, pt was admitted for IV diuresis. He denies chest pain, SOB, dizziness.       PMH  Past Medical History:   Diagnosis Date    Asthma     BRONCITITS, INHALER USE PRN    CAD (coronary artery disease)     MI    [facility] 2006    PROTATE    Chronic obstructive pulmonary disease ([facility]     Diabetes ([facility]     BORDERLINE    Hypercholesterolemia     Hypertension     Umbilical hernia       Social Hx  Social History     Socioeconomic History    Marital status: SINGLE     Spouse name: Not on file    Number of children: Not on file    Years of education: Not on file    Highest education level: Not on file   Occupational History    Not on file   Social Needs    Financial resource strain: Not on file    Food insecurity     Worry: Not on file     Inability: Not on file    Transportation needs     Medical: Not on file     Non-medical: Not on file   Tobacco Use    Smoking status: Former Smoker     Packs/day: 1.00     Types: Cigarettes     Last attempt to quit: 6/10/1964     Years since quittin.1    Smokeless tobacco: Never Used   Substance and Sexual Activity    Alcohol use:  Yes     Alcohol/week: 15.0 standard drinks     Types: 12 Cans of beer, 6 Standard drinks or equivalent per week     Comment: casual    Drug use: No    Sexual activity: Not Currently   Lifestyle    Physical activity     Days per week: Not on file     Minutes per session: Not on file    Stress: Not on file   Relationships    Social connections     Talks on phone: Not on file     Gets together: Not on file     Attends Yazidism service: Not on file Active member of club or organization: Not on file     Attends meetings of clubs or organizations: Not on file     Relationship status: Not on file    Intimate partner violence     Fear of current or ex partner: Not on file     Emotionally abused: Not on file     Physically abused: Not on file     Forced sexual activity: Not on file   Other Topics Concern    Not on file   Social History Narrative    Not on file   Objective:      Physical Exam:                Visit Vitals  /59 (BP 1 Location: Right arm, BP Patient Position: At rest)   Pulse 82   Temp 99.3 °F (37.4 °C)   Resp 17   Ht 5' 9\" (1.753 m)   Wt 199 lb 1.2 oz (90.3 kg)   SpO2 93%   BMI 29.40 kg/m²          General Appearance:   Well developed, well nourished,alert and oriented x 3, and   individual in no acute distress. Ears/Nose/Mouth/Throat:    Hearing grossly normal.         Neck:  Supple. Chest:    Lungs with faint expiratory wheeze on initial exam early a.m.but clear  to auscultation bilaterally on repeat exam later a.m. Clovia Carls Cardiovascular:   Irregularly irregular rate and rhythm, S1, S2 normal, no murmur. Abdomen:    Soft, non-tender, bowel sounds are active. Extremities:  BLE tr-1+ bilaterally. Venous stasis skin changes. BLE erythema and warmth. Jacey sized LLE venous stasis ulcer, filled in.   :kim:clear yellow urine   Skin:  Warm and dry. Right leg        Left leg    Telemetry: afib, rate controlled.           Data Review:    Labs:    Recent Results (from the past 24 hour(s))   METABOLIC PANEL, COMPREHENSIVE    Collection Time: 07/21/20 12:40 PM   Result Value Ref Range    Sodium 139 136 - 145 mmol/L    Potassium 3.3 (L) 3.5 - 5.1 mmol/L    Chloride 102 97 - 108 mmol/L    CO2 32 21 - 32 mmol/L    Anion gap 5 5 - 15 mmol/L    Glucose 118 (H) 65 - 100 mg/dL    BUN 20 6 - 20 MG/DL    Creatinine 1.03 0.70 - 1.30 MG/DL    BUN/Creatinine ratio 19 12 - 20      GFR est AA >60 >60 ml/min/1.73m2    GFR est non-AA >60 >60 ml/min/1.73m2    Calcium 8.9 8.5 - 10.1 MG/DL    Bilirubin, total 0.5 0.2 - 1.0 MG/DL    ALT (SGPT) 15 12 - 78 U/L    AST (SGOT) 21 15 - 37 U/L    Alk.  phosphatase 85 45 - 117 U/L    Protein, total 7.5 6.4 - 8.2 g/dL    Albumin 2.8 (L) 3.5 - 5.0 g/dL    Globulin 4.7 (H) 2.0 - 4.0 g/dL    A-G Ratio 0.6 (L) 1.1 - 2.2     MAGNESIUM    Collection Time: 07/21/20 12:40 PM   Result Value Ref Range    Magnesium 2.0 1.6 - 2.4 mg/dL   CBC W/O DIFF    Collection Time: 07/21/20 12:40 PM   Result Value Ref Range    WBC 8.8 4.1 - 11.1 K/uL    RBC 4.09 (L) 4.10 - 5.70 M/uL    HGB 13.1 12.1 - 17.0 g/dL    HCT 41.9 36.6 - 50.3 %    .4 (H) 80.0 - 99.0 FL    MCH 32.0 26.0 - 34.0 PG    MCHC 31.3 30.0 - 36.5 g/dL    RDW 13.6 11.5 - 14.5 %    PLATELET 066 099 - 779 K/uL    MPV 9.3 8.9 - 12.9 FL    NRBC 0.0 0  WBC    ABSOLUTE NRBC 0.00 0.00 - 0.01 K/uL   HEMOGLOBIN A1C WITH EAG    Collection Time: 07/21/20 12:40 PM   Result Value Ref Range    Hemoglobin A1c 5.7 (H) 4.0 - 5.6 %    Est. average glucose 117 mg/dL   NT-PRO BNP    Collection Time: 07/21/20 12:40 PM   Result Value Ref Range    NT pro-BNP 1,629 (H) <677 PG/ML   METABOLIC PANEL, BASIC    Collection Time: 07/22/20  2:54 AM   Result Value Ref Range    Sodium 137 136 - 145 mmol/L    Potassium 3.5 3.5 - 5.1 mmol/L    Chloride 98 97 - 108 mmol/L    CO2 34 (H) 21 - 32 mmol/L    Anion gap 5 5 - 15 mmol/L    Glucose 114 (H) 65 - 100 mg/dL    BUN 16 6 - 20 MG/DL    Creatinine 0.98 0.70 - 1.30 MG/DL    BUN/Creatinine ratio 16 12 - 20      GFR est AA >60 >60 ml/min/1.73m2    GFR est non-AA >60 >60 ml/min/1.73m2    Calcium 8.6 8.5 - 10.1 MG/DL   MAGNESIUM    Collection Time: 07/22/20  2:54 AM   Result Value Ref Range    Magnesium 1.8 1.6 - 2.4 mg/dL          Radiology:        Current Facility-Administered Medications   Medication Dose Route Frequency    metoprolol succinate (TOPROL-XL) XL tablet 25 mg  25 mg Oral DAILY    potassium chloride SR (KLOR-CON 10) tablet 40 mEq  40 mEq Oral ONCE    atorvastatin (LIPITOR) tablet 80 mg  80 mg Oral QHS    colesevelam (WELCHOL) tablet 1,875 mg  1,875 mg Oral BID WITH MEALS    apixaban (ELIQUIS) tablet 5 mg  5 mg Oral BID    furosemide (LASIX) 200 mg in 0.9% sodium chloride 100 mL infusion  10 mg/hr IntraVENous CONTINUOUS    arformoteroL (BROVANA) neb solution 15 mcg  15 mcg Nebulization BID RT    And    budesonide (PULMICORT) 500 mcg/2 ml nebulizer suspension  500 mcg Nebulization BID RT    ipratropium (ATROVENT) 0.02 % nebulizer solution 0.5 mg  0.5 mg Nebulization Q6H RT    pantothenic ac-min oil-pet,hyd (AQUAPHOR) 41 % ointment   Topical BID          Stephanie Lambert.  RYAN Lora     [facility] [address], [address] [address] [address]   [phone]

## 2020-07-22 NOTE — NURSE NAVIGATOR
Chart reviewed by Heart Failure Nurse Navigator. Heart Failure database completed. EF:  41-45%  Echo dated 10/25/19     ACEi/ARB/ARNi:     BB: Toprol XL 25mg QD    Aldosterone Antagonist: **    Obstructive Sleep Apnea Screening:   STOP-BANG score:   Referred to Sleep Medicine:     CRT currently not indicated. NYHA Functional Class not assigned. Documentation requested     Heart Failure Teach Back in Patient Education. Heart Failure Avoiding Triggers on Discharge Instructions. Cardiologist: MASTER    MEDICARE HEART FAILURE BUNDLE ACTIVE      Post discharge follow up phone call to be made within 48-72 hours of discharge.

## 2020-07-23 LAB
ANION GAP SERPL CALC-SCNC: 8 MMOL/L (ref 5–15)
ARTERIAL PATENCY WRIST A: YES
BASE EXCESS BLD CALC-SCNC: 18 MMOL/L
BDY SITE: ABNORMAL
BNP SERPL-MCNC: 840 PG/ML
BUN SERPL-MCNC: 24 MG/DL (ref 6–20)
BUN/CREAT SERPL: 20 (ref 12–20)
CA-I BLD-SCNC: 1.19 MMOL/L (ref 1.12–1.32)
CALCIUM SERPL-MCNC: 8.9 MG/DL (ref 8.5–10.1)
CHLORIDE SERPL-SCNC: 99 MMOL/L (ref 97–108)
CO2 SERPL-SCNC: 33 MMOL/L (ref 21–32)
CREAT SERPL-MCNC: 1.19 MG/DL (ref 0.7–1.3)
GAS FLOW.O2 O2 DELIVERY SYS: ABNORMAL L/MIN
GLUCOSE SERPL-MCNC: 119 MG/DL (ref 65–100)
HCO3 BLD-SCNC: 41.8 MMOL/L (ref 22–26)
PCO2 BLD: 61 MMHG (ref 35–45)
PH BLD: 7.44 [PH] (ref 7.35–7.45)
PO2 BLD: 108 MMHG (ref 80–100)
POTASSIUM SERPL-SCNC: 3.5 MMOL/L (ref 3.5–5.1)
SAO2 % BLD: 98 % (ref 92–97)
SODIUM SERPL-SCNC: 140 MMOL/L (ref 136–145)
SPECIMEN TYPE: ABNORMAL
TOTAL RESP. RATE, ITRR: 20

## 2020-07-23 PROCEDURE — 36415 COLL VENOUS BLD VENIPUNCTURE: CPT

## 2020-07-23 PROCEDURE — 36600 WITHDRAWAL OF ARTERIAL BLOOD: CPT

## 2020-07-23 PROCEDURE — 94760 N-INVAS EAR/PLS OXIMETRY 1: CPT

## 2020-07-23 PROCEDURE — 74011000250 HC RX REV CODE- 250: Performed by: NURSE PRACTITIONER

## 2020-07-23 PROCEDURE — 80048 BASIC METABOLIC PNL TOTAL CA: CPT

## 2020-07-23 PROCEDURE — 94640 AIRWAY INHALATION TREATMENT: CPT

## 2020-07-23 PROCEDURE — 82803 BLOOD GASES ANY COMBINATION: CPT

## 2020-07-23 PROCEDURE — 83880 ASSAY OF NATRIURETIC PEPTIDE: CPT

## 2020-07-23 PROCEDURE — 74011250637 HC RX REV CODE- 250/637: Performed by: INTERNAL MEDICINE

## 2020-07-23 PROCEDURE — 65660000000 HC RM CCU STEPDOWN

## 2020-07-23 PROCEDURE — 74011250636 HC RX REV CODE- 250/636: Performed by: NURSE PRACTITIONER

## 2020-07-23 PROCEDURE — 74011250637 HC RX REV CODE- 250/637: Performed by: NURSE PRACTITIONER

## 2020-07-23 RX ORDER — FUROSEMIDE 40 MG/1
40 TABLET ORAL 2 TIMES DAILY
Status: DISCONTINUED | OUTPATIENT
Start: 2020-07-23 | End: 2020-07-24 | Stop reason: HOSPADM

## 2020-07-23 RX ADMIN — WHITE PETROLATUM: 1.75 OINTMENT TOPICAL at 08:46

## 2020-07-23 RX ADMIN — ARFORMOTEROL TARTRATE 15 MCG: 15 SOLUTION RESPIRATORY (INHALATION) at 21:25

## 2020-07-23 RX ADMIN — ARFORMOTEROL TARTRATE 15 MCG: 15 SOLUTION RESPIRATORY (INHALATION) at 09:39

## 2020-07-23 RX ADMIN — COLESEVELAM HCL 1875 MG: 625 TABLET, FILM COATED ORAL at 17:28

## 2020-07-23 RX ADMIN — IPRATROPIUM BROMIDE 0.5 MG: 0.5 SOLUTION RESPIRATORY (INHALATION) at 21:25

## 2020-07-23 RX ADMIN — BUDESONIDE 500 MCG: 0.5 INHALANT RESPIRATORY (INHALATION) at 09:39

## 2020-07-23 RX ADMIN — CEPHALEXIN 250 MG: 250 CAPSULE ORAL at 21:25

## 2020-07-23 RX ADMIN — CEPHALEXIN 250 MG: 250 CAPSULE ORAL at 08:45

## 2020-07-23 RX ADMIN — WHITE PETROLATUM: 1.75 OINTMENT TOPICAL at 17:28

## 2020-07-23 RX ADMIN — ATORVASTATIN CALCIUM 80 MG: 40 TABLET, FILM COATED ORAL at 21:25

## 2020-07-23 RX ADMIN — APIXABAN 5 MG: 5 TABLET, FILM COATED ORAL at 08:45

## 2020-07-23 RX ADMIN — APIXABAN 5 MG: 5 TABLET, FILM COATED ORAL at 21:25

## 2020-07-23 RX ADMIN — FUROSEMIDE 40 MG: 40 TABLET ORAL at 15:25

## 2020-07-23 RX ADMIN — IPRATROPIUM BROMIDE 0.5 MG: 0.5 SOLUTION RESPIRATORY (INHALATION) at 02:37

## 2020-07-23 RX ADMIN — BUDESONIDE 500 MCG: 0.5 INHALANT RESPIRATORY (INHALATION) at 21:25

## 2020-07-23 RX ADMIN — IPRATROPIUM BROMIDE 0.5 MG: 0.5 SOLUTION RESPIRATORY (INHALATION) at 09:39

## 2020-07-23 RX ADMIN — FUROSEMIDE 40 MG: 10 INJECTION, SOLUTION INTRAMUSCULAR; INTRAVENOUS at 08:45

## 2020-07-23 RX ADMIN — IPRATROPIUM BROMIDE 0.5 MG: 0.5 SOLUTION RESPIRATORY (INHALATION) at 14:30

## 2020-07-23 RX ADMIN — COLESEVELAM HCL 1875 MG: 625 TABLET, FILM COATED ORAL at 08:45

## 2020-07-23 RX ADMIN — CEPHALEXIN 250 MG: 250 CAPSULE ORAL at 12:17

## 2020-07-23 RX ADMIN — METOPROLOL SUCCINATE 25 MG: 25 TABLET, EXTENDED RELEASE ORAL at 11:44

## 2020-07-23 RX ADMIN — CEPHALEXIN 250 MG: 250 CAPSULE ORAL at 17:28

## 2020-07-23 NOTE — PROGRESS NOTES
Cardiology Progress Note            Admit Date: 7/21/2020  Admit Diagnosis: Venous insufficiency [I87.2]  Acute on chronic diastolic CHF (congestive heart failure) ([facility] [I50.33]  Date: 7/23/2020     Time: 7:41 AM    Subjective:   Gosia Vargas denies chest pain, palpitations, dizziness and SOB. No shortness of breath despite hypoxia on saturations. Good urine output. Legs shrinking. No weeping from left shin ulcers. Assessment and Plan     1. BLE edema d/t venous insufficiency and acute on chronic CHF: Urine output 6+Liters since admission. Improved BLE edema. Will transition IV lasix to PO lasix 40 mg IV BID. 2. 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 NYHA II. On Toprol XL. No ace-I for now as BP low NL. Pro BNP 1629 on admission. Plan to discontinue kim. Supplement potassium. 3. Hx of CAD: s/p CABG 1995.not on ASA PTA, on Eliquis. On high intensity statin. No chest pain    4. Hx of afib, chronic:  Rate controlled. Continue toprol XL, eliqis 5 mg BID  5. Left venous stasis ulcer: appears filled in. Appreciate wound care recommendations. 6. Hx of venous insufficiency. Support hose recommended. 7.HLD. HDL 57, LDL 88. Continue Lipitor 80 mg daily  8. COPD: continue inhaler/neb. Add flutter valve device   9. BLE cellulitis: start keflex 250 mg QID. 10. Hypoxia- check RA ABG. Patient denies shortness of breath. HPI:  Gosia Vargas is a 80 y.o.  male admitted for Venous insufficiency [I87.2]  Acute on chronic diastolic CHF (congestive heart failure) ([facility] [I50.33]. He has PMH of  coronary artery disease, mild LV dysfunction, dyslipidemia, diastolic heart failure, lower extremity edema with recent right popliteal DVT- now resolved. Venous reflux disease being treated with support stockings.  He also has history of left shin venous stasis ulcer that had resolved with diuresis and aggressive wound care but now has reocurring blister and wound at recent virtual visit . He had previously followed with Dr. Karlie Betancourt for wound care management. At last virtual visit, his lasix dose was incresed for 5 days. However, he continued to have BLE edema, erythema and was poorly compliant with diuretics. Thus, pt was admitted for IV diuresis. He denies chest pain, SOB, dizziness.       PMH  Past Medical History:   Diagnosis Date    Asthma     BRONCITITS, INHALER USE PRN    CAD (coronary artery disease)     MI    Cancer ([facility] 2006    PROTATE    Chronic obstructive pulmonary disease ([facility]     Diabetes ([facility]     BORDERLINE    Hypercholesterolemia     Hypertension     Umbilical hernia       Social Hx  Social History     Socioeconomic History    Marital status: SINGLE     Spouse name: Not on file    Number of children: Not on file    Years of education: Not on file    Highest education level: Not on file   Occupational History    Not on file   Social Needs    Financial resource strain: Not on file    Food insecurity     Worry: Not on file     Inability: Not on file    Transportation needs     Medical: Not on file     Non-medical: Not on file   Tobacco Use    Smoking status: Former Smoker     Packs/day: 1.00     Types: Cigarettes     Last attempt to quit: 6/10/1964     Years since quittin.1    Smokeless tobacco: Never Used   Substance and Sexual Activity    Alcohol use:  Yes     Alcohol/week: 15.0 standard drinks     Types: 12 Cans of beer, 6 Standard drinks or equivalent per week     Comment: casual    Drug use: No    Sexual activity: Not Currently   Lifestyle    Physical activity     Days per week: Not on file     Minutes per session: Not on file    Stress: Not on file   Relationships    Social connections     Talks on phone: Not on file     Gets together: Not on file     Attends Rastafari service: Not on file     Active member of club or organization: Not on file Attends meetings of clubs or organizations: Not on file     Relationship status: Not on file    Intimate partner violence     Fear of current or ex partner: Not on file     Emotionally abused: Not on file     Physically abused: Not on file     Forced sexual activity: Not on file   Other Topics Concern    Not on file   Social History Narrative    Not on file   Objective:      Physical Exam:                Visit Vitals  /58 (BP 1 Location: Right arm, BP Patient Position: At rest)   Pulse 68   Temp 98.9 °F (37.2 °C)   Resp 18   Ht 5' 9\" (1.753 m)   Wt 91 kg (200 lb 9.9 oz)   SpO2 91%   BMI 29.63 kg/m²          General Appearance:   Well developed, well nourished,alert and oriented x 3, and   individual in no acute distress. Ears/Nose/Mouth/Throat:    Hearing grossly normal.         Neck:  Supple. Chest:    Lungs with faint expiratory wheeze on initial exam early a.m.but clear  to auscultation bilaterally on repeat exam later a.m. [facility]:   Irregularly irregular rate and rhythm, S1, S2 normal, no murmur. Abdomen:    Soft, non-tender, bowel sounds are active. Extremities:  BLE tr-1+ bilaterally. Venous stasis skin changes. BLE erythema and warmth. Jacey sized LLE venous stasis ulcer, filled in.   :kim:clear yellow urine   Skin:  Warm and dry. Telemetry: afib, rate controlled.           Data Review:    Labs:    Recent Results (from the past 24 hour(s))   METABOLIC PANEL, BASIC    Collection Time: 07/23/20  4:19 AM   Result Value Ref Range    Sodium 140 136 - 145 mmol/L    Potassium 3.5 3.5 - 5.1 mmol/L    Chloride 99 97 - 108 mmol/L    CO2 33 (H) 21 - 32 mmol/L    Anion gap 8 5 - 15 mmol/L    Glucose 119 (H) 65 - 100 mg/dL    BUN 24 (H) 6 - 20 MG/DL    Creatinine 1.19 0.70 - 1.30 MG/DL    BUN/Creatinine ratio 20 12 - 20      GFR est AA >60 >60 ml/min/1.73m2    GFR est non-AA 58 (L) >60 ml/min/1.73m2    Calcium 8.9 8.5 - 10.1 MG/DL   NT-PRO BNP    Collection Time: 07/23/20  4:19 AM Result Value Ref Range    NT pro- (H) <450 PG/ML          Radiology:        Current Facility-Administered Medications   Medication Dose Route Frequency    furosemide (LASIX) tablet 40 mg  40 mg Oral BID    metoprolol succinate (TOPROL-XL) XL tablet 25 mg  25 mg Oral DAILY    cephALEXin (KEFLEX) capsule 250 mg  250 mg Oral QID    atorvastatin (LIPITOR) tablet 80 mg  80 mg Oral QHS    colesevelam (WELCHOL) tablet 1,875 mg  1,875 mg Oral BID WITH MEALS    apixaban (ELIQUIS) tablet 5 mg  5 mg Oral BID    arformoteroL (BROVANA) neb solution 15 mcg  15 mcg Nebulization BID RT    And    budesonide (PULMICORT) 500 mcg/2 ml nebulizer suspension  500 mcg Nebulization BID RT    ipratropium (ATROVENT) 0.02 % nebulizer solution 0.5 mg  0.5 mg Nebulization Q6H RT    pantothenic ac-min oil-pet,hyd (AQUAPHOR) 41 % ointment   Topical BID          Narda Liang MD     [facility] [address] [place] [address] [address]   Gurpreet Rios   [phone]

## 2020-07-24 ENCOUNTER — TELEPHONE (OUTPATIENT)
Dept: CARDIOLOGY CLINIC | Age: 85
End: 2020-07-24

## 2020-07-24 VITALS
TEMPERATURE: 97.8 F | BODY MASS INDEX: 30.62 KG/M2 | RESPIRATION RATE: 19 BRPM | WEIGHT: 206.7 LBS | DIASTOLIC BLOOD PRESSURE: 62 MMHG | SYSTOLIC BLOOD PRESSURE: 118 MMHG | HEIGHT: 69 IN | HEART RATE: 88 BPM | OXYGEN SATURATION: 95 %

## 2020-07-24 DIAGNOSIS — I25.10 CORONARY ARTERY DISEASE INVOLVING NATIVE CORONARY ARTERY OF NATIVE HEART WITHOUT ANGINA PECTORIS: Primary | ICD-10-CM

## 2020-07-24 DIAGNOSIS — I10 ESSENTIAL HYPERTENSION: ICD-10-CM

## 2020-07-24 PROBLEM — I50.30 DIASTOLIC CHF (HCC): Status: ACTIVE | Noted: 2020-07-21

## 2020-07-24 LAB
ANION GAP SERPL CALC-SCNC: 8 MMOL/L (ref 5–15)
BUN SERPL-MCNC: 29 MG/DL (ref 6–20)
BUN/CREAT SERPL: 26 (ref 12–20)
CALCIUM SERPL-MCNC: 9.1 MG/DL (ref 8.5–10.1)
CHLORIDE SERPL-SCNC: 98 MMOL/L (ref 97–108)
CO2 SERPL-SCNC: 32 MMOL/L (ref 21–32)
CREAT SERPL-MCNC: 1.1 MG/DL (ref 0.7–1.3)
GLUCOSE SERPL-MCNC: 121 MG/DL (ref 65–100)
MAGNESIUM SERPL-MCNC: 2.4 MG/DL (ref 1.6–2.4)
POTASSIUM SERPL-SCNC: 3.4 MMOL/L (ref 3.5–5.1)
SODIUM SERPL-SCNC: 138 MMOL/L (ref 136–145)

## 2020-07-24 PROCEDURE — 74011000250 HC RX REV CODE- 250: Performed by: NURSE PRACTITIONER

## 2020-07-24 PROCEDURE — 74011250637 HC RX REV CODE- 250/637: Performed by: INTERNAL MEDICINE

## 2020-07-24 PROCEDURE — 80048 BASIC METABOLIC PNL TOTAL CA: CPT

## 2020-07-24 PROCEDURE — 74011000250 HC RX REV CODE- 250: Performed by: INTERNAL MEDICINE

## 2020-07-24 PROCEDURE — 74011250637 HC RX REV CODE- 250/637: Performed by: NURSE PRACTITIONER

## 2020-07-24 PROCEDURE — 94640 AIRWAY INHALATION TREATMENT: CPT

## 2020-07-24 PROCEDURE — 83735 ASSAY OF MAGNESIUM: CPT

## 2020-07-24 PROCEDURE — 36415 COLL VENOUS BLD VENIPUNCTURE: CPT

## 2020-07-24 RX ORDER — ALBUTEROL SULFATE 0.83 MG/ML
2.5 SOLUTION RESPIRATORY (INHALATION)
Status: DISCONTINUED | OUTPATIENT
Start: 2020-07-24 | End: 2020-07-24 | Stop reason: HOSPADM

## 2020-07-24 RX ORDER — POTASSIUM CHLORIDE 750 MG/1
40 TABLET, FILM COATED, EXTENDED RELEASE ORAL
Status: COMPLETED | OUTPATIENT
Start: 2020-07-24 | End: 2020-07-24

## 2020-07-24 RX ORDER — POTASSIUM CHLORIDE 1500 MG/1
20 TABLET, FILM COATED, EXTENDED RELEASE ORAL DAILY
Qty: 30 TAB | Refills: 1 | Status: SHIPPED | OUTPATIENT
Start: 2020-07-25 | End: 2020-11-18 | Stop reason: SDUPTHER

## 2020-07-24 RX ORDER — FUROSEMIDE 40 MG/1
TABLET ORAL
Qty: 60 TAB | Refills: 1 | Status: SHIPPED | OUTPATIENT
Start: 2020-07-24 | End: 2020-07-31 | Stop reason: DRUGHIGH

## 2020-07-24 RX ORDER — POTASSIUM CHLORIDE 750 MG/1
20 TABLET, FILM COATED, EXTENDED RELEASE ORAL DAILY
Status: DISCONTINUED | OUTPATIENT
Start: 2020-07-25 | End: 2020-07-24 | Stop reason: HOSPADM

## 2020-07-24 RX ORDER — CEPHALEXIN 250 MG/1
250 CAPSULE ORAL 2 TIMES DAILY
Status: DISCONTINUED | OUTPATIENT
Start: 2020-07-24 | End: 2020-07-24 | Stop reason: HOSPADM

## 2020-07-24 RX ORDER — IPRATROPIUM BROMIDE 0.5 MG/2.5ML
0.5 SOLUTION RESPIRATORY (INHALATION)
Status: DISCONTINUED | OUTPATIENT
Start: 2020-07-24 | End: 2020-07-24 | Stop reason: HOSPADM

## 2020-07-24 RX ORDER — CEPHALEXIN 250 MG/1
250 CAPSULE ORAL 2 TIMES DAILY
Qty: 8 CAP | Refills: 0 | Status: SHIPPED | OUTPATIENT
Start: 2020-07-24 | End: 2020-07-28

## 2020-07-24 RX ADMIN — IPRATROPIUM BROMIDE 0.5 MG: 0.5 SOLUTION RESPIRATORY (INHALATION) at 08:02

## 2020-07-24 RX ADMIN — CEPHALEXIN 250 MG: 250 CAPSULE ORAL at 08:50

## 2020-07-24 RX ADMIN — FUROSEMIDE 40 MG: 40 TABLET ORAL at 12:51

## 2020-07-24 RX ADMIN — FUROSEMIDE 40 MG: 40 TABLET ORAL at 08:50

## 2020-07-24 RX ADMIN — POTASSIUM CHLORIDE 40 MEQ: 750 TABLET, FILM COATED, EXTENDED RELEASE ORAL at 15:02

## 2020-07-24 RX ADMIN — METOPROLOL SUCCINATE 25 MG: 25 TABLET, EXTENDED RELEASE ORAL at 12:48

## 2020-07-24 RX ADMIN — ARFORMOTEROL TARTRATE 15 MCG: 15 SOLUTION RESPIRATORY (INHALATION) at 08:03

## 2020-07-24 RX ADMIN — COLESEVELAM HCL 1875 MG: 625 TABLET, FILM COATED ORAL at 08:50

## 2020-07-24 RX ADMIN — BUDESONIDE 500 MCG: 0.5 INHALANT RESPIRATORY (INHALATION) at 08:02

## 2020-07-24 RX ADMIN — APIXABAN 5 MG: 5 TABLET, FILM COATED ORAL at 08:50

## 2020-07-24 RX ADMIN — WHITE PETROLATUM: 1.75 OINTMENT TOPICAL at 08:58

## 2020-07-24 NOTE — PROGRESS NOTES
Pharmacist Discharge Medication Reconciliation    Discharging Provider: José Miguel Lester NP    Significant PMH:   Past Medical History:   Diagnosis Date    Asthma     BRONCITITS, INHALER USE PRN    CAD (coronary artery disease)     MI    Cancer ([facility] 2006    PROTATE    Chronic obstructive pulmonary disease ([facility]     Diabetes ([facility]     BORDERLINE    Hypercholesterolemia     Hypertension     Umbilical hernia 4/03/6610    Venous insufficiency (chronic) (peripheral)      Chief Complaint for this Admission: No chief complaint on file. Allergies: Adhesive    Discharge Medications:   Current Discharge Medication List        START taking these medications    Details   cephALEXin (KEFLEX) 250 mg capsule Take 1 Cap by mouth two (2) times a day for 4 days. Qty: 8 Cap, Refills: 0      pantothenic ac-min oil-pet,hyd (AQUAPHOR) 41 % ointment Apply  to affected area two (2) times a day. Qty: 53 g, Refills: 0      potassium chloride SR (K-TAB) 20 mEq tablet Take 1 Tab by mouth daily. Qty: 30 Tab, Refills: 1           CONTINUE these medications which have CHANGED    Details   furosemide (LASIX) 40 mg tablet Take 1 tablet 2 times per day  Qty: 60 Tab, Refills: 1           CONTINUE these medications which have NOT CHANGED    Details   fluticasone-umeclidinium-vilanterol (TRELEGY ELLIPTA) 100-62.5-25 mcg inhaler Take 1 Puff by inhalation daily. atorvastatin (LIPITOR) 80 mg tablet Take 80 mg by mouth nightly. ELIQUIS 5 mg tablet TAKE 1 TABLET TWICE A DAY TO PREVENT THROMBOEMBOLISM IN CHRONIC ATRIAL FIBRILLATION  Qty: 180 Tab, Refills: 4    Associated Diagnoses: Persistent atrial fibrillation ([facility] Essential hypertension; Coronary artery disease involving native coronary artery of native heart without angina pectoris;  Mixed hyperlipidemia      metoprolol succinate (TOPROL XL) 25 mg XL tablet TAKE 1 TABLET DAILY  Qty: 90 Tab, Refills: 1      colesevelam (WELCHOL) 625 mg tablet TAKE 3 TABLETS TWICE A DAY WITH MEALS  Qty: 540 Tab, Refills: 0      MULTIVITAMIN PO Take 1 Tab by mouth daily.              The patient's chart, MAR and AVS were reviewed by Meenu Retana PHARMD.

## 2020-07-24 NOTE — DISCHARGE INSTRUCTIONS
DISCHARGE SUMMARY from Nurse    PATIENT INSTRUCTIONS:    After general anesthesia or intravenous sedation, for 24 hours or while taking prescription Narcotics:  · Limit your activities  · Do not drive and operate hazardous machinery  · Do not make important personal or business decisions  · Do  not drink alcoholic beverages  · If you have not urinated within 8 hours after discharge, please contact your surgeon on call. Report the following to your surgeon:  · Excessive pain, swelling, redness or odor of or around the surgical area  · Temperature over 100.5  · Nausea and vomiting lasting longer than 4 hours or if unable to take medications  · Any signs of decreased circulation or nerve impairment to extremity: change in color, persistent  numbness, tingling, coldness or increase pain  · Any questions    What to do at Home:  Recommended activity: Activity as tolerated, ***    If you experience any of the following symptoms ***, please follow up with ***. *  Please give a list of your current medications to your Primary Care Provider. *  Please update this list whenever your medications are discontinued, doses are      changed, or new medications (including over-the-counter products) are added. *  Please carry medication information at all times in case of emergency situations. These are general instructions for a healthy lifestyle:    No smoking/ No tobacco products/ Avoid exposure to second hand smoke  Surgeon General's Warning:  Quitting smoking now greatly reduces serious risk to your health.     Obesity, smoking, and sedentary lifestyle greatly increases your risk for illness    A healthy diet, regular physical exercise & weight monitoring are important for maintaining a healthy lifestyle    You may be retaining fluid if you have a history of heart failure or if you experience any of the following symptoms:  Weight gain of 3 pounds or more overnight or 5 pounds in a week, increased swelling in our hands or feet or shortness of breath while lying flat in bed. Please call your doctor as soon as you notice any of these symptoms; do not wait until your next office visit. The discharge information has been reviewed with the patient. The patient verbalized understanding. Discharge medications reviewed with the patient and appropriate educational materials and side effects teaching were provided. ___________________________________________________________________________________________________________________________________Please obtain Lendia Tomasz next week (Wednesday or Thursday). Bring prescription for lab (BMP) with you to the lab of your choice. (There is a [facility] in Dr. Sanders Mohs office building).

## 2020-07-24 NOTE — PROGRESS NOTES
Bedside shift change report given to April RN (oncoming nurse) by Cherl Habermann RN (offgoing nurse). Report included the following information SBAR, Kardex, Intake/Output, MAR and Cardiac Rhythm A fib.

## 2020-07-24 NOTE — NURSE NAVIGATOR
MASTER called to request a follow up appointment. Unable to schedule and had to leave a message for Dr. Talia Faustin nurse. Waiting return call.

## 2020-07-24 NOTE — TELEPHONE ENCOUNTER
Fernando Sosa with BS heart failure requesting to schedule a f/u with Dr. Moreno Black by 7/31 for the pt.  Please advise        Phone:[phone]

## 2020-07-24 NOTE — DISCHARGE SUMMARY
Cardiology Discharge Summary     Patient ID:  Ajay Julien  [id number]  4 y.o.  1935    Admit Date: 7/21/2020    Discharge Date: 7/24/2020    Admitting Physician: Chadd Saldana MD     Discharge Physician: Chadd Saldana MD    Admission Diagnoses:   Venous insufficiency [I87.2]  Acute on chronic diastolic CHF (congestive heart failure) ([facility] [I50.33]    Discharge Diagnoses: Active Problems:    Venous insufficiency (10/11/2016)      Venous insufficiency of both lower extremities (48/78/7346)      Diastolic CHF ([address] (3/79/8269)      Venous stasis ulcer (HCC) (7/21/2020)      Hypokalemia (7/21/2020)    Acute on chronic diastolic CHF  Cellulitis. Discharge Condition: Good    Cardiology Procedures this Admission:      Consults: None    Hospital Course: Tim Dickson a 89 y.o.  male admitted for Venous insufficiency [I87.2] Acute on chronic diastolic CHF (congestive heart failure) [facility] [I50.33]. He has PMH of  CAD, afib, mild LV dysfunction, dyslipidemia, diastolic heart failure,COPD, lower extremity edema with recent right popliteal DVT- now resolved. He was exhibiting significant BLE edema, weeping and ulcer at which was not being successfully treated with po diuretics at home. Thus, he was a direct admit to hospital for IV diuresis. He was initially treated with IV lasix infusion for 24 hours, then to intermittent IV lasix. By day of discharge, he was on po lasix. He diuresed 8.4 Liters and had significant improvement in BLE edema (see pictures below). He received wound care consult for LLE small ulcer which was filled in and required only topical lotion. A course of Keflex was initiated for treatment of BLE cellulitis which had improved by day of discharge. He was NYHA class II at discharge. He was ambulatory at discharge and will follow up with Dr. Rachel Cannon in 2 weeks.        Visit Vitals  /62   Pulse 88   Temp 97.8 °F (36.6 °C)   Resp 19   Ht 5' 9\" (1.753 m)   Wt 206 lb 11.2 oz (93.8 kg)   SpO2 95%   BMI 30.52 kg/m²       Physical Exam  Abdomen: soft, non-tender. Bowel sounds normal.   Extremities: tr-no BLE edema, + PP bilaterally   Heart: regular rate and rhythm, S1, S2 normal, no murmurs, clicks, rubs or gallops  Lungs: clear to auscultation bilaterally  Neck: supple,   Neurologic: Grossly normal  Pulses: 2+ and symmetrical    Labs: No results for input(s): WBC, HGB, HCT, PLT, HGBEXT, HCTEXT, PLTEXT, HGBEXT, HCTEXT, PLTEXT in the last 72 hours. Recent Labs     07/24/20  0419 07/23/20  0419 07/22/20  0254    140 137   K 3.4* 3.5 3.5   CL 98 99 98   CO2 32 33* 34*   * 119* 114*   BUN 29* 24* 16   CREA 1.10 1.19 0.98   CA 9.1 8.9 8.6   MG 2.4  --  1.8               No results for input(s): TROIQ, CPK, CKMB in the last 72 hours. EKG:   CXR:   Other Diagnostic Tests:   Device check:     Disposition: home    Patient Instructions:   Current Discharge Medication List      START taking these medications    Details   cephALEXin (KEFLEX) 250 mg capsule Take 1 Cap by mouth two (2) times a day for 4 days. Qty: 8 Cap, Refills: 0      pantothenic ac-min oil-pet,hyd (AQUAPHOR) 41 % ointment Apply  to affected area two (2) times a day. Qty: 53 g, Refills: 0      potassium chloride SR (K-TAB) 20 mEq tablet Take 1 Tab by mouth daily. Qty: 30 Tab, Refills: 1         CONTINUE these medications which have CHANGED    Details   furosemide (LASIX) 40 mg tablet Take 1 tablet 2 times per day  Qty: 60 Tab, Refills: 1         CONTINUE these medications which have NOT CHANGED    Details   fluticasone-umeclidinium-vilanterol (TRELEGY ELLIPTA) 100-62.5-25 mcg inhaler Take 1 Puff by inhalation daily. atorvastatin (LIPITOR) 80 mg tablet Take 80 mg by mouth nightly. ELIQUIS 5 mg tablet TAKE 1 TABLET TWICE A DAY TO PREVENT THROMBOEMBOLISM IN CHRONIC ATRIAL FIBRILLATION  Qty: 180 Tab, Refills: 4    Associated Diagnoses: Persistent atrial fibrillation ([facility]  Essential hypertension; Coronary artery disease involving native coronary artery of native heart without angina pectoris; Mixed hyperlipidemia      metoprolol succinate (TOPROL XL) 25 mg XL tablet TAKE 1 TABLET DAILY  Qty: 90 Tab, Refills: 1      colesevelam (WELCHOL) 625 mg tablet TAKE 3 TABLETS TWICE A DAY WITH MEALS  Qty: 540 Tab, Refills: 0      MULTIVITAMIN PO Take 1 Tab by mouth daily. Reference discharge instructions provided by nursing for diet and activity. Follow-up with   No future appointments. Signed:  Nancy Lora NP

## 2020-07-24 NOTE — PROGRESS NOTES
Problem: Falls - Risk of  Goal: *Absence of Falls  Description: Document Nikhil Anne Fall Risk and appropriate interventions in the flowsheet.   Outcome: Progressing Towards Goal  Note: Fall Risk Interventions:  Mobility Interventions: Patient to call before getting OOB         Medication Interventions: Teach patient to arise slowly    Elimination Interventions: Call light in reach

## 2020-07-25 ENCOUNTER — PATIENT OUTREACH (OUTPATIENT)
Dept: CASE MANAGEMENT | Age: 85
End: 2020-07-25

## 2020-07-25 NOTE — PROGRESS NOTES
Patient contacted regarding recent discharge and COVID-19 risk. Discussed COVID-19 related testing which was not done at this time. Test results were not done. Patient informed of results, if available? no    Care Transition Nurse/ Ambulatory Care Manager contacted the patient by telephone to perform post discharge assessment. Verified name and  with patient as identifiers. Patient has following risk factors of: heart failure, COPD, asthma and diabetes. CTN/ACM reviewed discharge instructions, medical action plan and red flags related to discharge diagnosis. Reviewed and educated them on any new and changed medications related to discharge diagnosis. Advised obtaining a 90-day supply of all daily and as-needed medications. Advance Care Planning:   Does patient have an Advance Directive: patient declined education     Education provided regarding infection prevention, and signs and symptoms of COVID-19 and when to seek medical attention with patient who verbalized understanding. Discussed exposure protocols and quarantine from [address] you at higher risk for severe illness  and given an opportunity for questions and concerns. The patient agrees to contact the COVID-19 hotline [phone] or PCP office for questions related to their healthcare. CTN/ACM provided contact information for future reference. From CDC: Are you at higher risk for severe illness?  Wash your hands often.  Avoid close contact (6 feet, which is about two arm lengths) with people who are sick.  Put distance between yourself and other people if COVID-19 is spreading in your community.  Clean and disinfect frequently touched surfaces.  Avoid all cruise travel and non-essential air travel.  Call your healthcare professional if you have concerns about COVID-19 and your underlying condition or if you are sick.     For more information on steps you can take to protect yourself, see CDC's How to Protect Yourself      Patient/family/caregiver given information for [employer] and agrees to enroll no    Plan for follow-up call in 7-14 days based on severity of symptoms and risk factors.

## 2020-07-27 ENCOUNTER — VIRTUAL VISIT (OUTPATIENT)
Dept: INTERNAL MEDICINE CLINIC | Age: 85
End: 2020-07-27

## 2020-07-27 DIAGNOSIS — I50.30 DIASTOLIC CONGESTIVE HEART FAILURE, UNSPECIFIED HF CHRONICITY (HCC): ICD-10-CM

## 2020-07-27 DIAGNOSIS — I87.2 VENOUS INSUFFICIENCY OF BOTH LOWER EXTREMITIES: Primary | ICD-10-CM

## 2020-07-27 NOTE — PROGRESS NOTES
1. Have you been to the ER, urgent care clinic since your last visit? Hospitalized since your last visit? No    2. Have you seen or consulted any other health care providers outside of the [address] [place] [address] since your last visit? Include any pap smears or colon screening.  Yes    Chief Complaint   Patient presents with   [facility] Follow Up     patient went to [facility] for edema in both legs

## 2020-07-27 NOTE — PROGRESS NOTES
Mauricio Salmon is a 80 y.o. male who was seen by synchronous (real-time) audio-video technology on 7/27/2020 for Hospital Follow Up (patient went to [facility] for edema in both legs)        Assessment & Plan:   Diagnoses and all orders for this visit:    1. Venous insufficiency of both lower extremities    2. Diastolic congestive heart failure, unspecified HF chronicity ([address]      Advised the patient make sure that he does follow-up with Dr. Marcia Johnson. There continue to wrap and elevate legs. Patient should contact the office if any further concerns. 712  Subjective:   Patient presents for transitional care visit after having a hospitalization from July 21 to July 24. He presents today with his daughter. They report since being discharged from the hospital he is continued to make progress with the swelling of his lower extremities. She reports that she has a very difficult time with getting compression stockings on him. Currently she has been putting on very thin socks and then apply an Ace wrap to his legs. He reports that he is doing just fine. He reports he is not having any increase of shortness of breath and no chest pain. He is scheduled to follow-up with Dr. Marcia Johnson to in 2 weeks. He reports that his legs are no longer sore. Prior to Admission medications    Medication Sig Start Date End Date Taking? Authorizing Provider   furosemide (LASIX) 40 mg tablet Take 1 tablet 2 times per day 7/24/20  Yes Montrell HORTON NP   cephALEXin (KEFLEX) 250 mg capsule Take 1 Cap by mouth two (2) times a day for 4 days. 7/24/20 7/28/20 Yes Eloisa Burrows, NP   pantothenic ac-min oil-pet,hyd (AQUAPHOR) 41 % ointment Apply  to affected area two (2) times a day. 7/24/20  Yes Eloisa Burrows NP   potassium chloride SR (K-TAB) 20 mEq tablet Take 1 Tab by mouth daily. 7/25/20  Yes Eloisa Burrows, NP   fluticasone-umeclidinium-vilanterol (TRELEGY ELLIPTA) 100-62.5-25 mcg inhaler Take 1 Puff by inhalation daily. Yes Provider, Historical   atorvastatin (LIPITOR) 80 mg tablet Take 80 mg by mouth nightly. Yes Provider, Historical   ELIQUIS 5 mg tablet TAKE 1 TABLET TWICE A DAY TO PREVENT THROMBOEMBOLISM IN CHRONIC ATRIAL FIBRILLATION 9/28/19  Yes Filipe Quezada MD   metoprolol succinate (TOPROL XL) 25 mg XL tablet TAKE 1 TABLET DAILY 4/5/18  Yes Filipe Quezada MD   [facility]) 625 mg tablet TAKE 3 TABLETS TWICE A DAY WITH MEALS 4/5/18  Yes Filipe Quezada MD   MULTIVITAMIN PO Take 1 Tab by mouth daily. Provider, Historical     Patient Active Problem List    Diagnosis Date Noted    Diastolic CHF ([facility] 08/73/2869    Venous stasis ulcer ([facility] 07/21/2020    Hypokalemia 07/21/2020    DM type 2 causing vascular disease ([facility] 10/25/2019    Chronic deep vein thrombosis (DVT) of left peroneal vein ([facility] 10/25/2019    CHF (congestive heart failure) ([facility] 10/24/2019    HTN (hypertension) 10/24/2019    Leg ulcer ([facility] 10/24/2019    Atrial fibrillation ([facility] 03/15/2017    Venous insufficiency 10/11/2016    Venous insufficiency of both lower extremities 10/11/2016    Essential hypertension 10/08/2015    CAD (coronary artery disease) 04/08/2010    HLD (hyperlipidemia) 04/08/2010    Borderline diabetes mellitus 04/08/2010    Prostate cancer ([facility] 04/08/2010    Asthma 04/08/2010     Current Outpatient Medications   Medication Sig Dispense Refill    furosemide (LASIX) 40 mg tablet Take 1 tablet 2 times per day 60 Tab 1    cephALEXin (KEFLEX) 250 mg capsule Take 1 Cap by mouth two (2) times a day for 4 days. 8 Cap 0    pantothenic ac-min oil-pet,hyd (AQUAPHOR) 41 % ointment Apply  to affected area two (2) times a day. 53 g 0    potassium chloride SR (K-TAB) 20 mEq tablet Take 1 Tab by mouth daily. 30 Tab 1    fluticasone-umeclidinium-vilanterol (TRELEGY ELLIPTA) 100-62.5-25 mcg inhaler Take 1 Puff by inhalation daily.  atorvastatin (LIPITOR) 80 mg tablet Take 80 mg by mouth nightly.       ELIQUIS 5 mg tablet TAKE 1 TABLET TWICE A DAY TO PREVENT THROMBOEMBOLISM IN CHRONIC ATRIAL FIBRILLATION 180 Tab 4    metoprolol succinate (TOPROL XL) 25 mg XL tablet TAKE 1 TABLET DAILY 90 Tab 1    colesevelam (WELCHOL) 625 mg tablet TAKE 3 TABLETS TWICE A DAY WITH MEALS 540 Tab 0    MULTIVITAMIN PO Take 1 Tab by mouth daily. Allergies   Allergen Reactions    Latex, Natural Rubber Hives    Adhesive Other (comments)     BLISTERS       ROS  See above  Objective:     Patient-Reported Vitals 6/26/2020   Patient-Reported Weight 215lbs      General: alert, cooperative, no distress patient resting in chair. Mental  status: normal mood, behavior, speech, dress, motor activity, and thought processes, able to follow commands           Resp: no respiratory distress       Skin:  Bilateral lower extremities scaling of the skin noted. Currently no erythema. No open areas with discharge noted. When asked patient denies tenderness with palpation. Mild edema noted. Psychiatric: normal affect, consistent with stated mood, no evidence of hallucinations     Additional exam findings: We discussed the expected course, resolution and complications of the diagnosis(es) in detail. Medication risks, benefits, costs, interactions, and alternatives were discussed as indicated. I advised him to contact the office if his condition worsens, changes or fails to improve as anticipated. He expressed understanding with the diagnosis(es) and plan. Ciaran Edmonds, who was evaluated through a patient-initiated, synchronous (real-time) audio-video encounter, and/or his healthcare decision maker, is aware that it is a billable service, with coverage as determined by his insurance carrier. He provided verbal consent to proceed: Yes, and patient identification was verified.  It was conducted pursuant to the emergency declaration under the [facility] 1135 waiver authority and the Coronavirus Preparedness and Response Supplemental Appropriations Act. A caregiver was present when appropriate. Ability to conduct physical exam was limited. I was at home. The patient was at home.       Gloria Schulte PA-C

## 2020-07-30 ENCOUNTER — HOSPITAL ENCOUNTER (OUTPATIENT)
Dept: LAB | Age: 85
Discharge: HOME | End: 2020-07-30

## 2020-07-30 DIAGNOSIS — I87.2 VENOUS INSUFFICIENCY OF BOTH LOWER EXTREMITIES: ICD-10-CM

## 2020-07-30 LAB
ANION GAP SERPL CALC-SCNC: 4 MMOL/L (ref 5–15)
BUN SERPL-MCNC: 32 MG/DL (ref 6–20)
BUN/CREAT SERPL: 22 (ref 12–20)
CALCIUM SERPL-MCNC: 9 MG/DL (ref 8.5–10.1)
CHLORIDE SERPL-SCNC: 100 MMOL/L (ref 97–108)
CO2 SERPL-SCNC: 36 MMOL/L (ref 21–32)
CREAT SERPL-MCNC: 1.43 MG/DL (ref 0.7–1.3)
GLUCOSE SERPL-MCNC: 133 MG/DL (ref 65–100)
POTASSIUM SERPL-SCNC: 3.6 MMOL/L (ref 3.5–5.1)
SODIUM SERPL-SCNC: 140 MMOL/L (ref 136–145)

## 2020-07-31 ENCOUNTER — PATIENT OUTREACH (OUTPATIENT)
Dept: CASE MANAGEMENT | Age: 85
End: 2020-07-31

## 2020-07-31 RX ORDER — FUROSEMIDE 40 MG/1
40 TABLET ORAL
COMMUNITY
End: 2020-11-18 | Stop reason: SDUPTHER

## 2020-07-31 NOTE — TELEPHONE ENCOUNTER
Verified patient with two patient identifiers. Spoke with Mrs. Sloane Booker and was advised per  to decrease Lasix 40 mg daily and repeat BMP in 1 week. Will mail lab slip to patient. Patient's care taker verbalized understanding.

## 2020-07-31 NOTE — PROGRESS NOTES
Patient was admitted to [facility] on  and discharged on  for Diuresis/CHF. Patient was contacted within 1 business days of discharge by Kiana Bazzi RN. This CTN was assigned to patient 20 for CHF Bundle. Top Discharge Challenges to be reviewed by the provider   Additional needs identified to be addressed with provider no---patient already had appt with  PCP on 20    Wound Care follow up ---CTN will address with patient    No CXR or Echo performed this admission:  Last Echo  10/25/19  · Left Ventricle: Normal cavity size and wall thickness. Mild systolic dysfunction. Estimated left ventricular ejection fraction is 41 - 45%. Abnormal left ventricular wall motion. Labs at discharge:   20  0419 20  0419 20  0254    140 137   K 3.4* 3.5 3.5   CL 98 99 98   CO2 32 33* 34*   * 119* 114*   BUN 29* 24* 16   CREA 1.10 1.19 0.98   CA 9.1 8.9 8.6   MG 2.4  --  1.8     Patient started on PO Potassium at discharge. Patient completed PO Keflex    Patient Lasix increased to 40 mg BID    Discussed COVID-19 related testing which was not done at this time. Test results were not done. Patient informed of results, if available? na   Method of communication with provider : none       Advance Care Planning:   Does patient have an Advance Directive:  decision makers updated Per IP CM note, patient declined completed ACP    Inpatient Readmission Risk score: 52  Was this a readmission? no     Patients top risk factors for readmission: lack of knowledge about disease  Interventions to address risk factors: Provide education, ensure follow up appts, provide resources    Care Transition Nurse (CTN) contacted the family by telephone to perform post hospital discharge assessment. Verified name and  with family as identifiers. Provided introduction to self, and explanation of the CTN role.      CTN reviewed discharge instructions, medical action plan and red flags with family who verbalized understanding. Family given an opportunity to ask questions and does not have any further questions or concerns at this time. The family agrees to contact the PCP office for questions related to their healthcare. Medication reconciliation was performed with family, who verbalizes understanding of administration of home medications. Advised obtaining a 90-day supply of all daily and as-needed medications. Referral to Pharm D needed: no     Home Health/Outpatient orders at discharge: refused      Covid Risk Education---See note dated 7/25/20 by Kiana Bazzi post discharge      Discussed follow-up appointments. If no appointment was previously scheduled, appointment scheduling offered: yes  [facility] follow up appointment(s):   Future Appointments   Date Time Provider Radha Bahena   8/12/2020  2:40 PM MD YAS Zapata [facility]     Non-[facility] follow up appointment(s): lyla  Plan for follow-up call in 7-10 days based on severity of symptoms and risk factors. CTN provided contact information for future needs. Goals      Prevent complications post hospitalization. 07/31/20  CTN spoke to patient's daughter Amy to review discharge instructions/red flags to prevent readmission. Appts:      Patient already had virtual visit with PCP on 7/27/20. No new orders or changes. Patient has a follow up with Dr Zuleyma Walter, haja on 8/12 at 2:40. CTN asked about wound care follow up---Anusha reports patient's legs are healed and only has scabs, no open areas. She reports patient is keeping legs wrapped for compression. Amy states that Dr Zuleyma Walter was the MD that sent patient to see wound care so she will talk to him at the upcoming appt if patient needs follow up. Confirmed with Amy that patient was able to get all medications at discharge and is taking as directed.     CTN educated daughter on daily weights and when to call MD. Sh estates patient had not been performing daily weights but that they do have a scale that she can put in his room. Last documented weight is 206 lbs. Daughter educated on low sodium diet. She reports that she is watching his salt intake, have made substitutions for salt and is aware of reasoning behind low salt diet as it relates to HF. Juice Helms does not have any questions/concerns for CTN at this time.   CTN will follow up with patient/family for updates in 7-10 days=---alexi

## 2020-08-07 NOTE — TELEPHONE ENCOUNTER
Patient's daughter would like to know if patient needs to have blood work done again. She states he had labs done that were ordered by hospital when he was discharged and would like to know if that is sufficient? Please advise.    Phone: [phone]

## 2020-08-07 NOTE — TELEPHONE ENCOUNTER
Spoke to patient's daughter, Homero Coffman. Name noted on permission to release information. Informed to have labs 1 week after starting decreased dose of lasix. To have obtained on 8-10-20. Confirmed follow up with Dr. Claudia Brown on 8-12-20.      Future Appointments   Date Time Provider Radha Bahena   8/12/2020  2:40 PM MD YAS Berger [facility]

## 2020-08-10 ENCOUNTER — HOSPITAL ENCOUNTER (OUTPATIENT)
Dept: LAB | Age: 85
Discharge: HOME | End: 2020-08-10
Payer: MEDICARE

## 2020-08-10 PROCEDURE — 36415 COLL VENOUS BLD VENIPUNCTURE: CPT

## 2020-08-10 PROCEDURE — 80048 BASIC METABOLIC PNL TOTAL CA: CPT

## 2020-08-11 LAB
BUN SERPL-MCNC: 16 MG/DL (ref 8–27)
BUN/CREAT SERPL: 15 (ref 10–24)
CALCIUM SERPL-MCNC: 9.1 MG/DL (ref 8.6–10.2)
CHLORIDE SERPL-SCNC: 99 MMOL/L (ref 96–106)
CO2 SERPL-SCNC: 27 MMOL/L (ref 20–29)
CREAT SERPL-MCNC: 1.08 MG/DL (ref 0.76–1.27)
GLUCOSE SERPL-MCNC: 101 MG/DL (ref 65–99)
POTASSIUM SERPL-SCNC: 4.7 MMOL/L (ref 3.5–5.2)
SODIUM SERPL-SCNC: 140 MMOL/L (ref 134–144)

## 2020-08-12 ENCOUNTER — OFFICE VISIT (OUTPATIENT)
Dept: CARDIOLOGY CLINIC | Age: 85
End: 2020-08-12
Payer: MEDICARE

## 2020-08-12 VITALS
RESPIRATION RATE: 16 BRPM | SYSTOLIC BLOOD PRESSURE: 110 MMHG | HEART RATE: 46 BPM | OXYGEN SATURATION: 97 % | WEIGHT: 217 LBS | DIASTOLIC BLOOD PRESSURE: 60 MMHG | BODY MASS INDEX: 32.14 KG/M2 | HEIGHT: 69 IN

## 2020-08-12 DIAGNOSIS — I10 ESSENTIAL HYPERTENSION: ICD-10-CM

## 2020-08-12 DIAGNOSIS — E11.59 DM TYPE 2 CAUSING VASCULAR DISEASE (HCC): ICD-10-CM

## 2020-08-12 DIAGNOSIS — I48.21 PERMANENT ATRIAL FIBRILLATION (HCC): ICD-10-CM

## 2020-08-12 DIAGNOSIS — L97.921 ULCER OF LEFT LOWER EXTREMITY, LIMITED TO BREAKDOWN OF SKIN (HCC): ICD-10-CM

## 2020-08-12 DIAGNOSIS — I50.32 CHRONIC DIASTOLIC CONGESTIVE HEART FAILURE (HCC): ICD-10-CM

## 2020-08-12 DIAGNOSIS — I73.9 PERIPHERAL VASCULAR DISEASE (HCC): ICD-10-CM

## 2020-08-12 DIAGNOSIS — I87.2 VENOUS INSUFFICIENCY: ICD-10-CM

## 2020-08-12 DIAGNOSIS — I25.10 CORONARY ARTERY DISEASE INVOLVING NATIVE CORONARY ARTERY OF NATIVE HEART WITHOUT ANGINA PECTORIS: Primary | ICD-10-CM

## 2020-08-12 DIAGNOSIS — I25.10 CORONARY ARTERY DISEASE INVOLVING NATIVE CORONARY ARTERY OF NATIVE HEART WITHOUT ANGINA PECTORIS: ICD-10-CM

## 2020-08-12 PROCEDURE — 99215 OFFICE O/P EST HI 40 MIN: CPT | Performed by: INTERNAL MEDICINE

## 2020-08-12 NOTE — PROGRESS NOTES
Progress Note    Patient: Meagan Sharma MRN: [id number]  SSN: [id number]    YOB: 1935  Age: 80 y.o. Sex: male        Subjective:     Mr. Noelle Fonseca is an 19-year-old white male with known history of coronary artery disease status post coronary artery bypass graft surgery 1995, COPD, atrial fibrillation, right popliteal DVT, mild LV dysfunction, with lower extremity edema and ulcerations due to venous insufficieny and chronic diastolic heart failure. Recent echocardiogram showed stability of his mild LV dysfunction.  He did done well with diuresis with resolution of symptoms. Anticoagulated for DVT. Admitted to [facility] 7/21 to 7/24/2020 for IV diuresis. Impatient to get home despite incomplete resolution of edema. .     Since then he has re-accumulated fluid and started to weep in the left leg. He is currently staying with his daughter, but will go back home in a week. No chest pain. No shortness of breath. Complains that stockings and ACE wraps itch.        Past Medical History:   Diagnosis Date    Asthma     BRONCITITS, INHALER USE PRN    CAD (coronary artery disease)     MI    Cancer ([facility] [address] [address] 2006    PROTATE    Chronic obstructive pulmonary disease (HCC)     Diabetes ([facility] [address]     BORDERLINE    Hypercholesterolemia     Hypertension     Umbilical hernia 2/85/8391    Venous insufficiency (chronic) (peripheral)         Objective:     Vitals:    08/12/20 1449   BP: 110/60   Pulse: (!) 46   Resp: 16   SpO2: 97%   Weight: 217 lb (98.4 kg)   Height: 5' 9\" (1.753 m)        Physical Exam: Overweight  Head: Normocephalic, atraumatic. Eyes: Pupils equal, round, reactive to light and accomodation, Extra ocular muscles intact. Sclera anicteric. Ears: Grossly responsive to sound. Neck: No adenopathy. No bruits. Throat: No sores or erythema. Heart: Regular rate and rhythm. Normal S1 and S2. No murmurs, gallops, or rub. Lungs: Clear to auscultation bilaterally.  No wheezing, rales, or rhonchi. Abdomen: Soft, non-tender. No guarding or rebound. No hepatosplenomegaly. Bowel sounds active. Ext: 4+ pitting edema with stasis dermatitis. Dressings on shins from weeping. Skin: Stasis dermatitis. No active infection  Neuro: Cranial nerves II through XII intact. Motor and sensory grossly intact. Affect: Appropriate. Alert and interactive. Current Outpatient Medications:     furosemide (Lasix) 40 mg tablet, Take 40 mg by mouth every morning., Disp: , Rfl:     pantothenic ac-min oil-pet,hyd (AQUAPHOR) 41 % ointment, Apply  to affected area two (2) times a day., Disp: 53 g, Rfl: 0    potassium chloride SR (K-TAB) 20 mEq tablet, Take 1 Tab by mouth daily. , Disp: 30 Tab, Rfl: 1    fluticasone-umeclidinium-vilanterol (TRELEGY ELLIPTA) 100-62.5-25 mcg inhaler, Take 1 Puff by inhalation daily. , Disp: , Rfl:     atorvastatin (LIPITOR) 80 mg tablet, Take 80 mg by mouth nightly., Disp: , Rfl:     ELIQUIS 5 mg tablet, TAKE 1 TABLET TWICE A DAY TO PREVENT THROMBOEMBOLISM IN CHRONIC ATRIAL FIBRILLATION, Disp: 180 Tab, Rfl: 4    metoprolol succinate (TOPROL XL) 25 mg XL tablet, TAKE 1 TABLET DAILY, Disp: 90 Tab, Rfl: 1    colesevelam (WELCHOL) 625 mg tablet, TAKE 3 TABLETS TWICE A DAY WITH MEALS, Disp: 540 Tab, Rfl: 0    MULTIVITAMIN PO, Take 1 Tab by mouth daily. , Disp: , Rfl:        Lab/Data Review:  Labs Latest Ref Rng & Units 8/10/2020 7/30/2020 7/24/2020 7/23/2020 7/22/2020 7/21/2020 7/8/2020   WBC 4.1 - 11.1 K/uL - - - - - 8.8 -   RBC 4.10 - 5.70 M/uL - - - - - 4.09(L) -   Hemoglobin 12.1 - 17.0 g/dL - - - - - 13.1 -   Hematocrit 36.6 - 50.3 % - - - - - 41.9 -   MCV 80.0 - 99.0 FL - - - - - 102. 4(H) -   Platelets 698 - 852 K/uL - - - - - 244 -   Albumin 3.5 - 5.0 g/dL - - - - - 2. 8(L) -   Calcium 8.6 - 10.2 mg/dL 9.1 9.0 9.1 8.9 8.6 8.9 9.0   Glucose 65 - 99 mg/dL 101(H) 133(H) 121(H) 119(H) 114(H) 118(H) 107(H)   BUN 8 - 27 mg/dL 16 32(H) 29(H) 24(H) 16 20 20   Creatinine 0.76 - 1.27 mg/dL 1.08 1.43(H) 1.10 1.19 0.98 1.03 1.00   Sodium 134 - 144 mmol/L 140 140 138 140 137 139 145(H)   Potassium 3.5 - 5.2 mmol/L 4.7 3.6 3.4(L) 3.5 3.5 3. 3(L) 4.2   Some recent data might be hidden       Lab Results   Component Value Date/Time    Cholesterol, total 166 12/03/2019 02:30 PM    HDL Cholesterol 57 12/03/2019 02:30 PM    LDL, calculated 88.8 12/03/2019 02:30 PM    VLDL, calculated 20.2 12/03/2019 02:30 PM    Triglyceride 101 12/03/2019 02:30 PM    CHOL/HDL Ratio 2.9 12/03/2019 02:30 PM     Lab Results   Component Value Date/Time    TSH 3.72 10/25/2019 01:42 AM       10/24/19   ECHO ADULT COMPLETE 10/25/2019 10/25/2019    Narrative · Left Ventricle: Normal cavity size and wall thickness. Mild systolic   dysfunction. Estimated left ventricular ejection fraction is 41 - 45%. Abnormal left ventricular wall motion. · Left Atrium: Mildly dilated left atrium. · Aortic Valve: Probably trileaflet aortic valve. Aortic valve sclerosis. Aortic valve leaflet calcification present. · Mitral Valve: Mild mitral annular calcification. Signed by: Dale Chen MD     1/20/2020 Venous Ultrasound:  Interpretation Summary   · There is no evidence of deep venous thrombosis within the bilateral common femoral, femoral or popliteal veins. · Pulsatility of venous flow is consistent with increased central venous pressure. · Bilateral deep venous reflux (>1.0 seconds) is present within the popliteal veins, right > left. · Study is negative for superficial venous reflux. Assessment/Plan:       ICD-10-CM ICD-9-CM    1. Coronary artery disease involving native coronary artery of native heart without angina pectoris  I25.10 414.01    2. Peripheral vascular disease (HCC)  I73.9 443.9    3. Essential hypertension  I10 401.9    4. Venous insufficiency  I87.2 459.81    5. Permanent atrial fibrillation (HCC)  I48.21 427.31    6. DM type 2 causing vascular disease (HCC)  E11.59 250.70      443.81    7. Chronic diastolic congestive heart failure (HCC)  I50.32 428.32      428.0    8. Ulcer of left lower extremity, limited to breakdown of skin [facility])  L97.921 707.10      Mr. Gini Councilman is an 79 yo WM with chronic lower extremity edema due to venous insufficiency and chronic diastolic heart failure. Unfortunately, he has worsening of edema since hospitalization. I reviewed ACE-wraps and support stockings with him and his daughter. Will increase lasix to BID for at least 1 weeks. Reviewed need for potassium supplements. Discussed getting Jobst stockings when edema down to help mobilize fluid. Also discussed sequential compression device (OTC) to help mobilize fluid. May need home health when he returns home. RTC 1 month with BMP.       Signed By: Gabino Lara MD     August 12, 2020

## 2020-08-25 ENCOUNTER — PATIENT OUTREACH (OUTPATIENT)
Dept: CASE MANAGEMENT | Age: 85
End: 2020-08-25

## 2020-08-26 ENCOUNTER — TELEPHONE (OUTPATIENT)
Dept: CARDIOLOGY CLINIC | Age: 85
End: 2020-08-26

## 2020-08-26 NOTE — TELEPHONE ENCOUNTER
----- Message -----   From: Tayler Perez RN   Sent: 8/25/2020   3:30 PM EDT   To: Agustin Cosby RN, Margaux Looney MD   Subject: Question about lasix dosing                       HI,     I called Elyse Nance today---daughter of patient and she reports patient lives with him. I was just following up for CHF Bundle. Fatou James tells me that the patient is taking 40 mg lasix twice a day. I saw in Dr Rivera Sis note that patient was only supposed to double up for a week so I wanted to clarify this. Fatou James said no one told her to stop the increase so they have continued to take double dose. She says patient is wearing compression stockings--she feels that the swelling is better since he's been taking the double lasix and that his feet are not are dark purple anymore. She takes his stocking off at night and does not see any draining ulcers or wounds that she is concerned about. She tell me that he has not had any labs rechecked since seeing Dr Yrn Benton. I asked her about patient weight---she was at work so she didn't have access to patient's log, but she said she would check on it. I told her that he should be around 217lbs and to call if he had gained more. She tell me patient was staying with her sister for a week while she on on vacation however the patient lives with her. She says her sister takes him to most appts since her job is more flexible. I just want to make you aware of what Fatou James is telling me I case this is not the dosing patient should be on---I told her that the office will either call her or instruct me as to what they need to do. My number is [phone]. The number I reached Fatou James on is [phone] (which is listed as patient's number too). Thanks a bunch!      Sis Up RN   Care Transitions Nurse   [facility] Coordination Team   [phone]

## 2020-08-26 NOTE — PROGRESS NOTES
Goals      Prevent complications post hospitalization. 07/31/20  CTN spoke to patient's daughter Kathleen Zuniga to review discharge instructions/red flags to prevent readmission. Appts:      Patient already had virtual visit with PCP on 7/27/20. No new orders or changes. Patient has a follow up with haja Harrison on 8/12 at 2:40. CTN asked about wound care follow up---Anusha reports patient's legs are healed and only has scabs, no open areas. She reports patient is keeping legs wrapped for compression. Kathleen Zuniga states that Dr Dameon Clark was the MD that sent patient to see wound care so she will talk to him at the upcoming appt if patient needs follow up. Confirmed with Kathleen Zuniga that patient was able to get all medications at discharge and is taking as directed. CTN educated daughter on daily weights and when to call MD. She states patient had not been performing daily weights but that they do have a scale that she can put in his room. Last documented weight is 206 lbs. Daughter educated on low sodium diet. She reports that she is watching his salt intake, have made substitutions for salt and is aware of reasoning behind low salt diet as it relates to HF. Kathleen Zuniga does not have any questions/concerns for CTN at this time. CTN will follow up with patient/family for updates in 7-10 days=---mkrw    08/25/20  CTN contacted Anusha to review updates/concerns . She reports patient went to see Dr Dameon Clark on 8/12 --he is managing patient leg edema. She reports swelling has improved some since patient started taking double the amount of lasix. She states the color of his feet/legs is better---not normal but less of a deep purple. She reports no open wounds or ulcers, no draining on socks. She reports patient is wearing compression socks daily--size Number 20. She tells CTN that Dr Dameon Clark hopes to increase compression in a month after patient gets used to wearing these.     CTN asked if patient weighing daily--she states she bought him a scale and it is in his room, he weighs daily and records weights. She was not able to tell me his current weight as she was not at home. CTN asked her to check and to call if it was 5lbs more that 217 lbs as this is last weight documented in computer. She tells CTN that patient continues on lasix 40 mg BID since seeing Dr Glo Ramsey. CTN informed hr that I would clarify with MD as note states patient should only be taking increased dose for 1 week. CTN sent SM to MD and nurse [facility] to clarify lasix dosage and if patient should have had OP BMP drawn yet. Raffy Brumfield reports patient breathing and his spirits are improved. She reports patient does have a cough however she states he has COPD so this is normal--does not feel as if coughing as increased. Patient is up walking in the home, does not report SOB. CTN will follow up with pt/daughter after response received from cards office--alexi    08/26/20  CTN received message from Dr Glo Ramsey to have patient take lasix BID until leg edema went down and patient able to put on compression stockings. He also wanted BMP to be drawn in 2 weeks from OV. CTN notified Raffy Brumfield by phone regarding this. She states she will let her sister know so that she can take pt in for labs.  CTN sent reply message back to MD to let him know I spoke to family---syedrw

## 2020-08-27 NOTE — TELEPHONE ENCOUNTER
LM for RC. Need to advise regarding the increased dose of Lasix per Dr. Vidhi Smith:  Malia Ye MD  You 14 hours ago (6:30 PM)       Yes, continue BID and gets BMP asap. Follow up in 1-2 weeks.    Tobi Sutherland

## 2020-08-28 NOTE — TELEPHONE ENCOUNTER
TC to pt's daughter Warden Gomez, Pt ID verified. Advised to take pt ASAP for BMP. States she still has labslip from last visit and will take him to have this done soon. Advised Dr. Kelsey Dowell would like to move up her appt to the next 1-2 weeks. Pt states she will call back and move her appt once she has her calendar.

## 2020-09-01 ENCOUNTER — HOSPITAL ENCOUNTER (OUTPATIENT)
Dept: LAB | Age: 85
Discharge: HOME | End: 2020-09-01
Payer: MEDICARE

## 2020-09-01 PROCEDURE — 36415 COLL VENOUS BLD VENIPUNCTURE: CPT

## 2020-09-01 PROCEDURE — 80048 BASIC METABOLIC PNL TOTAL CA: CPT

## 2020-09-02 LAB
BUN SERPL-MCNC: 24 MG/DL (ref 8–27)
BUN/CREAT SERPL: 22 (ref 10–24)
CALCIUM SERPL-MCNC: 9.2 MG/DL (ref 8.6–10.2)
CHLORIDE SERPL-SCNC: 104 MMOL/L (ref 96–106)
CO2 SERPL-SCNC: 26 MMOL/L (ref 20–29)
CREAT SERPL-MCNC: 1.11 MG/DL (ref 0.76–1.27)
GLUCOSE SERPL-MCNC: 109 MG/DL (ref 65–99)
POTASSIUM SERPL-SCNC: 4.3 MMOL/L (ref 3.5–5.2)
SODIUM SERPL-SCNC: 144 MMOL/L (ref 134–144)

## 2020-09-07 ENCOUNTER — DOCUMENTATION ONLY (OUTPATIENT)
Dept: CARDIOLOGY CLINIC | Age: 85
End: 2020-09-07

## 2020-09-07 NOTE — PROGRESS NOTES
Alanna Headley, her daughter called me on Sunday when I am on call that she wants to change appt to September 30  Answering service still has to forward the call to me  Will ask Dr Rivas Shen nurse to help her after labor day    Future Appointments   Date Time Provider Radha Bahena   9/9/2020 11:20 AM MD [facility]

## 2020-09-08 ENCOUNTER — TELEPHONE (OUTPATIENT)
Dept: CARDIOLOGY CLINIC | Age: 85
End: 2020-09-08

## 2020-09-08 DIAGNOSIS — I73.9 PERIPHERAL VASCULAR DISEASE (HCC): Primary | ICD-10-CM

## 2020-09-08 NOTE — TELEPHONE ENCOUNTER
Cesar Garcia MD 22 hours ago (12:12 PM)          Grace Benavidez, her daughter called me on Sunday when I am on call that she wants to change appt to September 30  Answering service still has to forward the call to me  Will ask Dr Torie Santos nurse to help her after labor day            Future Appointments   Date Time Provider Radha Bahena   9/9/2020 11:20 AM MD [facility]              TC to pt's daughter Grace BenavidezLaura verified. Would like to reschedule appt to 9/30. States pt is doing well and wearing stockings as prescribed. Appt r/s'd and advised to call with any questions.

## 2020-09-09 DIAGNOSIS — I73.9 PERIPHERAL VASCULAR DISEASE (HCC): ICD-10-CM

## 2020-09-09 NOTE — TELEPHONE ENCOUNTER
Betzy Mayberry MD  You 20 hours ago (12:07 PM)       Sure    Message text       Marcelo Araya MD 22 hours ago (10:47 AM)       Daughter had to move appt out to 9/30. Had BMP 9/1/20, will they need another BMP before the visit that is pushed out? Continue Lasix BID? Routing comment      TC to Pt's daughter Ms Sloane Booker, [place] verified. Advised that Dr. Sultana Wilhelm would like him to continue taking increased Lasix (40mg BID) and to have BMP done 1 week prior to 9/30 visit. Understands and requests labslip be sent to fathers home.

## 2020-09-22 ENCOUNTER — HOSPITAL ENCOUNTER (OUTPATIENT)
Dept: LAB | Age: 85
Discharge: HOME | End: 2020-09-22
Payer: MEDICARE

## 2020-09-22 PROCEDURE — 80048 BASIC METABOLIC PNL TOTAL CA: CPT

## 2020-09-22 PROCEDURE — 36415 COLL VENOUS BLD VENIPUNCTURE: CPT

## 2020-09-23 LAB
BUN SERPL-MCNC: 26 MG/DL (ref 8–27)
BUN/CREAT SERPL: 19 (ref 10–24)
CALCIUM SERPL-MCNC: 9.3 MG/DL (ref 8.6–10.2)
CHLORIDE SERPL-SCNC: 99 MMOL/L (ref 96–106)
CO2 SERPL-SCNC: 32 MMOL/L (ref 20–29)
CREAT SERPL-MCNC: 1.36 MG/DL (ref 0.76–1.27)
GLUCOSE SERPL-MCNC: 99 MG/DL (ref 65–99)
INTERPRETATION: NORMAL
POTASSIUM SERPL-SCNC: 4.3 MMOL/L (ref 3.5–5.2)
SODIUM SERPL-SCNC: 144 MMOL/L (ref 134–144)

## 2020-09-25 ENCOUNTER — PATIENT OUTREACH (OUTPATIENT)
Dept: CASE MANAGEMENT | Age: 85
End: 2020-09-25

## 2020-09-25 NOTE — PROGRESS NOTES
Goals      Prevent complications post hospitalization. 07/31/20  CTN spoke to patient's daughter Tyree Zee Flaviotony Norris to review discharge instructions/red flags to prevent readmission. Appts:      Patient already had virtual visit with PCP on 7/27/20. No new orders or changes. Patient has a follow up with haja Disla on 8/12 at 2:40. CTN asked about wound care follow up---Anusha reports patient's legs are healed and only has scabs, no open areas. She reports patient is keeping legs wrapped for compression. Tyree Laird states that Dr Reginaldo Alfredo was the MD that sent patient to see wound care so she will talk to him at the upcoming appt if patient needs follow up. Confirmed with Tyree Laird that patient was able to get all medications at discharge and is taking as directed. CTN educated daughter on daily weights and when to call MD. She states patient had not been performing daily weights but that they do have a scale that she can put in his room. Last documented weight is 206 lbs. Daughter educated on low sodium diet. She reports that she is watching his salt intake, have made substitutions for salt and is aware of reasoning behind low salt diet as it relates to HF. Tyree Laird does not have any questions/concerns for CTN at this time. CTN will follow up with patient/family for updates in 7-10 days=---mkrw    08/25/20  CTN contacted Anusha to review updates/concerns . She reports patient went to see Dr Reginaldo Alfredo on 8/12 --he is managing patient leg edema. She reports swelling has improved some since patient started taking double the amount of lasix. She states the color of his feet/legs is better---not normal but less of a deep purple. She reports no open wounds or ulcers, no draining on socks. She reports patient is wearing compression socks daily--size Number 20. She tells CTN that Dr Reginaldo Alfredo hopes to increase compression in a month after patient gets used to wearing these.     CTN asked if patient weighing daily--she states she bought him a scale and it is in his room, he weighs daily and records weights. She was not able to tell me his current weight as she was not at home. CTN asked her to check and to call if it was 5lbs more that 217 lbs as this is last weight documented in computer. She tells CTN that patient continues on lasix 40 mg BID since seeing Dr Kaiden Lopez. CTN informed hr that I would clarify with MD as note states patient should only be taking increased dose for 1 week. CTN sent SM to MD and nurse Divya to clarify lasix dosage and if patient should have had OP BMP drawn yet. Alyssa Bardaless reports patient breathing and his spirits are improved. She reports patient does have a cough however she states he has COPD so this is normal--does not feel as if coughing as increased. Patient is up walking in the home, does not report SOB. CTN will follow up with pt/daughter after response received from cards office--rw    08/26/20  CTN received message from Dr Kaiden Lopez to have patient take lasix BID until leg edema went down and patient able to put on compression stockings. He also wanted BMP to be drawn in 2 weeks from OV. CTN notified Alyssa Liz by phone regarding this. She states she will let her sister know so that she can take pt in for labs. CTN sent reply message back to MD to let him know I spoke to family---mkrw    09/25/20  CTN contacted daughter Alex Swain for updates on patient. Per Alex Swain, she and her sister Alyssa Liz have difficulty with patient cooperating with his health care. Alex Swain states that patient will not use the home 02 at all, so the DME company came to the home and picked the concentrator up. Patient also refuses to use nebulizer. She states that pt will use his trilogy inhaler. She voices concern about pt's non compliance as his cough has increased to the point where he is c/o back pain and splints with a pillow.  She states pt is coughing \"something\" up, however pt will not spit out so she cannot describe pt's phlegm. CTN asked her about pulmonary follow up--she states that his MD Dr Avril Case had changed his appt to 10/14 however her sister Fatou James was going to move it up. CTN called [facility], was informed by  that  patient was added on today for a virtual visit with Juhi ROSEN at 3:30. CTN will follow up with Anusha regarding appt outcome. CTN gave Feliberto Mcfarlane the contact information for [facility] and informed her of their services, including mobile Xray. Instructed her to call them if patient had medical needs and couldn't get out to see MD. Danna Wong understanding. Feliberto Mcfarlane does report that patient legs are looking much better, very little swellling notes and that patient is wearing compression hose regularly. CTN asked if patient is weighing daily, she states that he is but does not know current weight as Fatou James lives with patient and keeps record of this. CTN attempted to contact Anusha, no answer on phone. CTN will re attempt to call again tomorrow to obtain weights and to get update on pulmonary appt. ---syedrdinah

## 2020-09-30 ENCOUNTER — OFFICE VISIT (OUTPATIENT)
Dept: CARDIOLOGY CLINIC | Age: 85
End: 2020-09-30
Payer: MEDICARE

## 2020-09-30 VITALS
WEIGHT: 198.4 LBS | BODY MASS INDEX: 29.38 KG/M2 | RESPIRATION RATE: 14 BRPM | HEART RATE: 66 BPM | DIASTOLIC BLOOD PRESSURE: 80 MMHG | SYSTOLIC BLOOD PRESSURE: 124 MMHG | HEIGHT: 69 IN

## 2020-09-30 DIAGNOSIS — I73.9 PERIPHERAL VASCULAR DISEASE (HCC): ICD-10-CM

## 2020-09-30 DIAGNOSIS — I48.21 PERMANENT ATRIAL FIBRILLATION (HCC): ICD-10-CM

## 2020-09-30 DIAGNOSIS — I10 ESSENTIAL HYPERTENSION: ICD-10-CM

## 2020-09-30 DIAGNOSIS — E11.59 DM TYPE 2 CAUSING VASCULAR DISEASE (HCC): ICD-10-CM

## 2020-09-30 DIAGNOSIS — I87.2 VENOUS INSUFFICIENCY: ICD-10-CM

## 2020-09-30 DIAGNOSIS — I25.10 CORONARY ARTERY DISEASE INVOLVING NATIVE CORONARY ARTERY OF NATIVE HEART WITHOUT ANGINA PECTORIS: Primary | ICD-10-CM

## 2020-09-30 DIAGNOSIS — I50.32 CHRONIC DIASTOLIC CONGESTIVE HEART FAILURE (HCC): ICD-10-CM

## 2020-09-30 DIAGNOSIS — I25.10 CORONARY ARTERY DISEASE INVOLVING NATIVE CORONARY ARTERY OF NATIVE HEART WITHOUT ANGINA PECTORIS: ICD-10-CM

## 2020-09-30 PROCEDURE — 99214 OFFICE O/P EST MOD 30 MIN: CPT | Performed by: INTERNAL MEDICINE

## 2020-09-30 PROCEDURE — G0463 HOSPITAL OUTPT CLINIC VISIT: HCPCS | Performed by: INTERNAL MEDICINE

## 2020-09-30 RX ORDER — DOXYCYCLINE HYCLATE 100 MG
1 TABLET ORAL DAILY
COMMUNITY
Start: 2020-09-29 | End: 2020-11-18

## 2020-09-30 NOTE — PROGRESS NOTES
Progress Note    Patient: Melinda Stevens MRN: [id number]  SSN: [id number]    YOB: 1935  Age: 80 y.o. Sex: male      PCP: Jaskaran Mendes MD      Subjective:     Mr. Lamar Rabago is an 80-year-old white male with known history of coronary artery disease status post coronary artery bypass graft surgery 1995, COPD, atrial fibrillation, right popliteal DVT, mild LV dysfunction, with lower extremity edema and ulcerations due to venous insufficieny and chronic diastolic heart failure. Recent echocardiogram showed stability of his mild LV dysfunction.  He did done well with diuresis with resolution of symptoms. Anticoagulated for DVT. Admitted to [facility] 7/21 to 7/24/2020 for IV diuresis. Impatient to get home despite incomplete resolution of edema. .     Doing very well. Using support stockings. No weeping or ulcers. No chest pain or shortness of breath. Weight stable at 210-212 lbs at home. Developed cough 2 years ago attributed to COPD. Now worsened past 1.5 months. On antibiotics and steroids. Due to see pulmonologist Oct 14.        Past Medical History:   Diagnosis Date    Asthma     BRONCITITS, INHALER USE PRN    CAD (coronary artery disease)     MI    Cancer ([address] 2006    PROTATE    Chronic obstructive pulmonary disease ([address]     Diabetes ([address]     BORDERLINE    Hypercholesterolemia     Hypertension     Umbilical hernia 5/84/5786    Venous insufficiency (chronic) (peripheral)         Objective:     Vitals:    09/30/20 1414   BP: 124/80   Pulse: 66   Resp: 14   Weight: 198 lb 6.4 oz (90 kg)   Height: 5' 9\" (1.753 m)        Physical Exam: Overweight  Head: Normocephalic, atraumatic. Eyes: Pupils equal, round, reactive to light and accomodation, Extra ocular muscles intact. Sclera anicteric. Ears: Grossly responsive to sound. Neck: No adenopathy. No bruits. Throat: No sores or erythema. Heart: Regular rate and rhythm. Normal S1 and S2.  No murmurs, gallops, or rub.  Lungs: Diminished breath sounds. Upper airway rhonchi  Abdomen: Soft, non-tender. No guarding or rebound. No hepatosplenomegaly. Bowel sounds active. Ext: 1+ pitting edema with stasis dermatitis. Support stockings in place  Skin: Stasis dermatitis. No active infection  Neuro: Cranial nerves II through XII intact. Motor and sensory grossly intact. Affect: Appropriate. Alert and interactive. Current Outpatient Medications:     doxycycline (VIBRA-TABS) 100 mg tablet, Take 1 Tab by mouth daily. , Disp: , Rfl:     furosemide (Lasix) 40 mg tablet, Take 40 mg by mouth every morning., Disp: , Rfl:     pantothenic ac-min oil-pet,hyd (AQUAPHOR) 41 % ointment, Apply  to affected area two (2) times a day., Disp: 53 g, Rfl: 0    potassium chloride SR (K-TAB) 20 mEq tablet, Take 1 Tab by mouth daily. , Disp: 30 Tab, Rfl: 1    fluticasone-umeclidinium-vilanterol (TRELEGY ELLIPTA) 100-62.5-25 mcg inhaler, Take 1 Puff by inhalation daily. , Disp: , Rfl:     atorvastatin (LIPITOR) 80 mg tablet, Take 80 mg by mouth nightly., Disp: , Rfl:     ELIQUIS 5 mg tablet, TAKE 1 TABLET TWICE A DAY TO PREVENT THROMBOEMBOLISM IN CHRONIC ATRIAL FIBRILLATION, Disp: 180 Tab, Rfl: 4    metoprolol succinate (TOPROL XL) 25 mg XL tablet, TAKE 1 TABLET DAILY, Disp: 90 Tab, Rfl: 1    colesevelam (WELCHOL) 625 mg tablet, TAKE 3 TABLETS TWICE A DAY WITH MEALS, Disp: 540 Tab, Rfl: 0    MULTIVITAMIN PO, Take 1 Tab by mouth daily. , Disp: , Rfl:        Lab/Data Review:  Labs Latest Ref Rng & Units 9/22/2020 9/1/2020 8/10/2020   Calcium 8.6 - 10.2 mg/dL 9.3 9.2 9.1   Glucose 65 - 99 mg/dL 99 109(H) 101(H)   BUN 8 - 27 mg/dL 26 24 16   Creatinine 0.76 - 1.27 mg/dL 1.36(H) 1.11 1.08   Sodium 134 - 144 mmol/L 144 144 140   Potassium 3.5 - 5.2 mmol/L 4.3 4.3 4.7   Some recent data might be hidden       Lab Results   Component Value Date/Time    Cholesterol, total 166 12/03/2019 02:30 PM    HDL Cholesterol 57 12/03/2019 02:30 PM    LDL, calculated 88.8 12/03/2019 02:30 PM    VLDL, calculated 20.2 12/03/2019 02:30 PM    Triglyceride 101 12/03/2019 02:30 PM    CHOL/HDL Ratio 2.9 12/03/2019 02:30 PM     Lab Results   Component Value Date/Time    TSH 3.72 10/25/2019 01:42 AM       10/24/19   ECHO ADULT COMPLETE 10/25/2019 10/25/2019    Narrative · Left Ventricle: Normal cavity size and wall thickness. Mild systolic   dysfunction. Estimated left ventricular ejection fraction is 41 - 45%. Abnormal left ventricular wall motion. · Left Atrium: Mildly dilated left atrium. · Aortic Valve: Probably trileaflet aortic valve. Aortic valve sclerosis. Aortic valve leaflet calcification present. · Mitral Valve: Mild mitral annular calcification. Signed by: Javier Sher MD     1/20/2020 Venous Ultrasound:  Interpretation Summary   · There is no evidence of deep venous thrombosis within the bilateral common femoral, femoral or popliteal veins. · Pulsatility of venous flow is consistent with increased central venous pressure. · Bilateral deep venous reflux (>1.0 seconds) is present within the popliteal veins, right > left. · Study is negative for superficial venous reflux. Assessment/Plan:       ICD-10-CM ICD-9-CM    1. Coronary artery disease involving native coronary artery of native heart without angina pectoris  I25.10 414.01    2. Essential hypertension  I10 401.9    3. Venous insufficiency  I87.2 459.81    4. Permanent atrial fibrillation (HCC)  I48.21 427.31    5. DM type 2 causing vascular disease (HCC)  E11.59 250.70      443.81    6. Chronic diastolic congestive heart failure (HCC)  I50.32 428.32      428.0    7. Peripheral vascular disease (HCC)  I73.9 443.9      Mr. Kevin Sommers is an 81 yo WM with chronic lower extremity edema due to venous insufficiency and chronic diastolic heart failure. Now wearing support stockings with substantial improvement in edema. Renal function slightly elevated.  Back off lasix to daily and recheck in 3 weeks. OK to use antibiotics and steroid short term for COPD exacerbation with bronchitis. Consider Neurology consult for memory loss.  Will try mental exercises and OTC supplements first.     Reduce Lasix to 40 mg daily  RTC 2 months      Signed By: Derrick Crowley MD     September 30, 2020

## 2020-10-02 ENCOUNTER — PATIENT OUTREACH (OUTPATIENT)
Dept: CASE MANAGEMENT | Age: 85
End: 2020-10-02

## 2020-10-02 NOTE — PROGRESS NOTES
Goals      Prevent complications post hospitalization. 07/31/20  CTN spoke to patient's daughter Jeffrey Davies to review discharge instructions/red flags to prevent readmission. Appts:      Patient already had virtual visit with PCP on 7/27/20. No new orders or changes. Patient has a follow up with haja Sarah on 8/12 at 2:40. CTN asked about wound care follow up---Anusha reports patient's legs are healed and only has scabs, no open areas. She reports patient is keeping legs wrapped for compression. Jeffrey Davies states that Dr Wang Weir was the MD that sent patient to see wound care so she will talk to him at the upcoming appt if patient needs follow up. Confirmed with Jeffrey Davies that patient was able to get all medications at discharge and is taking as directed. CTN educated daughter on daily weights and when to call MD. She states patient had not been performing daily weights but that they do have a scale that she can put in his room. Last documented weight is 206 lbs. Daughter educated on low sodium diet. She reports that she is watching his salt intake, have made substitutions for salt and is aware of reasoning behind low salt diet as it relates to HF. Jeffrey Davies does not have any questions/concerns for CTN at this time. CTN will follow up with patient/family for updates in 7-10 days=---mkrw    08/25/20  CTN contacted Anusha to review updates/concerns . She reports patient went to see Dr Wang Weir on 8/12 --he is managing patient leg edema. She reports swelling has improved some since patient started taking double the amount of lasix. She states the color of his feet/legs is better---not normal but less of a deep purple. She reports no open wounds or ulcers, no draining on socks. She reports patient is wearing compression socks daily--size Number 20. She tells CTN that Dr Wang Weir hopes to increase compression in a month after patient gets used to wearing these.     CTN asked if patient weighing daily--she states she bought him a scale and it is in his room, he weighs daily and records weights. She was not able to tell me his current weight as she was not at home. CTN asked her to check and to call if it was 5lbs more that 217 lbs as this is last weight documented in computer. She tells CTN that patient continues on lasix 40 mg BID since seeing Dr Nadine Odom. CTN informed hr that I would clarify with MD as note states patient should only be taking increased dose for 1 week. CTN sent SM to MD and nurse Corinne Atlas to clarify lasix dosage and if patient should have had OP BMP drawn yet. Sheila Hutson reports patient breathing and his spirits are improved. She reports patient does have a cough however she states he has COPD so this is normal--does not feel as if coughing as increased. Patient is up walking in the home, does not report SOB. CTN will follow up with pt/daughter after response received from cards office--[facility]    08/26/20  CTN received message from Dr Nadine Odom to have patient take lasix BID until leg edema went down and patient able to put on compression stockings. He also wanted BMP to be drawn in 2 weeks from OV. CTN notified Sheila Hutson by phone regarding this. She states she will let her sister know so that she can take pt in for labs. CTN sent reply message back to MD to let him know I spoke to family---r    09/25/20  CTN contacted daughter Abdon President for updates on patient. Per Abdon President, she and her sister Sheila Hutson have difficulty with patient cooperating with his health care. Abdon President states that patient will not use the home 02 at all, so the DME company came to the home and picked the concentrator up. Patient also refuses to use nebulizer. She states that pt will use his trilogy inhaler. She voices concern about pt's non compliance as his cough has increased to the point where he is c/o back pain and splints with a pillow.  She states pt is coughing \"something\" up, however pt will not spit out so she cannot describe pt's phlegm. CTN asked her about pulmonary follow up--she states that his MD Dr Jessica Reed had changed his appt to 10/14 however her sister Amy was going to move it up. CTN called [facility], was informed by  that  patient was added on today for a virtual visit with Felicitas ROSEN at 3:30. CTN will follow up with Anusha regarding appt outcome. CTN gave Marco A Martin the contact information for [facility] and informed her of their services, including mobile Xray. Instructed her to call them if patient had medical needs and couldn't get out to see MD. Layne Lopez understanding. Marco A Martin does report that patient legs are looking much better, very little swellling notes and that patient is wearing compression hose regularly. CTN asked if patient is weighing daily, she states that he is but does not know current weight as Amy lives with patient and keeps record of this. CTN attempted to contact Anusha, no answer on phone. CTN will re attempt to call again tomorrow to obtain weights and to get update on pulmonary appt. ---mkrw    10/02/20  CTN spoke to patient daughter Amy to get updates and offer assist.    Amy reports patient is having COPD exacerbation and started yesterday on abx and prednisone. She states he will not use oxygen but has agreed to use nebulizer. She sates that Dr Karina Mauricio pulmonary prescribed the above. 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 Amy states that she will give the medication a few days to work and will call PCP on Monday if patient back continues to be a problem. She reports that patient has had to sleep in his recliner as he has been unable to lie flat. Leola confirms that she has contact information for  [facility] if they feel patient needs to be seen over the weekend.     Per chart review, patient saw  Chaim Miner, haja on 9/30/2020. Lasix decreased to 40 mg daily. Patient weight 198 lbs documented on 9/30 per chart review. This is decrease from weight of 206 lbs documented earlier last month. Alexia Hall reports patient is wearing compression stockings. CTN will follow up early next week for update. ---syedrw

## 2020-10-27 ENCOUNTER — VIRTUAL VISIT (OUTPATIENT)
Dept: INTERNAL MEDICINE CLINIC | Age: 85
End: 2020-10-27
Payer: MEDICARE

## 2020-10-27 DIAGNOSIS — S22.050D COMPRESSION FRACTURE OF T6 VERTEBRA WITH ROUTINE HEALING, SUBSEQUENT ENCOUNTER: Primary | ICD-10-CM

## 2020-10-27 PROCEDURE — 99443 PR PHYS/QHP TELEPHONE EVALUATION 21-30 MIN: CPT | Performed by: INTERNAL MEDICINE

## 2020-10-27 RX ORDER — HYDROCODONE BITARTRATE AND ACETAMINOPHEN 5; 325 MG/1; MG/1
1 TABLET ORAL
Qty: 10 TAB | Refills: 0 | Status: SHIPPED | OUTPATIENT
Start: 2020-10-27 | End: 2020-10-30

## 2020-10-27 NOTE — PROGRESS NOTES
Mr. Raya Orozco is an 60-year-old gentleman with coronary artery disease and hypertension who was admitted to [address] [place] [address]. He was in the hospital on the 5th and had a kyphoplasty done for a T6 compression. They felt it was related to a respiratory infection. He was discharged on October 13. He was then seen in the emergency room at [address] [place] [address] on the 24th which was just three days ago with continued pain. In the hospital they gave him Percocet and when they sent him home they gave him no Percocet and no pain meds. He went home on his normal meds. They gave him a bit of prednisone and he had a repeat CT of his thoracic spine while in the emergency room and the repeat CT showed a kyphoplasty of T6 with nothing acute on his labs and vitals were stable. They gave him hydrocodone 5/325 (12) and sent him home. He said he is doing better with that. He has a lot less pain when taking one or two a day; they only gave him 12. He says he feels a lot better. He is moving around better and his breathing is better. He is not coughing or wheezing now. The etiology of the fracture is not clear but they told him it may be related to heavy coughing from his bronchitis. No exam was done. This was a virtual visit only, telephonic. I told him I would call him in another 12 hydrocodone. He is concerned that he would still have pain and that he still would be having issues by next week since he is taking one or two a day but what they gave him in the emergency room would last six days. I told him the max would be to take it for two weeks, otherwise, he would become dependent and he understands that and his daughter understands. I will call him in those and do a  check before doing so and he will follow up with me and we can discuss whether we need to look into osteoporosis in more detail. Controlled Substance Monitoring:    RX Monitoring 10/27/2020   Acute Pain Monitoring Severe pain not adequately treated with lower dose.       Telephone only      1. Compression fracture of T6 vertebra with routine healing, subsequent encounter  Diagnoses and all orders for this visit:    1. Compression fracture of T6 vertebra with routine healing, subsequent encounter  -     HYDROcodone-acetaminophen (NORCO) 5-325 mg per tablet; Take 1 Tab by mouth two (2) times daily as needed for Pain for up to 3 days. Max Daily Amount: 2 Tabs.         Total time spent includes reviewing hospital records not in emr was 23 minutes

## 2020-10-28 ENCOUNTER — HOSPITAL ENCOUNTER (OUTPATIENT)
Dept: LAB | Age: 85
Discharge: HOME | End: 2020-10-28
Payer: MEDICARE

## 2020-10-28 PROCEDURE — 80048 BASIC METABOLIC PNL TOTAL CA: CPT

## 2020-10-28 PROCEDURE — 36415 COLL VENOUS BLD VENIPUNCTURE: CPT

## 2020-10-29 LAB
BUN SERPL-MCNC: 17 MG/DL (ref 8–27)
BUN/CREAT SERPL: 18 (ref 10–24)
CALCIUM SERPL-MCNC: 9 MG/DL (ref 8.6–10.2)
CHLORIDE SERPL-SCNC: 101 MMOL/L (ref 96–106)
CO2 SERPL-SCNC: 24 MMOL/L (ref 20–29)
CREAT SERPL-MCNC: 0.96 MG/DL (ref 0.76–1.27)
GLUCOSE SERPL-MCNC: 115 MG/DL (ref 65–99)
Lab: NORMAL
POTASSIUM SERPL-SCNC: 4.5 MMOL/L (ref 3.5–5.2)
SODIUM SERPL-SCNC: 139 MMOL/L (ref 134–144)

## 2020-11-18 ENCOUNTER — OFFICE VISIT (OUTPATIENT)
Dept: CARDIOLOGY CLINIC | Age: 85
End: 2020-11-18
Payer: MEDICARE

## 2020-11-18 VITALS
DIASTOLIC BLOOD PRESSURE: 60 MMHG | OXYGEN SATURATION: 93 % | WEIGHT: 191 LBS | HEIGHT: 69 IN | SYSTOLIC BLOOD PRESSURE: 120 MMHG | HEART RATE: 91 BPM | BODY MASS INDEX: 28.29 KG/M2 | RESPIRATION RATE: 16 BRPM

## 2020-11-18 DIAGNOSIS — I87.2 VENOUS INSUFFICIENCY: ICD-10-CM

## 2020-11-18 DIAGNOSIS — I73.9 PERIPHERAL VASCULAR DISEASE (HCC): ICD-10-CM

## 2020-11-18 DIAGNOSIS — I50.30 DIASTOLIC CONGESTIVE HEART FAILURE, UNSPECIFIED HF CHRONICITY (HCC): ICD-10-CM

## 2020-11-18 DIAGNOSIS — I25.10 CORONARY ARTERY DISEASE INVOLVING NATIVE CORONARY ARTERY OF NATIVE HEART WITHOUT ANGINA PECTORIS: ICD-10-CM

## 2020-11-18 DIAGNOSIS — I48.20 CHRONIC ATRIAL FIBRILLATION (HCC): ICD-10-CM

## 2020-11-18 DIAGNOSIS — R73.03 BORDERLINE DIABETES MELLITUS: ICD-10-CM

## 2020-11-18 DIAGNOSIS — E78.00 PURE HYPERCHOLESTEROLEMIA: ICD-10-CM

## 2020-11-18 DIAGNOSIS — I25.10 CORONARY ARTERY DISEASE INVOLVING NATIVE CORONARY ARTERY OF NATIVE HEART WITHOUT ANGINA PECTORIS: Primary | ICD-10-CM

## 2020-11-18 DIAGNOSIS — I10 ESSENTIAL HYPERTENSION: ICD-10-CM

## 2020-11-18 DIAGNOSIS — I82.552 CHRONIC DEEP VEIN THROMBOSIS (DVT) OF LEFT PERONEAL VEIN (HCC): ICD-10-CM

## 2020-11-18 DIAGNOSIS — I48.21 PERMANENT ATRIAL FIBRILLATION (HCC): ICD-10-CM

## 2020-11-18 DIAGNOSIS — E11.59 DM TYPE 2 CAUSING VASCULAR DISEASE (HCC): ICD-10-CM

## 2020-11-18 PROCEDURE — 93005 ELECTROCARDIOGRAM TRACING: CPT | Performed by: INTERNAL MEDICINE

## 2020-11-18 PROCEDURE — 93010 ELECTROCARDIOGRAM REPORT: CPT | Performed by: INTERNAL MEDICINE

## 2020-11-18 PROCEDURE — G0463 HOSPITAL OUTPT CLINIC VISIT: HCPCS | Performed by: INTERNAL MEDICINE

## 2020-11-18 PROCEDURE — 99214 OFFICE O/P EST MOD 30 MIN: CPT | Performed by: INTERNAL MEDICINE

## 2020-11-18 RX ORDER — POTASSIUM CHLORIDE 1500 MG/1
20 TABLET, FILM COATED, EXTENDED RELEASE ORAL
Qty: 90 TAB | Refills: 1
Start: 2020-11-18 | End: 2021-10-11 | Stop reason: SDUPTHER

## 2020-11-18 RX ORDER — FUROSEMIDE 40 MG/1
40 TABLET ORAL
Qty: 90 TAB | Refills: 1 | Status: SHIPPED | OUTPATIENT
Start: 2020-11-18 | End: 2021-05-24

## 2020-11-18 RX ORDER — PANTOPRAZOLE SODIUM 40 MG/1
40 TABLET, DELAYED RELEASE ORAL DAILY
Qty: 90 TAB | Refills: 1 | Status: SHIPPED | OUTPATIENT
Start: 2020-11-18 | End: 2021-10-11 | Stop reason: ALTCHOICE

## 2020-11-18 NOTE — PROGRESS NOTES
Progress Note    Patient: Zachary Monahan MRN: [id number]  SSN: [id number]    YOB: 1935  Age: 80 y.o. Sex: male      PCP: Elizabeth Blanco MD      Subjective:     Mr. Raya Orozco is an 80year old white male with known history of coronary artery disease status post coronary artery bypass graft surgery 1995, COPD, atrial fibrillation, right popliteal DVT, mild LV dysfunction, with lower extremity edema and ulcerations due to venous insufficieny and chronic diastolic heart failure. Recent echocardiogram showed stability of his mild LV dysfunction.  He did done well with diuresis with resolution of symptoms. Anticoagulated for DVT. Admitted to [facility] 7/21 to 7/24/2020 for IV diuresis. Impatient to get home despite incomplete resolution of edema. .     Evaluated 10/5/2020 at [address] [place] [address] for back pain and had T6 kyphoplasty. Recurrent pain evaluated 10/24/2020. Daughter states he had lungs \"suctioned out. \"    Doing very well. Using support stockings. No weeping or ulcers. No chest pain or shortness of breath. Weight down. Developed cough 2 years ago attributed to COPD. Now worsened past 1.5 months. On antibiotics and steroids. Pulmonologist started nebulizer. Recommended oxygen, but patient declined.         Past Medical History:   Diagnosis Date    Asthma     BRONCITITS, INHALER USE PRN    CAD (coronary artery disease)     MI    Cancer ([facility] 2006    PROTATE    Chronic obstructive pulmonary disease ([facility]     Diabetes ([facility]     BORDERLINE    Hypercholesterolemia     Hypertension     Umbilical hernia 4/89/4736    Venous insufficiency (chronic) (peripheral)         Objective:     Vitals:    11/18/20 1406   BP: 120/60   Pulse: 91   Resp: 16   SpO2: 93%   Weight: 191 lb (86.6 kg)   Height: 5' 9\" (1.753 m)        Physical Exam: Overweight  Head: Normocephalic, atraumatic. Eyes: Pupils equal, round, reactive to light and accomodation, Extra ocular muscles intact.  Sclera anicteric. Ears: Grossly responsive to sound. Neck: No adenopathy. No bruits. Throat: No sores or erythema. Heart: Regular rate and rhythm. Normal S1 and S2. No murmurs, gallops, or rub. Lungs: Diminished breath sounds. Upper airway rhonchi  Abdomen: Soft, non-tender. No guarding or rebound. No hepatosplenomegaly. Bowel sounds active. Ext: 1+ pitting edema with stasis dermatitis. Support stockings in place  Skin: Stasis dermatitis. No active infection  Neuro: Cranial nerves II through XII intact. Motor and sensory grossly intact. Affect: Appropriate. Alert and interactive. Current Outpatient Medications:     fluticasone-umeclidinium-vilanterol (TRELEGY ELLIPTA) 100-62.5-25 mcg inhaler, Take 1 Puff by inhalation daily. , Disp: , Rfl:     atorvastatin (LIPITOR) 80 mg tablet, Take 80 mg by mouth nightly., Disp: , Rfl:     ELIQUIS 5 mg tablet, TAKE 1 TABLET TWICE A DAY TO PREVENT THROMBOEMBOLISM IN CHRONIC ATRIAL FIBRILLATION, Disp: 180 Tab, Rfl: 4    metoprolol succinate (TOPROL XL) 25 mg XL tablet, TAKE 1 TABLET DAILY, Disp: 90 Tab, Rfl: 1    colesevelam (WELCHOL) 625 mg tablet, TAKE 3 TABLETS TWICE A DAY WITH MEALS, Disp: 540 Tab, Rfl: 0    doxycycline (VIBRA-TABS) 100 mg tablet, Take 1 Tab by mouth daily. , Disp: , Rfl:     furosemide (Lasix) 40 mg tablet, Take 40 mg by mouth every morning., Disp: , Rfl:     pantothenic ac-min oil-pet,hyd (AQUAPHOR) 41 % ointment, Apply  to affected area two (2) times a day., Disp: 53 g, Rfl: 0    potassium chloride SR (K-TAB) 20 mEq tablet, Take 1 Tab by mouth daily. , Disp: 30 Tab, Rfl: 1    MULTIVITAMIN PO, Take 1 Tab by mouth daily. , Disp: , Rfl:        Lab/Data Review:  Labs Latest Ref Rng & Units 10/28/2020 9/22/2020   Calcium 8.6 - 10.2 mg/dL 9.0 9.3   Glucose 65 - 99 mg/dL 115(H) 99   BUN 8 - 27 mg/dL 17 26   Creatinine 0.76 - 1.27 mg/dL 0.96 1.36(H)   Sodium 134 - 144 mmol/L 139 144   Potassium 3.5 - 5.2 mmol/L 4.5 4.3   Some recent data might be hidden       Lab Results   Component Value Date/Time    Cholesterol, total 166 12/03/2019 02:30 PM    HDL Cholesterol 57 12/03/2019 02:30 PM    LDL, calculated 88.8 12/03/2019 02:30 PM    VLDL, calculated 20.2 12/03/2019 02:30 PM    Triglyceride 101 12/03/2019 02:30 PM    CHOL/HDL Ratio 2.9 12/03/2019 02:30 PM     Lab Results   Component Value Date/Time    TSH 3.72 10/25/2019 01:42 AM       10/24/19   ECHO ADULT COMPLETE 10/25/2019 10/25/2019    Narrative · Left Ventricle: Normal cavity size and wall thickness. Mild systolic   dysfunction. Estimated left ventricular ejection fraction is 41 - 45%. Abnormal left ventricular wall motion. · Left Atrium: Mildly dilated left atrium. · Aortic Valve: Probably trileaflet aortic valve. Aortic valve sclerosis. Aortic valve leaflet calcification present. · Mitral Valve: Mild mitral annular calcification. Signed by: Claudette Jones MD     1/20/2020 Venous Ultrasound:  Interpretation Summary   · There is no evidence of deep venous thrombosis within the bilateral common femoral, femoral or popliteal veins. · Pulsatility of venous flow is consistent with increased central venous pressure. · Bilateral deep venous reflux (>1.0 seconds) is present within the popliteal veins, right > left. · Study is negative for superficial venous reflux. Assessment/Plan:       ICD-10-CM ICD-9-CM    1. Coronary artery disease involving native coronary artery of native heart without angina pectoris  I25.10 414.01 AMB POC EKG ROUTINE W/ 12 LEADS, INTER & REP   2. Essential hypertension  I10 401.9    3. Chronic atrial fibrillation (HCC)  I48.20 427.31    4. Venous insufficiency  I87.2 459.81    5. Permanent atrial fibrillation (HCC)  I48.21 427.31    6. DM type 2 causing vascular disease (HCC)  E11.59 250.70      443.81    7. Chronic deep vein thrombosis (DVT) of left peroneal vein (HCC)  I82.552 453.52    8. Peripheral vascular disease (HCC)  I73.9 443.9    9.  Diastolic congestive heart failure, unspecified HF chronicity (HCC)  I50.30 428.30      428.0    10. Pure hypercholesterolemia  E78.00 272.0    11. Borderline diabetes mellitus  R73.03 790.29      Mr. Cardenas is an 79 yo WM with chronic lower extremity edema due to deep venous insufficiency and chronic diastolic heart failure. Now wearing support stockings with substantial improvement in edema. Overall doing well. Back pain decreasing. Pulmonary function still an issue.     PPI x 6 weeks for cough- possible silent aspiration  Continue lasix- may use prn    RTC 3 months  BMP in 1 month      Signed By: Rome Klein MD     November 18, 2020

## 2020-11-18 NOTE — PATIENT INSTRUCTIONS
Start taking protonix 40 mg daily until follow up. Have labs obtained in 1 month. Our office will call you with results. Follow up with Dr. Walter Bunch in 3 months.

## 2020-12-20 DIAGNOSIS — E78.2 MIXED HYPERLIPIDEMIA: ICD-10-CM

## 2020-12-20 DIAGNOSIS — I48.19 PERSISTENT ATRIAL FIBRILLATION (HCC): ICD-10-CM

## 2020-12-20 DIAGNOSIS — I10 ESSENTIAL HYPERTENSION: ICD-10-CM

## 2020-12-20 DIAGNOSIS — I25.10 CORONARY ARTERY DISEASE INVOLVING NATIVE CORONARY ARTERY OF NATIVE HEART WITHOUT ANGINA PECTORIS: ICD-10-CM

## 2020-12-20 RX ORDER — APIXABAN 5 MG/1
TABLET, FILM COATED ORAL
Qty: 180 TAB | Refills: 3 | Status: SHIPPED | OUTPATIENT
Start: 2020-12-20

## 2021-01-27 ENCOUNTER — TELEPHONE (OUTPATIENT)
Dept: INTERNAL MEDICINE CLINIC | Age: 86
End: 2021-01-27

## 2021-01-27 NOTE — TELEPHONE ENCOUNTER
Luz May states the other sister will be calling into the office to schedule for a 30 min in office visit for medication discussion.

## 2021-01-27 NOTE — TELEPHONE ENCOUNTER
Mia Valles (daughter)       Reason for call: Daughter would like a nurse to call her and go over her fathers medication. He cant keep up with the meds and when they should be taken so she wants to make sure she is giving him the correct dosage and what they are for.        Callback required yes/no and why: yes to go over meds       Best contact number(s): [phone]       [facility]

## 2021-01-29 ENCOUNTER — HOSPITAL ENCOUNTER (OUTPATIENT)
Dept: LAB | Age: 86
Discharge: HOME | End: 2021-01-29
Payer: MEDICARE

## 2021-01-29 PROCEDURE — 80048 BASIC METABOLIC PNL TOTAL CA: CPT

## 2021-01-29 PROCEDURE — 36415 COLL VENOUS BLD VENIPUNCTURE: CPT

## 2021-01-30 LAB
BUN SERPL-MCNC: 17 MG/DL (ref 8–27)
BUN/CREAT SERPL: 16 (ref 10–24)
CALCIUM SERPL-MCNC: 8.8 MG/DL (ref 8.6–10.2)
CHLORIDE SERPL-SCNC: 103 MMOL/L (ref 96–106)
CO2 SERPL-SCNC: 29 MMOL/L (ref 20–29)
CREAT SERPL-MCNC: 1.04 MG/DL (ref 0.76–1.27)
GLUCOSE SERPL-MCNC: 96 MG/DL (ref 65–99)
POTASSIUM SERPL-SCNC: 4.4 MMOL/L (ref 3.5–5.2)
SODIUM SERPL-SCNC: 145 MMOL/L (ref 134–144)

## 2021-03-01 ENCOUNTER — OFFICE VISIT (OUTPATIENT)
Dept: INTERNAL MEDICINE CLINIC | Age: 86
End: 2021-03-01
Payer: MEDICARE

## 2021-03-01 VITALS
HEIGHT: 69 IN | SYSTOLIC BLOOD PRESSURE: 110 MMHG | BODY MASS INDEX: 28.23 KG/M2 | HEART RATE: 86 BPM | RESPIRATION RATE: 22 BRPM | TEMPERATURE: 97 F | DIASTOLIC BLOOD PRESSURE: 70 MMHG | WEIGHT: 190.6 LBS

## 2021-03-01 DIAGNOSIS — I87.2 VENOUS INSUFFICIENCY OF BOTH LOWER EXTREMITIES: ICD-10-CM

## 2021-03-01 DIAGNOSIS — M80.00XD AGE-RELATED OSTEOPOROSIS WITH CURRENT PATHOLOGICAL FRACTURE WITH ROUTINE HEALING, SUBSEQUENT ENCOUNTER: ICD-10-CM

## 2021-03-01 DIAGNOSIS — Z13.31 SCREENING FOR DEPRESSION: ICD-10-CM

## 2021-03-01 DIAGNOSIS — R41.89 COGNITIVE DECLINE: ICD-10-CM

## 2021-03-01 DIAGNOSIS — Z00.00 MEDICARE ANNUAL WELLNESS VISIT, SUBSEQUENT: ICD-10-CM

## 2021-03-01 DIAGNOSIS — I48.21 PERMANENT ATRIAL FIBRILLATION (HCC): ICD-10-CM

## 2021-03-01 DIAGNOSIS — Z98.890 HISTORY OF KYPHOPLASTY: ICD-10-CM

## 2021-03-01 DIAGNOSIS — R73.03 BORDERLINE DIABETES MELLITUS: Primary | ICD-10-CM

## 2021-03-01 DIAGNOSIS — I10 ESSENTIAL HYPERTENSION: ICD-10-CM

## 2021-03-01 DIAGNOSIS — G62.9 NEUROPATHY: ICD-10-CM

## 2021-03-01 DIAGNOSIS — S22.000A COMPRESSION FRACTURE OF BODY OF THORACIC VERTEBRA (HCC): ICD-10-CM

## 2021-03-01 PROCEDURE — G8752 SYS BP LESS 140: HCPCS | Performed by: INTERNAL MEDICINE

## 2021-03-01 PROCEDURE — G0439 PPPS, SUBSEQ VISIT: HCPCS | Performed by: INTERNAL MEDICINE

## 2021-03-01 PROCEDURE — G0463 HOSPITAL OUTPT CLINIC VISIT: HCPCS | Performed by: INTERNAL MEDICINE

## 2021-03-01 PROCEDURE — 1101F PT FALLS ASSESS-DOCD LE1/YR: CPT | Performed by: INTERNAL MEDICINE

## 2021-03-01 PROCEDURE — 99214 OFFICE O/P EST MOD 30 MIN: CPT | Performed by: INTERNAL MEDICINE

## 2021-03-01 PROCEDURE — G8510 SCR DEP NEG, NO PLAN REQD: HCPCS | Performed by: INTERNAL MEDICINE

## 2021-03-01 PROCEDURE — G8754 DIAS BP LESS 90: HCPCS | Performed by: INTERNAL MEDICINE

## 2021-03-01 PROCEDURE — G8419 CALC BMI OUT NRM PARAM NOF/U: HCPCS | Performed by: INTERNAL MEDICINE

## 2021-03-01 PROCEDURE — G8427 DOCREV CUR MEDS BY ELIG CLIN: HCPCS | Performed by: INTERNAL MEDICINE

## 2021-03-01 PROCEDURE — G8536 NO DOC ELDER MAL SCRN: HCPCS | Performed by: INTERNAL MEDICINE

## 2021-03-01 RX ORDER — IPRATROPIUM BROMIDE AND ALBUTEROL SULFATE 2.5; .5 MG/3ML; MG/3ML
3 SOLUTION RESPIRATORY (INHALATION)
COMMUNITY

## 2021-03-01 RX ORDER — IPRATROPIUM BROMIDE AND ALBUTEROL SULFATE 2.5; .5 MG/3ML; MG/3ML
SOLUTION RESPIRATORY (INHALATION)
COMMUNITY
Start: 2021-02-19 | End: 2022-06-30

## 2021-03-01 NOTE — PROGRESS NOTES
This is the Subsequent Medicare Annual Wellness Exam, performed 12 months or more after the Initial AWV or the last Subsequent AWV    I have reviewed the patient's medical history in detail and updated the computerized patient record. Depression Risk Factor Screening:     3 most recent PHQ Screens 3/1/2021   PHQ Not Done -   Little interest or pleasure in doing things Not at all   Feeling down, depressed, irritable, or hopeless Not at all   Total Score PHQ 2 0       Alcohol Risk Screen    Do you average more than 1 drink per night or more than 7 drinks a week: No    In the past three months have you have had more than 4 drinks containing alcohol on one occasion: No        Functional Ability and Level of Safety:    Hearing: Hearing is good. Activities of Daily Living: The home contains: handrails  Patient does total self care      Ambulation: with mild difficulty     Fall Risk:  Fall Risk Assessment, last 12 mths 3/1/2021   Able to walk? Yes   Fall in past 12 months? 0   Do you feel unsteady? 1   Are you worried about falling 1      Abuse Screen:  Patient is not abused       Cognitive Screening    Has your family/caregiver stated any concerns about your memory: no     Cognitive Screening: Normal - Verbal Fluency Test    Assessment/Plan   Education and counseling provided:  Are appropriate based on today's review and evaluation    Diagnoses and all orders for this visit:    1. History of kyphoplasty    2. Medicare annual wellness visit, subsequent    3.  Screening for depression  -     [place] Maintenance Due     Health Maintenance Due   Topic Date Due    COVID-19 Vaccine (1 of 2) 09/04/1951    DTaP/Tdap/Td series (1 - Tdap) 09/04/1956    Shingrix Vaccine Age 50> (1 of 2) 09/04/1985    GLAUCOMA SCREENING Q2Y  09/04/2000    MICROALBUMIN Q1  01/18/2014    Eye Exam Retinal or Dilated  03/18/2014    Foot Exam Q1  07/20/2014    Flu Vaccine (1) 09/01/2020    Lipid Screen 12/03/2020       Patient Care Team   Patient Care Team:  Leti Flor MD as PCP - General (Internal Medicine)  Leti Flor MD as PCP - [facility]  Tonya May MD as Physician (Cardiology)  Madan Nair MD (Cardiology)  Jerris Harada, MD (Wound Care)  Madan Nair MD (Cardiology)  Gurmeet Murphy MD (Internal Medicine)    History     Patient Active Problem List   Diagnosis Code    CAD (coronary artery disease) I25.10    HLD (hyperlipidemia) E78.5    Borderline diabetes mellitus R73.03    Prostate cancer ([address] C61    Asthma J45.909    Essential hypertension I10    Venous insufficiency I87.2    Venous insufficiency of both lower extremities I87.2    Atrial fibrillation (HCC) I48.91    CHF (congestive heart failure) (HCC) I50.9    HTN (hypertension) I10    Leg ulcer ([address] L97.909    DM type 2 causing vascular disease ([address] E11.59    Chronic deep vein thrombosis (DVT) of left peroneal vein ([facility]) C69.017    Diastolic CHF ([address] V46.34    Venous stasis ulcer ([address] I83.009, L97.909    Hypokalemia E87.6    Peripheral vascular disease ([address] I73.9    History of kyphoplasty Z98.890     Past Medical History:   Diagnosis Date    Asthma     BRONCITITS, INHALER USE PRN    CAD (coronary artery disease)     MI    Cancer ([address] 2006    PROTATE    Chronic obstructive pulmonary disease ([address]     Diabetes ([address]     BORDERLINE    Hypercholesterolemia     Hypertension     Umbilical hernia 6/25/3427    Venous insufficiency (chronic) (peripheral)       Past Surgical History:   Procedure Laterality Date    ENDOSCOPY, COLON, DIAGNOSTIC  01/02/2006    HX GI      COLONOSCOPY    HX HEENT Bilateral     CATARACTS/ IOL    HX HERNIA REPAIR  7/2014    HX PROSTATECTOMY  01/02/2006    IR KYPHOPLASTY THORACIC  10/12/2020    NM CARDIAC SURG PROCEDURE UNLIST  1993    CABG X4 VESSEL    NM CARDIAC SURG PROCEDURE UNLIST  1997, 1999    CARDIAC CATH, STENTS     Current Outpatient Medications   Medication Sig Dispense Refill    Eliquis 5 mg tablet TAKE 1 TABLET TWICE A DAY TO PREVENT THROMBOEMBOLISM IN CHRONIC ATRIAL FIBRILLATION 180 Tab 3    furosemide (Lasix) 40 mg tablet Take 1 Tab by mouth daily as needed (swelling). (Patient taking differently: Take 40 mg by mouth two (2) times a day.) 90 Tab 1    atorvastatin (LIPITOR) 80 mg tablet Take 80 mg by mouth nightly.  metoprolol succinate (TOPROL XL) 25 mg XL tablet TAKE 1 TABLET DAILY 90 Tab 1    colesevelam (WELCHOL) 625 mg tablet TAKE 3 TABLETS TWICE A DAY WITH MEALS 540 Tab 0    albuterol-ipratropium (DUO-NEB) 2.5 mg-0.5 mg/3 ml nebu       albuterol-ipratropium (DUO-NEB) 2.5 mg-0.5 mg/3 ml nebu 3 mL.  potassium chloride SR (K-TAB) 20 mEq tablet Take 1 Tab by mouth daily as needed (swelling). 90 Tab 1    pantoprazole (PROTONIX) 40 mg tablet Take 1 Tab by mouth daily. 90 Tab 1    fluticasone-umeclidinium-vilanterol (TRELEGY ELLIPTA) 100-62.5-25 mcg inhaler Take 1 Puff by inhalation daily. Allergies   Allergen Reactions    Latex, Natural Rubber Hives    Adhesive Other (comments)     BLISTERS       Family History   Problem Relation Age of Onset    Stroke Father     Anesth Problems Neg Hx      Social History     Tobacco Use    Smoking status: Former Smoker     Packs/day: 1.00     Types: Cigarettes     Quit date: 6/10/1964     Years since quittin.7    Smokeless tobacco: Never Used   Substance Use Topics    Alcohol use: Yes     Alcohol/week: 15.0 standard drinks     Types: 12 Cans of beer, 6 Standard drinks or equivalent per week     Comment: casual   80 y.o. with a history of serious congestive heart failure, some element of COPD. Has had a couple of hospital visits or ER visits. He is no longer drinking. He was a moderate alcohol user for a long time. He had a severe back pain and ended up having a thoracic kyphoplasty back in October.   The procedure relieved 90% of his pain and then the pain slowly resolved. He still uses Tylenol because he has a little pain. Of note is he has developed some dorsal kyphosis in his normal gait and in his normal posture. He as noted had previously had a history of significant alcohol abuse, none now. He had a previous history of tobacco, quit smoking a long time ago, seeing Cardiology, Dr. Stacey Pastor; seeing Pulmonary. They have offered him oxygen. He is wearing oxygen at night 2 liters, p.r.n. during the day, but mostly just at night and that maybe has helped. He has only been doing that for a short period of time. Denies chest pain or intermittent claudication, gets around. Most of his pain is in the central back. Family has taken over a lot to help him. He has not driven since the fall. Sisters are coming in and seeing him and checking on him. They are helping him with his medications. He does most of his own activities of daily living at this point. One of his daughters lives in his house and works from home, so she keeps a pretty close eye on him. Vitals:    03/01/21 1302   BP: 110/70   Pulse: 86   Resp: 22   Temp: 97 °F (36.1 °C)   TempSrc: Temporal   Weight: 190 lb 9.6 oz (86.5 kg)   Height: 5' 9\" (1.753 m)     Wt Readings from Last 3 Encounters:   03/01/21 190 lb 9.6 oz (86.5 kg)   11/18/20 191 lb (86.6 kg)   09/30/20 198 lb 6.4 oz (90 kg)     No JVD  Kyphotic  Chest clear  Reg rhythm  Edema bilat LE mostly better but still present  No stockings today      1. History of kyphoplasty  From osteo likely  - DEXA BONE DENSITY STUDY AXIAL; Future  - VITAMIN D, 25 HYDROXY; Future    2. Medicare annual wellness visit, subsequent  reviewed    3. Screening for depression  no  - DEPRESSION SCREEN ANNUAL    4. Borderline diabetes mellitus  follow  - CBC WITH AUTOMATED DIFF; Future  - LIPID PANEL; Future  - METABOLIC PANEL, COMPREHENSIVE; Future  - TSH 3RD GENERATION; Future  - HEMOGLOBIN A1C W/O EAG    5.  Essential hypertension  controlled  - CBC WITH AUTOMATED DIFF; Future  - LIPID PANEL; Future  - METABOLIC PANEL, COMPREHENSIVE; Future  - TSH 3RD GENERATION; Future  - MAGNESIUM; Future    6. Permanent atrial fibrillation (HCC)    - HEMOGLOBIN A1C W/O EAG    7. Neuropathy  Lower legs  Also some codnitive decline  - CBC WITH AUTOMATED DIFF; Future  - METABOLIC PANEL, COMPREHENSIVE; Future  - TSH 3RD GENERATION; Future  - VITAMIN B12; Future  - METHYLMALONIC ACID; Future  - TSH 3RD GENERATION; Future    8. Cognitive decline  labs  - VITAMIN B12; Future  - METHYLMALONIC ACID; Future  - TSH 3RD GENERATION; Future    9. Age-related osteoporosis with current pathological fracture with routine healing, subsequent encounter  Labs and dexa  - DEXA BONE DENSITY STUDY AXIAL; Future  - METABOLIC PANEL, COMPREHENSIVE; Future  - TSH 3RD GENERATION; Future  - MAGNESIUM; Future  - VITAMIN D, 25 HYDROXY; Future    10.  Venous insufficiency of both lower extremities  Wear stockings    interested in memory stimulants advised not to bother

## 2021-03-01 NOTE — PROGRESS NOTES
Chief Complaint   Patient presents with    Annual Wellness Visit    Medication Refill     90 day refills     Medication Evaluation     check over current medications          1. Have you been to the ER, urgent care clinic since your last visit? Hospitalized since your last visit? No    2. Have you seen or consulted any other health care providers outside of the [address] [place] [address] since your last visit? Include any pap smears or colon screening.  No

## 2021-03-01 NOTE — PATIENT INSTRUCTIONS
Medicare Wellness Visit, Male The best way to live healthy is to have a lifestyle where you eat a well-balanced diet, exercise regularly, limit alcohol use, and quit all forms of tobacco/nicotine, if applicable. Regular preventive services are another way to keep healthy. Preventive services (vaccines, screening tests, monitoring & exams) can help personalize your care plan, which helps you manage your own care. Screening tests can find health problems at the earliest stages, when they are easiest to treat. [facility] follows the current, evidence-based guidelines published by the [facility] ([facility]) when recommending preventive services for our patients. Because we follow these guidelines, sometimes recommendations change over time as research supports it. (For example, a prostate screening blood test is no longer routinely recommended for men with no symptoms). Of course, you and your doctor may decide to screen more often for some diseases, based on your risk and co-morbidities (chronic disease you are already diagnosed with). Preventive services for you include: - Medicare offers their members a free annual wellness visit, which is time for you and your primary care provider to discuss and plan for your preventive service needs. Take advantage of this benefit every year! 
-All adults over age 72 should receive the recommended pneumonia vaccines. Current USPSTF guidelines recommend a series of two vaccines for the best pneumonia protection.  
-All adults should have a flu vaccine yearly and tetanus vaccine every 10 years. 
-All adults age 48 and older should receive the shingles vaccines (series of two vaccines). -All adults age 38-68 who are overweight should have a diabetes screening test once every three years. -Other screening tests & preventive services for persons with diabetes include: an eye exam to screen for diabetic retinopathy, a kidney function test, a foot exam, and stricter control over your cholesterol.  
-Cardiovascular screening for adults with routine risk involves an electrocardiogram (ECG) at intervals determined by the provider.  
-Colorectal cancer screening should be done for adults age 54-65 with no increased risk factors for colorectal cancer. There are a number of acceptable methods of screening for this type of cancer. Each test has its own benefits and drawbacks. Discuss with your provider what is most appropriate for you during your annual wellness visit. The different tests include: colonoscopy (considered the best screening method), a fecal occult blood test, a fecal DNA test, and sigmoidoscopy. 
-All adults born between [facility] should be screened once for Hepatitis C. 
-An Abdominal Aortic Aneurysm (AAA) Screening is recommended for men age 73-68 who has ever smoked in their lifetime. Here is a list of your current Health Maintenance items (your personalized list of preventive services) with a due date: 
Health Maintenance Due Topic Date Due  
 COVID-19 Vaccine (1 of 2) 09/04/1951  
 DTaP/Tdap/Td  (1 - Tdap) 09/04/1956  Shingles Vaccine (1 of 2) 09/04/1985  Glaucoma Screening   09/04/2000  Albumin Urine Test  01/18/2014 [facility]  03/18/2014 [facility]  07/20/2014  Yearly Flu Vaccine (1) 09/01/2020  Cholesterol Test   12/03/2020

## 2021-05-24 RX ORDER — FUROSEMIDE 40 MG/1
TABLET ORAL
Qty: 180 TABLET | Refills: 1 | Status: SHIPPED | OUTPATIENT
Start: 2021-05-24 | End: 2022-01-12

## 2021-07-02 RX ORDER — METOPROLOL SUCCINATE 25 MG/1
TABLET, EXTENDED RELEASE ORAL
Qty: 90 TABLET | Refills: 1 | Status: SHIPPED | OUTPATIENT
Start: 2021-07-02 | End: 2022-01-03

## 2021-07-02 NOTE — TELEPHONE ENCOUNTER
Reji Arredondo, daughter       Reason for call: Pt needing metoprolol 25mg sent to the  Home	[place], [address], please, as they are unable to get refills due to the Rx coming from the hospital       Callback required yes/no and why: Yes       Best contact number(s): [phone]      [facility]

## 2021-07-21 RX ORDER — COLESEVELAM 180 1/1
TABLET ORAL
Qty: 540 TABLET | Refills: 0 | Status: SHIPPED | OUTPATIENT
Start: 2021-07-21 | End: 2021-10-03

## 2021-07-21 NOTE — TELEPHONE ENCOUNTER
Brianna Castellon       Relationship of caller (if not patient): daughter       Best contact number(s): [phone]       Name of medication and dosage if known: Welchol 625mg with refills please       Is patient out of this medication (yes/no): Yes       Pharmacy name: [address] listed in chart? (yes/no): Yes   Pharmacy phone number: [phone]        [facility]

## 2021-07-27 RX ORDER — ATORVASTATIN CALCIUM 80 MG/1
80 TABLET, FILM COATED ORAL
Qty: 90 TABLET | Refills: 2 | Status: SHIPPED | OUTPATIENT
Start: 2021-07-27 | End: 2022-06-10

## 2021-10-03 RX ORDER — COLESEVELAM 180 1/1
TABLET ORAL
Qty: 540 TABLET | Refills: 0 | Status: SHIPPED | OUTPATIENT
Start: 2021-10-03 | End: 2022-03-28

## 2021-10-11 ENCOUNTER — OFFICE VISIT (OUTPATIENT)
Dept: INTERNAL MEDICINE CLINIC | Age: 86
End: 2021-10-11
Payer: MEDICARE

## 2021-10-11 VITALS
BODY MASS INDEX: 25.33 KG/M2 | OXYGEN SATURATION: 93 % | TEMPERATURE: 98.3 F | HEIGHT: 69 IN | RESPIRATION RATE: 20 BRPM | DIASTOLIC BLOOD PRESSURE: 68 MMHG | WEIGHT: 171 LBS | HEART RATE: 74 BPM | SYSTOLIC BLOOD PRESSURE: 138 MMHG

## 2021-10-11 DIAGNOSIS — L97.909 VENOUS STASIS ULCER WITH VARICOSE VEINS, UNSPECIFIED SITE, UNSPECIFIED ULCER STAGE (HCC): ICD-10-CM

## 2021-10-11 DIAGNOSIS — E87.6 HYPOKALEMIA: ICD-10-CM

## 2021-10-11 DIAGNOSIS — M81.0 OSTEOPOROSIS OF VERTEBRA: Primary | ICD-10-CM

## 2021-10-11 DIAGNOSIS — E11.59 DM TYPE 2 CAUSING VASCULAR DISEASE (HCC): ICD-10-CM

## 2021-10-11 DIAGNOSIS — I50.32 CHRONIC DIASTOLIC CONGESTIVE HEART FAILURE (HCC): ICD-10-CM

## 2021-10-11 DIAGNOSIS — C61 PROSTATE CANCER (HCC): ICD-10-CM

## 2021-10-11 DIAGNOSIS — I83.009 VENOUS STASIS ULCER WITH VARICOSE VEINS, UNSPECIFIED SITE, UNSPECIFIED ULCER STAGE (HCC): ICD-10-CM

## 2021-10-11 DIAGNOSIS — Z23 NEEDS FLU SHOT: ICD-10-CM

## 2021-10-11 DIAGNOSIS — L97.921 ULCER OF LEFT LOWER EXTREMITY, LIMITED TO BREAKDOWN OF SKIN (HCC): ICD-10-CM

## 2021-10-11 PROBLEM — R53.81 DEBILITY: Status: ACTIVE | Noted: 2021-10-11

## 2021-10-11 PROCEDURE — G8419 CALC BMI OUT NRM PARAM NOF/U: HCPCS | Performed by: INTERNAL MEDICINE

## 2021-10-11 PROCEDURE — G8536 NO DOC ELDER MAL SCRN: HCPCS | Performed by: INTERNAL MEDICINE

## 2021-10-11 PROCEDURE — 99214 OFFICE O/P EST MOD 30 MIN: CPT | Performed by: INTERNAL MEDICINE

## 2021-10-11 PROCEDURE — 1101F PT FALLS ASSESS-DOCD LE1/YR: CPT | Performed by: INTERNAL MEDICINE

## 2021-10-11 PROCEDURE — G8510 SCR DEP NEG, NO PLAN REQD: HCPCS | Performed by: INTERNAL MEDICINE

## 2021-10-11 PROCEDURE — 90694 VACC AIIV4 NO PRSRV 0.5ML IM: CPT | Performed by: INTERNAL MEDICINE

## 2021-10-11 PROCEDURE — G8427 DOCREV CUR MEDS BY ELIG CLIN: HCPCS | Performed by: INTERNAL MEDICINE

## 2021-10-11 PROCEDURE — G0463 HOSPITAL OUTPT CLINIC VISIT: HCPCS | Performed by: INTERNAL MEDICINE

## 2021-10-11 RX ORDER — POTASSIUM CHLORIDE 1500 MG/1
20 TABLET, FILM COATED, EXTENDED RELEASE ORAL
Qty: 90 TABLET | Refills: 1 | Status: SHIPPED | OUTPATIENT
Start: 2021-10-11 | End: 2022-04-14

## 2021-10-11 NOTE — PROGRESS NOTES
Chief Complaint   Patient presents with   [facility] Follow Up     1. Have you been to the ER, urgent care clinic since your last visit? Hospitalized since your last visit? Yes- [facility] for dyspnea/COPD    2. Have you seen or consulted any other health care providers outside of the [address] [place] [address] since your last visit? Include any pap smears or colon screening.  No    Visit Vitals  /68   Pulse 74   Temp 98.3 °F (36.8 °C) (Temporal)   Resp 20   Ht 5' 9\" (1.753 m)   Wt 171 lb (77.6 kg)   SpO2 93%   BMI 25.25 kg/m²

## 2021-10-13 NOTE — PROGRESS NOTES
Elyssa Troncoso is a 80 y.o. male with the following history as recorded in [facility]:  Patient Active Problem List    Diagnosis Date Noted    Debility 10/11/2021    History of kyphoplasty 03/01/2021    Peripheral vascular disease ([facility] 43/48/0816    Diastolic CHF ([facility] 23/49/0105    Venous stasis ulcer ([facility] 07/21/2020    Hypokalemia 07/21/2020    DM type 2 causing vascular disease ([facility] 10/25/2019    Chronic deep vein thrombosis (DVT) of left peroneal vein ([facility] 10/25/2019    CHF (congestive heart failure) ([facility] 10/24/2019    HTN (hypertension) 10/24/2019    Leg ulcer ([facility] 10/24/2019    Atrial fibrillation ([facility] 03/15/2017    Venous insufficiency 10/11/2016    Venous insufficiency of both lower extremities 10/11/2016    CAD (coronary artery disease) 04/08/2010    HLD (hyperlipidemia) 04/08/2010    Borderline diabetes mellitus 04/08/2010    Prostate cancer ([facility] 04/08/2010    Asthma 04/08/2010     Current Outpatient Medications   Medication Sig Dispense Refill    potassium chloride SR (K-TAB) 20 mEq tablet Take 1 Tablet by mouth daily as needed (swelling). 90 Tablet 1    colesevelam (WELCHOL) 625 mg tablet TAKE 3 TABLETS BY MOUTH TWICE DAILY WITH MEALS 540 Tablet 0    atorvastatin (Lipitor) 80 mg tablet Take 1 Tablet by mouth nightly. 90 Tablet 2    metoprolol succinate (Toprol XL) 25 mg XL tablet TAKE 1 TABLET DAILY 90 Tablet 1    furosemide (LASIX) 40 mg tablet TAKE 1 TABLET BY MOUTH TWICE DAILY 180 Tablet 1    albuterol-ipratropium (DUO-NEB) 2.5 mg-0.5 mg/3 ml nebu       albuterol-ipratropium (DUO-NEB) 2.5 mg-0.5 mg/3 ml nebu 3 mL.  Eliquis 5 mg tablet TAKE 1 TABLET TWICE A DAY TO PREVENT THROMBOEMBOLISM IN CHRONIC ATRIAL FIBRILLATION 180 Tab 3    fluticasone-umeclidinium-vilanterol (TRELEGY ELLIPTA) 100-62.5-25 mcg inhaler Take 1 Puff by inhalation daily.        Allergies: Latex, natural rubber and Adhesive  Past Medical History:   Diagnosis Date    Asthma     ARPIT, INHALER USE PRN    CAD (coronary artery disease)     MI    Cancer ([facility] 2006    PROTATE    Chronic obstructive pulmonary disease ([facility]     Diabetes ([facility]     BORDERLINE    Hypercholesterolemia     Hypertension     Umbilical hernia     Venous insufficiency (chronic) (peripheral)      Past Surgical History:   Procedure Laterality Date    ENDOSCOPY, COLON, DIAGNOSTIC  2006    HX GI      COLONOSCOPY    HX HEENT Bilateral     CATARACTS/ IOL    HX HERNIA REPAIR  2014    HX PROSTATECTOMY  2006    IR KYPHOPLASTY THORACIC  10/12/2020    SC CARDIAC SURG PROCEDURE UNLIST      CABG X4 VESSEL    SC CARDIAC SURG PROCEDURE UNLIST  ,     CARDIAC CATH, STENTS     Family History   Problem Relation Age of Onset    Stroke Father     Anesth Problems Neg Hx      Social History     Tobacco Use    Smoking status: Former Smoker     Packs/day: 1.00     Types: Cigarettes     Quit date: 6/10/1964     Years since quittin.3    Smokeless tobacco: Never Used   Substance Use Topics    Alcohol use: Yes     Alcohol/week: 15.0 standard drinks     Types: 12 Cans of beer, 6 Standard drinks or equivalent per week     Comment: casual     He had a couple of hospital ER visits, since last seen, at [facility]. No records are available. He has got obstructive lung disease and that has caused him some issues. His quit date of cigarettes was . He says that he uses the once a day Trelegy inhaler, sometimes uses the nebulizer once or twice a day with some relief of coughing and wheezing. He has had no chest pain or chest tightness. He has been generally healthy. He is taking large dose of Lasix 80 mg a day to keep his edema down and he has had no recurrent venous stasis ulcers or vascular ulcers in his lower legs, which is good. He is ambulatory moderately. He does not get around too much. He was with his family today. He is an elderly 80. His weight is 171. His blood pressure was 138/68.   His BMI was 25. Review of Systems - General ROS: positive for  - fatigue  Psychological ROS: negative for - depression  Hematological and Lymphatic ROS: negative  Endocrine ROS: negative for - hair pattern changes, hot flashes or palpitations  Respiratory ROS: no cough, shortness of breath, or wheezing  Cardiovascular ROS: no chest pain or dyspnea on exertion  Gastrointestinal ROS: no abdominal pain, change in bowel habits, or black or bloody stools  Genito-Urinary ROS: no dysuria, trouble voiding, or hematuria  Musculoskeletal ROS: positive for - gait disturbance, joint pain, joint stiffness and joint swelling  Neurological ROS: no TIA or stroke symptoms  Dermatological ROS: negative for - mole changes, nail changes or skin lesion changes          Slow moving with walker      no apparent distress  Vitals:    10/11/21 1350   BP: 138/68   Pulse: 74   Resp: 20   Temp: 98.3 °F (36.8 °C)   TempSrc: Temporal   SpO2: 93%   Weight: 171 lb (77.6 kg)   Height: 5' 9\" (1.753 m)     Chest clear no wheeze  Heart reg distant heart sounds  No ascites    Legs some edema but minimal  No ulcers  Some varicosities    1. Chronic diastolic congestive heart failure ([address]  stable    2. Ulcer of left lower extremity, limited to breakdown of skin ([address]  healed    3. Venous stasis ulcer with varicose veins, unspecified site, unspecified ulcer stage ([address]  healed    4. DM type 2 causing vascular disease ([address]  follow    5. Osteoporosis of vertebra  Prior kyphoplasty    - DEXA BONE DENSITY STUDY AXIAL; Future    6. Hypokalemia  replete  - potassium chloride SR (K-TAB) 20 mEq tablet; Take 1 Tablet by mouth daily as needed (swelling). Dispense: 90 Tablet; Refill: 1  - MAGNESIUM; Future  - METABOLIC PANEL, BASIC; Future    7. Needs flu shot    - FLU (FLUAD QUAD INFLUENZA VACCINE,QUAD,ADJUVANTED)    8.  Prostate cancer [facility])  Stable No results found for: PSA, Wilpavel Bella, David Coulter, [id number], [id number], [id number], PSAEXT

## 2022-01-03 RX ORDER — METOPROLOL SUCCINATE 25 MG/1
TABLET, EXTENDED RELEASE ORAL
Qty: 90 TABLET | Refills: 1 | Status: SHIPPED | OUTPATIENT
Start: 2022-01-03 | End: 2022-07-21

## 2022-01-12 RX ORDER — FUROSEMIDE 40 MG/1
TABLET ORAL
Qty: 180 TABLET | Refills: 1 | Status: SHIPPED | OUTPATIENT
Start: 2022-01-12

## 2022-03-18 PROBLEM — I50.9 CHF (CONGESTIVE HEART FAILURE) (HCC): Status: ACTIVE | Noted: 2019-10-24

## 2022-03-18 PROBLEM — I73.9 PERIPHERAL VASCULAR DISEASE (HCC): Status: ACTIVE | Noted: 2020-08-12

## 2022-03-18 PROBLEM — Z98.890 HISTORY OF KYPHOPLASTY: Status: ACTIVE | Noted: 2021-03-01

## 2022-03-19 PROBLEM — I83.009 VENOUS STASIS ULCER (HCC): Status: ACTIVE | Noted: 2020-07-21

## 2022-03-19 PROBLEM — E11.59 DM TYPE 2 CAUSING VASCULAR DISEASE (HCC): Status: ACTIVE | Noted: 2019-10-25

## 2022-03-19 PROBLEM — L97.909 LEG ULCER (HCC): Status: ACTIVE | Noted: 2019-10-24

## 2022-03-19 PROBLEM — R53.81 DEBILITY: Status: ACTIVE | Noted: 2021-10-11

## 2022-03-19 PROBLEM — L97.909 VENOUS STASIS ULCER (HCC): Status: ACTIVE | Noted: 2020-07-21

## 2022-03-19 PROBLEM — I48.91 ATRIAL FIBRILLATION (HCC): Status: ACTIVE | Noted: 2017-03-15

## 2022-03-20 PROBLEM — I10 HTN (HYPERTENSION): Status: ACTIVE | Noted: 2019-10-24

## 2022-03-20 PROBLEM — I82.552 CHRONIC DEEP VEIN THROMBOSIS (DVT) OF LEFT PERONEAL VEIN (HCC): Status: ACTIVE | Noted: 2019-10-25

## 2022-03-20 PROBLEM — I50.30 DIASTOLIC CHF (HCC): Status: ACTIVE | Noted: 2020-07-21

## 2022-03-20 PROBLEM — E87.6 HYPOKALEMIA: Status: ACTIVE | Noted: 2020-07-21

## 2022-03-28 RX ORDER — COLESEVELAM 180 1/1
TABLET ORAL
Qty: 540 TABLET | Refills: 0 | Status: SHIPPED | OUTPATIENT
Start: 2022-03-28 | End: 2022-10-29

## 2022-04-11 ENCOUNTER — OFFICE VISIT (OUTPATIENT)
Dept: INTERNAL MEDICINE CLINIC | Age: 87
End: 2022-04-11
Payer: MEDICARE

## 2022-04-11 VITALS
SYSTOLIC BLOOD PRESSURE: 137 MMHG | DIASTOLIC BLOOD PRESSURE: 71 MMHG | WEIGHT: 158 LBS | RESPIRATION RATE: 14 BRPM | HEART RATE: 62 BPM | OXYGEN SATURATION: 92 % | TEMPERATURE: 98.6 F | HEIGHT: 69 IN | BODY MASS INDEX: 23.4 KG/M2

## 2022-04-11 DIAGNOSIS — I10 ESSENTIAL HYPERTENSION: ICD-10-CM

## 2022-04-11 DIAGNOSIS — L97.921 ULCER OF LEFT LOWER EXTREMITY, LIMITED TO BREAKDOWN OF SKIN (HCC): ICD-10-CM

## 2022-04-11 DIAGNOSIS — M81.0 OSTEOPOROSIS OF VERTEBRA: ICD-10-CM

## 2022-04-11 DIAGNOSIS — I50.32 CHRONIC DIASTOLIC CONGESTIVE HEART FAILURE (HCC): Primary | ICD-10-CM

## 2022-04-11 DIAGNOSIS — Z79.899 HIGH RISK MEDICATION USE: ICD-10-CM

## 2022-04-11 PROBLEM — I50.9 ACUTE ON CHRONIC CONGESTIVE HEART FAILURE (HCC): Status: ACTIVE | Noted: 2022-04-11

## 2022-04-11 PROCEDURE — G0463 HOSPITAL OUTPT CLINIC VISIT: HCPCS | Performed by: INTERNAL MEDICINE

## 2022-04-11 PROCEDURE — 99214 OFFICE O/P EST MOD 30 MIN: CPT | Performed by: INTERNAL MEDICINE

## 2022-04-11 PROCEDURE — G8536 NO DOC ELDER MAL SCRN: HCPCS | Performed by: INTERNAL MEDICINE

## 2022-04-11 PROCEDURE — 1101F PT FALLS ASSESS-DOCD LE1/YR: CPT | Performed by: INTERNAL MEDICINE

## 2022-04-11 PROCEDURE — G8427 DOCREV CUR MEDS BY ELIG CLIN: HCPCS | Performed by: INTERNAL MEDICINE

## 2022-04-11 PROCEDURE — G8420 CALC BMI NORM PARAMETERS: HCPCS | Performed by: INTERNAL MEDICINE

## 2022-04-11 PROCEDURE — G8510 SCR DEP NEG, NO PLAN REQD: HCPCS | Performed by: INTERNAL MEDICINE

## 2022-04-11 NOTE — PROGRESS NOTES
Reviewed record in preparation for visit and have obtained necessary documentation. Identified pt with two pt identifiers(name and ). Chief Complaint   Patient presents with    Follow-up     Vitals:    22 1206   BP: 137/71   Pulse: 62   Resp: 14   Temp: 98.6 °F (37 °C)   TempSrc: Temporal   SpO2: 92%   Weight: 158 lb (71.7 kg)   Height: 5' 9\" (1.753 m)        Health Maintenance Due   Topic Date Due    DTaP/Tdap/Td  (1 - Tdap) Never done    Shingles Vaccine (1 of 2) Never done    Albumin Urine Test  2014    Eye Exam  2014    Diabetic Foot Care  2014    COVID-19 Vaccine (3 - Booster for Dorsie Kathryn series) 2021    Annual Well Visit  2022       Mr. Farmer  has a reminder for a \"due or due soon\" health maintenance. I have asked that he discuss this further with his primary care provider for follow-up on this health maintenance. Coordination of Care Questionnaire:  :     1) Have you been to an emergency room, urgent care clinic since your last visit? no   Hospitalized since your last visit? no             2) Have you seen or consulted any other health care providers outside of [address] [place] [address] since your last visit? no  (Include any pap smears or colon screenings in this section.)    3. For patients aged 39-70: Has the patient had a colonoscopy / FIT/ Cologuard? NA - based on age      If the patient is female:  4. For patients aged 41-77: Has the patient had a mammogram within the past 2 years? NA - based on age or sex       11. For patients aged 21-65: Has the patient had a pap smear? NA - based on age or sex      In the event something were to happen to you and you were unable to speak on your behalf, do you have an Advance Directive/ Living Will in place stating your wishes?  NO    Do you have an Advance Directive on file? no    6) Are you interested in receiving information on Advance Directives? no    Patient is accompanied by self I have received verbal consent from Eva Buck to discuss any/all medical information while they are present in the room.

## 2022-04-12 NOTE — PROGRESS NOTES
Lux Clements is a 80 y.o. male with the following history as recorded in [facility]:  Patient Active Problem List    Diagnosis Date Noted    Acute on chronic congestive heart failure ([facility] 04/11/2022    Debility 10/11/2021    History of kyphoplasty 03/01/2021    Peripheral vascular disease ([facility] 06/53/3777    Diastolic CHF ([facility] 48/37/6525    [facility] 07/21/2020    Hypokalemia 07/21/2020    DM type 2 causing vascular disease ([facility] 10/25/2019    Chronic deep vein thrombosis (DVT) of left peroneal vein ([facility] 10/25/2019    CHF (congestive heart failure) ([facility] 10/24/2019    HTN (hypertension) 10/24/2019    Leg ulcer ([facility] 10/24/2019    Atrial fibrillation ([facility] 03/15/2017    Venous insufficiency 10/11/2016    Venous insufficiency of both lower extremities 10/11/2016    CAD (coronary artery disease) 04/08/2010    HLD (hyperlipidemia) 04/08/2010    Borderline diabetes mellitus 04/08/2010    Prostate cancer ([facility] 04/08/2010    Asthma 04/08/2010     Current Outpatient Medications   Medication Sig Dispense Refill    colesevelam (WELCHOL) 625 mg tablet TAKE 3 TABLETS BY MOUTH TWICE DAILY WITH MEALS 540 Tablet 0    furosemide (LASIX) 40 mg tablet TAKE 1 TABLET BY MOUTH TWICE DAILY 180 Tablet 1    metoprolol succinate (TOPROL-XL) 25 mg XL tablet TAKE 1 TABLET BY MOUTH DAILY 90 Tablet 1    potassium chloride SR (K-TAB) 20 mEq tablet Take 1 Tablet by mouth daily as needed (swelling). 90 Tablet 1    atorvastatin (Lipitor) 80 mg tablet Take 1 Tablet by mouth nightly. 90 Tablet 2    albuterol-ipratropium (DUO-NEB) 2.5 mg-0.5 mg/3 ml nebu       albuterol-ipratropium (DUO-NEB) 2.5 mg-0.5 mg/3 ml nebu 3 mL.  Eliquis 5 mg tablet TAKE 1 TABLET TWICE A DAY TO PREVENT THROMBOEMBOLISM IN CHRONIC ATRIAL FIBRILLATION 180 Tab 3    fluticasone-umeclidinium-vilanterol (TRELEGY ELLIPTA) 100-62.5-25 mcg inhaler Take 1 Puff by inhalation daily.        Allergies: Latex, natural rubber and Adhesive  Past Medical History:   Diagnosis Date    Asthma     BRONCITITS, INHALER USE PRN    CAD (coronary artery disease)     MI    Cancer ([facility] 2006    PROTATE    Chronic obstructive pulmonary disease ([facility]     Diabetes ([facility]     BORDERLINE    Hypercholesterolemia     Hypertension     Umbilical hernia     Venous insufficiency (chronic) (peripheral)      Past Surgical History:   Procedure Laterality Date    ENDOSCOPY, COLON, DIAGNOSTIC  2006    HX GI      COLONOSCOPY    HX HEENT Bilateral     CATARACTS/ IOL    HX HERNIA REPAIR  2014    HX PROSTATECTOMY  2006    IR KYPHOPLASTY THORACIC  10/12/2020    IR THORACENTESIS NDL PUNC ASP W IMAGE  2021    VA CARDIAC SURG PROCEDURE UNLIST      CABG X4 VESSEL    [facility] CARDIAC SURG PROCEDURE UNLIST  ,     CARDIAC CATH, STENTS     Family History   Problem Relation Age of Onset    Stroke Father     Anesth Problems Neg Hx      Social History     Tobacco Use    Smoking status: Former Smoker     Packs/day: 1.00     Types: Cigarettes     Quit date: 6/10/1964     Years since quittin.8    Smokeless tobacco: Never Used   Substance Use Topics    Alcohol use: Yes     Alcohol/week: 15.0 standard drinks     Types: 12 Cans of beer, 6 Standard drinks or equivalent per week     Comment: casual     He had a couple of hospital ER visits, since last seen, at [facility]. No records are available. He has got obstructive lung disease and that has caused him some issues. His quit date of cigarettes was . He says that he uses the once a day Trelegy inhaler, sometimes uses the nebulizer once or twice a day with some relief of coughing and wheezing. He has had no chest pain or chest tightness. He has been generally healthy. He is taking large dose of Lasix 80 mg a day to keep his edema down and he has had no recurrent venous stasis ulcers or vascular ulcers in his lower legs, which is good. He is ambulatory moderately.   He does not get around too much. He was with his family today. He is an elderly 80. His weight is 171. His blood pressure was 138/68. His BMI was 25. He is on BID lasix and has kept out of hospital  Review of Systems - General ROS: positive for  - fatigue  Psychological ROS: negative for - depression  Hematological and Lymphatic ROS: negative  Endocrine ROS: negative for - hair pattern changes, hot flashes or palpitations  Respiratory ROS: no cough, shortness of breath, or wheezing  Cardiovascular ROS: no chest pain or dyspnea on exertion  Gastrointestinal ROS: no abdominal pain, change in bowel habits, or black or bloody stools  Genito-Urinary ROS: no dysuria, trouble voiding, or hematuria  Musculoskeletal ROS: positive for - gait disturbance, joint pain, joint stiffness and joint swelling  Neurological ROS: no TIA or stroke symptoms  Dermatological ROS: negative for - mole changes, nail changes or skin lesion changes          Slow moving with walker      no apparent distress  Vitals:    04/11/22 1206   BP: 137/71   Pulse: 62   Resp: 14   Temp: 98.6 °F (37 °C)   TempSrc: Temporal   SpO2: 92%   Weight: 158 lb (71.7 kg)   Height: 5' 9\" (1.753 m)     Wt Readings from Last 3 Encounters:   04/11/22 158 lb (71.7 kg)   10/11/21 171 lb (77.6 kg)   03/01/21 190 lb 9.6 oz (86.5 kg)       Chest clear no wheeze  Heart reg distant heart sounds  No ascites    Legs some edema but minimal  No ulcers  Some varicosities  1. Chronic diastolic congestive heart failure ([address]  He seems OK now  - MAGNESIUM; Future  - METABOLIC PANEL, COMPREHENSIVE; Future  - LIPID PANEL; Future  - CBC WITH AUTOMATED DIFF; Future    2. Essential hypertension  controlled  - MAGNESIUM; Future  - METABOLIC PANEL, COMPREHENSIVE; Future  - LIPID PANEL; Future  - CBC WITH AUTOMATED DIFF; Future    3. Osteoporosis of vertebra  No pain no new compression    4. Ulcer of left lower extremity, limited to breakdown of skin ([address]  healed    5.  High risk medication use  Lasix BID follow

## 2022-04-14 DIAGNOSIS — E87.6 HYPOKALEMIA: ICD-10-CM

## 2022-04-14 RX ORDER — POTASSIUM CHLORIDE 1500 MG/1
TABLET, FILM COATED, EXTENDED RELEASE ORAL
Qty: 90 TABLET | Refills: 1 | Status: SHIPPED | OUTPATIENT
Start: 2022-04-14 | End: 2022-11-02

## 2022-06-10 RX ORDER — ATORVASTATIN CALCIUM 80 MG/1
TABLET, FILM COATED ORAL
Qty: 90 TABLET | Refills: 2 | Status: SHIPPED | OUTPATIENT
Start: 2022-06-10

## 2022-06-15 ENCOUNTER — PATIENT OUTREACH (OUTPATIENT)
Dept: CASE MANAGEMENT | Age: 87
End: 2022-06-15

## 2022-06-15 NOTE — PROGRESS NOTES
Ambulatory Care Management Note    Date/Time:  6/15/2022 3:18 PM    This patient was received as a referral from [facility]. Ambulatory Care Manager outreached to patient today to offer care management services. Spoke with patient's daughter, Ivania Rob (verified on HIPAA). Introduction to self and role of care manager provided. Patient's daughter accepted care management services at this time. Follow up call scheduled at this time. Patient's daughter has Ambulatory Care Manager's contact number for for any questions or concerns.

## 2022-06-23 ENCOUNTER — PATIENT OUTREACH (OUTPATIENT)
Dept: CASE MANAGEMENT | Age: 87
End: 2022-06-23

## 2022-06-23 NOTE — PROGRESS NOTES
Ambulatory Care Management Note    Date/Time:  6/23/2022 10:37 AM    Outreach made to patient's daughter for follow-up. Daughter is tele-working and going to a work conference. Requests a call back next Thursday morning. Follow-up call scheduled.      PCP/Specialist follow up:   Future Appointments   Date Time Provider Radha Bahena   10/11/2022 11:15 AM Terell Gardner MD [facility]

## 2022-06-30 ENCOUNTER — PATIENT OUTREACH (OUTPATIENT)
Dept: CASE MANAGEMENT | Age: 87
End: 2022-06-30

## 2022-06-30 NOTE — PROGRESS NOTES
Ambulatory Care Management Note    Date/Time:  6/30/2022 10:49 AM    This Ambulatory Care Manager (ACM) reviewed and updated the following screenings during this call; general assessment, SDOH assessments and medication reconciliation     Patient's challenges to self-management identified:   increasing care needs      Medication Management:  good adherence and good understanding    Advance Care Planning:   Does patient have an Advance Directive:  not on file, will address at future call    [employer], Referrals, and Durable Medical Equipment: none      Health Maintenance Due   Topic Date Due    DTaP/Tdap/Td series (1 - Tdap) Never done    Shingrix Vaccine Age 50> (1 of 2) Never done    MICROALBUMIN Q1  01/18/2014    Pneumococcal 65+ years (2 - PCV) 01/18/2014    Eye Exam Retinal or Dilated  03/18/2014    Foot Exam Q1  07/20/2014    COVID-19 Vaccine (3 - Booster for Valerie Bora series) 08/26/2021    Medicare Yearly Exam  03/02/2022     Health Maintenance Reviewed    Patient was asked to consider health care goals that they would like to focus on with this ACM. ACM will follow up with patient to discuss goals and establish care plan in the next 14 days. PCP/Specialist follow up:   Future Appointments   Date Time Provider Radha Bahena   10/11/2022 11:15 AM Araceli Holland MD [facility]      Goals      Supportive resources in place to maintain patient in the community      6/30/22 - Some needs identified during general assessment. Patient lives with daughter, Sp Kiran, and her young adult children. 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 Also interested in meal delivery for his lunchtime meal when she isn't home to prepare food.  Emailed information for private duty care agencies and gave contact information for [employer] home delivered meal program. Also sent information for life alert system.

## 2022-07-14 ENCOUNTER — PATIENT OUTREACH (OUTPATIENT)
Dept: CASE MANAGEMENT | Age: 87
End: 2022-07-14

## 2022-07-14 NOTE — PROGRESS NOTES
Ambulatory Care Management Note    Date/Time:  7/14/2022 2:00 PM    Outreach made to patient's daughter for care management follow-up. Requests call back tomorrow morning. Follow-up call scheduled.      PCP/Specialist follow up:   Future Appointments   Date Time Provider Radha Bahena   10/11/2022 11:15 AM Nicki Gardner MD [facility]

## 2022-07-15 ENCOUNTER — PATIENT OUTREACH (OUTPATIENT)
Dept: CASE MANAGEMENT | Age: 87
End: 2022-07-15

## 2022-07-15 NOTE — PROGRESS NOTES
Ambulatory Care Management Note      Date/Time:  7/15/2022 3:04 PM    This patient was received as a referral from [facility]. Top Challenges reviewed with the provider   No challenges reviewed with provider at this time             Ambulatory  contacted patient for discussion and case management of CHF, COPD, social needs. Summary of patients top problems:   1. Social needs: Increasing care needs at home. Patient lives with daughter, Nik Centeno, and her young adult children. 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 Also needs additional resources for meals. Patient is retired from [employer] and has not established care with [facility]. Has  for life insurance and Medicare. 2. COPD: Had a few ED visits earlier in the year for exacerbation. No ED visits since April. Followed by Dr. Pete Gibson for pulmonology. On Trelegy inhaler, which patient's daughter reports is expensive. [facility] and noted that medication should be covered. Nik Centeno will make sure the pharmacy is running his [employer] insurance as well as medicare and update ACM if needed. 3. CHF: stable on current regimen. Lasix 40mg BID, lipitor, metoprolol. Eliquis for hx a-fib. Followed by Dr. Chandrakant Wheeler. 4. No ACP - no ACP conversations or documents noted on file. Will address.    Patient's challenges to self management identified:   lack of knowledge about disease, utilization of services and increasing care needs      Medication Management:  good adherence    Advance Care Planning:   Does patient have an Advance Directive:  not on file, will address    [employer], Referrals, and Durable Medical Equipment: cane, nebulizer    PCP/Specialist follow up:   Future Appointments   Date Time Provider Radha Bahena   10/11/2022 11:15 AM Kathy Gardner MD [facility]          Goals      Supportive resources in place to maintain patient in the community      6/30/22 - Some needs identified during general assessment. Patient lives with daughter, Yordy Murillo, and her young adult children. 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 Also interested in meal delivery for his lunchtime meal when she isn't home to prepare food. Emailed information for private duty care agencies and gave contact information for [employer] home delivered meal program. Also sent information for life alert system. 7/15/22 - Followed up on resources that were sent at last call. Yordy Murillo reports that she received all of the information but has not reached out to anyone yet. Her daughter tested positive or covid, so they have been in quarantine. Mr. Eden Reese has been kept separate and has remained testing negative. No symptoms. Gave covid hotline to utilize if needed. Also recommended establishing care with the [place]. They may have additional resources that would be beneficial to the patient. Gave enrollment number to [id number] will reach out to provided resources and update AC on additional needs. Patient verbalized understanding of all information discussed. Patient has this Ambulatory Care Manager's contact information for any further questions, concerns, or needs.

## 2022-07-21 RX ORDER — METOPROLOL SUCCINATE 25 MG/1
TABLET, EXTENDED RELEASE ORAL
Qty: 90 TABLET | Refills: 1 | Status: SHIPPED | OUTPATIENT
Start: 2022-07-21

## 2022-08-02 ENCOUNTER — PATIENT OUTREACH (OUTPATIENT)
Dept: CASE MANAGEMENT | Age: 87
End: 2022-08-02

## 2022-08-05 NOTE — PROGRESS NOTES
Ambulatory Care Management Note    Date/Time:  8/5/2022 1:25 PM    Outreach made to patient's daughter for care management follow-up. Daughter, Lesley Jesus, reports she is unable to talk at this time and requests a call back on Wednesday next week. Follow-up call scheduled. PCP/Specialist follow up:   Future Appointments   Date Time Provider Radha Bahena   10/11/2022 11:15 AM Karin Cockayne, Quentin Mings, MD [facility]       Goals        Supportive resources in place to maintain patient in the community      6/30/22 - Some needs identified during general assessment. Patient lives with daughter, Lesley Jesus, and her young adult children. 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 Also interested in meal delivery for his lunchtime meal when she isn't home to prepare food. Emailed information for private duty care agencies and gave contact information for [employer] home delivered meal program. Also sent information for life alert system. 7/15/22 - Followed up on resources that were sent at last call. Lesley Jesus reports that she received all of the information but has not reached out to anyone yet. Her daughter tested positive or covid, so they have been in quarantine. Mr. Paul Haile has been kept separate and has remained testing negative. No symptoms. Gave covid hotline to utilize if needed. Also recommended establishing care with the [place]. They may have additional resources that would be beneficial to the patient. Gave enrollment number to 3006  will reach out to provided resources and update ACM on additional needs.

## 2022-08-10 ENCOUNTER — PATIENT OUTREACH (OUTPATIENT)
Dept: CASE MANAGEMENT | Age: 87
End: 2022-08-10

## 2022-08-10 NOTE — PROGRESS NOTES
Ambulatory Care Management Note    Date/Time:  8/10/2022 3:48 PM    Outreach made to patient's daughter for scheduled care management follow-up. Daughter requests call back tomorrow between 2-3pm. Follow-up call scheduled. PCP/Specialist follow up:   Future Appointments   Date Time Provider Radha Riosi   10/11/2022 11:15 AM Bairon Agee MD [facility]       Goals        Supportive resources in place to maintain patient in the community      6/30/22 - Some needs identified during general assessment. Patient lives with daughter, Jose Mondragon, and her young adult children. 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 Also interested in meal delivery for his lunchtime meal when she isn't home to prepare food. Emailed information for private duty care agencies and gave contact information for [employer] home delivered meal program. Also sent information for life alert system. 7/15/22 - Followed up on resources that were sent at last call. Jose Mondragon reports that she received all of the information but has not reached out to anyone yet. Her daughter tested positive or covid, so they have been in quarantine. Mr. Gurmeet Marques has been kept separate and has remained testing negative. No symptoms. Gave covid hotline to utilize if needed. Also recommended establishing care with the [place]. They may have additional resources that would be beneficial to the patient. Gave enrollment number to [id number] will reach out to provided resources and update ACM on additional needs.

## 2022-08-11 ENCOUNTER — PATIENT OUTREACH (OUTPATIENT)
Dept: CASE MANAGEMENT | Age: 87
End: 2022-08-11

## 2022-08-11 NOTE — PROGRESS NOTES
Goals        ACP      8/11/22 - Patient's daughter reports that the patient does not have any written medical wishes, but she would like to discuss it further with him and feels that it is important for him to complete a medical directive. Sending ACP information via email as requested by patient's daughter. She will review the information with the patient and ACM will discuss further at next call. Will offer ACP referral if patient would like to complete an AMD at that time. Supportive resources in place to maintain patient in the community      6/30/22 - Some needs identified during general assessment. Patient lives with daughter, Meño Rodriguez, and her young adult children. 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 Also interested in meal delivery for his lunchtime meal when she isn't home to prepare food. Emailed information for private duty care agencies and gave contact information for [employer] home delivered meal program. Also sent information for life alert system. 7/15/22 - Followed up on resources that were sent at last call. Meño Rodriguez reports that she received all of the information but has not reached out to anyone yet. Her daughter tested positive or covid, so they have been in quarantine. Mr. Daphne Bird has been kept separate and has remained testing negative. No symptoms. Gave covid hotline to utilize if needed. Also recommended establishing care with the [address] [place] [address]. They may have additional resources that would be beneficial to the patient. Gave enrollment number to [facility] will reach out to provided resources and update ACM on additional needs. 8/11/22 - Patient's daughter reports that they are in the process of getting the life alert system. They will also be contacting personal care aide companies to hire someone a few days a week.  Declines need for additional resources at this time. [facility] will continue to monitor.

## 2022-08-12 NOTE — ACP (ADVANCE CARE PLANNING)
8/11/22 - Patient's daughter reports that the patient does not have any written medical wishes, but she would like to discuss it further with him and feels that it is important for him to complete a medical directive. Sending ACP information via email as requested by patient's daughter. She will review the information with the patient and ACM will discuss further at next call. Will offer ACP referral if patient would like to complete an AMD at that time.

## 2022-09-12 ENCOUNTER — PATIENT OUTREACH (OUTPATIENT)
Dept: CASE MANAGEMENT | Age: 87
End: 2022-09-12

## 2022-09-12 NOTE — PROGRESS NOTES
Ambulatory Care Management Note    Date/Time:  9/12/2022 2:41 PM    Outreach made to patient's daughter for care management follow-up. Patient's daughter requests call back Wednesday afternoon. PCP/Specialist follow up:   Future Appointments   Date Time Provider Radha Bahena   11/1/2022 11:30 AM Mery Monroy MD [facility]       Goals        ACP      8/11/22 - Patient's daughter reports that the patient does not have any written medical wishes, but she would like to discuss it further with him and feels that it is important for him to complete a medical directive. Sending ACP information via email as requested by patient's daughter. She will review the information with the patient and ACM will discuss further at next call. Will offer ACP referral if patient would like to complete an AMD at that time. Supportive resources in place to maintain patient in the community      6/30/22 - Some needs identified during general assessment. Patient lives with daughter, Stefanie Gan, and her young adult children. 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 Also interested in meal delivery for his lunchtime meal when she isn't home to prepare food. Emailed information for private duty care agencies and gave contact information for [employer] home delivered meal program. Also sent information for life alert system. 7/15/22 - Followed up on resources that were sent at last call. Stefanie Gan reports that she received all of the information but has not reached out to anyone yet. Her daughter tested positive or covid, so they have been in quarantine. Mr. Larry Sandoval has been kept separate and has remained testing negative. No symptoms. Gave covid hotline to utilize if needed. Also recommended establishing care with the [address] [place] [address]. They may have additional resources that would be beneficial to the patient.  Gave enrollment number to [id number] will reach out to provided resources and update [facility] on additional needs. 8/11/22 - Patient's daughter reports that they are in the process of getting the life alert system. They will also be contacting personal care aide companies to hire someone a few days a week. Declines need for additional resources at this time. [facility] will continue to monitor.

## 2022-09-14 ENCOUNTER — PATIENT OUTREACH (OUTPATIENT)
Dept: CASE MANAGEMENT | Age: 87
End: 2022-09-14

## 2022-09-15 NOTE — PROGRESS NOTES
Ambulatory Care Management Note    Date/Time:  9/14/22     Outreach made to patient's daughter for scheduled care management follow-up. Message left requesting return call. PCP/Specialist follow up:   Future Appointments   Date Time Provider Radha Riosi   11/1/2022 11:30 AM Mery Monroy MD [facility]       Goals        ACP      8/11/22 - Patient's daughter reports that the patient does not have any written medical wishes, but she would like to discuss it further with him and feels that it is important for him to complete a medical directive. Sending ACP information via email as requested by patient's daughter. She will review the information with the patient and ACM will discuss further at next call. Will offer ACP referral if patient would like to complete an AMD at that time. Supportive resources in place to maintain patient in the community      6/30/22 - Some needs identified during general assessment. Patient lives with daughter, Stefanie Gan, and her young adult children. 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 Also interested in meal delivery for his lunchtime meal when she isn't home to prepare food. Emailed information for private duty care agencies and gave contact information for [employer] home delivered meal program. Also sent information for life alert system. 7/15/22 - Followed up on resources that were sent at last call. Stefanie Gan reports that she received all of the information but has not reached out to anyone yet. Her daughter tested positive or covid, so they have been in quarantine. Mr. Larry Sandoval has been kept separate and has remained testing negative. No symptoms. Gave covid hotline to utilize if needed. Also recommended establishing care with the [place]. They may have additional resources that would be beneficial to the patient.  Gave enrollment number to [facility] Brennan will reach out to provided resources and update AC on additional needs. 8/11/22 - Patient's daughter reports that they are in the process of getting the life alert system. They will also be contacting personal care aide companies to hire someone a few days a week. Declines need for additional resources at this time. [facility] will continue to monitor.

## 2022-10-05 ENCOUNTER — PATIENT OUTREACH (OUTPATIENT)
Dept: CASE MANAGEMENT | Age: 87
End: 2022-10-05

## 2022-10-05 NOTE — PROGRESS NOTES
Patient resolved from the Complex Case Management  program on 10/5/22. [facility] has been unable to reach patients daughter for continuing care management. Care management goals have been completed. No further Ambulatory Care Manager follow up scheduled. Goals Addressed                   This Visit's Progress     COMPLETED: ACP        8/11/22 - Patient's daughter reports that the patient does not have any written medical wishes, but she would like to discuss it further with him and feels that it is important for him to complete a medical directive. Sending ACP information via email as requested by patient's daughter. She will review the information with the patient and ACM will discuss further at next call. Will offer ACP referral if patient would like to complete an AMD at that time. COMPLETED: Supportive resources in place to maintain patient in the community        6/30/22 - Some needs identified during general assessment. Patient lives with daughter, Amy, and her young adult children. 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 Also interested in meal delivery for his lunchtime meal when she isn't home to prepare food. Emailed information for private duty care agencies and gave contact information for [employer] home delivered meal program. Also sent information for life alert system. 7/15/22 - Followed up on resources that were sent at last call. Amy reports that she received all of the information but has not reached out to anyone yet. Her daughter tested positive or covid, so they have been in quarantine. Mr. Dipesh Norman has been kept separate and has remained testing negative. No symptoms. Gave covid hotline to utilize if needed. Also recommended establishing care with the [address] [place] [address]. They may have additional resources that would be beneficial to the patient.  Gave enrollment number to [facility] [address] will reach out to provided resources and update ACM on additional needs. 8/11/22 - Patient's daughter reports that they are in the process of getting the life alert system. They will also be contacting personal care aide [employer] to hire someone a few days a week. Declines need for additional resources at this time. ACM will continue to monitor. Patient has Ambulatory Care Manager's contact information for any further questions, concerns, or needs.   Patients upcoming visits:    Future Appointments   Date Time Provider Radha Bahena   11/1/2022 11:30 AM Neva Gardner MD [facility]

## 2022-10-29 RX ORDER — COLESEVELAM 180 1/1
TABLET ORAL
Qty: 540 TABLET | Refills: 0 | Status: SHIPPED | OUTPATIENT
Start: 2022-10-29

## 2022-11-01 ENCOUNTER — OFFICE VISIT (OUTPATIENT)
Dept: INTERNAL MEDICINE CLINIC | Age: 87
End: 2022-11-01
Payer: MEDICARE

## 2022-11-01 VITALS
HEIGHT: 69 IN | HEART RATE: 52 BPM | TEMPERATURE: 97.8 F | WEIGHT: 164.8 LBS | SYSTOLIC BLOOD PRESSURE: 128 MMHG | DIASTOLIC BLOOD PRESSURE: 60 MMHG | OXYGEN SATURATION: 94 % | RESPIRATION RATE: 18 BRPM | BODY MASS INDEX: 24.41 KG/M2

## 2022-11-01 DIAGNOSIS — Z00.00 MEDICARE ANNUAL WELLNESS VISIT, SUBSEQUENT: Primary | ICD-10-CM

## 2022-11-01 DIAGNOSIS — I50.32 CHRONIC DIASTOLIC CONGESTIVE HEART FAILURE (HCC): ICD-10-CM

## 2022-11-01 DIAGNOSIS — Z23 NEEDS FLU SHOT: ICD-10-CM

## 2022-11-01 DIAGNOSIS — R73.03 BORDERLINE DIABETES MELLITUS: ICD-10-CM

## 2022-11-01 DIAGNOSIS — E78.00 PURE HYPERCHOLESTEROLEMIA: ICD-10-CM

## 2022-11-01 DIAGNOSIS — Z13.39 SCREENING FOR ALCOHOLISM: ICD-10-CM

## 2022-11-01 LAB
ALBUMIN SERPL-MCNC: 3.1 G/DL (ref 3.5–5)
ALBUMIN/GLOB SERPL: 0.6 {RATIO} (ref 1.1–2.2)
ALP SERPL-CCNC: 140 U/L (ref 45–117)
ALT SERPL-CCNC: 26 U/L (ref 12–78)
ANION GAP SERPL CALC-SCNC: 2 MMOL/L (ref 5–15)
AST SERPL-CCNC: 42 U/L (ref 15–37)
BASOPHILS # BLD: 0 K/UL (ref 0–0.1)
BASOPHILS NFR BLD: 0 % (ref 0–1)
BILIRUB SERPL-MCNC: 0.5 MG/DL (ref 0.2–1)
BUN SERPL-MCNC: 28 MG/DL (ref 6–20)
BUN/CREAT SERPL: 23 (ref 12–20)
CALCIUM SERPL-MCNC: 9.4 MG/DL (ref 8.5–10.1)
CHLORIDE SERPL-SCNC: 102 MMOL/L (ref 97–108)
CHOLEST SERPL-MCNC: 145 MG/DL
CO2 SERPL-SCNC: 38 MMOL/L (ref 21–32)
CREAT SERPL-MCNC: 1.2 MG/DL (ref 0.7–1.3)
DIFFERENTIAL METHOD BLD: ABNORMAL
EOSINOPHIL # BLD: 0.7 K/UL (ref 0–0.4)
EOSINOPHIL NFR BLD: 8 % (ref 0–7)
ERYTHROCYTE [DISTWIDTH] IN BLOOD BY AUTOMATED COUNT: 14.2 % (ref 11.5–14.5)
EST. AVERAGE GLUCOSE BLD GHB EST-MCNC: 108 MG/DL
GLOBULIN SER CALC-MCNC: 5 G/DL (ref 2–4)
GLUCOSE SERPL-MCNC: 102 MG/DL (ref 65–100)
HBA1C MFR BLD: 5.4 % (ref 4–5.6)
HCT VFR BLD AUTO: 39.2 % (ref 36.6–50.3)
HDLC SERPL-MCNC: 50 MG/DL
HDLC SERPL: 2.9 {RATIO} (ref 0–5)
HGB BLD-MCNC: 12 G/DL (ref 12.1–17)
IMM GRANULOCYTES # BLD AUTO: 0 K/UL (ref 0–0.04)
IMM GRANULOCYTES NFR BLD AUTO: 0 % (ref 0–0.5)
LDLC SERPL CALC-MCNC: 81.4 MG/DL (ref 0–100)
LYMPHOCYTES # BLD: 2.8 K/UL (ref 0.8–3.5)
LYMPHOCYTES NFR BLD: 35 % (ref 12–49)
MAGNESIUM SERPL-MCNC: 2.2 MG/DL (ref 1.6–2.4)
MCH RBC QN AUTO: 32.3 PG (ref 26–34)
MCHC RBC AUTO-ENTMCNC: 30.6 G/DL (ref 30–36.5)
MCV RBC AUTO: 105.7 FL (ref 80–99)
MONOCYTES # BLD: 0.7 K/UL (ref 0–1)
MONOCYTES NFR BLD: 9 % (ref 5–13)
NEUTS SEG # BLD: 3.9 K/UL (ref 1.8–8)
NEUTS SEG NFR BLD: 48 % (ref 32–75)
NRBC # BLD: 0.03 K/UL (ref 0–0.01)
NRBC BLD-RTO: 0.4 PER 100 WBC
PLATELET # BLD AUTO: 241 K/UL (ref 150–400)
PMV BLD AUTO: 10.2 FL (ref 8.9–12.9)
POTASSIUM SERPL-SCNC: 4.5 MMOL/L (ref 3.5–5.1)
PROT SERPL-MCNC: 8.1 G/DL (ref 6.4–8.2)
RBC # BLD AUTO: 3.71 M/UL (ref 4.1–5.7)
SODIUM SERPL-SCNC: 142 MMOL/L (ref 136–145)
TRIGL SERPL-MCNC: 68 MG/DL (ref ?–150)
TSH SERPL DL<=0.05 MIU/L-ACNC: 3.36 UIU/ML (ref 0.36–3.74)
URATE SERPL-MCNC: 6.1 MG/DL (ref 3.5–7.2)
VLDLC SERPL CALC-MCNC: 13.6 MG/DL
WBC # BLD AUTO: 8.1 K/UL (ref 4.1–11.1)

## 2022-11-01 PROCEDURE — G0442 ANNUAL ALCOHOL SCREEN 15 MIN: HCPCS | Performed by: INTERNAL MEDICINE

## 2022-11-01 PROCEDURE — G8510 SCR DEP NEG, NO PLAN REQD: HCPCS | Performed by: INTERNAL MEDICINE

## 2022-11-01 PROCEDURE — G0463 HOSPITAL OUTPT CLINIC VISIT: HCPCS | Performed by: INTERNAL MEDICINE

## 2022-11-01 PROCEDURE — G8536 NO DOC ELDER MAL SCRN: HCPCS | Performed by: INTERNAL MEDICINE

## 2022-11-01 PROCEDURE — 1101F PT FALLS ASSESS-DOCD LE1/YR: CPT | Performed by: INTERNAL MEDICINE

## 2022-11-01 PROCEDURE — 90694 VACC AIIV4 NO PRSRV 0.5ML IM: CPT | Performed by: INTERNAL MEDICINE

## 2022-11-01 PROCEDURE — 99214 OFFICE O/P EST MOD 30 MIN: CPT | Performed by: INTERNAL MEDICINE

## 2022-11-01 PROCEDURE — G0439 PPPS, SUBSEQ VISIT: HCPCS | Performed by: INTERNAL MEDICINE

## 2022-11-01 PROCEDURE — G8427 DOCREV CUR MEDS BY ELIG CLIN: HCPCS | Performed by: INTERNAL MEDICINE

## 2022-11-01 PROCEDURE — G8420 CALC BMI NORM PARAMETERS: HCPCS | Performed by: INTERNAL MEDICINE

## 2022-11-01 NOTE — PROGRESS NOTES
Chief Complaint   Patient presents with    Follow-up          Vitals:    11/01/22 1140   BP: 128/60   Pulse: (!) 52   Resp: 18   Temp: 97.8 °F (36.6 °C)   TempSrc: Temporal   SpO2: 94%   Weight: 164 lb 12.8 oz (74.8 kg)   Height: 5' 9\" (1.753 m)   PainSc:   0 - No pain       Current Outpatient Medications on File Prior to Visit   Medication Sig Dispense Refill    colesevelam (WELCHOL) 625 mg tablet TAKE 3 TABLETS BY MOUTH TWICE DAILY WITH MEALS 540 Tablet 0    metoprolol succinate (TOPROL-XL) 25 mg XL tablet TAKE 1 TABLET BY MOUTH DAILY 90 Tablet 1    atorvastatin (LIPITOR) 80 mg tablet TAKE 1 TABLET BY MOUTH EVERY NIGHT 90 Tablet 2    potassium chloride SR (K-TAB) 20 mEq tablet TAKE 1 TABLET BY MOUTH DAILY AS NEEDED FOR SWELLING 90 Tablet 1    furosemide (LASIX) 40 mg tablet TAKE 1 TABLET BY MOUTH TWICE DAILY (Patient taking differently: daily. ) 180 Tablet 1    albuterol-ipratropium (DUO-NEB) 2.5 mg-0.5 mg/3 ml nebu 3 mL. Eliquis 5 mg tablet TAKE 1 TABLET TWICE A DAY TO PREVENT THROMBOEMBOLISM IN CHRONIC ATRIAL FIBRILLATION 180 Tab 3    fluticasone-umeclidinium-vilanterol (TRELEGY ELLIPTA) 100-62.5-25 mcg inhaler Take 1 Puff by inhalation daily. No current facility-administered medications on file prior to visit. Health Maintenance Due   Topic    DTaP/Tdap/Td series (1 - Tdap)    Shingrix Vaccine Age 50> (1 of 2)    MICROALBUMIN Q1     Eye Exam Retinal or Dilated     Foot Exam Q1     COVID-19 Vaccine (3 - Booster for Moderna series)    Medicare Yearly Exam     Flu Vaccine (1)       1. \"Have you been to the ER, urgent care clinic since your last visit? Hospitalized since your last visit? \" No    2. \"Have you seen or consulted any other health care providers outside of the [address] [place] [address] since your last visit? \" No     3. For patients aged 39-70: Has the patient had a colonoscopy / FIT/ Cologuard? NA - based on age      If the patient is female:    4.  For patients aged 41-77: Has the patient had a mammogram within the past 2 years? NA - based on age or sex      11. For patients aged 21-65: Has the patient had a pap smear?  NA - based on age or sex

## 2022-11-01 NOTE — PROGRESS NOTES
Batsheva Baker is a 80 y.o. male with the following history as recorded in [facility]:  Patient Active Problem List    Diagnosis Date Noted    Acute on chronic congestive heart failure ([facility] 04/11/2022    Debility 10/11/2021    History of kyphoplasty 03/01/2021    Peripheral vascular disease ([facility] 71/90/1569    Diastolic CHF ([facility] 94/10/7128    Venous stasis ulcer ([facility] 07/21/2020    Hypokalemia 07/21/2020    DM type 2 causing vascular disease ([facility] 10/25/2019    Chronic deep vein thrombosis (DVT) of left peroneal vein ([facility] 10/25/2019    CHF (congestive heart failure) ([facility] 10/24/2019    HTN (hypertension) 10/24/2019    Leg ulcer ([facility] 10/24/2019    Atrial fibrillation ([facility] 03/15/2017    Venous insufficiency 10/11/2016    Venous insufficiency of both lower extremities 10/11/2016    CAD (coronary artery disease) 04/08/2010    HLD (hyperlipidemia) 04/08/2010    Borderline diabetes mellitus 04/08/2010    Prostate cancer ([facility] 04/08/2010    Asthma 04/08/2010     Current Outpatient Medications   Medication Sig Dispense Refill    colesevelam (WELCHOL) 625 mg tablet TAKE 3 TABLETS BY MOUTH TWICE DAILY WITH MEALS 540 Tablet 0    metoprolol succinate (TOPROL-XL) 25 mg XL tablet TAKE 1 TABLET BY MOUTH DAILY 90 Tablet 1    atorvastatin (LIPITOR) 80 mg tablet TAKE 1 TABLET BY MOUTH EVERY NIGHT 90 Tablet 2    potassium chloride SR (K-TAB) 20 mEq tablet TAKE 1 TABLET BY MOUTH DAILY AS NEEDED FOR SWELLING 90 Tablet 1    furosemide (LASIX) 40 mg tablet TAKE 1 TABLET BY MOUTH TWICE DAILY (Patient taking differently: daily. ) 180 Tablet 1    albuterol-ipratropium (DUO-NEB) 2.5 mg-0.5 mg/3 ml nebu 3 mL. Eliquis 5 mg tablet TAKE 1 TABLET TWICE A DAY TO PREVENT THROMBOEMBOLISM IN CHRONIC ATRIAL FIBRILLATION 180 Tab 3    fluticasone-umeclidinium-vilanterol (TRELEGY ELLIPTA) 100-62.5-25 mcg inhaler Take 1 Puff by inhalation daily.        Allergies: Latex, natural rubber and Adhesive  Past Medical History:   Diagnosis Date    Asthma JUSTITS, INHALER USE PRN    CAD (coronary artery disease)     MI    Cancer ([facility] 2006    PROTATE    Chronic obstructive pulmonary disease ([facility]     Diabetes ([facility]     BORDERLINE    Hypercholesterolemia     Hypertension     Umbilical hernia     Venous insufficiency (chronic) (peripheral)      Past Surgical History:   Procedure Laterality Date    ENDOSCOPY, COLON, DIAGNOSTIC  2006    HX GI      COLONOSCOPY    HX HEENT Bilateral     CATARACTS/ IOL    HX HERNIA REPAIR  2014    HX PROSTATECTOMY  2006    IR KYPHOPLASTY THORACIC  10/12/2020    IR THORACENTESIS NDL PUNC ASP W IMAGE  2021    [place] CARDIAC SURG PROCEDURE UNLIST      CABG X4 VESSEL    [place] CARDIAC SURG PROCEDURE UNLIST  ,     CARDIAC CATH, STENTS     Family History   Problem Relation Age of Onset    Stroke Father     Anesth Problems Neg Hx      Social History     Tobacco Use    Smoking status: Former     Packs/day: 1.00     Types: Cigarettes     Quit date: 6/10/1964     Years since quittin.4    Smokeless tobacco: Never   Substance Use Topics    Alcohol use: Yes     Alcohol/week: 15.0 standard drinks     Types: 12 Cans of beer, 6 Standard drinks or equivalent per week     Comment: casual     He had a couple of hospital ER visits, since last seen, at [facility]. No records are available. He has got obstructive lung disease and that has caused him some issues. His quit date of cigarettes was . He says that he uses the once a day Trelegy inhaler, sometimes uses the nebulizer once or twice a day with some relief of coughing and wheezing. He has had no chest pain or chest tightness. He has been generally healthy. He is taking large dose of Lasix 80 mg a day to keep his edema down and he has had no recurrent venous stasis ulcers or vascular ulcers in his lower legs, which is good. He is ambulatory moderately. He does not get around too much. He was with his family today. He is an elderly 80. His weight is 171. His blood pressure was 138/68. His BMI was 25. He is on BID lasix and has kept out of hospital  Review of Systems - General ROS: positive for  - fatigue  Psychological ROS: negative for - depression  Hematological and Lymphatic ROS: negative  Endocrine ROS: negative for - hair pattern changes, hot flashes or palpitations  Respiratory ROS: no cough, shortness of breath, or wheezing  Cardiovascular ROS: no chest pain or dyspnea on exertion  Gastrointestinal ROS: no abdominal pain, change in bowel habits, or black or bloody stools  Genito-Urinary ROS: no dysuria, trouble voiding, or hematuria  Musculoskeletal ROS: positive for - gait disturbance, joint pain, joint stiffness and joint swelling  Neurological ROS: no TIA or stroke symptoms  Dermatological ROS: negative for - mole changes, nail changes or skin lesion changes          Slow moving with walker      no apparent distress  Vitals:    11/01/22 1140   BP: 128/60   Pulse: (!) 52   Resp: 18   Temp: 97.8 °F (36.6 °C)   TempSrc: Temporal   SpO2: 94%   Weight: 164 lb 12.8 oz (74.8 kg)   Height: 5' 9\" (1.753 m)     Wt Readings from Last 3 Encounters:   11/01/22 164 lb 12.8 oz (74.8 kg)   04/11/22 158 lb (71.7 kg)   10/11/21 171 lb (77.6 kg)       Chest clear no wheeze  Heart reg distant heart sounds  No ascites    Legs some edema but minimal  No ulcers  Some varicosities  hytperpigmented  Mental status exam; he is alert, orient to time, person and place. Normal thought content, speech, affect, mood and dress are noted. Uses a cane for ambulation support and balance    1. Chronic diastolic congestive heart failure ([address]  He seems OK now  - MAGNESIUM; Future  - METABOLIC PANEL, COMPREHENSIVE; Future  - LIPID PANEL; Future  - CBC WITH AUTOMATED DIFF; Future    2. Essential hypertension  controlled  - MAGNESIUM; Future  - METABOLIC PANEL, COMPREHENSIVE; Future  - LIPID PANEL; Future  - CBC WITH AUTOMATED DIFF; Future    3.  Osteoporosis of vertebra  No pain no new compression    4. Ulcer of left lower extremity, limited to breakdown of skin ([address]  healed    5. High risk medication use  Lasix BID follow    This is the Subsequent Medicare Annual Wellness Exam, performed 12 months or more after the Initial AWV or the last Subsequent AWV    I have reviewed the patient's medical history in detail and updated the computerized patient record. Assessment/Plan   Education and counseling provided:  Are appropriate based on today's review and evaluation  End-of-Life planning (with patient's consent)    1. Medicare annual wellness visit, subsequent  2. Screening for alcoholism  -     [place] ANNUAL ALCOHOL SCREEN 15 MIN       Depression Risk Factor Screening     3 most recent PHQ Screens 11/1/2022   PHQ Not Done -   Little interest or pleasure in doing things Not at all   Feeling down, depressed, irritable, or hopeless Not at all   Total Score PHQ 2 0   Trouble falling or staying asleep, or sleeping too much -   Feeling tired or having little energy -   Poor appetite, weight loss, or overeating -   Feeling bad about yourself - or that you are a failure or have let yourself or your family down -   Trouble concentrating on things such as school, work, reading, or watching TV -   Moving or speaking so slowly that other people could have noticed; or the opposite being so fidgety that others notice -   Thoughts of being better off dead, or hurting yourself in some way -   PHQ 9 Score -   How difficult have these problems made it for you to do your work, take care of your home and get along with others -       Alcohol & Drug Abuse Risk Screen    Do you average more than 1 drink per night or more than 7 drinks a week: No    In the past three months have you have had more than 4 drinks containing alcohol on one occasion: No          Functional Ability and Level of Safety    Hearing: The patient needs further evaluation. Activities of Daily Living:   The home contains: handrails and grab bars  Patient needs help with:  transportation, shopping, and preparing meals      Ambulation: with difficulty, uses a cane     Fall Risk:  Fall Risk Assessment, last 12 mths 11/1/2022   Able to walk? Yes   Fall in past 12 months? 1   Do you feel unsteady? 0   Are you worried about falling 1   Is the gait abnormal? 1   Number of falls in past 12 months 2   Fall with injury?  0      Abuse Screen:  Patient is not abused       Cognitive Screening    Has your family/caregiver stated any concerns about your memory: no     Cognitive Screening: Normal - Verbal Fluency Test    Health Maintenance Due     Health Maintenance Due   Topic Date Due    DTaP/Tdap/Td series (1 - Tdap) Never done    Shingrix Vaccine Age 50> (1 of 2) Never done    MICROALBUMIN Q1  01/18/2014    Eye Exam Retinal or Dilated  03/18/2014    Foot Exam Q1  07/20/2014    COVID-19 Vaccine (3 - Booster for Reggie Chilel series) 05/21/2021    Flu Vaccine (1) 08/01/2022       Patient Care Team   Patient Care Team:  Coco Carmona MD as PCP - General (Internal Medicine Physician)  Coco Carmona MD as PCP - [facility]  Vikki Gonsalez MD as Physician (Cardiovascular Disease Physician)  South Darnell MD (Cardiovascular Disease Physician)  Tim Biggs MD (Wound Care)  South Darnell MD (Cardiovascular Disease Physician)  Frankey Carrier, MD (Internal Medicine Physician)    History     Patient Active Problem List   Diagnosis Code    CAD (coronary artery disease) I25.10    HLD (hyperlipidemia) E78.5    Borderline diabetes mellitus R73.03    Prostate cancer ([facility] C61    Asthma J45.909    Venous insufficiency I87.2    Venous insufficiency of both lower extremities I87.2    Atrial fibrillation (HCC) I48.91    CHF (congestive heart failure) (HCC) I50.9    HTN (hypertension) I10    Leg ulcer ([facility] L97.909    DM type 2 causing vascular disease ([facility] E11.59    Chronic deep vein thrombosis (DVT) of left peroneal vein (HCC) F42.484    Diastolic CHF ([facility] I50.30    Venous stasis ulcer (HCC) I83.009, L97.909    Hypokalemia E87.6    Peripheral vascular disease (HCC) I73.9    History of kyphoplasty Z98.890    Debility R53.81    Acute on chronic congestive heart failure (HCC) I50.9     Past Medical History:   Diagnosis Date    Asthma     BRONCITITS, INHALER USE PRN    CAD (coronary artery disease)     MI    [facility]    PROTATE    Chronic obstructive pulmonary disease (HCC)     Diabetes ([facility]     BORDERLINE    Hypercholesterolemia     Hypertension     Umbilical hernia 5/43/0617    Venous insufficiency (chronic) (peripheral)       Past Surgical History:   Procedure Laterality Date    ENDOSCOPY, COLON, DIAGNOSTIC  01/02/2006    HX GI      COLONOSCOPY    HX HEENT Bilateral     CATARACTS/ IOL    HX HERNIA REPAIR  7/2014    HX PROSTATECTOMY  01/02/2006    IR KYPHOPLASTY THORACIC  10/12/2020    IR THORACENTESIS NDL PUNC ASP W IMAGE  6/22/2021    [place] CARDIAC SURG PROCEDURE UNLIST  1993    CABG X4 VESSEL    [place] CARDIAC SURG PROCEDURE UNLIST  1997, 1999    CARDIAC CATH, STENTS     Current Outpatient Medications   Medication Sig Dispense Refill    colesevelam (WELCHOL) 625 mg tablet TAKE 3 TABLETS BY MOUTH TWICE DAILY WITH MEALS 540 Tablet 0    metoprolol succinate (TOPROL-XL) 25 mg XL tablet TAKE 1 TABLET BY MOUTH DAILY 90 Tablet 1    atorvastatin (LIPITOR) 80 mg tablet TAKE 1 TABLET BY MOUTH EVERY NIGHT 90 Tablet 2    potassium chloride SR (K-TAB) 20 mEq tablet TAKE 1 TABLET BY MOUTH DAILY AS NEEDED FOR SWELLING 90 Tablet 1    furosemide (LASIX) 40 mg tablet TAKE 1 TABLET BY MOUTH TWICE DAILY (Patient taking differently: daily. ) 180 Tablet 1    albuterol-ipratropium (DUO-NEB) 2.5 mg-0.5 mg/3 ml nebu 3 mL. Eliquis 5 mg tablet TAKE 1 TABLET TWICE A DAY TO PREVENT THROMBOEMBOLISM IN CHRONIC ATRIAL FIBRILLATION 180 Tab 3    fluticasone-umeclidinium-vilanterol (TRELEGY ELLIPTA) 100-62.5-25 mcg inhaler Take 1 Puff by inhalation daily.        Allergies   Allergen Reactions    Latex, Natural Rubber Hives    Adhesive Other (comments)     BLISTERS       Family History   Problem Relation Age of Onset    Stroke Father     Anesth Problems Neg Hx      Social History     Tobacco Use    Smoking status: Former     Packs/day: 1.00     Types: Cigarettes     Quit date: 6/10/1964     Years since quittin.4    Smokeless tobacco: Never   Substance Use Topics    Alcohol use: Yes     Alcohol/week: 15.0 standard drinks     Types: 12 Cans of beer, 6 Standard drinks or equivalent per week     Comment: casual     1. Medicare annual wellness visit, subsequent  Due covid booster    2. Screening for alcoholism  None now  - [place] ANNUAL ALCOHOL SCREEN 15 MIN    3. Borderline diabetes mellitus  follow  - LIPID PANEL; Future  - HEMOGLOBIN A1C WITH EAG; Future    4. Chronic diastolic congestive heart failure ([facility]  He is unchanged advise see card for opinion  - CBC WITH AUTOMATED DIFF; Future  - LIPID PANEL; Future  - METABOLIC PANEL, COMPREHENSIVE; Future  - TSH 3RD GENERATION; Future  - URIC ACID; Future  - MAGNESIUM; Future    5. Pure hypercholesterolemia  labs  - CBC WITH AUTOMATED DIFF; Future    6.  Needs flu shot  now  - INFLUENZA, FLUAD, (AGE 65 Y+), IM, PF, 0.5 ML      Khadar Garcia MD

## 2022-11-01 NOTE — PATIENT INSTRUCTIONS
Medicare Wellness Visit, Male    The best way to live healthy is to have a lifestyle where you eat a well-balanced diet, exercise regularly, limit alcohol use, and quit all forms of tobacco/nicotine, if applicable. Regular preventive services are another way to keep healthy. Preventive services (vaccines, screening tests, monitoring & exams) can help personalize your care plan, which helps you manage your own care. Screening tests can find health problems at the earliest stages, when they are easiest to treat. [facility] follows the current, evidence-based guidelines published by the [facility] ([facility]) when recommending preventive services for our patients. Because we follow these guidelines, sometimes recommendations change over time as research supports it. (For example, a prostate screening blood test is no longer routinely recommended for men with no symptoms). Of course, you and your doctor may decide to screen more often for some diseases, based on your risk and co-morbidities (chronic disease you are already diagnosed with). Preventive services for you include:  - Medicare offers their members a free annual wellness visit, which is time for you and your primary care provider to discuss and plan for your preventive service needs. Take advantage of this benefit every year!  -All adults over age 72 should receive the recommended pneumonia vaccines. Current USPSTF guidelines recommend a series of two vaccines for the best pneumonia protection.   -All adults should have a flu vaccine yearly and tetanus vaccine every 10 years.  -All adults age 48 and older should receive the shingles vaccines (series of two vaccines).        -All adults age 38-68 who are overweight should have a diabetes screening test once every three years.   -Other screening tests & preventive services for persons with diabetes include: an eye exam to screen for diabetic retinopathy, a kidney function test, a foot exam, and stricter control over your cholesterol.   -Cardiovascular screening for adults with routine risk involves an electrocardiogram (ECG) at intervals determined by the provider.   -Colorectal cancer screening should be done for adults age 54-65 with no increased risk factors for colorectal cancer. There are a number of acceptable methods of screening for this type of cancer. Each test has its own benefits and drawbacks. Discuss with your provider what is most appropriate for you during your annual wellness visit. The different tests include: colonoscopy (considered the best screening method), a fecal occult blood test, a fecal DNA test, and sigmoidoscopy.  -All adults born between [facility] should be screened once for Hepatitis C.  -An Abdominal Aortic Aneurysm (AAA) Screening is recommended for men age 73-68 who has ever smoked in their lifetime.      Here is a list of your current Health Maintenance items (your personalized list of preventive services) with a due date:  Health Maintenance Due   Topic Date Due    DTaP/Tdap/Td  (1 - Tdap) Never done    Shingles Vaccine (1 of 2) Never done    Albumin Urine Test  01/18/2014    Eye Exam  03/18/2014    Diabetic Foot Care  07/20/2014    COVID-19 Vaccine (3 - Booster for Kasey Kirstin series) 05/21/2021    Yearly Flu Vaccine (1) 08/01/2022

## 2022-11-02 DIAGNOSIS — E87.6 HYPOKALEMIA: ICD-10-CM

## 2022-11-02 RX ORDER — POTASSIUM CHLORIDE 1500 MG/1
TABLET, FILM COATED, EXTENDED RELEASE ORAL
Qty: 90 TABLET | Refills: 1 | Status: SHIPPED | OUTPATIENT
Start: 2022-11-02

## 2022-12-28 RX ORDER — FUROSEMIDE 40 MG/1
TABLET ORAL
Qty: 180 TABLET | Refills: 1 | Status: SHIPPED | OUTPATIENT
Start: 2022-12-28

## 2023-03-03 ENCOUNTER — TELEPHONE (OUTPATIENT)
Dept: INTERNAL MEDICINE CLINIC | Age: 88
End: 2023-03-03

## 2023-03-03 NOTE — TELEPHONE ENCOUNTER
Wiliam Clark, nurse with [address] reported that they are not able to get in touch w/ patient. Will try to start services Monday.

## 2023-03-09 NOTE — TELEPHONE ENCOUNTER
----- Message from Brian Groves sent at 3/8/2023 12:48 PM EST -----  Subject: Message to Provider    QUESTIONS  Information for Provider? ms hay is still sitting out in the home of   alfreda ingram and she wasnt avaliable. ms hay stated that she will not be   back until there is a secured date and time. ---------------------------------------------------------------------------  --------------  Pablo Foster INFO  [phone]; OK to leave message on voicemail  ---------------------------------------------------------------------------  --------------  SCRIPT ANSWERS  Relationship to Patient? Third Party  Third Party Type? Home Health Care?   Representative Name? Ike Lopez

## 2023-03-17 ENCOUNTER — APPOINTMENT (OUTPATIENT)
Dept: MRI IMAGING | Age: 88
DRG: 515 | End: 2023-03-17
Attending: PHYSICIAN ASSISTANT
Payer: MEDICARE

## 2023-03-17 ENCOUNTER — APPOINTMENT (OUTPATIENT)
Dept: GENERAL RADIOLOGY | Age: 88
DRG: 515 | End: 2023-03-17
Attending: EMERGENCY MEDICINE
Payer: MEDICARE

## 2023-03-17 ENCOUNTER — APPOINTMENT (OUTPATIENT)
Dept: NON INVASIVE DIAGNOSTICS | Age: 88
DRG: 515 | End: 2023-03-17
Attending: STUDENT IN AN ORGANIZED HEALTH CARE EDUCATION/TRAINING PROGRAM
Payer: MEDICARE

## 2023-03-17 ENCOUNTER — APPOINTMENT (OUTPATIENT)
Dept: CT IMAGING | Age: 88
DRG: 515 | End: 2023-03-17
Attending: EMERGENCY MEDICINE
Payer: MEDICARE

## 2023-03-17 ENCOUNTER — HOSPITAL ENCOUNTER (INPATIENT)
Age: 88
LOS: 5 days | Discharge: HOME HEALTH | DRG: 515 | End: 2023-03-22
Attending: EMERGENCY MEDICINE | Admitting: STUDENT IN AN ORGANIZED HEALTH CARE EDUCATION/TRAINING PROGRAM
Payer: MEDICARE

## 2023-03-17 DIAGNOSIS — I10 HYPERTENSION, UNSPECIFIED TYPE: ICD-10-CM

## 2023-03-17 DIAGNOSIS — M54.50 ACUTE LOW BACK PAIN WITHOUT SCIATICA, UNSPECIFIED BACK PAIN LATERALITY: ICD-10-CM

## 2023-03-17 DIAGNOSIS — I50.9 ACUTE ON CHRONIC CONGESTIVE HEART FAILURE, UNSPECIFIED HEART FAILURE TYPE (HCC): ICD-10-CM

## 2023-03-17 DIAGNOSIS — J90 PLEURAL EFFUSION: ICD-10-CM

## 2023-03-17 DIAGNOSIS — S22.080A COMPRESSION FRACTURE OF T12 VERTEBRA, INITIAL ENCOUNTER (HCC): ICD-10-CM

## 2023-03-17 DIAGNOSIS — J96.01 ACUTE RESPIRATORY FAILURE WITH HYPOXIA (HCC): Primary | ICD-10-CM

## 2023-03-17 DIAGNOSIS — I48.91 ATRIAL FIBRILLATION, UNSPECIFIED TYPE (HCC): ICD-10-CM

## 2023-03-17 LAB
ALBUMIN SERPL-MCNC: 3.3 G/DL (ref 3.5–5)
ALBUMIN/GLOB SERPL: 0.7 (ref 1.1–2.2)
ALP SERPL-CCNC: 147 U/L (ref 45–117)
ALT SERPL-CCNC: 20 U/L (ref 12–78)
ANION GAP SERPL CALC-SCNC: 4 MMOL/L (ref 5–15)
APPEARANCE UR: CLEAR
AST SERPL-CCNC: 32 U/L (ref 15–37)
BACTERIA URNS QL MICRO: NEGATIVE /HPF
BASOPHILS # BLD: 0 K/UL (ref 0–0.1)
BASOPHILS NFR BLD: 0 % (ref 0–1)
BILIRUB SERPL-MCNC: 1 MG/DL (ref 0.2–1)
BILIRUB UR QL: NEGATIVE
BNP SERPL-MCNC: 3916 PG/ML (ref 0–450)
BUN SERPL-MCNC: 33 MG/DL (ref 6–20)
BUN/CREAT SERPL: 29 (ref 12–20)
CALCIUM SERPL-MCNC: 9.4 MG/DL (ref 8.5–10.1)
CHLORIDE SERPL-SCNC: 100 MMOL/L (ref 97–108)
CO2 SERPL-SCNC: 39 MMOL/L (ref 21–32)
COLOR UR: ABNORMAL
CREAT SERPL-MCNC: 1.13 MG/DL (ref 0.7–1.3)
DIFFERENTIAL METHOD BLD: ABNORMAL
EOSINOPHIL # BLD: 0.1 K/UL (ref 0–0.4)
EOSINOPHIL NFR BLD: 2 % (ref 0–7)
EPITH CASTS URNS QL MICRO: ABNORMAL /LPF
ERYTHROCYTE [DISTWIDTH] IN BLOOD BY AUTOMATED COUNT: 13.7 % (ref 11.5–14.5)
GLOBULIN SER CALC-MCNC: 4.6 G/DL (ref 2–4)
GLUCOSE SERPL-MCNC: 96 MG/DL (ref 65–100)
GLUCOSE UR STRIP.AUTO-MCNC: NEGATIVE MG/DL
HCT VFR BLD AUTO: 37.2 % (ref 36.6–50.3)
HGB BLD-MCNC: 11.6 G/DL (ref 12.1–17)
HGB UR QL STRIP: NEGATIVE
IMM GRANULOCYTES # BLD AUTO: 0 K/UL (ref 0–0.04)
IMM GRANULOCYTES NFR BLD AUTO: 0 % (ref 0–0.5)
KETONES UR QL STRIP.AUTO: NEGATIVE MG/DL
LEUKOCYTE ESTERASE UR QL STRIP.AUTO: NEGATIVE
LIPASE SERPL-CCNC: 312 U/L (ref 73–393)
LYMPHOCYTES # BLD: 1.7 K/UL (ref 0.8–3.5)
LYMPHOCYTES NFR BLD: 26 % (ref 12–49)
MAGNESIUM SERPL-MCNC: 2 MG/DL (ref 1.6–2.4)
MCH RBC QN AUTO: 32.1 PG (ref 26–34)
MCHC RBC AUTO-ENTMCNC: 31.2 G/DL (ref 30–36.5)
MCV RBC AUTO: 103 FL (ref 80–99)
MONOCYTES # BLD: 0.9 K/UL (ref 0–1)
MONOCYTES NFR BLD: 13 % (ref 5–13)
NEUTS SEG # BLD: 3.8 K/UL (ref 1.8–8)
NEUTS SEG NFR BLD: 59 % (ref 32–75)
NITRITE UR QL STRIP.AUTO: NEGATIVE
NRBC # BLD: 0 K/UL (ref 0–0.01)
NRBC BLD-RTO: 0 PER 100 WBC
PH UR STRIP: 8 (ref 5–8)
PLATELET # BLD AUTO: 166 K/UL (ref 150–400)
PMV BLD AUTO: 9.8 FL (ref 8.9–12.9)
POTASSIUM SERPL-SCNC: 3.8 MMOL/L (ref 3.5–5.1)
PROT SERPL-MCNC: 7.9 G/DL (ref 6.4–8.2)
PROT UR STRIP-MCNC: ABNORMAL MG/DL
RBC # BLD AUTO: 3.61 M/UL (ref 4.1–5.7)
RBC #/AREA URNS HPF: ABNORMAL /HPF (ref 0–5)
SODIUM SERPL-SCNC: 143 MMOL/L (ref 136–145)
SP GR UR REFRACTOMETRY: 1.01 (ref 1–1.03)
TROPONIN I SERPL HS-MCNC: 37 NG/L (ref 0–76)
UR CULT HOLD, URHOLD: NORMAL
UROBILINOGEN UR QL STRIP.AUTO: 0.2 EU/DL (ref 0.2–1)
WBC # BLD AUTO: 6.5 K/UL (ref 4.1–11.1)
WBC URNS QL MICRO: ABNORMAL /HPF (ref 0–4)

## 2023-03-17 PROCEDURE — 72146 MRI CHEST SPINE W/O DYE: CPT

## 2023-03-17 PROCEDURE — 36415 COLL VENOUS BLD VENIPUNCTURE: CPT

## 2023-03-17 PROCEDURE — 65270000046 HC RM TELEMETRY

## 2023-03-17 PROCEDURE — 93005 ELECTROCARDIOGRAM TRACING: CPT

## 2023-03-17 PROCEDURE — 71250 CT THORAX DX C-: CPT

## 2023-03-17 PROCEDURE — 99285 EMERGENCY DEPT VISIT HI MDM: CPT

## 2023-03-17 PROCEDURE — 74176 CT ABD & PELVIS W/O CONTRAST: CPT

## 2023-03-17 PROCEDURE — 93306 TTE W/DOPPLER COMPLETE: CPT

## 2023-03-17 PROCEDURE — 83880 ASSAY OF NATRIURETIC PEPTIDE: CPT

## 2023-03-17 PROCEDURE — 83735 ASSAY OF MAGNESIUM: CPT

## 2023-03-17 PROCEDURE — 77010033678 HC OXYGEN DAILY

## 2023-03-17 PROCEDURE — 94640 AIRWAY INHALATION TREATMENT: CPT

## 2023-03-17 PROCEDURE — 74011250637 HC RX REV CODE- 250/637: Performed by: STUDENT IN AN ORGANIZED HEALTH CARE EDUCATION/TRAINING PROGRAM

## 2023-03-17 PROCEDURE — 83690 ASSAY OF LIPASE: CPT

## 2023-03-17 PROCEDURE — 71045 X-RAY EXAM CHEST 1 VIEW: CPT

## 2023-03-17 PROCEDURE — 80053 COMPREHEN METABOLIC PANEL: CPT

## 2023-03-17 PROCEDURE — APPSS30 APP SPLIT SHARED TIME 16-30 MINUTES: Performed by: NURSE PRACTITIONER

## 2023-03-17 PROCEDURE — 94761 N-INVAS EAR/PLS OXIMETRY MLT: CPT

## 2023-03-17 PROCEDURE — 94664 DEMO&/EVAL PT USE INHALER: CPT

## 2023-03-17 PROCEDURE — 81001 URINALYSIS AUTO W/SCOPE: CPT

## 2023-03-17 PROCEDURE — 0W9B3ZZ DRAINAGE OF LEFT PLEURAL CAVITY, PERCUTANEOUS APPROACH: ICD-10-PCS | Performed by: INTERNAL MEDICINE

## 2023-03-17 PROCEDURE — 85025 COMPLETE CBC W/AUTO DIFF WBC: CPT

## 2023-03-17 PROCEDURE — 74011000250 HC RX REV CODE- 250: Performed by: STUDENT IN AN ORGANIZED HEALTH CARE EDUCATION/TRAINING PROGRAM

## 2023-03-17 PROCEDURE — 72148 MRI LUMBAR SPINE W/O DYE: CPT

## 2023-03-17 PROCEDURE — 84484 ASSAY OF TROPONIN QUANT: CPT

## 2023-03-17 RX ORDER — IPRATROPIUM BROMIDE AND ALBUTEROL SULFATE 2.5; .5 MG/3ML; MG/3ML
3 SOLUTION RESPIRATORY (INHALATION)
Status: DISCONTINUED | OUTPATIENT
Start: 2023-03-17 | End: 2023-03-22 | Stop reason: HOSPADM

## 2023-03-17 RX ORDER — BUMETANIDE 0.25 MG/ML
1 INJECTION INTRAMUSCULAR; INTRAVENOUS 2 TIMES DAILY
Status: DISCONTINUED | OUTPATIENT
Start: 2023-03-17 | End: 2023-03-17

## 2023-03-17 RX ORDER — BUDESONIDE 0.5 MG/2ML
500 INHALANT ORAL
Status: DISCONTINUED | OUTPATIENT
Start: 2023-03-17 | End: 2023-03-22 | Stop reason: HOSPADM

## 2023-03-17 RX ORDER — ARFORMOTEROL TARTRATE 15 UG/2ML
15 SOLUTION RESPIRATORY (INHALATION)
Status: DISCONTINUED | OUTPATIENT
Start: 2023-03-17 | End: 2023-03-22 | Stop reason: HOSPADM

## 2023-03-17 RX ORDER — SPIRONOLACTONE 25 MG/1
TABLET ORAL DAILY
COMMUNITY

## 2023-03-17 RX ORDER — IPRATROPIUM BROMIDE 0.5 MG/2.5ML
0.5 SOLUTION RESPIRATORY (INHALATION)
Status: DISCONTINUED | OUTPATIENT
Start: 2023-03-17 | End: 2023-03-17

## 2023-03-17 RX ORDER — SPIRONOLACTONE 25 MG/1
25 TABLET ORAL DAILY
Status: DISCONTINUED | OUTPATIENT
Start: 2023-03-18 | End: 2023-03-22 | Stop reason: HOSPADM

## 2023-03-17 RX ORDER — BUMETANIDE 1 MG/1
TABLET ORAL DAILY
Status: ON HOLD | COMMUNITY
End: 2023-03-22 | Stop reason: SDUPTHER

## 2023-03-17 RX ORDER — IPRATROPIUM BROMIDE 0.5 MG/2.5ML
0.5 SOLUTION RESPIRATORY (INHALATION)
Status: DISCONTINUED | OUTPATIENT
Start: 2023-03-17 | End: 2023-03-22 | Stop reason: HOSPADM

## 2023-03-17 RX ORDER — METOPROLOL SUCCINATE 25 MG/1
25 TABLET, EXTENDED RELEASE ORAL DAILY
Status: DISCONTINUED | OUTPATIENT
Start: 2023-03-18 | End: 2023-03-22 | Stop reason: HOSPADM

## 2023-03-17 RX ORDER — ACETAMINOPHEN 500 MG
500 TABLET ORAL
Status: DISCONTINUED | OUTPATIENT
Start: 2023-03-17 | End: 2023-03-22 | Stop reason: HOSPADM

## 2023-03-17 RX ORDER — BUMETANIDE 0.25 MG/ML
1 INJECTION INTRAMUSCULAR; INTRAVENOUS 2 TIMES DAILY
Status: DISPENSED | OUTPATIENT
Start: 2023-03-18 | End: 2023-03-19

## 2023-03-17 RX ORDER — BUMETANIDE 0.25 MG/ML
2 INJECTION INTRAMUSCULAR; INTRAVENOUS ONCE
Status: COMPLETED | OUTPATIENT
Start: 2023-03-17 | End: 2023-03-17

## 2023-03-17 RX ORDER — ATORVASTATIN CALCIUM 20 MG/1
80 TABLET, FILM COATED ORAL
Status: DISCONTINUED | OUTPATIENT
Start: 2023-03-17 | End: 2023-03-22 | Stop reason: HOSPADM

## 2023-03-17 RX ADMIN — IPRATROPIUM BROMIDE 0.5 MG: 0.5 SOLUTION RESPIRATORY (INHALATION) at 20:16

## 2023-03-17 RX ADMIN — BUMETANIDE 2 MG: 0.25 INJECTION INTRAMUSCULAR; INTRAVENOUS at 15:22

## 2023-03-17 RX ADMIN — ATORVASTATIN CALCIUM 80 MG: 20 TABLET, FILM COATED ORAL at 22:39

## 2023-03-17 RX ADMIN — BUDESONIDE 500 MCG: 0.5 SUSPENSION RESPIRATORY (INHALATION) at 20:21

## 2023-03-17 RX ADMIN — ARFORMOTEROL TARTRATE 15 MCG: 15 SOLUTION RESPIRATORY (INHALATION) at 20:21

## 2023-03-17 RX ADMIN — ACETAMINOPHEN 500 MG: 500 TABLET ORAL at 20:17

## 2023-03-17 NOTE — ED NOTES
Daughter would like for us to contact her sister when patient is transferred: Perley Gilford
Patient has arrived to this ED from [facility] via [facility]. No acute distress noted. Reports pain is the best its been all night.
Patient noted to be in MRI.
Patient returned from [address] [place] [address] was obtained through PIV without difficulty. Patient was given warm blankets and family is at bedside.
Swallow sceen performed, no difficulty. Patient is taking small sips of water. Family at bedside.
TRANSFER - OUT REPORT:    Verbal report given to [employer] on Rufino Avila  being transferred to [facility] ED for routine progression of care       Report consisted of patients Situation, Background, Assessment and   Recommendations(SBAR). Information from the following report(s) SBAR, ED Summary, MAR, Recent Results, Med Rec Status and Cardiac Rhythm afib was reviewed with the receiving nurse. Lines:   Peripheral IV 03/17/23 Left Antecubital (Active)   Site Assessment Clean, dry, & intact 03/17/23 1016   Phlebitis Assessment 0 03/17/23 1016   Infiltration Assessment 0 03/17/23 1016   Dressing Status Clean, dry, & intact 03/17/23 1016   Hub Color/Line Status Pink 03/17/23 1016   Action Taken Blood drawn 03/17/23 1016        Opportunity for questions and clarification was provided.       Patient transported with:   Monitor  O2 @ 2 liters
TRANSFER - OUT REPORT:    Verbal report given to liz(name) on Garfield Murguia  being transferred to [facility](unit) for routine progression of care       Report consisted of patients Situation, Background, Assessment and   Recommendations(SBAR). Information from the following report(s) SBAR, Kardex, ED Summary and Procedure Summary was reviewed with the receiving nurse. Lines:   Peripheral IV 03/17/23 Left Antecubital (Active)   Site Assessment Clean, dry, & intact 03/17/23 1016   Phlebitis Assessment 0 03/17/23 1016   Infiltration Assessment 0 03/17/23 1016   Dressing Status Clean, dry, & intact 03/17/23 1016   Hub Color/Line Status Pink 03/17/23 1016   Action Taken Blood drawn 03/17/23 1016        Opportunity for questions and clarification was provided.       Patient transported with:   Registered Nurse
Pupils equal, round and reactive to light, Extra-ocular movement intact, eyes are clear b/l

## 2023-03-17 NOTE — ED TRIAGE NOTES
Patient arrive with [facility] with complaint of mid back pain with movement for three days. Patient was discharged from [address] [place] [address] two weeks prior for bilateral lower leg edema. Patient has a history of CHF. Patient lives at home with daughter and granddaughter. Patient states they Alray Inches so dry\" and \"would love something to drink\". Patient states they have not urinated today and have not had a bowel movement in over 5 days. Patient has had a poor appetite and does not remember the last time he ate.

## 2023-03-17 NOTE — H&P
[address] [facility]  History and Physical    Date of Service:  3/17/2023  Primary Care Provider: Michael Ospina MD  Source of information: The patient    Chief Complaint: Back Pain      History of Presenting Illness:   Anthony Leonardo is a 80-year-old male with a history of CAD, prostate CA, COPD, diabetes, hypertension, hypercholesterolemia who presented to  ED with a chief complaint of low back pain. History obtained per patient, patient's daughter and chart review. Patient states that he experienced acute low back pain for the last three days which became unbearable last night so he presented to the ED for evaluation. Denies any trauma to the area, fall. Also endorses no bowel movement in the last 4 days. Of note, patient states that he was recently admitted to [facility] for hypoxic respiratory failure, found to have a large pleural effusion now s/p thoracentesis. At [facility], /51, HR 70, satting 97% on 2 L.  CBC and BMP within normal limits,hs troponin 37, NT proBNP 3916. ECG showed a fib, imcomplete LBBB (previously seen), no acute ischemic changes. Chest x-ray and CT chest notable for large left pleural effusion, small right pleural effusion, pulmonary edema. CT abdomen pelvis showed minor compression of the T12 vertebral body. Patient transferred to [facility] for further management. My interview, patient continues to endorse severe back pain, non radiating. Denies any fevers, chills, lightheadedness, dizziness, chest pain, shortness of breath, abdominal pain leg swelling, loss of bladder control, paraesthesia, numbness. REVIEW OF SYSTEMS:  A comprehensive review of systems was negative except for that written in the History of Present Illness.      Past Medical History:   Diagnosis Date    Asthma     BRONCITITS, INHALER USE PRN    CAD (coronary artery disease)     MI    Cancer ([facility] [address] 2006    PROTATE    Chronic obstructive pulmonary disease ([facility] [address]     Diabetes ([facility] [address]     BORDERLINE    Hypercholesterolemia     Hypertension     Umbilical hernia 6/03/0501    Venous insufficiency (chronic) (peripheral)       Past Surgical History:   Procedure Laterality Date    ENDOSCOPY, COLON, DIAGNOSTIC  01/02/2006    HX GI      COLONOSCOPY    HX HEENT Bilateral     CATARACTS/ IOL    HX HERNIA REPAIR  7/2014    HX PROSTATECTOMY  01/02/2006    IR KYPHOPLASTY THORACIC  10/12/2020    IR THORACENTESIS NDL PUNC ASP W IMAGE  6/22/2021    SD UNLISTED PROCEDURE CARDIAC SURGERY  1993    CABG X4 VESSEL    SD UNLISTED PROCEDURE CARDIAC SURGERY  1997, [address] CATH, STENTS     Prior to Admission medications    Medication Sig Start Date End Date Taking? Authorizing Provider   spironolactone (ALDACTONE) 25 mg tablet Take  by mouth daily. Yes Other, MD Justin   potassium chloride SR (K-TAB) 20 mEq tablet TAKE 1 TABLET BY MOUTH DAILY AS NEEDED FOR SWELLING 11/2/22  Yes Chad Castillo MD   [facility]) 625 mg tablet TAKE 3 TABLETS BY MOUTH TWICE DAILY WITH MEALS 10/29/22  Yes Chad Castillo MD   metoprolol succinate (TOPROL-XL) 25 mg XL tablet TAKE 1 TABLET BY MOUTH DAILY 7/21/22  Yes Chad Castillo MD   atorvastatin (LIPITOR) 80 mg tablet TAKE 1 TABLET BY MOUTH EVERY NIGHT 6/10/22  Yes Chad Castillo MD   Eliquis 5 mg tablet TAKE 1 TABLET TWICE A DAY TO PREVENT THROMBOEMBOLISM IN CHRONIC ATRIAL FIBRILLATION 12/20/20  Yes Chad Castillo MD   bumetanide (BUMEX) 1 mg tablet Take  by mouth daily. Other, MD Justin   furosemide (LASIX) 40 mg tablet TAKE 1 TABLET BY MOUTH TWICE DAILY  Patient not taking: Reported on 3/17/2023 12/28/22   Chad Castillo MD   albuterol-ipratropium (DUO-NEB) 2.5 mg-0.5 mg/3 ml nebu 3 mL. Provider, Historical   fluticasone-umeclidinium-vilanterol (TRELEGY ELLIPTA) 100-62.5-25 mcg inhaler Take 1 Puff by inhalation daily.     Provider, Historical     Allergies   Allergen Reactions    Latex, Natural Rubber Hives Adhesive Other (comments)     BLISTERS      Family History   Problem Relation Age of Onset    Stroke Father     Anesth Problems Neg Hx       Social History:  reports that he quit smoking about 58 years ago. His smoking use included cigarettes. He smoked an average of 1 pack per day. He has never used smokeless tobacco. He reports current alcohol use of about 15.0 standard drinks per week. He reports that he does not use drugs. Social Determinants of Health     Tobacco Use: Medium Risk    Smoking Tobacco Use: Former    Smokeless Tobacco Use: Never    Passive Exposure: Not on file   Alcohol Use: Not At Risk    Frequency of Alcohol Consumption: Never    Average Number of Drinks: Not on file    Frequency of Binge Drinking: Never   Financial Resource Strain: Low Risk     Difficulty of Paying Living Expenses: Not hard at all   Food Insecurity: No Food Insecurity    Worried About Running Out of Food in the Last Year: Never true    Ran Out of Food in the Last Year: Never true   Transportation Needs: No Transportation Needs    Lack of Transportation (Medical): No    Lack of Transportation (Non-Medical): No   Physical Activity: Inactive    Days of Exercise per Week: 0 days    Minutes of Exercise per Session: 0 min   Stress: No Stress Concern Present    Feeling of Stress :  Only a little   Social Connections: Unknown    Frequency of Communication with Friends and Family: More than three times a week    Frequency of Social Gatherings with Friends and Family: More than three times a week    Attends Gnosticist Services: Never    Active Member of Clubs or Organizations: No    Attends Club or Organization Meetings: Never    Marital Status: Not on file   Intimate Partner Violence: Not At Risk    Fear of Current or Ex-Partner: No    Emotionally Abused: No    Physically Abused: No    Sexually Abused: No   Depression: Not at risk    PHQ-2 Score: 0   Housing Stability: [address] to Pay for Housing in the Last Year: No Number of Places Lived in the Last Year: 1    Unstable Housing in the Last Year: No        Family and social history were personally reviewed, all pertinent and relevant details are outlined as above. Objective:   Visit Vitals  BP (!) 109/51   Pulse 81   Temp 98.1 °F (36.7 °C)   Resp 20   SpO2 97%    O2 Flow Rate (L/min): 2 l/min O2 Device: Nasal cannula    PHYSICAL EXAM:   General: Alert x oriented x 3, awake, no acute distress, resting in bed  HEENT: PEERL, EOMI, moist mucus membranes  Neck: Supple, no JVD, no meningeal signs  Chest: Clear to auscultation bilaterally anteriorly  CVS: irregularly irregular, normal rate, S1 S2 heard, no murmurs/rubs/gallops  Abd: Soft, non-tender, non-distended, +bowel sounds   Ext: No clubbing, no cyanosis, 2+ bilateral lower extremity edema, bilateral erythema to knees, non tender, not warm  Neuro/Psych: Pleasant mood and affect, CN 2-12 grossly intact, sensory grossly within normal limit, Strength 5/5 in all extremities, DTR 1+ x 4  Cap refill: Brisk, less than 3 seconds  Pulses: 2+, symmetric in all extremities  Skin: Warm, dry    Data Review: All diagnostic labs and studies have been reviewed. Abnormal Labs Reviewed   CBC WITH AUTOMATED DIFF - Abnormal; Notable for the following components:       Result Value    RBC 3.61 (*)     HGB 11.6 (*)     .0 (*)     All other components within normal limits   METABOLIC PANEL, COMPREHENSIVE - Abnormal; Notable for the following components:    CO2 39 (*)     Anion gap 4 (*)     BUN 33 (*)     BUN/Creatinine ratio 29 (*)     Alk.  phosphatase 147 (*)     Albumin 3.3 (*)     Globulin 4.6 (*)     A-G Ratio 0.7 (*)     All other components within normal limits   NT-PRO BNP - Abnormal; Notable for the following components:    NT pro-BNP 3,916 (*)     All other components within normal limits   URINALYSIS W/MICROSCOPIC - Abnormal; Notable for the following components:    Protein TRACE (*)     All other components within normal limits       All Micro Results       Procedure Component Value Units Date/Time    URINE CULTURE HOLD SAMPLE [[id number]] Collected: 03/17/23 0906    Order Status: Completed Specimen: Urine Updated: 03/17/23 0910     Urine culture hold       Urine on hold in Microbiology dept for 2 days. If unpreserved urine is submitted, it cannot be used for addtional testing after 24 hours, recollection will be required. IMAGING:   CT ABD PELV WO CONT   Final Result   No acute intra-abdominal process is identified. There are gallstones. There is fecal stasis in the right and transverse colon. Atherosclerotic changes are noted. Patient is status post prostatectomy. There   is a tiny amount of free fluid in the pelvis. There is minor compression of T12   vertebral body. CT CHEST WO CONT   Final Result      1. There is a moderately large left effusion with left basilar atelectasis. There is a mild right pleural effusion with mild right basilar atelectasis. XR CHEST PORT   Final Result   Large left pleural effusion, small right pleural effusion, and pulmonary edema.            ECG/ECHO:    Results for orders placed or performed during the hospital encounter of 03/17/23   EKG, 12 LEAD, INITIAL   Result Value Ref Range    Ventricular Rate 69 BPM    QRS Duration 136 ms    Q-T Interval 426 ms    QTC Calculation (Bezet) 456 ms    Calculated R Axis -49 degrees    Calculated T Axis 135 degrees    Diagnosis       Atrial fibrillation  Left axis deviation  Nonspecific intraventricular block  Minimal voltage criteria for LVH, may be normal variant ( Bashir product )  Nonspecific T wave abnormality  Abnormal ECG  When compared with ECG of 24-OCT-2019 14:24,  Nonspecific intraventricular block has replaced Incomplete left bundle branch   block  ST elevation now present in Anterior leads  QT has shortened     Results for orders placed or performed in visit on 04/02/12   AMB POC EKG ROUTINE W/ 12 LEADS, INTER & REP    Narrative    See scanned document. Assessment:   Given the patient's current clinical presentation, there is a high level of concern for decompensation if discharged from the emergency department. Complex decision making was performed, which includes reviewing the patient's available past medical records, laboratory results, and imaging studies. Active Problems:    Pleural effusion (3/17/2023)        Plan:   Fermin Reich is a 26-year-old male with a history of CAD, prostate CA, COPD, diabetes, hypertension, hypercholesterolemia who presented to [facility] ED with a chief complaint of low back pain found to have t12 fracture, and now admitted for CHF exacerbation.     #Chronic hypoxic respiratory failure  #Acute on chronic CHF exacerbation  - Last echocardiogram on chart in 2019, EF 41-45%  - Warm and hypervolemic on exam  - NT-ProBNP 3900 (last one 800)  - Chest xray/CT chest notable for moderate L pleural effusion and pulmonary edema  - Patient on 1 mg bumex PO BID at home  - Diurese with 2mg IV Bumex for net neg 2L goal, reassess   - Consult Cardiology   - Will order echocardiogram  - Monitor electrolytes closely, strict I/Os, cardiac diet, fluid restriction    Update:   - Per cardiology, diurese with bumex 1 mg BID  - Unfortunately, echocardiogram already done and unable to cancel it  - Will hold eliquis  - Continue toprol and aldactone    #Moderate to large pleural effusion  - Seen on CT  - Per patient, recently underwent thoracentesis at OSH two weeks ago (this was his second time)  - Suspect 2/2 to CHF exacerbation however given size and one sided nature, will consult Pulmonology     #Acute low back pain due to #compression fracture of T12 vertebra  - Seen on abd CT  - Consult Orthopedic surgery  - Pain control     #Chronic venous statis LE  - Bilateral chronic venous stasis changes, does not appear to be infected  - Management as above    #Hx of CAD  - Continue home statin, BB    #hx of a fib on Eliquis  - Hold eliquis per Pulm pending possible thoracentesis     #Hx of COPD  #Chronic hypoxic respiratory failure  - On home 2L O2, currently at baseline  - Continue home inhalers    #Macrocytic anemia  - Order anemia labs: TSH, B12, folate, iron studies    #Hx of HTN  - Continue home bb and aldactone    #HLD  - Continue home colesesvelam     #Hx of prostate cancer  - s/p prostatectomy     Code: Full   DVT prophylaxis: SCDs  Diet: Cardiac  Emergency contact: Daughter Mrs. Michelle Landon, discussed plan with her      CRITICAL CARE WAS PERFORMED FOR THIS ENCOUNTER: NO.      Signed By: Jeannette Ramos MD     March 17, 2023         Please note that this dictation may have been completed with Dragon, the computer voice recognition software. Quite often unanticipated grammatical, syntax, homophones, and other interpretive errors are inadvertently transcribed by the computer software. Please disregard these errors. Please excuse any errors that have escaped final proofreading.

## 2023-03-17 NOTE — PROGRESS NOTES
4:10 PM  Patient recently transported for MRI. Nurse confirmed - will return to complete echo at later time.     iker

## 2023-03-17 NOTE — ED PROVIDER NOTES
20-year-old male with a history of CAD, prostate CA, COPD, diabetes, hypertension, hypercholesterolemia presents with a chief complaint of low back pain. Patient wears oxygen chronically at home. He was recently admitted to [facility] and fluid overload. He reports having a thoracentesis while he was admitted. Today he experience low back pain when trying to get out of bed. He is currently pain-free but states the pain is worse with movement. He denies fevers. He has not had a bowel movement in the last 4 days. Past Medical History:   Diagnosis Date    Asthma     BRONCITITS, INHALER USE PRN    CAD (coronary artery disease)     MI    Cancer ([facility] [address] 2006    PROTATE    Chronic obstructive pulmonary disease (HCC)     Diabetes ([facility] [address]     BORDERLINE    Hypercholesterolemia     Hypertension     Umbilical hernia 3/00/1707    Venous insufficiency (chronic) (peripheral)        Past Surgical History:   Procedure Laterality Date    ENDOSCOPY, COLON, DIAGNOSTIC  2006    HX GI      COLONOSCOPY    HX HEENT Bilateral     CATARACTS/ IOL    HX HERNIA REPAIR  2014    HX PROSTATECTOMY  2006    IR KYPHOPLASTY THORACIC  10/12/2020    IR THORACENTESIS NDL PUNC ASP W IMAGE  2021    VT CARDIAC SURG PROCEDURE UNLIST      CABG X4 VESSEL    VT CARDIAC SURG PROCEDURE UNLIST  ,     CARDIAC CATH, STENTS         Family History:   Problem Relation Age of Onset    Stroke Father     Anesth Problems Neg Hx        Social History     Socioeconomic History    Marital status: SINGLE     Spouse name: Not on file    Number of children: Not on file    Years of education: Not on file    Highest education level: Not on file   Occupational History    Not on file   Tobacco Use    Smoking status: Former     Packs/day: 1.00     Types: Cigarettes     Quit date: 6/10/1964     Years since quittin.8    Smokeless tobacco: Never   Substance and Sexual Activity    Alcohol use:  Yes     Alcohol/week: 15.0 standard drinks     Types: 12 Cans of beer, 6 Standard drinks or equivalent per week     Comment: casual    Drug use: No    Sexual activity: Not Currently   Other Topics Concern    Not on file   Social History Narrative    Not on file     Social Determinants of Health     Financial Resource Strain: Low Risk     Difficulty of Paying Living Expenses: Not hard at all   Food Insecurity: No Food Insecurity    Worried About Running Out of Food in the Last Year: Never true    Ran Out of Food in the Last Year: Never true   Transportation Needs: No Transportation Needs    Lack of Transportation (Medical): No    Lack of Transportation (Non-Medical): No   Physical Activity: Inactive    Days of Exercise per Week: 0 days    Minutes of Exercise per Session: 0 min   Stress: No Stress Concern Present    Feeling of Stress : Only a little   Social Connections: Unknown    Frequency of Communication with Friends and Family: More than three times a week    Frequency of Social Gatherings with Friends and Family: More than three times a week    Attends Amish Services: Never    Active Member of Clubs or Organizations: No    Attends Club or Organization Meetings: Never    Marital Status: Not on file   Intimate Partner Violence: Not At Risk    Fear of Current or Ex-Partner: No    Emotionally Abused: No    Physically Abused: No    Sexually Abused: No   Housing Stability: Low Risk     Unable to Pay for Housing in the Last Year: No    Number of [place] in the Last Year: 1    Unstable Housing in the Last Year: No         ALLERGIES: Latex, natural rubber and Adhesive    Review of Systems   Constitutional:  Negative for fever. There were no vitals filed for this visit. Physical Exam  Vitals and nursing note reviewed. Constitutional:       General: He is not in acute distress. Appearance: Normal appearance. He is not ill-appearing, toxic-appearing or diaphoretic. HENT:      Head: Normocephalic and atraumatic.       Nose: Nose normal.      Mouth/Throat:      Mouth: Mucous membranes are moist.   Eyes:      Extraocular Movements: Extraocular movements intact. Cardiovascular:      Rate and Rhythm: Normal rate. Pulmonary:      Effort: Pulmonary effort is normal. No respiratory distress. Breath sounds: Normal breath sounds. Abdominal:      General: There is no distension. Musculoskeletal:         General: Normal range of motion. Cervical back: Normal range of motion. Right lower leg: Edema present. Left lower leg: Edema present. Comments: No midline lumbar tenderness   Skin:     General: Skin is dry. Neurological:      General: No focal deficit present. Mental Status: He is alert and oriented to person, place, and time. Psychiatric:         Mood and Affect: Mood normal.        Medical Decision Making      Patient presents with back pain concerning for compression fracture. Patient found to be hypoxic on his home dose of oxygen. He was placed on supplemental oxygen with improvement in saturations. X-ray of the chest shows pleural effusion. White blood cell count is normal.  BNP is greater than 3900. CT abdomen and pelvis shows no acute intra-abdominal process but does demonstrate a T12 compression fracture. CT of the chest shows large pleural effusion on the left. Will admit for hypoxia. Patient comfortable and agreeable to plan of care.     Perfect Serve Consult for Admission  10:43 AM    ED Room Number: SBZ80/78  Patient Name and age:  Darlene Delaney 80 y.o.  male  Working Diagnosis: Acute respiratory failure with hypoxia ([address]  (primary encounter diagnosis)  Pleural effusion  Acute low back pain without sciatica, unspecified back pain laterality  Compression fracture of T12 vertebra, initial encounter ([address]  Acute on chronic congestive heart failure, unspecified heart failure type ([address]    COVID-19 Suspicion:  no  Sepsis present:  no  Reassessment needed: no  Code Status:  Full Code  Readmission: no  Isolation Requirements:  no  Recommended Level of Care:  telemetry  Department: [facility] ED - [phone]    Other: Recent admission to [facility] with thoracentesis. Pleural effusion with hypoxia on home dose of oxygen. Total critical care time spent exclusive of procedures:  37 minutes. Amount and/or Complexity of Data Reviewed  Labs: ordered. Radiology: ordered. ECG/medicine tests: ordered. Risk  Decision regarding hospitalization. ED Course as of 03/17/23 1043   Fri Mar 17, 2023   0959 EKG shows atrial fibrillation at a rate of 69, normal intervals, left axis deviation, no ischemic changes.   This EKG was interpreted by Tariq Black MD, ED Physician [RD]      ED Course User Index  [RD] Jason Franks MD       Procedures

## 2023-03-17 NOTE — PROGRESS NOTES
CARE MANAGEMENT INITIAL ASSESSEMENT      NAME:   Priscila Huber   :     1935   MRN:     [id number]       Emergency Contact:  Extended Emergency Contact Information  Primary Emergency Contact: [facility]  Mobile Phone: [phone]  Relation: Child  Secondary Emergency Contact: [facility] Phone: [phone]  Mobile Phone: [phone]  Relation: Daughter    Advance Directive:  Full Code, does not have an advance directive. [employer] [facility] Decision Maker:   Dhaval Kim- daughter- [phone]    Reason for Admission:  Mr. Radha Andrews is a 80 y.o. male with history that includes  prostate cancer, DM, AFIB, COPD, CAD, CHF, HTN, HLD, and DVT  who was emergently admitted for:  chronic hypoxic respiratory failure    Patient Active Problem List   Diagnosis Code    CAD (coronary artery disease) I25.10    HLD (hyperlipidemia) E78.5    Borderline diabetes mellitus R73.03    Prostate cancer ([address] C61    Asthma J45.909    Venous insufficiency I87.2    Venous insufficiency of both lower extremities I87.2    Atrial fibrillation (HCC) I48.91    CHF (congestive heart failure) (HCC) I50.9    HTN (hypertension) I10    Leg ulcer ([address] L97.909    DM type 2 causing vascular disease ([address] E11.59    Chronic deep vein thrombosis (DVT) of left peroneal vein (HCC) V98.856    Diastolic CHF (HCC) D51.95    Venous stasis ulcer ([address] I83.009, L97.909    Hypokalemia E87.6    Peripheral vascular disease ([address] I73.9    History of kyphoplasty Z98.890    Debility R53.81    Acute on chronic congestive heart failure ([address] I50.9    Pleural effusion J90       Assessment: In person with patient and daughter Tim Weeks) . RUR:  11%  Risk Level:  Low  Value-based purchasing:  Yes  Bundle patient:  No    Residency:  Private residence  Exterior Steps:   3  Interior Steps:   13-14. Shower is upstairs. Family helps Pt up/down stairs. Lives With:  Adult children (daughter)    Prior functioning:  Partial dependence.   Patient requires assistance with:  Meal prep, medications    Prior DME required:  Cane, Grab bars, Home O2 (3L/Provider Linchan), Nebulizer, and Other:  trilogy    DME available:  Same as above    Rehab history:  None    Discharge Concerns/Barriers Identified:  None identified      Insurer:  Payor: Austin Varma / Plan: [place] MEDICARE PART A & B / Product Type: Medicare /     PCP: Mason Kramer MD   Name of Practice:   [facility]   Current patient: Yes   Approximate date of last visit: 11/01/22   Access to virtual PCP visits:  Unknown    Pharmacy:  [address] [place] [address]. Financial/Difficulty affording medications:  None identified    COVID-19 vaccination status:  Yes    DC Transport:  Family      Transition of care plan:  Home with outpatient follow-up     Comments:   Pt admitted on 3/17/23 for chronic hypoxic respiratory failure. CM met with Pt and daughter Nimesh Bazzi) at bedside. Pt lives at home with his other daughter. Pt has no hx of HH. Pt has home O2 at night through [address]. Pt has a cane, nebulizer, trilogy, and grab bars at home. Pt is mostly independent with ADLs. Family assists with meal prep and medications. Pt ambulates with a cane PRN. Melida Nicholas denies any usual problems with ambulation. Pt is retired. Pt is unable to drive. Discharge plan is for Pt to return home. Family will transport Pt home.   _____________________________________  Maximilian Orantes, [facility] Management  Available via [facility]  3/17/2023   5:36 PM      Care Management Interventions  PCP Verified by CM: Yes Chacorta Dee MD)  Mode of Transport at Discharge:  Other (see comment) (family)  Transition of Care Consult (CM Consult): Discharge Planning  MyChart Signup: No  Discharge Durable Medical Equipment: No  Physical Therapy Consult: No  Occupational Therapy Consult: No  Speech Therapy Consult: No  Support Systems: Child(sirena)  Confirm Follow Up Transport: Family  Discharge Location  Patient Expects to be Discharged to[de-identified] Home

## 2023-03-17 NOTE — PROGRESS NOTES
[facility]                    Cardiology Care Note     [x]Initial Encounter     []Follow-up    Patient Name: Carolann Perkins - [facility]:[birth year] - [facility]MI:[id number]  Primary Cardiologist: none   Consulting Cardiologist: Amanda Mcgrath Cardiology Physicians: Antonio Mcburney DO     Reason for encounter: HF r EF:     HPI:       Carolann Perkins is a 80 y.o. male with PMH  with history of coronary artery disease status post coronary artery bypass graft surgery 1995, COPD, atrial fibrillation, right popliteal DVT, mild LV dysfunction, with lower extremity edema and ulcerations due to venous insufficieny and chronic diastolic heart failure who presented to the  ED with a chief complaint of low back pain. History obtained per patient,  daughter and chart review. Patient states that he experienced acute low back pain for the last three days which became unbearable last night so he presented to the ED for evaluation. Denies any trauma to the area, fall. Also endorses no bowel movement in the last 4 days. Of note, patient states that he was recently admitted to [facility] for hypoxic respiratory failure, found to have a large pleural effusion, had a thoracentesis there ,dtr thinks both sides also legs were weeping fluid. .  Wears oxygen at night only. Subjective:      Carolann Perkins reports  back pain, weakness, no bm in several days . Assessment and Plan     Hx CAD s/p CABG 1995:   HF r EF with large L pleural effusion. Pulmonology to see re thoracentesis. Cont IV bumex 1 mg BID . Cont toprol x, aldactone. Obtain records from [address] [place] [address] if echo done last admission ~ 4 weeks ago then would not repeat. I/O. Low na diet   Chronic a fib :on eliquis   Back pain: multiple compression fractures: ortho consult.    Constipation            ____________________________________________________________    Cardiac testing      ECHO ADULT COMPLETE 10/25/2019 10/25/2019    Interpretation Summary  · Left Ventricle: Normal cavity size and wall thickness. Mild systolic dysfunction. Estimated left ventricular ejection fraction is 41 - 45%. Abnormal left ventricular wall motion. · Left Atrium: Mildly dilated left atrium. · Aortic Valve: Probably trileaflet aortic valve. Aortic valve sclerosis. Aortic valve leaflet calcification present. · Mitral Valve: Mild mitral annular calcification. Signed by: Emi Chris MD on 10/25/2019  5:00 PM    CT chest: 1. There is a moderately large left effusion with left basilar atelectasis. There is a mild right pleural effusion with mild right basilar atelectasis. CT Results (most recent):  Results from Hospital Encounter encounter on 03/17/23    CT CHEST WO CONT    Narrative  INDICATION: Heart failure, COPD    COMPARISON:    CONTRAST: None. TECHNIQUE:  5 mm axial images were obtained through the chest. Coronal and  sagittal reformats were generated. CT dose reduction was achieved through use  of a standardized protocol tailored for this examination and automatic exposure  control for dose modulation. The absence of intravenous contrast reduces the sensitivity for evaluation of  the mediastinum, lisandro, vasculature, and upper abdominal organs. FINDINGS:    CHEST WALL: No mass or axillary lymphadenopathy. THYROID: No nodule. MEDIASTINUM: No mass or lymphadenopathy. LISANDRO: No mass or lymphadenopathy. THORACIC AORTA: No aneurysm. MAIN PULMONARY ARTERY: Slightly prominent measuring 3.1 cm  TRACHEA/BRONCHI: Patent. ESOPHAGUS: No wall thickening or dilatation. HEART: There is mild cardiomegaly. Patient is status post median sternotomy. Coronary artery calcium: absent  PLEURA: There is a moderately large left effusion with left basilar atelectasis. There is a mild right effusion with right basilar atelectasis. LUNGS: No nodule or airspace disease.   INCIDENTALLY IMAGED UPPER ABDOMEN: There are gallstones  BONES: There are kyphoplasty changes and a markedly compressed T6 vertebral  body. There is moderate compression superior endplate of T7. There is minor  compression of T12. Impression  1. There is a moderately large left effusion with left basilar atelectasis. There is a mild right pleural effusion with mild right basilar atelectasis.     Most recent HS troponins:  Recent Labs     03/17/23  0906   TROPHS 37       ECG:   EKG Results       Procedure 720 Value Units Date/Time    EKG, 12 LEAD, INITIAL [[id number]] Collected: 03/17/23 0952    Order Status: Completed Updated: 03/17/23 1232     Ventricular Rate 69 BPM      QRS Duration 136 ms      Q-T Interval 426 ms      QTC Calculation (Bezet) 456 ms      Calculated R Axis -49 degrees      Calculated T Axis 135 degrees      Diagnosis --     Atrial fibrillation  Left axis deviation  Nonspecific intraventricular block  Minimal voltage criteria for LVH, may be normal variant ( Bashir product )  Nonspecific T wave abnormality  Abnormal ECG  When compared with ECG of 24-OCT-2019 14:24,  Nonspecific intraventricular block has replaced Incomplete left bundle branch   block  ST elevation now present in Anterior leads  QT has shortened                Review of Systems:    [x]All other systems reviewed and all negative except as written in HPI    [] Patient unable to provide secondary to condition    Past Medical History:   Diagnosis Date    Asthma     BRONCITITS, INHALER USE PRN    CAD (coronary artery disease)     MI    Cancer ([address] 2006    PROTATE    Chronic obstructive pulmonary disease (HCC)     Diabetes ([address]     BORDERLINE    Hypercholesterolemia     Hypertension     Umbilical hernia 6/10/4005    Venous insufficiency (chronic) (peripheral)      Past Surgical History:   Procedure Laterality Date    ENDOSCOPY, COLON, DIAGNOSTIC  01/02/2006    HX GI      COLONOSCOPY    HX HEENT Bilateral     CATARACTS/ IOL    HX HERNIA REPAIR  7/2014    HX PROSTATECTOMY 01/02/2006    IR KYPHOPLASTY THORACIC  10/12/2020    IR THORACENTESIS NDL PUNC ASP W IMAGE  6/22/2021    [place] UNLISTED PROCEDURE CARDIAC SURGERY  1993    CABG X4 VESSEL    [place] UNLISTED PROCEDURE CARDIAC SURGERY  1997, 1999    CARDIAC CATH, STENTS     Social Hx:  reports that he quit smoking about 58 years ago. His smoking use included cigarettes. He smoked an average of 1 pack per day. He has never used smokeless tobacco. He reports current alcohol use of about 15.0 standard drinks per week. He reports that he does not use drugs. Family Hx: family history includes Stroke in his father. Allergies   Allergen Reactions    Latex, Natural Rubber Hives    Adhesive Other (comments)     BLISTERS          OBJECTIVE:  Wt Readings from Last 3 Encounters:   03/17/23 84.4 kg (186 lb)   11/01/22 74.8 kg (164 lb 12.8 oz)   04/11/22 71.7 kg (158 lb)     No intake or output data in the 24 hours ending 03/17/23 1410    Physical Exam:    Vitals:   Vitals:    03/17/23 1011 03/17/23 1121 03/17/23 1230 03/17/23 1335   BP:  113/64 (!) 110/55 109/61   Pulse:  67 75 71   Resp:  18 16 18   Temp:  98.4 °F (36.9 °C) 97.6 °F (36.4 °C)    SpO2: 97% 98% 95% 98%   Weight:  84.4 kg (186 lb)     Height:  5' 9\" (1.753 m)       Telemetry: a fib     Gen: Elderly   Neck: Supple, No JVD, No Carotid Bruit  Resp: No accessory muscle use, diminished L base  Card: irregular Rate,Rythm, Normal S1, S2, 2/6 systolic murmur,no rubs or gallop. No thrills.    Abd:   Soft, non-tender, non-distended, BS+   MSK: No cyanosis  Skin: No rashes    Neuro: Moving all four extremities, follows commands appropriately  Psych: Good insight, oriented to person, place, alert, Nml Affect  LE: venous stasis, 2 + LE edema to mid calf     Data Review:     Radiology:   XR Results (most recent):  Results from [place] encounter on 03/17/23    XR CHEST PORT    Narrative  INDICATION: hypoxia    EXAM:  AP CHEST RADIOGRAPH    COMPARISON: October 24, 2019    FINDINGS:    AP portable view of the chest demonstrates prior median sternotomy. Large left  pleural effusion and associated atelectasis. Mild pulmonary edema and small  right pleural effusion. Sclerosis proximal left humerus. Impression  Large left pleural effusion, small right pleural effusion, and pulmonary edema. Recent Labs     03/17/23  0906      K 3.8      CO2 39*   BUN 33*   CREA 1.13   GLU 96   CA 9.4     Recent Labs     03/17/23  0906   WBC 6.5   HGB 11.6*   HCT 37.2        Recent Labs     03/17/23  0906   *     No results for input(s): CHOL, LDLC in the last 72 hours. No lab exists for component: TGL, HDLC,  HBA1C      Current meds:    Current Facility-Administered Medications:     bumetanide (BUMEX) injection 2 mg, 2 mg, IntraVENous, ONCE, Christofer Mccoy MD    albuterol-ipratropium (DUO-NEB) 2.5 MG-0.5 MG/3 ML, 3 mL, Nebulization, Q6H PRN, Christofer Mccoy MD    atorvastatin (LIPITOR) tablet 80 mg, 80 mg, Oral, QHS, Christofer Mccoy MD    apixaban (ELIQUIS) tablet 5 mg, 5 mg, Oral, BID, Christofer Mccoy MD    [START ON 3/18/2023] metoprolol succinate (TOPROL-XL) XL tablet 25 mg, 25 mg, Oral, DAILY, Christofer Mccoy MD    [START ON 3/18/2023] spironolactone (ALDACTONE) tablet 25 mg, 25 mg, Oral, DAILY, Christofer Mccoy MD    acetaminophen (TYLENOL) tablet 500 mg, 500 mg, Oral, Q6H PRN, Christofer Mccoy MD    budesonide (PULMICORT) 500 mcg/2 ml nebulizer suspension, 500 mcg, Nebulization, BID RT, Christofer Mccoy MD    arformoteroL (BROVANA) neb solution 15 mcg, 15 mcg, Nebulization, BID RT, Christofer Mccoy MD    ipratropium (ATROVENT) 0.02 % nebulizer solution 0.5 mg, 0.5 mg, Nebulization, Q8H RT, Christofer Mccoy MD    Current Outpatient Medications:     spironolactone (ALDACTONE) 25 mg tablet, Take  by mouth daily. , Disp: , Rfl:     potassium chloride SR (K-TAB) 20 mEq tablet, TAKE 1 TABLET BY MOUTH DAILY AS NEEDED FOR SWELLING, Disp: 90 Tablet, Rfl: 1 colesevelam (WELCHOL) 625 mg tablet, TAKE 3 TABLETS BY MOUTH TWICE DAILY WITH MEALS, Disp: 540 Tablet, Rfl: 0    metoprolol succinate (TOPROL-XL) 25 mg XL tablet, TAKE 1 TABLET BY MOUTH DAILY, Disp: 90 Tablet, Rfl: 1    atorvastatin (LIPITOR) 80 mg tablet, TAKE 1 TABLET BY MOUTH EVERY NIGHT, Disp: 90 Tablet, Rfl: 2    Eliquis 5 mg tablet, TAKE 1 TABLET TWICE A DAY TO PREVENT THROMBOEMBOLISM IN CHRONIC ATRIAL FIBRILLATION, Disp: 180 Tab, Rfl: 3    bumetanide (BUMEX) 1 mg tablet, Take  by mouth daily. , Disp: , Rfl:     furosemide (LASIX) 40 mg tablet, TAKE 1 TABLET BY MOUTH TWICE DAILY (Patient not taking: Reported on 3/17/2023), Disp: 180 Tablet, Rfl: 1    albuterol-ipratropium (DUO-NEB) 2.5 mg-0.5 mg/3 ml nebu, 3 mL., Disp: , Rfl:     fluticasone-umeclidinium-vilanterol (TRELEGY ELLIPTA) 100-62.5-25 mcg inhaler, Take 1 Puff by inhalation daily. , Disp: , Rfl:     [facility]  Call center: (z) [phone] (q) [phone]      Vijay Mays MD

## 2023-03-17 NOTE — PROGRESS NOTES
TRANSFER - IN REPORT:    Verbal report received from LUCIO Goldstein(name) on Rufino Avila  being received from ED(unit) for routine progression of care      Report consisted of patients Situation, Background, Assessment and   Recommendations(SBAR). Information from the following report(s) SBAR, Kardex, ED Summary, MAR, Recent Results, and Cardiac Rhythm SR  was reviewed with the receiving nurse. Opportunity for questions and clarification was provided. Assessment completed upon patients arrival to unit and care assumed. Bedside and Verbal shift change report given to Daniel Connelly RN (oncoming nurse) by Manolo Howard (offgoing nurse).  Report included the following information SBAR, Kardex, ED Summary, MAR, Recent Results, and Cardiac Rhythm SR .

## 2023-03-17 NOTE — CONSULTS
[facility]     Name: Carolann Perkins MRN: [id number]   : 1935 Hospital: [address]   Date: 3/17/2023        Impression Plan   Pleural effusion  T12 compression fracture  Back pain  Afib               Wean O2 to keep sats above 90%  Pt has no sxs from pleural effusion at this time. Since he recently had a thoracentesis and currently dealing with debilitating back pain, will re-evaluate pulmonary sxs once back pain is improved  Hold eliquis over the weekend (may need it held for kyphoplasty as well as future thoracentesis)  CXR on Monday  Will see pt again on Monday and re-evaluate plan  Discussed plan with daughter and pt. Radiology  ( personally reviewed) Moderate left pleural - essentially unchanged since 2023 and not much changed from . ABG No results for input(s): PHI, PO2I, PCO2I in the last 72 hours. Subjective     Cc: back pain    81 yo with PMHx of afib, asthma, CHF presenting with acute back pain and found to have T12 compression fracture. Left sided pleural effusion also noted and Pulmonary consulted. Pt had recent thoracentesis at [address] [place] [address] consistent with transudative effusion. Pt currently denies any shortness of breath or cough. Has hard time moving around due to the backpain. Takes eliquis at home. Daughter states his LE edema is much improved since  hospitalization. Most history provided by daughter at the bedside    Review of Systems:  A comprehensive review of systems was negative except for that written in the HPI.     Past Medical History:   Diagnosis Date    Asthma     BRONCITITS, INHALER USE PRN    CAD (coronary artery disease)     MI    Cancer ([facility] 2006    PROTATE    Chronic obstructive pulmonary disease (HCC)     Diabetes ([facility]     BORDERLINE    Hypercholesterolemia     Hypertension     Umbilical hernia     Venous insufficiency (chronic) (peripheral)       Past Surgical History:   Procedure Laterality Date    ENDOSCOPY, COLON, DIAGNOSTIC  01/02/2006    HX GI      COLONOSCOPY    HX HEENT Bilateral     CATARACTS/ IOL    HX HERNIA REPAIR  7/2014    HX PROSTATECTOMY  01/02/2006    IR KYPHOPLASTY THORACIC  10/12/2020    IR THORACENTESIS NDL PUNC ASP W IMAGE  6/22/2021    [place] UNLISTED PROCEDURE CARDIAC SURGERY  1993    CABG X4 VESSEL    [place] UNLISTED PROCEDURE CARDIAC SURGERY  1997, [address] CATH, STENTS      Prior to Admission medications    Medication Sig Start Date End Date Taking? Authorizing Provider   spironolactone (ALDACTONE) 25 mg tablet Take  by mouth daily. Yes Other, MD Justin   potassium chloride SR (K-TAB) 20 mEq tablet TAKE 1 TABLET BY MOUTH DAILY AS NEEDED FOR SWELLING 11/2/22  Yes Letty Jalloh MD   [facility]) 625 mg tablet TAKE 3 TABLETS BY MOUTH TWICE DAILY WITH MEALS 10/29/22  Yes Letty Jalloh MD   metoprolol succinate (TOPROL-XL) 25 mg XL tablet TAKE 1 TABLET BY MOUTH DAILY 7/21/22  Yes Letty Jalloh MD   atorvastatin (LIPITOR) 80 mg tablet TAKE 1 TABLET BY MOUTH EVERY NIGHT 6/10/22  Yes Letty Jalloh MD   Eliquis 5 mg tablet TAKE 1 TABLET TWICE A DAY TO PREVENT THROMBOEMBOLISM IN CHRONIC ATRIAL FIBRILLATION 12/20/20  Yes Letty Jalloh MD   bumetanide (BUMEX) 1 mg tablet Take  by mouth daily. Other, MD Justin   furosemide (LASIX) 40 mg tablet TAKE 1 TABLET BY MOUTH TWICE DAILY  Patient not taking: Reported on 3/17/2023 12/28/22   Letty Jalloh MD   albuterol-ipratropium (DUO-NEB) 2.5 mg-0.5 mg/3 ml nebu 3 mL. Provider, Historical   fluticasone-umeclidinium-vilanterol (TRELEGY ELLIPTA) 100-62.5-25 mcg inhaler Take 1 Puff by inhalation daily.     Provider, Historical     Current Facility-Administered Medications   Medication Dose Route Frequency    atorvastatin (LIPITOR) tablet 80 mg  80 mg Oral QHS    apixaban (ELIQUIS) tablet 5 mg  5 mg Oral BID    [START ON 3/18/2023] metoprolol succinate (TOPROL-XL) XL tablet 25 mg  25 mg Oral DAILY    [START ON 3/18/2023] spironolactone (ALDACTONE) tablet 25 mg  25 mg Oral DAILY    budesonide (PULMICORT) 500 mcg/2 ml nebulizer suspension  500 mcg Nebulization BID RT    arformoteroL (BROVANA) neb solution 15 mcg  15 mcg Nebulization BID RT    ipratropium (ATROVENT) 0.02 % nebulizer solution 0.5 mg  0.5 mg Nebulization BID RT    [START ON 3/18/2023] bumetanide (BUMEX) injection 1 mg  1 mg IntraVENous BID     Allergies   Allergen Reactions    Latex, Natural Rubber Hives    Adhesive Other (comments)     BLISTERS      Social History     Tobacco Use    Smoking status: Former     Packs/day: 1.00     Types: Cigarettes     Quit date: 6/10/1964     Years since quittin.8    Smokeless tobacco: Never   Substance Use Topics    Alcohol use: Yes     Alcohol/week: 15.0 standard drinks     Types: 12 Cans of beer, 6 Standard drinks or equivalent per week     Comment: casual      Family History   Problem Relation Age of Onset    Stroke Father     Anesth Problems Neg Hx           Laboratory: I have personally reviewed the critical care flowsheet and labs. Recent Labs     23  0906   WBC 6.5   HGB 11.6*   HCT 37.2        Recent Labs     23  0906      K 3.8      CO2 39*   GLU 96   BUN 33*   CREA 1.13   CA 9.4   MG 2.0   ALB 3.3*   ALT 20       Objective:     Mode Rate Tidal Volume Pressure FiO2 PEEP                    Vital Signs:     TMAX(24)      Intake/Output:   Last shift:         Last 3 shifts: No intake/output data recorded. [id number] intake or output data in the 24 hours ending 23 1712  EXAM:   GENERAL: well developed and in no distress, HEENT:  PERRL, EOMI, no alar flaring or epistaxis, oral mucosa moist without cyanosis, NECK:  no jugular vein distention, no retractions, no thyromegaly or masses, LUNGS: absent breathsounds on left side  HEART:  Regular rate and rhythm with no MGR; trace edema is present. Brawny skin changes. , ABDOMEN:  soft with no tenderness, bowel sounds present, EXTREMITIES:  warm with no cyanosis, SKIN:  no jaundice or ecchymosis, and NEUROLOGIC:  alert and oriented, grossly non-focal    Mariah Roque MD  [facility]

## 2023-03-18 LAB
ALBUMIN SERPL-MCNC: 2.9 G/DL (ref 3.5–5)
ALBUMIN/GLOB SERPL: 0.7 (ref 1.1–2.2)
ALP SERPL-CCNC: 138 U/L (ref 45–117)
ALT SERPL-CCNC: 15 U/L (ref 12–78)
ANION GAP SERPL CALC-SCNC: 2 MMOL/L (ref 5–15)
AST SERPL-CCNC: 27 U/L (ref 15–37)
BASOPHILS # BLD: 0 K/UL (ref 0–0.1)
BASOPHILS NFR BLD: 1 % (ref 0–1)
BILIRUB SERPL-MCNC: 1 MG/DL (ref 0.2–1)
BUN SERPL-MCNC: 30 MG/DL (ref 6–20)
BUN/CREAT SERPL: 30 (ref 12–20)
CALCIUM SERPL-MCNC: 8.8 MG/DL (ref 8.5–10.1)
CHLORIDE SERPL-SCNC: 99 MMOL/L (ref 97–108)
CO2 SERPL-SCNC: 36 MMOL/L (ref 21–32)
CREAT SERPL-MCNC: 0.99 MG/DL (ref 0.7–1.3)
DIFFERENTIAL METHOD BLD: ABNORMAL
ECHO AV AREA PEAK VELOCITY: 2.1 CM2
ECHO AV AREA PEAK VELOCITY: 36.3 CM2
ECHO AV AREA VTI: 2.1 CM2
ECHO AV AREA/BSA VTI: 1.1 CM2/M2
ECHO AV MEAN GRADIENT: 9 MMHG
ECHO AV MEAN VELOCITY: 1.5 M/S
ECHO AV PEAK GRADIENT: 0 MMHG
ECHO AV PEAK GRADIENT: 16 MMHG
ECHO AV PEAK VELOCITY: 0.1 M/S
ECHO AV PEAK VELOCITY: 2 M/S
ECHO AV VTI: 36.2 CM
ECHO LA DIAMETER INDEX: 2.55 CM/M2
ECHO LA DIAMETER: 5.1 CM
ECHO LA VOL 2C: 86 ML (ref 18–58)
ECHO LA VOL 4C: 123 ML (ref 18–58)
ECHO LA VOL BP: 105 ML (ref 18–58)
ECHO LA VOL/BSA BIPLANE: 53 ML/M2 (ref 16–34)
ECHO LA VOLUME AREA LENGTH: 109 ML
ECHO LA VOLUME INDEX A2C: 43 ML/M2 (ref 16–34)
ECHO LA VOLUME INDEX A4C: 62 ML/M2 (ref 16–34)
ECHO LA VOLUME INDEX AREA LENGTH: 55 ML/M2 (ref 16–34)
ECHO LV E' LATERAL VELOCITY: 7 CM/S
ECHO LV E' SEPTAL VELOCITY: 5 CM/S
ECHO LV EDV A2C: 161 ML
ECHO LV EDV A4C: 117 ML
ECHO LV EDV BP: 147 ML (ref 67–155)
ECHO LV EDV INDEX A4C: 59 ML/M2
ECHO LV EDV INDEX BP: 74 ML/M2
ECHO LV EDV NDEX A2C: 81 ML/M2
ECHO LV EJECTION FRACTION A2C: 46 %
ECHO LV EJECTION FRACTION A4C: 38 %
ECHO LV EJECTION FRACTION BIPLANE: 45 % (ref 55–100)
ECHO LV ESV A2C: 88 ML
ECHO LV ESV A4C: 73 ML
ECHO LV ESV BP: 81 ML (ref 22–58)
ECHO LV ESV INDEX A2C: 44 ML/M2
ECHO LV ESV INDEX A4C: 37 ML/M2
ECHO LV ESV INDEX BP: 41 ML/M2
ECHO LV FRACTIONAL SHORTENING: 22 % (ref 28–44)
ECHO LV INTERNAL DIMENSION DIASTOLE INDEX: 3 CM/M2
ECHO LV INTERNAL DIMENSION DIASTOLIC: 6 CM (ref 4.2–5.9)
ECHO LV INTERNAL DIMENSION SYSTOLIC INDEX: 2.35 CM/M2
ECHO LV INTERNAL DIMENSION SYSTOLIC: 4.7 CM
ECHO LV IVSD: 0.8 CM (ref 0.6–1)
ECHO LV MASS 2D: 200.7 G (ref 88–224)
ECHO LV MASS INDEX 2D: 100.4 G/M2 (ref 49–115)
ECHO LV POSTERIOR WALL DIASTOLIC: 0.9 CM (ref 0.6–1)
ECHO LV RELATIVE WALL THICKNESS RATIO: 0.3
ECHO LVOT AREA: 2.8 CM2
ECHO LVOT AV VTI INDEX: 0.72
ECHO LVOT DIAM: 1.9 CM
ECHO LVOT MEAN GRADIENT: 4 MMHG
ECHO LVOT PEAK GRADIENT: 9 MMHG
ECHO LVOT PEAK VELOCITY: 1.5 M/S
ECHO LVOT STROKE VOLUME INDEX: 37 ML/M2
ECHO LVOT SV: 74 ML
ECHO LVOT VTI: 26.1 CM
ECHO MV A VELOCITY: 0.24 M/S
ECHO MV AREA VTI: 3.3 CM2
ECHO MV E DECELERATION TIME (DT): 134.7 MS
ECHO MV E VELOCITY: 1.13 M/S
ECHO MV E/A RATIO: 4.71
ECHO MV E/E' LATERAL: 16.14
ECHO MV E/E' RATIO (AVERAGED): 19.37
ECHO MV E/E' SEPTAL: 22.6
ECHO MV LVOT VTI INDEX: 0.86
ECHO MV MAX VELOCITY: 1.1 M/S
ECHO MV MEAN GRADIENT: 2 MMHG
ECHO MV MEAN VELOCITY: 0.6 M/S
ECHO MV PEAK GRADIENT: 4 MMHG
ECHO MV REGURGITANT PEAK GRADIENT: 77 MMHG
ECHO MV REGURGITANT PEAK VELOCITY: 4.4 M/S
ECHO MV VTI: 22.4 CM
ECHO PV MAX VELOCITY: 1.2 M/S
ECHO PV PEAK GRADIENT: 6 MMHG
ECHO RV INTERNAL DIMENSION: 5.2 CM
ECHO RV TAPSE: 1.3 CM (ref 1.7–?)
ECHO RVOT MEAN GRADIENT: 1 MMHG
ECHO RVOT PEAK GRADIENT: 1 MMHG
ECHO RVOT PEAK VELOCITY: 0.6 M/S
ECHO RVOT VTI: 10.2 CM
ECHO TV REGURGITANT MAX VELOCITY: 2.84 M/S
ECHO TV REGURGITANT PEAK GRADIENT: 32 MMHG
EOSINOPHIL # BLD: 0.2 K/UL (ref 0–0.4)
EOSINOPHIL NFR BLD: 4 % (ref 0–7)
ERYTHROCYTE [DISTWIDTH] IN BLOOD BY AUTOMATED COUNT: 13.6 % (ref 11.5–14.5)
FERRITIN SERPL-MCNC: 347 NG/ML (ref 26–388)
FOLATE SERPL-MCNC: 28.5 NG/ML (ref 5–21)
GLOBULIN SER CALC-MCNC: 3.9 G/DL (ref 2–4)
GLUCOSE SERPL-MCNC: 98 MG/DL (ref 65–100)
HCT VFR BLD AUTO: 35.1 % (ref 36.6–50.3)
HGB BLD-MCNC: 11.3 G/DL (ref 12.1–17)
IMM GRANULOCYTES # BLD AUTO: 0 K/UL (ref 0–0.04)
IMM GRANULOCYTES NFR BLD AUTO: 0 % (ref 0–0.5)
IRON SATN MFR SERPL: 42 % (ref 20–50)
IRON SERPL-MCNC: 79 UG/DL (ref 35–150)
LYMPHOCYTES # BLD: 1.5 K/UL (ref 0.8–3.5)
LYMPHOCYTES NFR BLD: 29 % (ref 12–49)
MAGNESIUM SERPL-MCNC: 1.9 MG/DL (ref 1.6–2.4)
MCH RBC QN AUTO: 32.7 PG (ref 26–34)
MCHC RBC AUTO-ENTMCNC: 32.2 G/DL (ref 30–36.5)
MCV RBC AUTO: 101.4 FL (ref 80–99)
MONOCYTES # BLD: 0.7 K/UL (ref 0–1)
MONOCYTES NFR BLD: 13 % (ref 5–13)
NEUTS SEG # BLD: 2.6 K/UL (ref 1.8–8)
NEUTS SEG NFR BLD: 53 % (ref 32–75)
NRBC # BLD: 0 K/UL (ref 0–0.01)
NRBC BLD-RTO: 0 PER 100 WBC
PLATELET # BLD AUTO: 168 K/UL (ref 150–400)
PMV BLD AUTO: 10.1 FL (ref 8.9–12.9)
POTASSIUM SERPL-SCNC: 3.8 MMOL/L (ref 3.5–5.1)
PROT SERPL-MCNC: 6.8 G/DL (ref 6.4–8.2)
RBC # BLD AUTO: 3.46 M/UL (ref 4.1–5.7)
SODIUM SERPL-SCNC: 137 MMOL/L (ref 136–145)
TIBC SERPL-MCNC: 188 UG/DL (ref 250–450)
TSH SERPL DL<=0.05 MIU/L-ACNC: 2.19 UIU/ML (ref 0.36–3.74)
VIT B12 SERPL-MCNC: >2000 PG/ML (ref 193–986)
WBC # BLD AUTO: 5 K/UL (ref 4.1–11.1)

## 2023-03-18 PROCEDURE — 83540 ASSAY OF IRON: CPT

## 2023-03-18 PROCEDURE — 82728 ASSAY OF FERRITIN: CPT

## 2023-03-18 PROCEDURE — 74011000250 HC RX REV CODE- 250: Performed by: INTERNAL MEDICINE

## 2023-03-18 PROCEDURE — 74011250636 HC RX REV CODE- 250/636: Performed by: INTERNAL MEDICINE

## 2023-03-18 PROCEDURE — 97116 GAIT TRAINING THERAPY: CPT

## 2023-03-18 PROCEDURE — 74011250637 HC RX REV CODE- 250/637: Performed by: STUDENT IN AN ORGANIZED HEALTH CARE EDUCATION/TRAINING PROGRAM

## 2023-03-18 PROCEDURE — 94664 DEMO&/EVAL PT USE INHALER: CPT

## 2023-03-18 PROCEDURE — 85025 COMPLETE CBC W/AUTO DIFF WBC: CPT

## 2023-03-18 PROCEDURE — 65270000046 HC RM TELEMETRY

## 2023-03-18 PROCEDURE — 83735 ASSAY OF MAGNESIUM: CPT

## 2023-03-18 PROCEDURE — 84443 ASSAY THYROID STIM HORMONE: CPT

## 2023-03-18 PROCEDURE — 99232 SBSQ HOSP IP/OBS MODERATE 35: CPT | Performed by: INTERNAL MEDICINE

## 2023-03-18 PROCEDURE — 97161 PT EVAL LOW COMPLEX 20 MIN: CPT

## 2023-03-18 PROCEDURE — 36415 COLL VENOUS BLD VENIPUNCTURE: CPT

## 2023-03-18 PROCEDURE — 93306 TTE W/DOPPLER COMPLETE: CPT | Performed by: INTERNAL MEDICINE

## 2023-03-18 PROCEDURE — 94640 AIRWAY INHALATION TREATMENT: CPT

## 2023-03-18 PROCEDURE — 82607 VITAMIN B-12: CPT

## 2023-03-18 PROCEDURE — 74011000250 HC RX REV CODE- 250: Performed by: STUDENT IN AN ORGANIZED HEALTH CARE EDUCATION/TRAINING PROGRAM

## 2023-03-18 PROCEDURE — 77010033678 HC OXYGEN DAILY

## 2023-03-18 PROCEDURE — 74011000250 HC RX REV CODE- 250: Performed by: NURSE PRACTITIONER

## 2023-03-18 PROCEDURE — 94761 N-INVAS EAR/PLS OXIMETRY MLT: CPT

## 2023-03-18 PROCEDURE — 97530 THERAPEUTIC ACTIVITIES: CPT

## 2023-03-18 PROCEDURE — 82746 ASSAY OF FOLIC ACID SERUM: CPT

## 2023-03-18 PROCEDURE — 80053 COMPREHEN METABOLIC PANEL: CPT

## 2023-03-18 RX ORDER — BUMETANIDE 1 MG/1
1 TABLET ORAL DAILY
Status: DISCONTINUED | OUTPATIENT
Start: 2023-03-19 | End: 2023-03-21

## 2023-03-18 RX ORDER — HYDROMORPHONE HYDROCHLORIDE 1 MG/ML
0.5 INJECTION, SOLUTION INTRAMUSCULAR; INTRAVENOUS; SUBCUTANEOUS
Status: DISCONTINUED | OUTPATIENT
Start: 2023-03-18 | End: 2023-03-22 | Stop reason: HOSPADM

## 2023-03-18 RX ORDER — LIDOCAINE 4 G/100G
1 PATCH TOPICAL EVERY 24 HOURS
Status: DISCONTINUED | OUTPATIENT
Start: 2023-03-18 | End: 2023-03-22 | Stop reason: HOSPADM

## 2023-03-18 RX ORDER — DEXAMETHASONE SODIUM PHOSPHATE 10 MG/ML
10 INJECTION INTRAMUSCULAR; INTRAVENOUS EVERY 8 HOURS
Status: DISCONTINUED | OUTPATIENT
Start: 2023-03-18 | End: 2023-03-19

## 2023-03-18 RX ADMIN — DEXAMETHASONE SODIUM PHOSPHATE 10 MG: 10 INJECTION, SOLUTION INTRAMUSCULAR; INTRAVENOUS at 21:34

## 2023-03-18 RX ADMIN — ATORVASTATIN CALCIUM 80 MG: 20 TABLET, FILM COATED ORAL at 21:33

## 2023-03-18 RX ADMIN — METOPROLOL SUCCINATE 25 MG: 25 TABLET, EXTENDED RELEASE ORAL at 10:53

## 2023-03-18 RX ADMIN — BUDESONIDE 500 MCG: 0.5 SUSPENSION RESPIRATORY (INHALATION) at 07:08

## 2023-03-18 RX ADMIN — IPRATROPIUM BROMIDE 0.5 MG: 0.5 SOLUTION RESPIRATORY (INHALATION) at 07:07

## 2023-03-18 RX ADMIN — ACETAMINOPHEN 500 MG: 500 TABLET ORAL at 14:18

## 2023-03-18 RX ADMIN — ARFORMOTEROL TARTRATE 15 MCG: 15 SOLUTION RESPIRATORY (INHALATION) at 07:08

## 2023-03-18 RX ADMIN — IPRATROPIUM BROMIDE 0.5 MG: 0.5 SOLUTION RESPIRATORY (INHALATION) at 20:21

## 2023-03-18 RX ADMIN — BUMETANIDE 1 MG: 0.25 INJECTION INTRAMUSCULAR; INTRAVENOUS at 05:07

## 2023-03-18 RX ADMIN — ACETAMINOPHEN 500 MG: 500 TABLET ORAL at 05:31

## 2023-03-18 RX ADMIN — ARFORMOTEROL TARTRATE 15 MCG: 15 SOLUTION RESPIRATORY (INHALATION) at 20:26

## 2023-03-18 RX ADMIN — SPIRONOLACTONE 25 MG: 25 TABLET ORAL at 10:53

## 2023-03-18 RX ADMIN — DEXAMETHASONE SODIUM PHOSPHATE 10 MG: 10 INJECTION, SOLUTION INTRAMUSCULAR; INTRAVENOUS at 14:11

## 2023-03-18 RX ADMIN — BUDESONIDE 500 MCG: 0.5 SUSPENSION RESPIRATORY (INHALATION) at 20:26

## 2023-03-18 NOTE — PROGRESS NOTES
Orthopaedic Progress Note    March 18, 2023 10:54 AM     Patient: Meaghan Aguilar MRN: [id number]  SSN: [id number]    YOB: 1935  Age: 80 y.o. Sex: male      Admit date:  3/17/2023  Admitting Physician:  Yg Flores MD   Consulting Physician(s): Treatment Team: Attending Provider: Ghassan Camacho DO; Consulting Provider: Venita Richard MD; Consulting Provider: JESSIKA Tripp; Consulting Provider: Kristina Mcdonnell NP; Consulting Provider: Angus Lambert MD; Consulting Provider: Pinky Wolf DO; Care Manager: Josee Cervantes; Staff Nurse: Lakshmi Fung, RN; Utilization Review: Annabella Rivers; Physical Therapist: Nikki Ureña, PT, DPT    SUBJECTIVE:     Meaghan Aguilar is a 80 y.o. male is resting in bed with complaints of back pain. Denies leg pain or weakness this morning. Denies bowel or bladder incontinence. No complaints of nausea, vomiting, dizziness, lightheadedness, chest pain, or shortness of breath. OBJECTIVE:       Physical Exam:  General: Alert, cooperative, no distress. Respiratory: Respirations unlabored  EXAM: GEN: frail elderly male in NAD  PSYCH: AAO x 4  MUSC/NEURO: Pain to palpation at T12 spinous process. No ecchymosis, lesions, or rash. Unable to sit patient up due to pain  BL LE:  Edema in both legs. Erythema in both legs to the midtibial level consistent with chronic venous stasis changes. Blanchable erythema. SILT BL LE. Moves both legs spontaneously. DTR: 2+     LLE:   PF: 4/5  DF: 4/5  EHL/EFL: 4/5  KF: 4/5  KE: 4/5  HF: did not test  Negative ankle clonus  Negative Babinski     RLE:  PF: 4/5  DF: 4/5  EHL/EFL: 4/5  KF: 4/5  KE: 4/5  HF: did not test  Negative ankle clonus  Negative Babinski     +DP and PT pulses. IMPRESSION  1.  Mild subacute superior endplate compression fracture of T12.  2. Chronic severe compression fracture of T6 status post kyphoplasty, with  retropulsion resulting in mild spinal canal stenosis with ventral cord  compression and questionable focal myelomalacia. 3. Moderate chronic compression fracture of T7.  4. Markedly exaggerated thoracic kyphosis. 5. Moderate to large bilateral pleural effusions. Vital Signs:        Patient Vitals for the past 8 hrs:   BP Temp Pulse Resp SpO2   23 1436 -- -- 72 -- --   23 1419 114/61 97.5 °F (36.4 °C) 70 23 93 %   23 1045 119/63 -- 77 -- 95 %   23 0804 (!) 104/58 98.2 °F (36.8 °C) 76 16 95 %   23 0733 -- -- 77 -- --   23 0731 (!) 104/53 98.2 °F (36.8 °C) 77 16 94 %                                          Temp (24hrs), Av.9 °F (36.6 °C), Min:97.5 °F (36.4 °C), Max:98.2 °F (36.8 °C)      Labs:        Recent Labs     23  0503   HCT 35.1*   HGB 11.3*     Lab Results   Component Value Date/Time    Sodium 137 2023 05:03 AM    Potassium 3.8 2023 05:03 AM    Chloride 99 2023 05:03 AM    CO2 36 (H) 2023 05:03 AM    Glucose 98 2023 05:03 AM    BUN 30 (H) 2023 05:03 AM    Creatinine 0.99 2023 05:03 AM    Calcium 8.8 2023 05:03 AM       PT/OT:                Patient mobility  Bed Mobility Training  Rolling: Moderate assistance  Supine to Sit: Moderate assistance, Assist x2, Additional time  Sit to Supine: Maximum assistance, Assist x2                      ASSESSMENT / PLAN:   Active Problems:    Pleural effusion (3/17/2023)         A: 1. Subacute T12 compression fracture without canal compromise  2. Chronic T6 and T7 compression fractures  3. 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 CHF exacerbation with BL pleural effusions     P: 1. T12 compression fracture:  Options for treatment were discussed with patient and his daughter again today. We went over the MRI results and discussed options for his subacute compression fracture and mild cord compression. I went over surgical options for his T6 retropulsion and he/daughter are not interested.   I explained to them that he can experience leg weakness from this, but this is not new and is chronic from his previous compression fracture. They understand he will have worsening kyphosis as he ages and there is a potential for worsening leg weakness. I think it is reasonable to trial 24 hours of IV steroids as he could have worsening symptoms from his fall at the level of the T6 compression deformity. He is at a high risk for decompensation due to pain and immobility. The plan is for kyphoplasty once IR is available and patient stable to have it done - likely Monday. We can try bracing, but I worry that his respiratory status will be difficult to manage with a TLSO. He can ambulate and out of bed to chair as tolerated. TLSO will be ordered in case patient can tolerate. Consider intranasal calcitonin for pain management.     2. DVT ppx: per hospitalist.     Signed By:  Kimber Wetzel, [address] [facility] [address]

## 2023-03-18 NOTE — PROGRESS NOTES
[facility]                    Cardiology Care Note     []Initial Encounter     [x]Follow-up    Patient Name: Fermin Reich - DEQ:[birth year] - [facility]:[id number]  Primary Cardiologist:   Consulting Cardiologist: [facility] Physicians: Britta Polk MD     Reason for encounter: HFrEF    HPI:     Fermin Reich is a 80 y.o. male with PMH  with history of coronary artery disease status post coronary artery bypass graft surgery 1995, COPD, atrial fibrillation, right popliteal DVT, mild LV dysfunction, with lower extremity edema and ulcerations due to venous insufficieny and chronic diastolic heart failure who presented to the  ED with a chief complaint of low back pain. History obtained per patient,  daughter and chart review. Patient states that he experienced acute low back pain for the last three days which became unbearable last night so he presented to the ED for evaluation. Denies any trauma to the area, fall. Also endorses no bowel movement in the last 4 days. Of note, patient states that he was recently admitted to [facility] for hypoxic respiratory failure, found to have a large pleural effusion, had a thoracentesis there ,dtr thinks both sides also legs were weeping fluid. .  Wears oxygen at night only. Subjective:      Fermin Reich reports back pain/weakness. Assessment and Plan     Acute on Chronic HFrEF  Hx of CAD s/p CABG 1995  Chronic Afib/ on Elqius - held on admn  Back Pain - compression fractures    Plan:  On IV bumex 1mg bid/ Cr 0.9; Switch to PO Bumex 1mg in AM.   Cont GDMT  Records from [address] [place] [address]  - recent admn /had thoracentesis/  Per pulm - no need for thoracentesis now.  Restart eliquis if no procedures planned       ____________________________________________________________    Cardiac testing  10/24/19    ECHO ADULT COMPLETE 10/25/2019 10/25/2019    Interpretation Summary  · Left Ventricle: Normal cavity size and wall thickness. Mild systolic dysfunction. Estimated left ventricular ejection fraction is 41 - 45%. Abnormal left ventricular wall motion. · Left Atrium: Mildly dilated left atrium. · Aortic Valve: Probably trileaflet aortic valve. Aortic valve sclerosis. Aortic valve leaflet calcification present. · Mitral Valve: Mild mitral annular calcification. Signed by: Valerie Hair MD on 10/25/2019  5:00 PM              Most recent HS troponins:  Recent Labs     03/17/23  0906   TROPHS 37       ECG:     Review of Systems:    []All other systems reviewed and all negative except as written in HPI    [] Patient unable to provide secondary to condition    Past Medical History:   Diagnosis Date    Asthma     BRONCITITS, INHALER USE PRN    CAD (coronary artery disease)     MI    Cancer ([address] 2006    PROTATE    Chronic obstructive pulmonary disease ([address]     Diabetes ([facility]     BORDERLINE    Hypercholesterolemia     Hypertension     Umbilical hernia 3/13/1383    Venous insufficiency (chronic) (peripheral)      Past Surgical History:   Procedure Laterality Date    ENDOSCOPY, COLON, DIAGNOSTIC  01/02/2006    HX GI      COLONOSCOPY    HX HEENT Bilateral     CATARACTS/ IOL    HX HERNIA REPAIR  7/2014    HX PROSTATECTOMY  01/02/2006    IR KYPHOPLASTY THORACIC  10/12/2020    IR THORACENTESIS NDL PUNC ASP W IMAGE  6/22/2021    [place] UNLISTED PROCEDURE CARDIAC SURGERY  1993    CABG X4 VESSEL    CA UNLISTED PROCEDURE CARDIAC SURGERY  1997, 1999    CARDIAC CATH, STENTS     Social Hx:  reports that he quit smoking about 58 years ago. His smoking use included cigarettes. He smoked an average of 1 pack per day. He has never used smokeless tobacco. He reports current alcohol use of about 15.0 standard drinks per week. He reports that he does not use drugs. Family Hx: family history includes Stroke in his father.   Allergies   Allergen Reactions    Latex, Natural Rubber Hives    Adhesive Other (comments) BLISTERS          OBJECTIVE:  Wt Readings from Last 3 Encounters:   03/17/23 186 lb 1.1 oz (84.4 kg)   11/01/22 164 lb 12.8 oz (74.8 kg)   04/11/22 158 lb (71.7 kg)     No intake or output data in the 24 hours ending 03/18/23 0115    Physical Exam:    Vitals:   Vitals:    03/17/23 1946 03/17/23 1955 03/17/23 2309 03/17/23 2350   BP: 104/79   (!) 102/48   Pulse: 74 73 68 61   Resp: 14   14   Temp: 97.8 °F (36.6 °C)   97.7 °F (36.5 °C)   SpO2: 96%   97%   Weight:       Height:         Telemetry: normal sinus rhythm    Gen: Well-developed, well-nourished, elderly  Neck: Supple,JVD+  Resp: No accessory muscle use, Clear breath sounds, No rales or rhonchi  Card: Irregular Rate,Rythm, Normal S1, S2, 2/6 sys murmur+  Abd:   Soft,  BS+   MSK: No cyanosis  Skin: No rashes    Neuro: Moving all four extremities, follows commands appropriately  Psych: Good insight, oriented to person, place, alert, Nml Affect  LE: 1+ edema    Data Review:     Radiology:   XR Results (most recent):  Results from Hospital Encounter encounter on 03/17/23    XR CHEST PORT    Narrative  INDICATION: hypoxia    EXAM:  AP CHEST RADIOGRAPH    COMPARISON: October 24, 2019    FINDINGS:    AP portable view of the chest demonstrates prior median sternotomy. Large left  pleural effusion and associated atelectasis. Mild pulmonary edema and small  right pleural effusion. Sclerosis proximal left humerus. Impression  Large left pleural effusion, small right pleural effusion, and pulmonary edema. Recent Labs     03/17/23  0906      K 3.8      CO2 39*   BUN 33*   CREA 1.13   GLU 96   CA 9.4     Recent Labs     03/17/23  0906   WBC 6.5   HGB 11.6*   HCT 37.2        Recent Labs     03/17/23  0906   *     No results for input(s): CHOL, LDLC in the last 72 hours.     No lab exists for component: TGL, HDLC,  HBA1C      Current meds:    Current Facility-Administered Medications:     albuterol-ipratropium (DUO-NEB) 2.5 MG-0.5 MG/3 ML, 3 mL, Nebulization, Q6H PRN, Lou Ugalde MD    atorvastatin (LIPITOR) tablet 80 mg, 80 mg, Oral, QHS, Lou Ugalde MD, 80 mg at 03/17/23 2239    metoprolol succinate (TOPROL-XL) XL tablet 25 mg, 25 mg, Oral, DAILY, Lou Ugalde MD    spironolactone (ALDACTONE) tablet 25 mg, 25 mg, Oral, DAILY, Lou Ugalde MD    acetaminophen (TYLENOL) tablet 500 mg, 500 mg, Oral, Q6H PRN, Lou Ugalde MD, 500 mg at 03/17/23 2017    budesonide (PULMICORT) 500 mcg/2 ml nebulizer suspension, 500 mcg, Nebulization, BID RT, Lou Ugalde MD, 500 mcg at 03/17/23 2021    arformoteroL (BROVANA) neb solution 15 mcg, 15 mcg, Nebulization, BID RT, Lou Ugalde MD, 15 mcg at 03/17/23 2021    ipratropium (ATROVENT) 0.02 % nebulizer solution 0.5 mg, 0.5 mg, Nebulization, BID RT, Lou Ugalde MD, 0.5 mg at 03/17/23 2016    bumetanide (BUMEX) injection 1 mg, 1 mg, IntraVENous, BID, Pinky Archer NP Norm Simon, MD    [facility]  Call center: (V) [phone]  (X) [phone]      CC:Shawanda Gardner Officer, MD

## 2023-03-18 NOTE — PROGRESS NOTES
3/17/23: 1900: Bedside and Verbal shift change report given to [facility] [address] [facility] [address] (oncoming nurse) by Oksana Beltran (offgoing nurse). Report included the following information SBAR, Kardex, ED Summary, Procedure Summary, Intake/Output, MAR, Recent Results, Med Rec Status, Cardiac Rhythm  , and Alarm Parameters . 3/18/23: 0730: Bedside and Verbal shift change report given to LUCIO Hoff (oncoming nurse) by [address] [place] [address] [address] (offgoing nurse). Report included the following information SBAR, Kardex, ED Summary, Procedure Summary, Intake/Output, MAR, Recent Results, Med Rec Status, Cardiac Rhythm  , and Alarm Parameters .

## 2023-03-18 NOTE — PROGRESS NOTES
Bedside shift change report given to [facility] (oncoming nurse) by Pema Bowie (offgoing nurse). Report included the following information SBAR, Kardex, Med Rec Status, and Cardiac Rhythm NSR .

## 2023-03-18 NOTE — PROGRESS NOTES
Bedside shift change report given to [address] [place] [address] (oncoming nurse) by Michael Zapata (offgoing nurse). Report included the following information SBAR, Intake/Output, MAR, Recent Results, and Cardiac Rhythm afib .

## 2023-03-18 NOTE — PROGRESS NOTES
This patient was assisted with Intentional Toileting every 2 hours during this shift as appropriate. Documentation of ambulation and output reflected on Flowsheet as appropriate. Purposeful hourly rounding was completed using AIDET and 5Ps. Outcomes of PHR documented as they occurred. Bed alarm in use as appropriate.   Dual Suction and ambubag in place.  -Garrett Hui

## 2023-03-18 NOTE — CONSULTS
ORTHOPEDIC SURGERY CONSULT    Subjective:     Date of Consultation:  2023    Referring Physician:  Dr. Mcwilliams Reza is a 80 y.o. male who is being seen for t12 compression fracture and lumbar pain. Patient has a significant past medical history of pleural effusions, CHF, DM-2, HTN, chronic LE lymphedema, HLD, CAD, atrial fibrillation on AC, prostate Ca, and PVD. He presented to the [facility] ER today complaining of severe back pain and inability to walk due to back pain. He was found to be in acute CHF with bilateral pleural effusions. His daughter is at bedside who reports he was just at [facility] for multiple days due to CHF exacerbation. Patient reports his back pain started about 5 days ago after sitting down hard on the toilet. He normally ambulates with a  cane and his gait has worsened with his worsening venous stasis and lymphedema. He denies leg weakness. He denies bowel or bladder incontinence. He denies radicular pain. He has a history of a thoracic kyphoplasty.       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 ([address] 10/25/2019    CHF (congestive heart failure) ([address] 10/24/2019    HTN (hypertension) 10/24/2019    Leg ulcer ([address] 10/24/2019    Atrial fibrillation ([address] 03/15/2017    Venous insufficiency 10/11/2016    Venous insufficiency of both lower extremities 10/11/2016    CAD (coronary artery disease) 2010    HLD (hyperlipidemia) 2010    Borderline diabetes mellitus 2010    Prostate cancer ([address] 2010    Asthma 2010     Family History Problem Relation Age of Onset    Stroke Father     Anesth Problems Neg Hx       Social History     Tobacco Use    Smoking status: Former     Packs/day: 1.00     Types: Cigarettes     Quit date: 6/10/1964     Years since quittin.8    Smokeless tobacco: Never   Substance Use Topics    Alcohol use: Yes     Alcohol/week: 15.0 standard drinks     Types: 12 Cans of beer, 6 Standard drinks or equivalent per week     Comment: casual     Past Medical History:   Diagnosis Date    Asthma     BRONCITITS, INHALER USE PRN    CAD (coronary artery disease)     MI    Cancer ([facility]     PROTATE    Chronic obstructive pulmonary disease (HCC)     Diabetes ([facility]     BORDERLINE    Hypercholesterolemia     Hypertension     Umbilical hernia     Venous insufficiency (chronic) (peripheral)       Past Surgical History:   Procedure Laterality Date    ENDOSCOPY, COLON, DIAGNOSTIC  2006    HX GI      COLONOSCOPY    HX HEENT Bilateral     CATARACTS/ IOL    HX HERNIA REPAIR  2014    HX PROSTATECTOMY  2006    IR KYPHOPLASTY THORACIC  10/12/2020    IR THORACENTESIS NDL PUNC ASP W IMAGE  2021    [place] UNLISTED PROCEDURE CARDIAC SURGERY      CABG X4 VESSEL    [place] UNLISTED PROCEDURE CARDIAC SURGERY  , [address] [facility] CATH, STENTS      Prior to Admission medications    Medication Sig Start Date End Date Taking? Authorizing Provider   spironolactone (ALDACTONE) 25 mg tablet Take  by mouth daily.    Yes Other, MD Justin   potassium chloride SR (K-TAB) 20 mEq tablet TAKE 1 TABLET BY MOUTH DAILY AS NEEDED FOR SWELLING 22  Yes Leticia Puga MD   [facility]) 625 mg tablet TAKE 3 TABLETS BY MOUTH TWICE DAILY WITH MEALS 10/29/22  Yes Leticia Puga MD   metoprolol succinate (TOPROL-XL) 25 mg XL tablet TAKE 1 TABLET BY MOUTH DAILY 22  Yes Leticia Puga MD   atorvastatin (LIPITOR) 80 mg tablet TAKE 1 TABLET BY MOUTH EVERY NIGHT 6/10/22  Yes Leticia Puga MD   Eliquis 5 mg tablet TAKE 1 TABLET TWICE A DAY TO PREVENT THROMBOEMBOLISM IN CHRONIC ATRIAL FIBRILLATION 20  Yes Becca Coe MD   bumetanide (BUMEX) 1 mg tablet Take  by mouth daily. Other, MD Justin   furosemide (LASIX) 40 mg tablet TAKE 1 TABLET BY MOUTH TWICE DAILY  Patient not taking: Reported on 3/17/2023 12/28/22   Becca Coe MD   albuterol-ipratropium (DUO-NEB) 2.5 mg-0.5 mg/3 ml nebu 3 mL. Provider, Historical   fluticasone-umeclidinium-vilanterol (TRELEGY ELLIPTA) 100-62.5-25 mcg inhaler Take 1 Puff by inhalation daily. Provider, Historical     Current Facility-Administered Medications   Medication Dose Route Frequency    albuterol-ipratropium (DUO-NEB) 2.5 MG-0.5 MG/3 ML  3 mL Nebulization Q6H PRN    atorvastatin (LIPITOR) tablet 80 mg  80 mg Oral QHS    [START ON 3/18/2023] metoprolol succinate (TOPROL-XL) XL tablet 25 mg  25 mg Oral DAILY    [START ON 3/18/2023] spironolactone (ALDACTONE) tablet 25 mg  25 mg Oral DAILY    acetaminophen (TYLENOL) tablet 500 mg  500 mg Oral Q6H PRN    budesonide (PULMICORT) 500 mcg/2 ml nebulizer suspension  500 mcg Nebulization BID RT    arformoteroL (BROVANA) neb solution 15 mcg  15 mcg Nebulization BID RT    ipratropium (ATROVENT) 0.02 % nebulizer solution 0.5 mg  0.5 mg Nebulization BID RT    [START ON 3/18/2023] bumetanide (BUMEX) injection 1 mg  1 mg IntraVENous BID     Allergies   Allergen Reactions    Latex, Natural Rubber Hives    Adhesive Other (comments)     BLISTERS        Review of Systems:  Pertinent items are noted in HPI.     Objective:     Patient Vitals for the past 8 hrs:   BP Temp Pulse Resp SpO2 Height Weight   23 -- -- 73 -- -- -- --   23 104/79 97.8 °F (36.6 °C) 74 14 96 % -- --   23 106/62 -- 75 12 94 % -- --   23 108/63 -- -- -- -- 5' 9\" (1.753 m) 84.4 kg (186 lb 1.1 oz)   23 -- 74 17 93 % -- --     Temp (24hrs), Av °F (36.7 °C), Min:97.6 °F (36.4 °C), Max:98.4 °F (36.9 °C)        EXAM: GEN: frail elderly male in NAD  PSYCH: AAO x 4  MUSC/NEURO: Pain to palpation at T12 spinous process. No ecchymosis, lesions, or rash. Unable to sit patient up due to pain  BL LE:  Edema in both legs. Erythema in both legs to the midtibial level consistent with chronic venous stasis changes. Blanchable erythema. SILT BL LE. Moves both legs spontaneously. DTR: 2+    LLE:   PF: 4/5  DF: 4/5  EHL/EFL: 4/5  KF: 4/5  KE: 4/5  HF: did not test  Negative ankle clonus  Negative Babinski    RLE:  PF: 4/5  DF: 4/5  EHL/EFL: 4/5  KF: 4/5  KE: 4/5  HF: did not test  Negative ankle clonus  Negative Babinski    +DP and PT pulses. IMAGING:  URINARY BLADDER: No mass or calculus. BONES: No destructive bone lesion. There is minor compression of T12 there is  facet arthropathy lower lumbar spine  ABDOMINAL WALL: No mass or hernia. ADDITIONAL COMMENTS: N/A     IMPRESSION  No acute intra-abdominal process is identified. There are gallstones. There is fecal stasis in the right and transverse colon. Atherosclerotic changes are noted. Patient is status post prostatectomy. There  is a tiny amount of free fluid in the pelvis. There is minor compression of T12  vertebral body. Data Review   Recent Results (from the past 24 hour(s))   CBC WITH AUTOMATED DIFF    Collection Time: 03/17/23  9:06 AM   Result Value Ref Range    WBC 6.5 4.1 - 11.1 K/uL    RBC 3.61 (L) 4.10 - 5.70 M/uL    HGB 11.6 (L) 12.1 - 17.0 g/dL    HCT 37.2 36.6 - 50.3 %    .0 (H) 80.0 - 99.0 FL    MCH 32.1 26.0 - 34.0 PG    MCHC 31.2 30.0 - 36.5 g/dL    RDW 13.7 11.5 - 14.5 %    PLATELET 100 732 - 224 K/uL    MPV 9.8 8.9 - 12.9 FL    NRBC 0.0 0.0  WBC    ABSOLUTE NRBC 0.00 0.00 - 0.01 K/uL    NEUTROPHILS 59 32 - 75 %    LYMPHOCYTES 26 12 - 49 %    MONOCYTES 13 5 - 13 %    EOSINOPHILS 2 0 - 7 %    BASOPHILS 0 0 - 1 %    IMMATURE GRANULOCYTES 0 0 - 0.5 %    ABS. NEUTROPHILS 3.8 1.8 - 8.0 K/UL    ABS.  LYMPHOCYTES 1.7 0.8 - 3.5 K/UL    ABS. MONOCYTES 0.9 0.0 - 1.0 K/UL    ABS. EOSINOPHILS 0.1 0.0 - 0.4 K/UL    ABS. BASOPHILS 0.0 0.0 - 0.1 K/UL    ABS. IMM. GRANS. 0.0 0.00 - 0.04 K/UL    DF AUTOMATED     METABOLIC PANEL, COMPREHENSIVE    Collection Time: 03/17/23  9:06 AM   Result Value Ref Range    Sodium 143 136 - 145 mmol/L    Potassium 3.8 3.5 - 5.1 mmol/L    Chloride 100 97 - 108 mmol/L    CO2 39 (H) 21 - 32 mmol/L    Anion gap 4 (L) 5 - 15 mmol/L    Glucose 96 65 - 100 mg/dL    BUN 33 (H) 6 - 20 MG/DL    Creatinine 1.13 0.70 - 1.30 MG/DL    BUN/Creatinine ratio 29 (H) 12 - 20      eGFR >60 >60 ml/min/1.73m2    Calcium 9.4 8.5 - 10.1 MG/DL    Bilirubin, total 1.0 0.2 - 1.0 MG/DL    ALT (SGPT) 20 12 - 78 U/L    AST (SGOT) 32 15 - 37 U/L    Alk.  phosphatase 147 (H) 45 - 117 U/L    Protein, total 7.9 6.4 - 8.2 g/dL    Albumin 3.3 (L) 3.5 - 5.0 g/dL    Globulin 4.6 (H) 2.0 - 4.0 g/dL    A-G Ratio 0.7 (L) 1.1 - 2.2     NT-PRO BNP    Collection Time: 03/17/23  9:06 AM   Result Value Ref Range    NT pro-BNP 3,916 (H) 0 - 450 PG/ML   LIPASE    Collection Time: 03/17/23  9:06 AM   Result Value Ref Range    Lipase 312 73 - 393 U/L   MAGNESIUM    Collection Time: 03/17/23  9:06 AM   Result Value Ref Range    Magnesium 2.0 1.6 - 2.4 mg/dL   URINALYSIS W/MICROSCOPIC    Collection Time: 03/17/23  9:06 AM   Result Value Ref Range    Color YELLOW/STRAW      Appearance CLEAR CLEAR      Specific gravity 1.010 1.003 - 1.030      pH (UA) 8.0 5.0 - 8.0      Protein TRACE (A) NEG mg/dL    Glucose Negative NEG mg/dL    Ketone Negative NEG mg/dL    Bilirubin Negative NEG      Blood Negative NEG      Urobilinogen 0.2 0.2 - 1.0 EU/dL    Nitrites Negative NEG      Leukocyte Esterase Negative NEG      WBC 0-4 0 - 4 /hpf    RBC 0-5 0 - 5 /hpf    Epithelial cells FEW FEW /lpf    Bacteria Negative NEG /hpf   URINE CULTURE HOLD SAMPLE    Collection Time: 03/17/23  9:06 AM    Specimen: Urine   Result Value Ref Range    Urine culture hold        Urine on hold in Microbiology dept for 2 days. If unpreserved urine is submitted, it cannot be used for addtional testing after 24 hours, recollection will be required.    TROPONIN-HIGH SENSITIVITY    Collection Time: 03/17/23  9:06 AM   Result Value Ref Range    Troponin-High Sensitivity 37 0 - 76 ng/L   EKG, 12 LEAD, INITIAL    Collection Time: 03/17/23  9:52 AM   Result Value Ref Range    Ventricular Rate 69 BPM    QRS Duration 136 ms    Q-T Interval 426 ms    QTC Calculation (Bezet) 456 ms    Calculated R Axis -49 degrees    Calculated T Axis 135 degrees    Diagnosis       Atrial fibrillation  Left axis deviation  Nonspecific intraventricular block  Minimal voltage criteria for LVH, may be normal variant ( [place] product )  Nonspecific T wave abnormality  Abnormal ECG  When compared with ECG of 24-OCT-2019 14:24,  Nonspecific intraventricular block has replaced Incomplete left bundle branch   block  ST elevation now present in Anterior leads  QT has shortened     ECHO ADULT COMPLETE    Collection Time: 03/17/23  7:00 PM   Result Value Ref Range    IVSd 0.8 0.6 - 1.0 cm    LVIDd 6.0 (A) 4.2 - 5.9 cm    LVIDs 4.7 cm    LVOT Diameter 1.9 cm    LVPWd 0.9 0.6 - 1.0 cm    EF BP 45 (A) 55 - 100 %    LV Ejection Fraction A2C 46 %    LV Ejection Fraction A4C 38 %    LV EDV A2C 161 mL    LV EDV A4C 117 mL    LV EDV  67 - 155 mL    LV ESV A2C 88 mL    LV ESV A4C 73 mL    LV ESV BP 81 (A) 22 - 58 mL    LVOT Peak Gradient 9 mmHg    LVOT Mean Gradient 4 mmHg    LVOT SV 74.0 ml    LVOT Peak Velocity 1.5 m/s    LVOT VTI 26.1 cm    RVIDd 5.2 cm    RVOT Peak Gradient 1 mmHg    RVOT Mean Gradient 1 mmHg    RVOT Peak Velocity 0.6 m/s    RVOT VTI 10.2 cm    LA Diameter 5.1 cm    LA Volume A/L 109 mL    LA Volume 2C 86 (A) 18 - 58 mL    LA Volume 4C 123 (A) 18 - 58 mL    LA Volume  (A) 18 - 58 mL    AV Area by Peak Velocity 2.1 cm2    AV Area by Peak Velocity 36.3 cm2    AV Area by VTI 2.1 cm2    AV Peak Gradient 16 mmHg    AV Peak Gradient 0 mmHg    AV Mean Gradient 9 mmHg    AV Peak Velocity 2.0 m/s    AV Peak Velocity 0.1 m/s    AV Mean Velocity 1.5 m/s    AV VTI 36.2 cm    MV A Velocity 0.24 m/s    MV E Wave Deceleration Time 134.7 ms    MV E Velocity 1.13 m/s    LV E' Lateral Velocity 7 cm/s    LV E' Septal Velocity 5 cm/s    MV Area by VTI 3.3 cm2    MR Peak Gradient 77 mmHg    MR Peak Velocity 4.4 m/s    MV Peak Gradient 4 mmHg    MV Mean Gradient 2 mmHg    MV Max Velocity 1.1 m/s    MV Mean Velocity 0.6 m/s    MV VTI 22.4 cm    PV Peak Gradient 6 mmHg    PV Max Velocity 1.2 m/s    TAPSE 1.3 (A) 1.7 cm    TR Peak Gradient 32 mmHg    TR Max Velocity 2.84 m/s    Fractional Shortening 2D 22 28 - 44 %    LV ESV Index BP 41 mL/m2    LV EDV Index BP 74 mL/m2    LV ESV Index A4C 37 mL/m2    LV EDV Index A4C 59 mL/m2    LV ESV Index A2C 44 mL/m2    LV EDV Index A2C 81 mL/m2    LVIDd Index 3.00 cm/m2    LVIDs Index 2.35 cm/m2    LV RWT Ratio 0.30     LV Mass 2D 200.7 88 - 224 g    LV Mass 2D Index 100.4 49 - 115 g/m2    MV E/A 4.71     E/E' Ratio (Averaged) 19.37     E/E' Lateral 16.14     E/E' Septal 22.60     LA Volume Index BP 53 (A) 16 - 34 ml/m2    LA Volume Index A/L 55 16 - 34 mL/m2    LVOT Stroke Volume Index 37.0 mL/m2    LVOT Area 2.8 cm2    LA Volume Index 2C 43 (A) 16 - 34 mL/m2    LA Volume Index 4C 62 (A) 16 - 34 mL/m2    LA Size Index 2.55 cm/m2    LVOT:AV VTI Index 0.72     ALEE/BSA VTI 1.1 cm2/m2    MV:LVOT VTI Index 0.86          Assessment/Plan:   A: 1. T12 compression fracture without canal compromise  2. CHF exacerbation with BL pleural effusions    P: 1. T12 compression fracture:  Options for treatment were discussed with patient and his daughter. He is at a high risk for decompensation due to pain and immobility. I recommended a MRI of T and L spine. Once these are complete we can discuss kyphoplasty as the patient will do poorly otherwise. Plan for kyphoplasty once IR is available and patient stable to receive.   We can try bracing, but I worry that his respiratory status will be difficult to manage with a TLSO. He can ambulate and out of bed to chair as tolerated. TLSO will be ordered in case patient can tolerate. Consider intranasal calcitonin for pain management. 2. DVT ppx: per hospitalist.     Discussed case with Dr. Sundeep Woo who agrees with plan.         Terry Burnett, [address] [facility]  [facility]  [address] [facility]

## 2023-03-18 NOTE — PROGRESS NOTES
[facility]  [address] [facility], [address] [place] [address]  [phone]      Medical Progress Note      NAME: Alexy Dong   :  1935  MRM:  [id number]    Date/Time of service: 3/18/2023       Subjective:     Chief Complaint:  Patient was personally seen and examined by me during this time period. Chart reviewed. Fu CHF exacerbation and T12 fracture. Endorses some back pain. Dyspnea improved. Objective:       Vitals:       Last 24hrs VS reviewed since prior progress note.  Most recent are:    Visit Vitals  /63 (BP 1 Location: Right upper arm, BP Patient Position: Lying)   Pulse 77   Temp 98.2 °F (36.8 °C)   Resp 16   Ht 5' 9\" (1.753 m)   Wt 84.4 kg (186 lb 1.1 oz)   SpO2 95%   BMI 27.48 kg/m²     SpO2 Readings from Last 6 Encounters:   23 95%   22 94%   22 92%   10/11/21 93%   20 93%   20 97%    O2 Flow Rate (L/min): 2 l/min     Intake/Output Summary (Last 24 hours) at 3/18/2023 1310  Last data filed at 3/18/2023 4479  Gross per 24 hour   Intake 50 ml   Output 500 ml   Net -450 ml        Exam:     Physical Exam:    Gen:  Well-developed, well-nourished, in no acute distress  HEENT:  Pink conjunctivae, PERRL, hearing intact to voice  Resp:  No accessory muscle use, clear breath sounds without wheezes rales or rhonchi  Card:  RRR, No murmurs, normal S1, S2, b/l LE peripheral edema  Abd:  Soft, non-tender, non-distended, normoactive bowel sounds are present  Musc:  No cyanosis or clubbing  Skin:  No rashes or ulcers, skin turgor is good  Neuro:  Cranial nerves 3-12 are grossly intact, follows commands appropriately  Psych:  Oriented to person, place, and time, Alert with good insight      Medications Reviewed: (see below)    Lab Data Reviewed: (see below)    ______________________________________________________________________    Medications:     Current Facility-Administered Medications   Medication Dose Route Frequency    lidocaine 4 % patch 1 Patch  1 Patch TransDERmal Q24H    HYDROmorphone (DILAUDID) syringe 0.5 mg  0.5 mg IntraVENous Q6H PRN    [START ON 3/19/2023] bumetanide (BUMEX) tablet 1 mg  1 mg Oral DAILY    albuterol-ipratropium (DUO-NEB) 2.5 MG-0.5 MG/3 ML  3 mL Nebulization Q6H PRN    atorvastatin (LIPITOR) tablet 80 mg  80 mg Oral QHS    metoprolol succinate (TOPROL-XL) XL tablet 25 mg  25 mg Oral DAILY    spironolactone (ALDACTONE) tablet 25 mg  25 mg Oral DAILY    acetaminophen (TYLENOL) tablet 500 mg  500 mg Oral Q6H PRN    budesonide (PULMICORT) 500 mcg/2 ml nebulizer suspension  500 mcg Nebulization BID RT    arformoteroL (BROVANA) neb solution 15 mcg  15 mcg Nebulization BID RT    ipratropium (ATROVENT) 0.02 % nebulizer solution 0.5 mg  0.5 mg Nebulization BID RT    bumetanide (BUMEX) injection 1 mg  1 mg IntraVENous BID          Lab Review:     Recent Labs     03/18/23  0503 03/17/23  0906   WBC 5.0 6.5   HGB 11.3* 11.6*   HCT 35.1* 37.2    166     Recent Labs     03/18/23  0503 03/17/23  0906    143   K 3.8 3.8   CL 99 100   CO2 36* 39*   GLU 98 96   BUN 30* 33*   CREA 0.99 1.13   CA 8.8 9.4   MG 1.9 2.0   ALB 2.9* 3.3*   TBILI 1.0 1.0   ALT 15 20     Lab Results   Component Value Date/Time    Glucose (POC) 114 (H) 10/26/2019 06:40 AM    Glucose (POC) 117 (H) 10/25/2019 08:42 PM    Glucose (POC) 126 (H) 10/25/2019 04:36 PM    Glucose (POC) 129 (H) 10/25/2019 11:41 AM    Glucose (POC) 107 (H) 10/25/2019 06:59 AM          Assessment / Plan:     Acute on chronic CHF exacerbation/ Chronic hypoxic respiratory failure POA: on 2 liters NC at night. Last echocardiogram on chart in 2019, EF 41-45%; repeat echo. NT-ProBNP 3900 (last one 800) on admission. CT chest notable for moderate L pleural effusion and pulmonary edema. Continue IV bumex. Continue BB and aldactone. Cardiology Following       Moderate to large pleural effusion: Seen on CT.  Suspect 2/2 to CHF exacerbation however given size and one sided location pulm following. Repeat CXR 3/20. IV bumex. Holding off on thoracentesis for for now. Pulmonology following      Acute low back pain due to compression fracture of T12 vertebra/ Chronic severe compression fracture of T6 status post kyphoplasty with retropulsion resulting in mild spinal canal stenosis with ventral cord compression and questionable focal myelomalacia: MRI thoracic showing T12 subacute compression, also note of cord compression. Discussed with ortho patient poor surgical candidate in regards to compression. Plan for likely kyphoplasty by IR intervention for T12 fracture. Start IV decadron. Start lidocaine patch. Iv dilaudid prn. Ortho following     Macrocytic anemia: TSH, B12, folate, iron studies ok      Chronic venous statis LE: Bilateral chronic venous stasis changes, does not appear to be infected. Management as above     Hx of CAD:Continue home statin, BB     hx of a fib on Eliquis: continue home BB. Hold eliquis per Pulm pending possible thoracentesis and kyphoplasty      Hx of COPD/ Chronic hypoxic respiratory failure: On home 2L O2 nightly; currently requiring continuous 02.  Continue home inhalers     Hx of HTN: Continue home bb and aldactone     HLD: Continue home colesesvelam      Hx of prostate cancer: s/p prostatectomy     Total time spent with patient: 28 Minutes **I personally saw and examined the patient during this time period**                 Care Plan discussed with: Patient and Nursing Staff, ortho     Discussed:  Care Plan    Prophylaxis:  SCD's    Disposition:  SNF            ___________________________________________________    Attending Physician: Hortensia Malik DO

## 2023-03-18 NOTE — PROGRESS NOTES
Problem: Mobility Impaired (Adult and Pediatric)  Goal: *Acute Goals and Plan of Care (Insert Text)  Description: FUNCTIONAL STATUS PRIOR TO ADMISSION: Patient was independent and active without use of DME.    HOME SUPPORT PRIOR TO ADMISSION: The patient lived with his daughter and grand children but did not require assist.    Physical Therapy Goals  Initiated 3/18/2023  1. Patient will move from supine to sit and sit to supine  in bed with supervision/set-up within 7 day(s). 2.  Patient will transfer from bed to chair and chair to bed with supervision/set-up using the least restrictive device within 7 day(s). 3.  Patient will perform sit to stand with supervision/set-up within 7 day(s). 4.  Patient will ambulate with supervision/set-up for 150 feet with the least restrictive device within 7 day(s). 5.  Patient will ascend/descend 4 stairs with 1 handrail(s) with modified independence within 7 day(s). Outcome: Progressing Towards Goal   PHYSICAL THERAPY EVALUATION  Patient: Lenin Vaz (18 y.o. male)  Date: 3/18/2023  Primary Diagnosis: Pleural effusion [J90]       Precautions:        ASSESSMENT  Based on the objective data described below, the patient presents with c/o severe low back pain, impaired ROM, balance, and activity tolerance following admission for a pleural effusion and newly identified T12 compression fracture. Education provided regarding back precautions to protect a spinal fracture with verbalized understanding but fair demonstration. He required moderate assist x1-2 for bed mobility and transfers. Challenged with log roll technique and required 2 attempts for initial sidelying to sit transfer d/t back pain with BLE off of the bed. Fitted with TLSO but he continued to c/o a high severe back pain and limited change with brace requiring return to supine without further progression. Concern for discharge home based on activity tolerance with current pain levels.  Noted patient given tylenol this am but has additional options for pain control. Recommend timing intervention with pain meds next visit. Current Level of Function Impacting Discharge (mobility/balance): max x2 sit to sidelying    Functional Outcome Measure: The patient scored 11/24 on the [facility] outcome measure which is indicative of likely need for ongoing therapy at discharge. Patient will benefit from skilled therapy intervention to address the above noted impairments. PLAN :  Recommendations and Planned Interventions: bed mobility training, transfer training, gait training, and therapeutic exercises      Frequency/Duration: Patient will be followed by physical therapy:  5 times a week to address goals. Recommendation for discharge: (in order for the patient to meet his/her long term goals)  Therapy up to 5 days/week in SNF setting    This discharge recommendation:  Has not yet been discussed the attending provider and/or case management    IF patient discharges home will need the following DME: to be determined (TBD)         SUBJECTIVE:   Patient stated I haven't needed any help before.     OBJECTIVE DATA SUMMARY:   HISTORY:    Past Medical History:   Diagnosis Date    Asthma     BRONCITITS, INHALER USE PRN    CAD (coronary artery disease)     MI    Cancer ([facility] 2006    PROTATE    Chronic obstructive pulmonary disease ([facility]     Diabetes ([facility]     BORDERLINE    Hypercholesterolemia     Hypertension     Umbilical hernia 3/81/7661    Venous insufficiency (chronic) (peripheral)      Past Surgical History:   Procedure Laterality Date    ENDOSCOPY, COLON, DIAGNOSTIC  01/02/2006    HX GI      COLONOSCOPY    HX HEENT Bilateral     CATARACTS/ IOL    HX HERNIA REPAIR  7/2014    HX PROSTATECTOMY  01/02/2006    IR KYPHOPLASTY THORACIC  10/12/2020    IR THORACENTESIS NDL PUNC ASP W IMAGE  6/22/2021    [facility] UNLISTED PROCEDURE CARDIAC SURGERY  1993    CABG X4 VESSEL    [facility] [id number] [id number]    CARDIAC CATH, STENTS       Personal factors and/or comorbidities impacting plan of care:     Home Situation  Home Environment: Private residence  # Steps to Enter: 3  One/Two Story Residence: Two story, live on 1st floor  Living Alone: No  Support Systems: Child(sirena)  Patient Expects to be Discharged to[de-identified] Home  Current DME Used/Available at Home: Vicente Hammers, quad, Walker, rolling    EXAMINATION/PRESENTATION/DECISION MAKING:   Critical Behavior:  Neurologic State: Alert  Orientation Level: Oriented X4        Hearing: Auditory  Auditory Impairment: Hard of hearing, bilateral  Skin:    Edema:   Range Of Motion:  AROM: Generally decreased, functional                       Strength:    Strength: Generally decreased, functional                    Tone & Sensation:   Tone: Normal              Sensation: Intact               Coordination:  Coordination: Generally decreased, functional  Vision:      Functional Mobility:  Bed Mobility:  Rolling: Moderate assistance  Supine to Sit: Moderate assistance;Assist x2; Additional time  Sit to Supine: Maximum assistance;Assist x2     Transfers:                             Balance:   Sitting: Impaired  Sitting - Static: Fair (occasional)  Sitting - Dynamic: Poor (constant support)  Standing: Impaired  Standing - Static: Poor  Standing - Dynamic : Poor  Ambulation/Gait Training:                                          Functional Measure:  [facility] AM-PAC®      Basic Mobility Inpatient Short Form (6-Clicks) Version 2  How much HELP from another person do you currently need. .. (If the patient hasn't done an activity recently, how much help from another person do you think they would need if they tried?) Total A Lot A Little None   1. Turning from your back to your side while in a flat bed without using bedrails? []  1 [x]  2 []  3  []  4   2. Moving from lying on your back to sitting on the side of a flat bed without using bedrails? []  1 [x]  2 []  3  []  4   3.   Moving to and from a bed to a chair (including a wheelchair)? []  1 [x]  2 []  3  []  4   4. Standing up from a chair using your arms (e.g. wheelchair or bedside chair)? []  1 [x]  2 []  3  []  4   5. Walking in hospital room? [x]  1 []  2 []  3  []  4   6. Climbing 3-5 steps with a railing? [x]  1 []  2 []  3  []  4     Raw Score: 10/24                            Cutoff score ?171,2,3 had higher odds of discharging home with home health or need of SNF/IPR. [address] Cazares, Ness Mirza, Mariah Kapadia More Sandra Rivera. Validity of the AM-PAC 6-Clicks Inpatient Daily Activity and Basic Mobility Short Forms. Physical Therapy Mar 2014, [phone]) [phone]; DOI: 10.2522/ptj.80393838  2. Sarika Tuttle. Association of AM-PAC \"6-Clicks\" Basic Mobility and Daily Activity Scores With Discharge Destination. Phys Ther. 2021 Apr 4;101(4):plni053. doi: 10.1093/ptj/cplw054. PMID: 10796477. V Elif Mo, Dee D, Anupama Kim, Antwon K, Harvinder S. Activity Measure for Post-Acute Care \"6-Clicks\" Basic Mobility Scores Predict Discharge Destination After Acute Care Hospitalization in Select Patient Groups: A Retrospective, Observational Study. Arch Rehabil Res Clin Transl. 2022 Jul 16;4(3):428292. doi: 10.1016/j.arrct. 7304.767063. PMID: 08348934; PMCID: [id number]. 4. Radha Barbour, Eloisa P. AM-PAC Short Forms Manual 4.0. Revised 2/2020.          Physical Therapy Evaluation Charge Determination   History Examination Presentation Decision-Making   MEDIUM  Complexity : 1-2 comorbidities / personal factors will impact the outcome/ POC  LOW Complexity : 1-2 Standardized tests and measures addressing body structure, function, activity limitation and / or participation in recreation  LOW Complexity : Stable, uncomplicated  Other outcome measures [facility]  HIGH       Based on the above components, the patient evaluation is determined to be of the following complexity level: LOW Pain Ratin/10 lumbar spine sitting    Activity Tolerance:   Fair    After treatment patient left in no apparent distress:   Supine in bed and Call bell within reach    COMMUNICATION/EDUCATION:   The patients plan of care was discussed with: Registered nurse. Fall prevention education was provided and the patient/caregiver indicated understanding., Patient/family have participated as able in goal setting and plan of care. , and Patient/family agree to work toward stated goals and plan of care.     Thank you for this referral.  Kobe Adhikari, PT, DPT   Time Calculation: 26 mins

## 2023-03-19 LAB
ALBUMIN SERPL-MCNC: 2.8 G/DL (ref 3.5–5)
ALBUMIN/GLOB SERPL: 0.6 (ref 1.1–2.2)
ALP SERPL-CCNC: 152 U/L (ref 45–117)
ALT SERPL-CCNC: 19 U/L (ref 12–78)
ANION GAP SERPL CALC-SCNC: 2 MMOL/L (ref 5–15)
AST SERPL-CCNC: 30 U/L (ref 15–37)
BASOPHILS # BLD: 0 K/UL (ref 0–0.1)
BASOPHILS NFR BLD: 0 % (ref 0–1)
BILIRUB SERPL-MCNC: 1.1 MG/DL (ref 0.2–1)
BNP SERPL-MCNC: 3332 PG/ML
BUN SERPL-MCNC: 33 MG/DL (ref 6–20)
BUN/CREAT SERPL: 28 (ref 12–20)
CALCIUM SERPL-MCNC: 9.2 MG/DL (ref 8.5–10.1)
CALCULATED R AXIS, ECG10: -49 DEGREES
CALCULATED T AXIS, ECG11: 135 DEGREES
CHLORIDE SERPL-SCNC: 98 MMOL/L (ref 97–108)
CO2 SERPL-SCNC: 35 MMOL/L (ref 21–32)
CREAT SERPL-MCNC: 1.18 MG/DL (ref 0.7–1.3)
DIAGNOSIS, 93000: NORMAL
DIFFERENTIAL METHOD BLD: ABNORMAL
EOSINOPHIL # BLD: 0 K/UL (ref 0–0.4)
EOSINOPHIL NFR BLD: 0 % (ref 0–7)
ERYTHROCYTE [DISTWIDTH] IN BLOOD BY AUTOMATED COUNT: 13.3 % (ref 11.5–14.5)
GLOBULIN SER CALC-MCNC: 4.7 G/DL (ref 2–4)
GLUCOSE SERPL-MCNC: 195 MG/DL (ref 65–100)
HCT VFR BLD AUTO: 36.6 % (ref 36.6–50.3)
HGB BLD-MCNC: 11.7 G/DL (ref 12.1–17)
IMM GRANULOCYTES # BLD AUTO: 0 K/UL (ref 0–0.04)
IMM GRANULOCYTES NFR BLD AUTO: 0 % (ref 0–0.5)
LYMPHOCYTES # BLD: 0.5 K/UL (ref 0.8–3.5)
LYMPHOCYTES NFR BLD: 19 % (ref 12–49)
MCH RBC QN AUTO: 32.4 PG (ref 26–34)
MCHC RBC AUTO-ENTMCNC: 32 G/DL (ref 30–36.5)
MCV RBC AUTO: 101.4 FL (ref 80–99)
MONOCYTES # BLD: 0.1 K/UL (ref 0–1)
MONOCYTES NFR BLD: 2 % (ref 5–13)
NEUTS SEG # BLD: 2 K/UL (ref 1.8–8)
NEUTS SEG NFR BLD: 79 % (ref 32–75)
NRBC # BLD: 0 K/UL (ref 0–0.01)
NRBC BLD-RTO: 0 PER 100 WBC
PLATELET # BLD AUTO: 162 K/UL (ref 150–400)
PMV BLD AUTO: 9.8 FL (ref 8.9–12.9)
POTASSIUM SERPL-SCNC: 3.7 MMOL/L (ref 3.5–5.1)
PROT SERPL-MCNC: 7.5 G/DL (ref 6.4–8.2)
Q-T INTERVAL, ECG07: 426 MS
QRS DURATION, ECG06: 136 MS
QTC CALCULATION (BEZET), ECG08: 456 MS
RBC # BLD AUTO: 3.61 M/UL (ref 4.1–5.7)
RBC MORPH BLD: ABNORMAL
SODIUM SERPL-SCNC: 135 MMOL/L (ref 136–145)
VENTRICULAR RATE, ECG03: 69 BPM
WBC # BLD AUTO: 2.6 K/UL (ref 4.1–11.1)

## 2023-03-19 PROCEDURE — 65270000046 HC RM TELEMETRY

## 2023-03-19 PROCEDURE — 74011250636 HC RX REV CODE- 250/636: Performed by: INTERNAL MEDICINE

## 2023-03-19 PROCEDURE — 74011250637 HC RX REV CODE- 250/637: Performed by: INTERNAL MEDICINE

## 2023-03-19 PROCEDURE — 85025 COMPLETE CBC W/AUTO DIFF WBC: CPT

## 2023-03-19 PROCEDURE — 97530 THERAPEUTIC ACTIVITIES: CPT

## 2023-03-19 PROCEDURE — 99232 SBSQ HOSP IP/OBS MODERATE 35: CPT | Performed by: INTERNAL MEDICINE

## 2023-03-19 PROCEDURE — 77010033678 HC OXYGEN DAILY

## 2023-03-19 PROCEDURE — 74011000250 HC RX REV CODE- 250: Performed by: STUDENT IN AN ORGANIZED HEALTH CARE EDUCATION/TRAINING PROGRAM

## 2023-03-19 PROCEDURE — 80053 COMPREHEN METABOLIC PANEL: CPT

## 2023-03-19 PROCEDURE — 94761 N-INVAS EAR/PLS OXIMETRY MLT: CPT

## 2023-03-19 PROCEDURE — 94640 AIRWAY INHALATION TREATMENT: CPT

## 2023-03-19 PROCEDURE — 94664 DEMO&/EVAL PT USE INHALER: CPT

## 2023-03-19 PROCEDURE — 74011000250 HC RX REV CODE- 250: Performed by: INTERNAL MEDICINE

## 2023-03-19 PROCEDURE — 74011250637 HC RX REV CODE- 250/637: Performed by: STUDENT IN AN ORGANIZED HEALTH CARE EDUCATION/TRAINING PROGRAM

## 2023-03-19 PROCEDURE — 36415 COLL VENOUS BLD VENIPUNCTURE: CPT

## 2023-03-19 PROCEDURE — 83880 ASSAY OF NATRIURETIC PEPTIDE: CPT

## 2023-03-19 RX ADMIN — BUMETANIDE 1 MG: 1 TABLET ORAL at 08:20

## 2023-03-19 RX ADMIN — ATORVASTATIN CALCIUM 80 MG: 20 TABLET, FILM COATED ORAL at 20:58

## 2023-03-19 RX ADMIN — DEXAMETHASONE SODIUM PHOSPHATE 10 MG: 10 INJECTION, SOLUTION INTRAMUSCULAR; INTRAVENOUS at 06:44

## 2023-03-19 RX ADMIN — IPRATROPIUM BROMIDE 0.5 MG: 0.5 SOLUTION RESPIRATORY (INHALATION) at 20:32

## 2023-03-19 RX ADMIN — SPIRONOLACTONE 25 MG: 25 TABLET ORAL at 08:20

## 2023-03-19 RX ADMIN — ARFORMOTEROL TARTRATE 15 MCG: 15 SOLUTION RESPIRATORY (INHALATION) at 20:37

## 2023-03-19 RX ADMIN — BUDESONIDE 500 MCG: 0.5 SUSPENSION RESPIRATORY (INHALATION) at 20:37

## 2023-03-19 RX ADMIN — ARFORMOTEROL TARTRATE 15 MCG: 15 SOLUTION RESPIRATORY (INHALATION) at 07:08

## 2023-03-19 RX ADMIN — METOPROLOL SUCCINATE 25 MG: 25 TABLET, EXTENDED RELEASE ORAL at 08:20

## 2023-03-19 RX ADMIN — BUDESONIDE 500 MCG: 0.5 SUSPENSION RESPIRATORY (INHALATION) at 07:08

## 2023-03-19 RX ADMIN — IPRATROPIUM BROMIDE 0.5 MG: 0.5 SOLUTION RESPIRATORY (INHALATION) at 07:08

## 2023-03-19 RX ADMIN — ACETAMINOPHEN 500 MG: 500 TABLET ORAL at 08:26

## 2023-03-19 NOTE — PROGRESS NOTES
Problem: Mobility Impaired (Adult and Pediatric)  Goal: *Acute Goals and Plan of Care (Insert Text)  Description: FUNCTIONAL STATUS PRIOR TO ADMISSION: Patient was independent and active without use of DME.    HOME SUPPORT PRIOR TO ADMISSION: The patient lived with his daughter and grand children but did not require assist.    Physical Therapy Goals  Initiated 3/18/2023  1. Patient will move from supine to sit and sit to supine  in bed with supervision/set-up within 7 day(s). 2.  Patient will transfer from bed to chair and chair to bed with supervision/set-up using the least restrictive device within 7 day(s). 3.  Patient will perform sit to stand with supervision/set-up within 7 day(s). 4.  Patient will ambulate with supervision/set-up for 150 feet with the least restrictive device within 7 day(s). 5.  Patient will ascend/descend 4 stairs with 1 handrail(s) with modified independence within 7 day(s). 3/19/2023 1106 by Francia Ordoñez, PT, DPT  Note:   PHYSICAL THERAPY TREATMENT  Patient: Bibi Morel (59 y.o. male)  Date: 3/19/2023  Diagnosis: Pleural effusion [J90] <principal problem not specified>      Precautions:    Chart, physical therapy assessment, plan of care and goals were reviewed. ASSESSMENT  Patient continues with skilled PT services and is progressing towards goals. Patient able to tolerate out of bed activity today. Was premedicated prior to session. Able to perform log roll with increased cuing and MIN A x2 to sit at edge of bed. Dependent for TLSO placement, but then able to preform sit-stand and SPT to chair. Patient vitals stable- see below. Patient remained sitting up in chair. Awaiting kyphoplasty tomorrow. .     Current Level of Function Impacting Discharge (mobility/balance): MIN A x2 with log roll    Other factors to consider for discharge:          PLAN :  Patient continues to benefit from skilled intervention to address the above impairments.   Continue treatment per established plan of care. to address goals. Recommendation for discharge: (in order for the patient to meet his/her long term goals)  To be determined: pending kyphoplasty    This discharge recommendation:  Has been made in collaboration with the attending provider and/or case management    IF patient discharges home will need the following DME: to be determined (TBD)       SUBJECTIVE:   Patient stated I  have not had a bowel movement in a few days, how am I going to do that? Cay Goltz    OBJECTIVE DATA SUMMARY:   Critical Behavior:  Neurologic State: Alert  Orientation Level: Oriented X4        Vitals:    03/19/23 0802 03/19/23 1000 03/19/23 1025 03/19/23 1033   BP: 114/62 113/60 90/62 103/70   BP 1 Location:  Right upper arm Right upper arm Right upper arm   BP Patient Position:  At rest;Supine Standing    Pulse: 74 74 75 76   Temp:       Resp: 19      Height:       Weight:       SpO2: 95% 95% 94% 95%       Functional Mobility Training:  Bed Mobility:  Rolling: Minimum assistance;Assist x2  Supine to Sit: Minimum assistance;Assist x2              Transfers:  Sit to Stand: Minimum assistance;Assist x2  Stand to Sit: Minimum assistance;Assist x2        Bed to Chair: Minimum assistance;Assist x2                    Balance:  Sitting: Intact  Sitting - Static: Fair (occasional)  Sitting - Dynamic: Fair (occasional)  Standing: Impaired  Standing - Static: Constant support  Standing - Dynamic : Constant support  Ambulation/Gait Training:                                  Therapeutic Exercises:     Pain Rating:  Premedicated prior to session, no reports of pain with movement that limited out of bed activity    Activity Tolerance:   Fair    After treatment patient left in no apparent distress:   Sitting in chair, Call bell within reach, and Bed / chair alarm activated    COMMUNICATION/COLLABORATION:   The patients plan of care was discussed with: Registered nurse.      Tarun Vazquez, PT, DPT   Time Calculation: 33 mins         3/19/2023 1059 by Darryn Gleason, PT, DPT  Outcome: Progressing Towards Goal  Note:   PHYSICAL THERAPY TREATMENT  Patient: Sophie Perez (78 y.o. male)  Date: 3/19/2023  Diagnosis: Pleural effusion [J90] <principal problem not specified>      Precautions:    Chart, physical therapy assessment, plan of care and goals were reviewed. ASSESSMENT  Patient continues with skilled PT services and is progressing towards goals. Patient today able to tolerate increased out of bed activity, was premedicated prior to session. Patient requires increased cuing for log roll performance, but MIN A with log roll. Once in sitting, dependent for brace placement, patient then able to perform sit-stand and SPT to RW with MIN A x2. Patient is planned for kyphoplasty tomorrow. Current Level of Function Impacting Discharge (mobility/balance): MIN A x2 for log roll and upright activity, with TLSO and RW    Other factors to consider for discharge:          PLAN :  Patient continues to benefit from skilled intervention to address the above impairments. Continue treatment per established plan of care. to address goals.     Recommendation for discharge: (in order for the patient to meet his/her long term goals)  To be determined: pending kyphoplasty    This discharge recommendation:  Has been made in collaboration with the attending provider and/or case management    IF patient discharges home will need the following DME: to be determined (TBD)       SUBJECTIVE:   Patient stated .    OBJECTIVE DATA SUMMARY:   Critical Behavior:  Neurologic State: Alert  Orientation Level: Oriented X4            Functional Mobility Training:  Bed Mobility:  Rolling: Minimum assistance;Assist x2  Supine to Sit: Minimum assistance;Assist x2              Transfers:  Sit to Stand: Minimum assistance;Assist x2  Stand to Sit: Minimum assistance;Assist x2        Bed to Chair: Minimum assistance;Assist x2 Balance:  Sitting: Intact  Sitting - Static: Fair (occasional)  Sitting - Dynamic: Fair (occasional)  Standing: Impaired  Standing - Static: Constant support  Standing - Dynamic : Constant support  Ambulation/Gait Training:                       Therapeutic Exercises:     Pain Rating:  Premedicated prior to session, patient did not report any pain during session that limited movement    Activity Tolerance:   Good and Fair    After treatment patient left in no apparent distress:   Sitting in chair, Call bell within reach, and Bed / chair alarm activated    COMMUNICATION/COLLABORATION:   The patients plan of care was discussed with: Registered nurse.      Sonido Saravia, PT, DPT   Time Calculation: 33 mins

## 2023-03-19 NOTE — PROGRESS NOTES
[facility]                    Cardiology Care Note     []Initial Encounter     [x]Follow-up    Patient Name: Carolann Perkins - CIY:[birth year] - W:[id number]  Primary Cardiologist:   Consulting Cardiologist: [facility] Cardiology Physicians: Kisha Green MD     Reason for encounter: HFrEF    HPI:     Carolann Perkins is a 80 y.o. male with PMH  with history of coronary artery disease status post coronary artery bypass graft surgery 1995, COPD, atrial fibrillation, right popliteal DVT, mild LV dysfunction, with lower extremity edema and ulcerations due to venous insufficieny and chronic diastolic heart failure who presented to the  ED with a chief complaint of low back pain. History obtained per patient,  daughter and chart review. Patient states that he experienced acute low back pain for the last three days which became unbearable last night so he presented to the ED for evaluation. Denies any trauma to the area, fall. Also endorses no bowel movement in the last 4 days. Of note, patient states that he was recently admitted to [facility] for hypoxic respiratory failure, found to have a large pleural effusion, had a thoracentesis there ,dtr thinks both sides also legs were weeping fluid. .  Wears oxygen at night only. Subjective:      C/o back pain     Assessment and Plan     Acute on Chronic HFrEF  Hx of CAD s/p CABG 1995  Chronic Afib/ on Elqius - held on admn  Back Pain - compression fractures    Plan:  Bumex 1mg bid. Cr 1.18/NTproBNP - 3332  Cont GDMT  Records from [address] [place] [address]  - recent admn /had thoracentesis/  Per pulm - no need for thoracentesis now. Restart eliquis if no procedures planned       ____________________________________________________________    Cardiac testing  10/24/19    ECHO ADULT COMPLETE 10/25/2019 10/25/2019    Interpretation Summary  · Left Ventricle: Normal cavity size and wall thickness.  Mild systolic dysfunction. Estimated left ventricular ejection fraction is 41 - 45%. Abnormal left ventricular wall motion. · Left Atrium: Mildly dilated left atrium. · Aortic Valve: Probably trileaflet aortic valve. Aortic valve sclerosis. Aortic valve leaflet calcification present. · Mitral Valve: Mild mitral annular calcification. Signed by: Diamante Peck MD on 10/25/2019  5:00 PM              Most recent HS troponins:  Recent Labs     03/17/23  0906   TROPHS 37       ECG:     Review of Systems:    []All other systems reviewed and all negative except as written in HPI    [] Patient unable to provide secondary to condition    Past Medical History:   Diagnosis Date    Asthma     BRONCITITS, INHALER USE PRN    CAD (coronary artery disease)     MI    Cancer ([address] 2006    PROTATE    Chronic obstructive pulmonary disease ([facility]     Diabetes ([facility] [address]     BORDERLINE    Hypercholesterolemia     Hypertension     Umbilical hernia 3/84/5848    Venous insufficiency (chronic) (peripheral)      Past Surgical History:   Procedure Laterality Date    ENDOSCOPY, COLON, DIAGNOSTIC  01/02/2006    HX GI      COLONOSCOPY    HX HEENT Bilateral     CATARACTS/ IOL    HX HERNIA REPAIR  7/2014    HX PROSTATECTOMY  01/02/2006    IR KYPHOPLASTY THORACIC  10/12/2020    IR THORACENTESIS NDL PUNC ASP W IMAGE  6/22/2021    [place] UNLISTED PROCEDURE CARDIAC SURGERY  1993    CABG X4 VESSEL    [place] UNLISTED PROCEDURE CARDIAC SURGERY  1997, 1999    CARDIAC CATH, STENTS     Social Hx:  reports that he quit smoking about 58 years ago. His smoking use included cigarettes. He smoked an average of 1 pack per day. He has never used smokeless tobacco. He reports current alcohol use of about 15.0 standard drinks per week. He reports that he does not use drugs. Family Hx: family history includes Stroke in his father.   Allergies   Allergen Reactions    Latex, Natural Rubber Hives    Adhesive Other (comments)     BLISTERS          OBJECTIVE:  Wt Readings from Last 3 Encounters:   03/17/23 186 lb 1.1 oz (84.4 kg)   11/01/22 164 lb 12.8 oz (74.8 kg)   04/11/22 158 lb (71.7 kg)       Intake/Output Summary (Last 24 hours) at 3/19/2023 1519  Last data filed at 3/19/2023 0913  Gross per 24 hour   Intake 400 ml   Output 475 ml   Net -75 ml       Physical Exam:    Vitals:   Vitals:    03/19/23 1025 03/19/23 1033 03/19/23 1156 03/19/23 1425   BP: 90/62 103/70 (!) 104/59    Pulse: 75 76 76 76   Resp:   20    Temp:   98 °F (36.7 °C)    SpO2: 94% 95% 99%    Weight:       Height:         Telemetry: normal sinus rhythm    Gen: Well-developed, well-nourished, elderly  Neck: Supple,No JVD  Resp: No accessory muscle use, Clear breath sounds, No rales or rhonchi  Card: Irregular Rate,Rythm, Normal S1, S2, 2/6 sys murmur+  Abd:   Soft,  BS+   MSK: No cyanosis  Skin: No rashes    Neuro: Moving all four extremities, follows commands appropriately  Psych: Good insight, oriented to person, place, alert, Nml Affect  LE: 1+ edema    Data Review:     Radiology:   XR Results (most recent):  Results from Hospital Encounter encounter on 03/17/23    XR CHEST PORT    Narrative  INDICATION: hypoxia    EXAM:  AP CHEST RADIOGRAPH    COMPARISON: October 24, 2019    FINDINGS:    AP portable view of the chest demonstrates prior median sternotomy. Large left  pleural effusion and associated atelectasis. Mild pulmonary edema and small  right pleural effusion. Sclerosis proximal left humerus. Impression  Large left pleural effusion, small right pleural effusion, and pulmonary edema. Recent Labs     03/19/23  0348 03/18/23  0503   * 137   K 3.7 3.8   CL 98 99   CO2 35* 36*   BUN 33* 30*   CREA 1.18 0.99   * 98   CA 9.2 8.8     Recent Labs     03/19/23  0348 03/18/23  0503   WBC 2.6* 5.0   HGB 11.7* 11.3*   HCT 36.6 35.1*    168     Recent Labs     03/19/23  0348 03/18/23  0503   * 138*     No results for input(s): CHOL, LDLC in the last 72 hours.     No lab exists for component: TGL, HDLC,  HBA1C      Current meds:    Current Facility-Administered Medications:     lidocaine 4 % patch 1 Patch, 1 Patch, TransDERmal, Q24H, Yumiko Villatoro DO, 1 Patch at 03/18/23 1411    HYDROmorphone (DILAUDID) syringe 0.5 mg, 0.5 mg, IntraVENous, Q6H PRN, Yumiko Villatoro, DO    bumetanide (BUMEX) tablet 1 mg, 1 mg, Oral, DAILY, John Rick MD, 1 mg at 03/19/23 0820    albuterol-ipratropium (DUO-NEB) 2.5 MG-0.5 MG/3 ML, 3 mL, Nebulization, Q6H PRN, Birgit Hussein MD    atorvastatin (LIPITOR) tablet 80 mg, 80 mg, Oral, QHS, Birgit Hussein MD, 80 mg at 03/18/23 2133    metoprolol succinate (TOPROL-XL) XL tablet 25 mg, 25 mg, Oral, DAILY, Birgit Hussein MD, 25 mg at 03/19/23 0820    spironolactone (ALDACTONE) tablet 25 mg, 25 mg, Oral, DAILY, Birgit Hussein MD, 25 mg at 03/19/23 0820    acetaminophen (TYLENOL) tablet 500 mg, 500 mg, Oral, Q6H PRN, Birgit Hussein MD, 500 mg at 03/19/23 0826    budesonide (PULMICORT) 500 mcg/2 ml nebulizer suspension, 500 mcg, Nebulization, BID RT, Birgit Hussein MD, 500 mcg at 03/19/23 0708    arformoteroL (BROVANA) neb solution 15 mcg, 15 mcg, Nebulization, BID RT, Birgit Hussein MD, 15 mcg at 03/19/23 0708    ipratropium (ATROVENT) 0.02 % nebulizer solution 0.5 mg, 0.5 mg, Nebulization, BID RT, Birgit Hussein MD, 0.5 mg at 03/19/23 123 [facility] MD You Dillon [facility] Cardiology  Call center: W) [phone] (k) [phone]      CC:Lew Gardner MD

## 2023-03-19 NOTE — PROGRESS NOTES
Ricardo Keenan [facility] [address]  [address] [facility], [address] [place] [address]  [phone]      Medical Progress Note      NAME: Alexy Dong   :  1935  MRM:  [id number]    Date/Time of service: 3/19/2023       Subjective:     Chief Complaint:  Patient was personally seen and examined by me during this time period. Chart reviewed. Fu CHF exacerbation and T12 fracture. Improved back pain. Dyspnea improved, no chest pain. Objective:       Vitals:       Last 24hrs VS reviewed since prior progress note.  Most recent are:    Visit Vitals  /71   Pulse 72   Temp 98.2 °F (36.8 °C)   Resp 18   Ht 5' 9\" (1.753 m)   Wt 84.4 kg (186 lb 1.1 oz)   SpO2 97%   BMI 27.48 kg/m²     SpO2 Readings from Last 6 Encounters:   23 97%   22 94%   22 92%   10/11/21 93%   20 93%   20 97%    O2 Flow Rate (L/min): 2 l/min     Intake/Output Summary (Last 24 hours) at 3/19/2023 0804  Last data filed at 3/19/2023 0646  Gross per 24 hour   Intake 280 ml   Output 675 ml   Net -395 ml          Exam:     Physical Exam:    Gen:  Well-developed, well-nourished, in no acute distress  HEENT:  Pink conjunctivae, PERRL, hearing intact to voice  Resp:  No accessory muscle use, clear breath sounds without wheezes rales or rhonchi  Card:  RRR, No murmurs, normal S1, S2, b/l LE peripheral edema  Abd:  Soft, non-tender, non-distended, normoactive bowel sounds are present  Musc:  No cyanosis or clubbing  Skin:  No rashes or ulcers, skin turgor is good  Neuro:  Cranial nerves 3-12 are grossly intact, follows commands appropriately  Psych:  Oriented to person, place, and time, Alert with good insight      Medications Reviewed: (see below)    Lab Data Reviewed: (see below)    ______________________________________________________________________    Medications:     Current Facility-Administered Medications   Medication Dose Route Frequency    lidocaine 4 % patch 1 Patch  1 Patch TransDERmal Q24H HYDROmorphone (DILAUDID) syringe 0.5 mg  0.5 mg IntraVENous Q6H PRN    bumetanide (BUMEX) tablet 1 mg  1 mg Oral DAILY    albuterol-ipratropium (DUO-NEB) 2.5 MG-0.5 MG/3 ML  3 mL Nebulization Q6H PRN    atorvastatin (LIPITOR) tablet 80 mg  80 mg Oral QHS    metoprolol succinate (TOPROL-XL) XL tablet 25 mg  25 mg Oral DAILY    spironolactone (ALDACTONE) tablet 25 mg  25 mg Oral DAILY    acetaminophen (TYLENOL) tablet 500 mg  500 mg Oral Q6H PRN    budesonide (PULMICORT) 500 mcg/2 ml nebulizer suspension  500 mcg Nebulization BID RT    arformoteroL (BROVANA) neb solution 15 mcg  15 mcg Nebulization BID RT    ipratropium (ATROVENT) 0.02 % nebulizer solution 0.5 mg  0.5 mg Nebulization BID RT    bumetanide (BUMEX) injection 1 mg  1 mg IntraVENous BID          Lab Review:     Recent Labs     03/19/23  0348 03/18/23  0503 03/17/23  0906   WBC 2.6* 5.0 6.5   HGB 11.7* 11.3* 11.6*   HCT 36.6 35.1* 37.2    168 166       Recent Labs     03/19/23  0348 03/18/23  0503 03/17/23  0906   * 137 143   K 3.7 3.8 3.8   CL 98 99 100   CO2 35* 36* 39*   * 98 96   BUN 33* 30* 33*   CREA 1.18 0.99 1.13   CA 9.2 8.8 9.4   MG  --  1.9 2.0   ALB 2.8* 2.9* 3.3*   TBILI 1.1* 1.0 1.0   ALT 19 15 20       Lab Results   Component Value Date/Time    Glucose (POC) 114 (H) 10/26/2019 06:40 AM    Glucose (POC) 117 (H) 10/25/2019 08:42 PM    Glucose (POC) 126 (H) 10/25/2019 04:36 PM    Glucose (POC) 129 (H) 10/25/2019 11:41 AM    Glucose (POC) 107 (H) 10/25/2019 06:59 AM          Assessment / Plan:     Acute on chronic CHF exacerbation/ Chronic hypoxic respiratory failure POA: on 2 liters NC at night. Last echocardiogram on chart in 2019, EF 41-45%; repeat echo. NT-ProBNP 3900 (last one 800) on admission. CT chest notable for moderate L pleural effusion and pulmonary edema. Transition IV bumex to oral. Continue BB and aldactone. Cardiology Following       Moderate to large pleural effusion: Seen on CT.  Suspect 2/2 to CHF exacerbation however given size and one sided location pulm following. Repeat CXR 3/20. Transition IV bumex to oral. Holding off on thoracentesis for for now. Pulmonology following      Acute low back pain due to compression fracture of T12 vertebra/ Chronic severe compression fracture of T6 status post kyphoplasty with retropulsion resulting in mild spinal canal stenosis with ventral cord compression and questionable focal myelomalacia: MRI thoracic showing T12 subacute compression, also note of cord compression. Discussed with ortho; patient poor surgical candidate in regards to compression. Plan for likely kyphoplasty by IR intervention for T12 fracture. Received IV decadronx3 doses; will stop today. Start lidocaine patch. Iv dilaudid prn. Ortho following     Macrocytic anemia: TSH, B12, folate, iron studies ok      Chronic venous statis LE: Bilateral chronic venous stasis changes, does not appear to be infected. Management as above     Hx of CAD:Continue home statin, BB     hx of a fib on Eliquis: continue home BB. Hold eliquis per Pulm pending possible thoracentesis and kyphoplasty      Hx of COPD/ Chronic hypoxic respiratory failure: On home 2L O2 nightly; currently requiring continuous 02.  Continue home inhalers     Hx of HTN: Continue home bb and aldactone     HLD: Continue home colesesvelam      Hx of prostate cancer: s/p prostatectomy     Total time spent with patient: 28 Minutes **I personally saw and examined the patient during this time period**                 Care Plan discussed with: Patient and Nursing Staff, ortho     Discussed:  Care Plan    Prophylaxis:  SCD's    Disposition:  SNF            ___________________________________________________    Attending Physician: Yuliya Listen, DO

## 2023-03-20 ENCOUNTER — APPOINTMENT (OUTPATIENT)
Dept: GENERAL RADIOLOGY | Age: 88
DRG: 515 | End: 2023-03-20
Attending: INTERNAL MEDICINE
Payer: MEDICARE

## 2023-03-20 ENCOUNTER — APPOINTMENT (OUTPATIENT)
Dept: INTERVENTIONAL RADIOLOGY/VASCULAR | Age: 88
DRG: 515 | End: 2023-03-20
Attending: PHYSICIAN ASSISTANT
Payer: MEDICARE

## 2023-03-20 LAB
ALBUMIN SERPL-MCNC: 2.9 G/DL (ref 3.5–5)
ALBUMIN/GLOB SERPL: 0.6 (ref 1.1–2.2)
ALP SERPL-CCNC: 164 U/L (ref 45–117)
ALT SERPL-CCNC: 34 U/L (ref 12–78)
ANION GAP SERPL CALC-SCNC: 5 MMOL/L (ref 5–15)
AST SERPL-CCNC: 54 U/L (ref 15–37)
BASOPHILS # BLD: 0 K/UL (ref 0–0.1)
BASOPHILS NFR BLD: 0 % (ref 0–1)
BILIRUB SERPL-MCNC: 0.5 MG/DL (ref 0.2–1)
BUN SERPL-MCNC: 39 MG/DL (ref 6–20)
BUN/CREAT SERPL: 29 (ref 12–20)
CALCIUM SERPL-MCNC: 9.1 MG/DL (ref 8.5–10.1)
CHLORIDE SERPL-SCNC: 95 MMOL/L (ref 97–108)
CO2 SERPL-SCNC: 35 MMOL/L (ref 21–32)
CREAT SERPL-MCNC: 1.35 MG/DL (ref 0.7–1.3)
DIFFERENTIAL METHOD BLD: ABNORMAL
EOSINOPHIL # BLD: 0 K/UL (ref 0–0.4)
EOSINOPHIL NFR BLD: 0 % (ref 0–7)
ERYTHROCYTE [DISTWIDTH] IN BLOOD BY AUTOMATED COUNT: 13.2 % (ref 11.5–14.5)
GLOBULIN SER CALC-MCNC: 4.7 G/DL (ref 2–4)
GLUCOSE SERPL-MCNC: 156 MG/DL (ref 65–100)
HCT VFR BLD AUTO: 36.4 % (ref 36.6–50.3)
HGB BLD-MCNC: 11.7 G/DL (ref 12.1–17)
IMM GRANULOCYTES # BLD AUTO: 0 K/UL (ref 0–0.04)
IMM GRANULOCYTES NFR BLD AUTO: 0 % (ref 0–0.5)
LYMPHOCYTES # BLD: 0.7 K/UL (ref 0.8–3.5)
LYMPHOCYTES NFR BLD: 6 % (ref 12–49)
MCH RBC QN AUTO: 32.4 PG (ref 26–34)
MCHC RBC AUTO-ENTMCNC: 32.1 G/DL (ref 30–36.5)
MCV RBC AUTO: 100.8 FL (ref 80–99)
MONOCYTES # BLD: 0.8 K/UL (ref 0–1)
MONOCYTES NFR BLD: 7 % (ref 5–13)
NEUTS SEG # BLD: 10.4 K/UL (ref 1.8–8)
NEUTS SEG NFR BLD: 87 % (ref 32–75)
NRBC # BLD: 0 K/UL (ref 0–0.01)
NRBC BLD-RTO: 0 PER 100 WBC
PLATELET # BLD AUTO: 174 K/UL (ref 150–400)
PMV BLD AUTO: 10.5 FL (ref 8.9–12.9)
POTASSIUM SERPL-SCNC: 3.8 MMOL/L (ref 3.5–5.1)
PROT SERPL-MCNC: 7.6 G/DL (ref 6.4–8.2)
RBC # BLD AUTO: 3.61 M/UL (ref 4.1–5.7)
SODIUM SERPL-SCNC: 135 MMOL/L (ref 136–145)
WBC # BLD AUTO: 12 K/UL (ref 4.1–11.1)

## 2023-03-20 PROCEDURE — 99233 SBSQ HOSP IP/OBS HIGH 50: CPT | Performed by: INTERNAL MEDICINE

## 2023-03-20 PROCEDURE — 74011250637 HC RX REV CODE- 250/637: Performed by: STUDENT IN AN ORGANIZED HEALTH CARE EDUCATION/TRAINING PROGRAM

## 2023-03-20 PROCEDURE — 94761 N-INVAS EAR/PLS OXIMETRY MLT: CPT

## 2023-03-20 PROCEDURE — 74011250636 HC RX REV CODE- 250/636: Performed by: RADIOLOGY

## 2023-03-20 PROCEDURE — 36415 COLL VENOUS BLD VENIPUNCTURE: CPT

## 2023-03-20 PROCEDURE — 22513 PERQ VERTEBRAL AUGMENTATION: CPT

## 2023-03-20 PROCEDURE — 74011250637 HC RX REV CODE- 250/637: Performed by: INTERNAL MEDICINE

## 2023-03-20 PROCEDURE — 77030021783 HC SYS CEM DEL MEDT -D

## 2023-03-20 PROCEDURE — 77010033678 HC OXYGEN DAILY

## 2023-03-20 PROCEDURE — 77030029065 HC DRSG HEMO QCLOT ZMED -B

## 2023-03-20 PROCEDURE — 74011000250 HC RX REV CODE- 250: Performed by: RADIOLOGY

## 2023-03-20 PROCEDURE — 77030034842 HC TAMP SPN BN INFL EXP II KYPH -I

## 2023-03-20 PROCEDURE — 80053 COMPREHEN METABOLIC PANEL: CPT

## 2023-03-20 PROCEDURE — 74011000250 HC RX REV CODE- 250: Performed by: STUDENT IN AN ORGANIZED HEALTH CARE EDUCATION/TRAINING PROGRAM

## 2023-03-20 PROCEDURE — 71045 X-RAY EXAM CHEST 1 VIEW: CPT

## 2023-03-20 PROCEDURE — 99153 MOD SED SAME PHYS/QHP EA: CPT

## 2023-03-20 PROCEDURE — 0PU43JZ SUPPLEMENT THORACIC VERTEBRA WITH SYNTHETIC SUBSTITUTE, PERCUTANEOUS APPROACH: ICD-10-PCS | Performed by: PHYSICIAN ASSISTANT

## 2023-03-20 PROCEDURE — 74011000636 HC RX REV CODE- 636: Performed by: RADIOLOGY

## 2023-03-20 PROCEDURE — 65270000029 HC RM PRIVATE

## 2023-03-20 PROCEDURE — 94664 DEMO&/EVAL PT USE INHALER: CPT

## 2023-03-20 PROCEDURE — C1713 ANCHOR/SCREW BN/BN,TIS/BN: HCPCS

## 2023-03-20 PROCEDURE — 77030021782 HC SYS CEM CART DEL KYPH -C

## 2023-03-20 PROCEDURE — APPSS30 APP SPLIT SHARED TIME 16-30 MINUTES: Performed by: NURSE PRACTITIONER

## 2023-03-20 PROCEDURE — 99152 MOD SED SAME PHYS/QHP 5/>YRS: CPT

## 2023-03-20 PROCEDURE — 2709999900 HC NON-CHARGEABLE SUPPLY

## 2023-03-20 PROCEDURE — 77030003666 HC NDL SPINAL BD -A

## 2023-03-20 PROCEDURE — 85025 COMPLETE CBC W/AUTO DIFF WBC: CPT

## 2023-03-20 PROCEDURE — 0PS43ZZ REPOSITION THORACIC VERTEBRA, PERCUTANEOUS APPROACH: ICD-10-PCS | Performed by: PHYSICIAN ASSISTANT

## 2023-03-20 PROCEDURE — 94640 AIRWAY INHALATION TREATMENT: CPT

## 2023-03-20 RX ORDER — KETOROLAC TROMETHAMINE 30 MG/ML
15 INJECTION, SOLUTION INTRAMUSCULAR; INTRAVENOUS ONCE
Status: COMPLETED | OUTPATIENT
Start: 2023-03-20 | End: 2023-03-20

## 2023-03-20 RX ORDER — MIDAZOLAM HYDROCHLORIDE 1 MG/ML
.5-1 INJECTION, SOLUTION INTRAMUSCULAR; INTRAVENOUS
Status: DISCONTINUED | OUTPATIENT
Start: 2023-03-20 | End: 2023-03-20 | Stop reason: HOSPADM

## 2023-03-20 RX ORDER — DIPHENHYDRAMINE HYDROCHLORIDE 50 MG/ML
25 INJECTION, SOLUTION INTRAMUSCULAR; INTRAVENOUS ONCE
Status: COMPLETED | OUTPATIENT
Start: 2023-03-20 | End: 2023-03-20

## 2023-03-20 RX ORDER — CEFAZOLIN SODIUM 1 G/3ML
2 INJECTION, POWDER, FOR SOLUTION INTRAMUSCULAR; INTRAVENOUS ONCE
Status: COMPLETED | OUTPATIENT
Start: 2023-03-20 | End: 2023-03-20

## 2023-03-20 RX ORDER — LIDOCAINE HYDROCHLORIDE 10 MG/ML
10-30 INJECTION INFILTRATION; PERINEURAL
Status: DISCONTINUED | OUTPATIENT
Start: 2023-03-20 | End: 2023-03-20 | Stop reason: HOSPADM

## 2023-03-20 RX ORDER — BUPIVACAINE HYDROCHLORIDE 7.5 MG/ML
5-10 INJECTION, SOLUTION EPIDURAL; RETROBULBAR ONCE
Status: COMPLETED | OUTPATIENT
Start: 2023-03-20 | End: 2023-03-20

## 2023-03-20 RX ORDER — FENTANYL CITRATE 50 UG/ML
25-200 INJECTION, SOLUTION INTRAMUSCULAR; INTRAVENOUS
Status: DISCONTINUED | OUTPATIENT
Start: 2023-03-20 | End: 2023-03-20 | Stop reason: HOSPADM

## 2023-03-20 RX ADMIN — BUMETANIDE 1 MG: 1 TABLET ORAL at 09:06

## 2023-03-20 RX ADMIN — ARFORMOTEROL TARTRATE 15 MCG: 15 SOLUTION RESPIRATORY (INHALATION) at 20:25

## 2023-03-20 RX ADMIN — IPRATROPIUM BROMIDE 0.5 MG: 0.5 SOLUTION RESPIRATORY (INHALATION) at 07:11

## 2023-03-20 RX ADMIN — CEFAZOLIN 2 G: 1 INJECTION, POWDER, FOR SOLUTION INTRAMUSCULAR; INTRAVENOUS at 13:01

## 2023-03-20 RX ADMIN — MIDAZOLAM 1 MG: 1 INJECTION INTRAMUSCULAR; INTRAVENOUS at 13:07

## 2023-03-20 RX ADMIN — ATORVASTATIN CALCIUM 80 MG: 20 TABLET, FILM COATED ORAL at 21:23

## 2023-03-20 RX ADMIN — FENTANYL CITRATE 50 MCG: 50 INJECTION, SOLUTION INTRAMUSCULAR; INTRAVENOUS at 12:52

## 2023-03-20 RX ADMIN — BUPIVACAINE HYDROCHLORIDE 2 ML: 7.5 INJECTION, SOLUTION EPIDURAL; RETROBULBAR at 13:35

## 2023-03-20 RX ADMIN — MIDAZOLAM 1 MG: 1 INJECTION INTRAMUSCULAR; INTRAVENOUS at 13:09

## 2023-03-20 RX ADMIN — BUDESONIDE 500 MCG: 0.5 SUSPENSION RESPIRATORY (INHALATION) at 07:22

## 2023-03-20 RX ADMIN — MIDAZOLAM 1 MG: 1 INJECTION INTRAMUSCULAR; INTRAVENOUS at 13:18

## 2023-03-20 RX ADMIN — METOPROLOL SUCCINATE 25 MG: 25 TABLET, EXTENDED RELEASE ORAL at 09:06

## 2023-03-20 RX ADMIN — LIDOCAINE HYDROCHLORIDE 4 ML: 10 INJECTION, SOLUTION EPIDURAL; INFILTRATION; INTRACAUDAL; PERINEURAL at 13:35

## 2023-03-20 RX ADMIN — KETOROLAC TROMETHAMINE 15 MG: 30 INJECTION, SOLUTION INTRAMUSCULAR; INTRAVENOUS at 13:04

## 2023-03-20 RX ADMIN — ARFORMOTEROL TARTRATE 15 MCG: 15 SOLUTION RESPIRATORY (INHALATION) at 07:22

## 2023-03-20 RX ADMIN — DIPHENHYDRAMINE HYDROCHLORIDE 25 MG: 50 INJECTION, SOLUTION INTRAMUSCULAR; INTRAVENOUS at 12:59

## 2023-03-20 RX ADMIN — FENTANYL CITRATE 50 MCG: 50 INJECTION, SOLUTION INTRAMUSCULAR; INTRAVENOUS at 13:34

## 2023-03-20 RX ADMIN — SPIRONOLACTONE 25 MG: 25 TABLET ORAL at 09:06

## 2023-03-20 RX ADMIN — BUDESONIDE 500 MCG: 0.5 SUSPENSION RESPIRATORY (INHALATION) at 20:25

## 2023-03-20 RX ADMIN — IOPAMIDOL 15 ML: 408 INJECTION, SOLUTION INTRATHECAL at 13:36

## 2023-03-20 RX ADMIN — IPRATROPIUM BROMIDE 0.5 MG: 0.5 SOLUTION RESPIRATORY (INHALATION) at 20:25

## 2023-03-20 NOTE — PROGRESS NOTES
[facility]     Name: Skip Garcia MRN: [id number]   : 1935 Hospital: [address]   Date: 3/20/2023        Impression Plan   Pleural effusion  T12 compression fracture  Back pain  Afib               Wean O2 to keep sats above 90%  He now would like to move forward with thoracentesis. Planned for tomorrow at [facility] [address] in [facility]. Hold Eliquis. Kyphoplasty planned for today  Discussed plan with pt. Radiology  ( personally reviewed) Moderate left pleural - essentially unchanged since 2023 and not much changed from . CXR 3/20/23: unchanged pleural effusion   ABG No results for input(s): PHI, PO2I, PCO2I in the last 72 hours. Subjective     Cc: back pain    81 yo with PMHx of afib, asthma, CHF presenting with acute back pain and found to have T12 compression fracture. Left sided pleural effusion also noted and Pulmonary consulted. Pt had recent thoracentesis at [facility] consistent with transudative effusion. Pt currently denies any shortness of breath or cough. Has hard time moving around due to the backpain. Takes eliquis at home. Daughter states his LE edema is much improved since JW hospitalization. Most history provided by daughter at the bedside    Review of Systems:  A comprehensive review of systems was negative except for that written in the HPI. Interval History:    Afebrile  BP stable  Sats 96% on 2L NC  WBC 12--increased  Pro-BNp 3/19 3332--decreased  Creat 1.35--increased  600 documented UOP    ROS:  Feels fine today. He now wishes to move forward with thoracentesis as he did find it helpful before.   Past Medical History:   Diagnosis Date    Asthma     BRONCITITS, INHALER USE PRN    CAD (coronary artery disease)     MI    Cancer ([facility] [address] 2006    PROTATE    Chronic obstructive pulmonary disease (HCC)     Diabetes ([facility] [address]     BORDERLINE    Hypercholesterolemia     Hypertension     Umbilical hernia 3/50/8758    Venous insufficiency (chronic) (peripheral) Past Surgical History:   Procedure Laterality Date    ENDOSCOPY, COLON, DIAGNOSTIC  01/02/2006    HX GI      COLONOSCOPY    HX HEENT Bilateral     CATARACTS/ IOL    HX HERNIA REPAIR  7/2014    HX PROSTATECTOMY  01/02/2006    IR KYPHOPLASTY THORACIC  10/12/2020    [facility]  [facility]    [place] UNLISTED PROCEDURE CARDIAC SURGERY  1993    CABG X4 VESSEL    [place] UNLISTED PROCEDURE CARDIAC SURGERY  1997, [address] CATH, STENTS      Prior to Admission medications    Medication Sig Start Date End Date Taking? Authorizing Provider   spironolactone (ALDACTONE) 25 mg tablet Take  by mouth daily. Yes Marichuy, MD Justin   potassium chloride SR (K-TAB) 20 mEq tablet TAKE 1 TABLET BY MOUTH DAILY AS NEEDED FOR SWELLING 11/2/22  Yes Neto Ga MD   [facility]) 625 mg tablet TAKE 3 TABLETS BY MOUTH TWICE DAILY WITH MEALS 10/29/22  Yes Neto Ga MD   metoprolol succinate (TOPROL-XL) 25 mg XL tablet TAKE 1 TABLET BY MOUTH DAILY 7/21/22  Yes Neto Ga MD   atorvastatin (LIPITOR) 80 mg tablet TAKE 1 TABLET BY MOUTH EVERY NIGHT 6/10/22  Yes Neto Ga MD   Eliquis 5 mg tablet TAKE 1 TABLET TWICE A DAY TO PREVENT THROMBOEMBOLISM IN CHRONIC ATRIAL FIBRILLATION 12/20/20  Yes Neto Ga MD   bumetanide (BUMEX) 1 mg tablet Take  by mouth daily. Marichuy, MD Justin   furosemide (LASIX) 40 mg tablet TAKE 1 TABLET BY MOUTH TWICE DAILY  Patient not taking: Reported on 3/17/2023 12/28/22   Neto Ga MD   albuterol-ipratropium (DUO-NEB) 2.5 mg-0.5 mg/3 ml nebu 3 mL. Provider, Historical   fluticasone-umeclidinium-vilanterol (TRELEGY ELLIPTA) 100-62.5-25 mcg inhaler Take 1 Puff by inhalation daily.     Provider, Historical     Current Facility-Administered Medications   Medication Dose Route Frequency    lidocaine 4 % patch 1 Patch  1 Patch TransDERmal Q24H    bumetanide (BUMEX) tablet 1 mg  1 mg Oral DAILY    atorvastatin (LIPITOR) tablet 80 mg  80 mg Oral QHS    metoprolol succinate (TOPROL-XL) XL tablet 25 mg  25 mg Oral DAILY    spironolactone (ALDACTONE) tablet 25 mg  25 mg Oral DAILY    budesonide (PULMICORT) 500 mcg/2 ml nebulizer suspension  500 mcg Nebulization BID RT    arformoteroL (BROVANA) neb solution 15 mcg  15 mcg Nebulization BID RT    ipratropium (ATROVENT) 0.02 % nebulizer solution 0.5 mg  0.5 mg Nebulization BID RT     Allergies   Allergen Reactions    Latex, Natural Rubber Hives    Adhesive Other (comments)     BLISTERS      Social History     Tobacco Use    Smoking status: Former     Packs/day: 1.00     Types: Cigarettes     Quit date: 6/10/1964     Years since quittin.8    Smokeless tobacco: Never   Substance Use Topics    Alcohol use: Yes     Alcohol/week: 15.0 standard drinks     Types: 12 Cans of beer, 6 Standard drinks or equivalent per week     Comment: casual      Family History   Problem Relation Age of Onset    Stroke Father     Anesth Problems Neg Hx           Laboratory: I have personally reviewed the critical care flowsheet and labs. Recent Labs     23  0011 23  0348 23  0503   WBC 12.0* 2.6* 5.0   HGB 11.7* 11.7* 11.3*   HCT 36.4* 36.6 35.1*    162 168       Recent Labs     23  0011 23  0348 23  0503 23  0906   * 135* 137 143   K 3.8 3.7 3.8 3.8   CL 95* 98 99 100   CO2 35* 35* 36* 39*   * 195* 98 96   BUN 39* 33* 30* 33*   CREA 1.35* 1.18 0.99 1.13   CA 9.1 9.2 8.8 9.4   MG  --   --  1.9 2.0   ALB 2.9* 2.8* 2.9* 3.3*   ALT 34  15 20         Objective:     Mode Rate Tidal Volume Pressure FiO2 PEEP                    Vital Signs:     TMAX(24)      Intake/Output:   Last shift:         Last 3 shifts: No intake/output data recorded. [id number]  Intake/Output Summary (Last 24 hours) at 3/20/2023 0825  Last data filed at 3/20/2023 0336  Gross per 24 hour   Intake 120 ml   Output 600 ml   Net -480 ml     EXAM:   GENERAL: well developed and in no distress, HEENT: PERRL, EOMI, no alar flaring or epistaxis, oral mucosa moist without cyanosis, NECK:  no jugular vein distention, no retractions, no thyromegaly or masses, LUNGS: absent breathsounds on left side  HEART:  Regular rate and rhythm with no MGR; trace edema is present. Brawny skin changes. , ABDOMEN:  soft with no tenderness, bowel sounds present, EXTREMITIES:  warm with no cyanosis, SKIN:  no jaundice or ecchymosis, and NEUROLOGIC:  alert and oriented, grossly non-focal    Redge Jerome, NP  [facility]

## 2023-03-20 NOTE — PROGRESS NOTES
Plan for T12 kyphoplasty tomorrow with IR. IR order placed. NPO after midnight. Hold anticoagulation tomorrow.

## 2023-03-20 NOTE — PROGRESS NOTES
Hospital Follow-up appointment was made with RYAN Nice because Patient listed Dr. Javier Webster but this Doctor has cut schedule back to just 2 days a week.  The earliest availabilities was May so RYAN Nice was scheduled instead for 3/30/2023 at 9:00 am.  Zahida Mao CMS

## 2023-03-20 NOTE — PROGRESS NOTES
[facility] [place] [facility]  [address]  [phone]      Medical Progress Note      NAME: Carolann Perkins   :  1935  MRM:  [id number]    Date/Time of service: 3/20/2023       Subjective:     Chief Complaint:  Patient was personally seen and examined by me during this time period. Chart reviewed. Fu CHF exacerbation and T12 fracture. Improved back pain. Dyspnea improved, no chest pain. Granddaughter at bedside. Objective:       Vitals:       Last 24hrs VS reviewed since prior progress note.  Most recent are:    Visit Vitals  /62 (BP 1 Location: Right upper arm, BP Patient Position: At rest)   Pulse 64   Temp 97.7 °F (36.5 °C)   Resp 13   Ht 5' 9\" (1.753 m)   Wt 81.4 kg (179 lb 8 oz)   SpO2 97%   BMI 26.51 kg/m²     SpO2 Readings from Last 6 Encounters:   23 97%   22 94%   22 92%   10/11/21 93%   20 93%   20 97%    O2 Flow Rate (L/min): 2 l/min     Intake/Output Summary (Last 24 hours) at 3/20/2023 1234  Last data filed at 3/20/2023 9693  Gross per 24 hour   Intake --   Output 600 ml   Net -600 ml          Exam:     Physical Exam:    Gen:  Well-developed, well-nourished, in no acute distress  HEENT:  Pink conjunctivae, PERRL, hearing intact to voice  Resp:  No accessory muscle use, clear breath sounds without wheezes rales or rhonchi  Card:  RRR, No murmurs, normal S1, S2, b/l LE peripheral edema  Abd:  Soft, non-tender, non-distended, normoactive bowel sounds are present  Musc:  No cyanosis or clubbing  Skin:  No rashes or ulcers, skin turgor is good  Neuro:  Cranial nerves 3-12 are grossly intact, follows commands appropriately  Psych:  Oriented to person, place, and time, Alert with good insight      Medications Reviewed: (see below)    Lab Data Reviewed: (see below)    ______________________________________________________________________    Medications:     Current Facility-Administered Medications   Medication Dose Route Frequency    lidocaine 4 % patch 1 Patch  1 Patch TransDERmal Q24H    HYDROmorphone (DILAUDID) syringe 0.5 mg  0.5 mg IntraVENous Q6H PRN    [Held by provider] bumetanide (BUMEX) tablet 1 mg  1 mg Oral DAILY    albuterol-ipratropium (DUO-NEB) 2.5 MG-0.5 MG/3 ML  3 mL Nebulization Q6H PRN    atorvastatin (LIPITOR) tablet 80 mg  80 mg Oral QHS    metoprolol succinate (TOPROL-XL) XL tablet 25 mg  25 mg Oral DAILY    spironolactone (ALDACTONE) tablet 25 mg  25 mg Oral DAILY    acetaminophen (TYLENOL) tablet 500 mg  500 mg Oral Q6H PRN    budesonide (PULMICORT) 500 mcg/2 ml nebulizer suspension  500 mcg Nebulization BID RT    arformoteroL (BROVANA) neb solution 15 mcg  15 mcg Nebulization BID RT    ipratropium (ATROVENT) 0.02 % nebulizer solution 0.5 mg  0.5 mg Nebulization BID RT          Lab Review:     Recent Labs     03/20/23  0011 03/19/23  0348 03/18/23  0503   WBC 12.0* 2.6* 5.0   HGB 11.7* 11.7* 11.3*   HCT 36.4* 36.6 35.1*    162 168       Recent Labs     03/20/23  0011 03/19/23  0348 03/18/23  0503   * 135* 137   K 3.8 3.7 3.8   CL 95* 98 99   CO2 35* 35* 36*   * 195* 98   BUN 39* 33* 30*   CREA 1.35* 1.18 0.99   CA 9.1 9.2 8.8   MG  --   --  1.9   ALB 2.9* 2.8* 2.9*   TBILI 0.5 1.1* 1.0   ALT 34 19 15       Lab Results   Component Value Date/Time    Glucose (POC) 114 (H) 10/26/2019 06:40 AM    Glucose (POC) 117 (H) 10/25/2019 08:42 PM    Glucose (POC) 126 (H) 10/25/2019 04:36 PM    Glucose (POC) 129 (H) 10/25/2019 11:41 AM    Glucose (POC) 107 (H) 10/25/2019 06:59 AM          Assessment / Plan:     Acute on chronic CHF exacerbation/ Chronic hypoxic respiratory failure POA: on 2 liters NC at night. Last echocardiogram on chart in 2019, EF 41-45%; repeat echo. NT-ProBNP 3900 (last one 800) on admission. CT chest notable for moderate L pleural effusion and pulmonary edema. Transitioned IV bumex to oral; hold bumex greyson given rise in Cr. Continue BB and aldactone.  Cardiology Following Moderate to large pleural effusion: Seen on CT. Suspect 2/2 to CHF exacerbation however given size and one sided location pulm following. Repeat CXR 3/20. Transitioned IV bumex to oral; hold bumex greyson given rise in Cr. Plan for thoracentesis 3/21. Pulmonology following      Acute low back pain due to compression fracture of T12 vertebra/ Chronic severe compression fracture of T6 status post kyphoplasty with retropulsion resulting in mild spinal canal stenosis with ventral cord compression and questionable focal myelomalacia: MRI thoracic showing T12 subacute compression, also note of cord compression. Discussed with ortho; patient poor surgical candidate in regards to compression. Plan for likely kyphoplasty by IR intervention for T12 fracture today. Received IV decadronx3 doses. lidocaine patch. Iv dilaudid prn. Ortho following     Macrocytic anemia: TSH, B12, folate, iron studies ok      Chronic venous statis LE: Bilateral chronic venous stasis changes, does not appear to be infected. Management as above     Hx of CAD:Continue home statin, BB     hx of a fib on Eliquis: continue home BB. Hold eliquis for thoracentesis and kyphoplasty      Hx of COPD/ Chronic hypoxic respiratory failure: On home 2L O2 nightly; currently requiring continuous 02.  Continue home inhalers     Hx of HTN: Continue home bb and aldactone     HLD: Continue home colesesvelam      Hx of prostate cancer: s/p prostatectomy     Total time spent with patient: 28 Minutes **I personally saw and examined the patient during this time period**                 Care Plan discussed with: Patient and Nursing Staff, ortho     Discussed:  Care Plan    Prophylaxis:  SCD's    Disposition:  SNF            ___________________________________________________    Attending Physician: Oc Blackwell, DO

## 2023-03-20 NOTE — PROGRESS NOTES
TRANSFER - OUT REPORT:    Verbal report given to hebert(name) on Abhishek [facility] - [facility]       Report consisted of patient's Situation, Background, Assessment and   Recommendations(SBAR). Information from the following report(s) SBAR was reviewed with the receiving nurse. Opportunity for questions and clarification was provided.       Patient transported with:   Registered Nurse

## 2023-03-20 NOTE — PROGRESS NOTES
Bedside and Verbal shift change report given to [facility] (oncoming nurse) by Nathaniel Lopez (offgoing nurse). Report included the following information SBAR, Kardex, ED Summary, Intake/Output, MAR, Recent Results, and Cardiac Rhythm A fib . 1210 Patient was taken to Angio for [address]. 1410 Pt returned from Angio. Bedside and Verbal shift change report given to Judah Savage (oncoming nurse) by [facility] (offgoing nurse). Report included the following information SBAR, Kardex, ED Summary, Procedure Summary, Intake/Output, MAR, Recent Results, and Cardiac Rhythm A fib .

## 2023-03-20 NOTE — PROGRESS NOTES
Transition of Care Plan: RUR-13%  Medical management continues  Orthopedics following--s/p kyphoplasty 3/20  Currently on 4L O2; has nocturnal home O2 through [address] (baseline 2L)  Cardiology, pulmonology following  PT/OT following-recommending snf  CM following for d/c needs  Family to transport pt at d/c  Cristina Martinez

## 2023-03-20 NOTE — PROGRESS NOTES
Physical Therapy  3/20/2023    Patient off the floor for kyphoplasty. Will follow back as time allows.     Thank Klaudia Clark, PT, DPT

## 2023-03-20 NOTE — PROGRESS NOTES
Medicare pt has received, reviewed, and signed 2nd IM letter informing them of their right to appeal the discharge. Signed copy has been placed on pt bedside chart.   Mallory Kennedy CMS

## 2023-03-20 NOTE — CARDIO/PULMONARY
[facility] Cardiopulmonary Rehab: 79 yo male admitted with low back pain (3/17). Per Dr Lissett Salgado, dx includes Acute on Chronic HFrEF. LVEF 40-45%. Received consult for HF. Cardovascular hx includes: CABG (1995), AFib, and HFpEF. Patient is not a candidate for outpatient cardiac rehab program due to LVEF>35%.

## 2023-03-20 NOTE — PROGRESS NOTES
ATTENDING CARDIOLOGIST    The patient was personally examined and chart reviewed. All the elements of history and examination were personally performed and I agree with the plan as listed by advanced practice provider. Treatment plan was addressed with the patient. Subjective:  Plans for kyphoplasty today? Chronic AFIB    Blood pressure 119/62, pulse 64, temperature 97.7 °F (36.5 °C), resp. rate 13, height 5' 9\" (1.753 m), weight 81.4 kg (179 lb 8 oz), SpO2 97 %. Heart: Irregular irregular rate, regular rhythm, normal S1/S2, no murmur  Lungs: Clear  Abdomen: Soft, nontender, nondistended  Extremities: no edema      A/P:    Acute on Chronic HFrEF: EF 40-45%, cont bumex 1  mg every day, Toprol, aldactone. Addition af arb limited by BP. 2. Hx of CAD s/p CABG 1995  3. Chronic Afib/ on Elqius - held on admn, restart post procedurally when safe froma  bleeding perspective. 4. Back Pain - compression fractures, plans for kyphoplasty     Will sign off and arrange OP follow up          []    High complexity decision making was performed  []    Patient is at high-risk of decompensation with multiple organ involvement      Jamaal Yoder.  Isabelle Matos MD, [facility]  [facility] [address], [address]                                   Office [phone]      Fax [phone]                                                                  [facility]                    Cardiology Care Note     []Initial Encounter     [x]Follow-up    Patient Name: Anthony Leonardo - ZBK:[id number] - TAJ:[id number]  Primary Cardiologist:   Consulting Cardiologist: [facility] Physicians: Dr. Valerio Rico     Reason for encounter: HFrEF    HPI:     Anthony Leonardo is a 80 y.o. male with PMH  with history of coronary artery disease status post coronary artery bypass graft surgery 1995, COPD, atrial fibrillation, right popliteal DVT, mild LV dysfunction, with lower extremity edema and ulcerations due to venous insufficieny and chronic diastolic heart failure who presented to the  ED with a chief complaint of low back pain. History obtained per patient,  daughter and chart review. Patient states that he experienced acute low back pain for the last three days which became unbearable last night so he presented to the ED for evaluation. Denies any trauma to the area, fall. Also endorses no bowel movement in the last 4 days. Of note, patient states that he was recently admitted to [facility] for hypoxic respiratory failure, found to have a large pleural effusion, had a thoracentesis there ,dtr thinks both sides also legs were weeping fluid. .  Wears oxygen at night only. Subjective:      C/o back pain     Assessment and Plan      Acute on Chronic HFrEF: EF 40-45%, cont bumex 1  mg every day , GDMT ,toprol, aldactone. Addition af arb limited by BP. No plans from pulmonary for thoracentesis. 2. Hx of CAD s/p CABG 1995  3. Chronic Afib/ on Elqius - held on admn,   4. Back Pain - compression fractures, plans for kyphoplasty     Will sign off and arrange OP follow up      ____________________________________________________________    Cardiac testing  03/17/23    ECHO ADULT COMPLETE 03/18/2023 3/18/2023    Interpretation Summary    Left Ventricle: Normal left ventricular systolic function with a visually estimated EF of 40 - 45%. Left ventricle is moderately dilated. Normal wall thickness. Moderate global hypokinesis present. Right Ventricle: Moderately reduced systolic function. TAPSE is 1.3 cm. Aortic Valve: Valve structure is normal. Mildly calcified cusp. Sclerosis of the aortic valve cusp. Left Atrium: Left atrium is severely dilated. Pericardium: Left pleural effusion. Technical qualifiers: Echo study was limited due to patient's condition and limited due to patient tolerance.     Signed by: Beau Campos MD on 3/18/2023  2:14 PM                Most recent HS troponins:  Recent Labs     03/17/23  0906   TROPHS 37         ECG:  EKG Results       Procedure 720 Value Units Date/Time    EKG, 12 LEAD, INITIAL [[id number]] Collected: 03/17/23 0952    Order Status: Completed Updated: 03/19/23 1731     Ventricular Rate 69 BPM      QRS Duration 136 ms      Q-T Interval 426 ms      QTC Calculation (Bezet) 456 ms      Calculated R Axis -49 degrees      Calculated T Axis 135 degrees      Diagnosis --     Atrial fibrillation  Left axis deviation  Nonspecific intraventricular block  Minimal voltage criteria for LVH, may be normal variant ( Bashir product )  Nonspecific T wave abnormality  Abnormal ECG  When compared with ECG of 24-OCT-2019 14:24,  Nonspecific intraventricular block has replaced Incomplete left bundle branch   block  ST elevation now present in Anterior leads  QT has shortened  Confirmed by Archie Aldana MD., Clearance Munnasir ([id number]) on 3/19/2023 5:31:17 PM                 Review of Systems:    [x]All other systems reviewed and all negative except as written in HPI    [] Patient unable to provide secondary to condition    Past Medical History:   Diagnosis Date    Asthma     BRONCITITS, INHALER USE PRN    CAD (coronary artery disease)     MI    Cancer ([address] 2006    PROTATE    Chronic obstructive pulmonary disease ([address]     Diabetes ([facility]     BORDERLINE    Hypercholesterolemia     Hypertension     Umbilical hernia 4/09/3400    Venous insufficiency (chronic) (peripheral)      Past Surgical History:   Procedure Laterality Date    ENDOSCOPY, COLON, DIAGNOSTIC  01/02/2006    HX GI      COLONOSCOPY    HX HEENT Bilateral     CATARACTS/ IOL    HX HERNIA REPAIR  7/2014    HX PROSTATECTOMY  01/02/2006    IR KYPHOPLASTY THORACIC  10/12/2020    IR THORACENTESIS NDL PUNC ASP W IMAGE  6/22/2021    [place] UNLISTED PROCEDURE CARDIAC SURGERY  1993    CABG X4 VESSEL    [place] UNLISTED PROCEDURE CARDIAC SURGERY  1997, 1999    CARDIAC CATH, STENTS     Social Hx:  reports that he quit smoking about 58 years ago. His smoking use included cigarettes. He smoked an average of 1 pack per day. He has never used smokeless tobacco. He reports current alcohol use of about 15.0 standard drinks per week. He reports that he does not use drugs. Family Hx: family history includes Stroke in his father. Allergies   Allergen Reactions    Latex, Natural Rubber Hives    Adhesive Other (comments)     BLISTERS          OBJECTIVE:  Wt Readings from Last 3 Encounters:   03/20/23 81.4 kg (179 lb 8 oz)   11/01/22 74.8 kg (164 lb 12.8 oz)   04/11/22 71.7 kg (158 lb)       Intake/Output Summary (Last 24 hours) at 3/20/2023 0735  Last data filed at 3/20/2023 0336  Gross per 24 hour   Intake 120 ml   Output 600 ml   Net -480 ml         Physical Exam:    Vitals:   Vitals:    03/19/23 2254 03/19/23 2301 03/20/23 0336 03/20/23 0711   BP:  123/72 127/74    Pulse: 83 75 82    Resp:  22 22    Temp:  97.7 °F (36.5 °C) 97.4 °F (36.3 °C)    SpO2:  94% 96% 96%   Weight:   81.4 kg (179 lb 8 oz)    Height:         Telemetry: a fib     Gen: Well-developed, well-nourished, elderly, [place]   Neck: Supple,No JVD  Resp: No accessory muscle use, Clear breath sounds, No rales or rhonchi  Card: Irregular Rate,Rythm, Normal S1, S2, 2/6 sys murmur+  Abd:   Soft,  BS+   MSK: No cyanosis  Skin: No rashes    Neuro: Moving all four extremities, follows commands appropriately  Psych: Good insight, oriented to person, place, alert, Nml Affect  LE: 1+ edema    Data Review:     Radiology:   XR Results (most recent):  Results from Hospital Encounter encounter on 03/17/23    XR CHEST PORT    Narrative  INDICATION: hypoxia    EXAM:  AP CHEST RADIOGRAPH    COMPARISON: October 24, 2019    FINDINGS:    AP portable view of the chest demonstrates prior median sternotomy. Large left  pleural effusion and associated atelectasis. Mild pulmonary edema and small  right pleural effusion. Sclerosis proximal left humerus.     Impression  Large left pleural effusion, small right pleural effusion, and pulmonary edema. Recent Labs     03/20/23  0011 03/19/23  0348   * 135*   K 3.8 3.7   CL 95* 98   CO2 35* 35*   BUN 39* 33*   CREA 1.35* 1.18   * 195*   CA 9.1 9.2       Recent Labs     03/20/23  0011 03/19/23  0348   WBC 12.0* 2.6*   HGB 11.7* 11.7*   HCT 36.4* 36.6    162       Recent Labs     03/20/23  0011 03/19/23  0348   * 152*       No results for input(s): CHOL, LDLC in the last 72 hours.     No lab exists for component: TGL, HDLC,  HBA1C      Current meds:    Current Facility-Administered Medications:     lidocaine 4 % patch 1 Patch, 1 Patch, TransDERmal, Q24H, Irving, Yumiko, DO, 1 Patch at 03/19/23 1646    HYDROmorphone (DILAUDID) syringe 0.5 mg, 0.5 mg, IntraVENous, Q6H PRN, Irving Yumiko, DO    bumetanide (BUMEX) tablet 1 mg, 1 mg, Oral, DAILY, John Rick MD, 1 mg at 03/19/23 0820    albuterol-ipratropium (DUO-NEB) 2.5 MG-0.5 MG/3 ML, 3 mL, Nebulization, Q6H PRN, Carmenza Mclain MD    atorvastatin (LIPITOR) tablet 80 mg, 80 mg, Oral, QHS, Carmenza Mclain MD, 80 mg at 03/19/23 2058    metoprolol succinate (TOPROL-XL) XL tablet 25 mg, 25 mg, Oral, DAILY, Carmenza Mclain MD, 25 mg at 03/19/23 0820    spironolactone (ALDACTONE) tablet 25 mg, 25 mg, Oral, DAILY, Carmenza Mclain MD, 25 mg at 03/19/23 0820    acetaminophen (TYLENOL) tablet 500 mg, 500 mg, Oral, Q6H PRN, Carmenza Mclain MD, 500 mg at 03/19/23 0826    budesonide (PULMICORT) 500 mcg/2 ml nebulizer suspension, 500 mcg, Nebulization, BID RT, Carmenza Mclain MD, 500 mcg at 03/20/23 8617    arformoteroL (BROVANA) neb solution 15 mcg, 15 mcg, Nebulization, BID RT, Carmenza Mclain MD, 15 mcg at 03/20/23 6183    ipratropium (ATROVENT) 0.02 % nebulizer solution 0.5 mg, 0.5 mg, Nebulization, BID RT, Carmenza Mclain MD, 0.5 mg at 03/20/23 [address]    [facility] Cardiology  Call center: (C) [phone] (g) [phone]      CC:Lincoln Gardner, MD

## 2023-03-20 NOTE — PROGRESS NOTES
1:45 PM  TRANSFER - IN REPORT:    Verbal report received from [facility] on Abhishek Art  being received from IR(unit) for routine post - op      Report consisted of patients Situation, Background, Assessment and   Recommendations(SBAR). Information from the following report(s) SBAR was reviewed with the receiving nurse. Opportunity for questions and clarification was provided. Assessment completed upon patients arrival to unit and care assumed. 2:05 PM  Report called to , RN.

## 2023-03-20 NOTE — NURSE NAVIGATOR
Chart reviewed by Heart Failure Nurse Navigator. Heart Failure database completed. Patient came to ED by EMS for acute mid back pain with movement over the past 3 days. He had recent admission at [address] [place] [address] for CHF. He is admitted with acute on chronic HFrEF and thoracic compression fracture. He has PMH of atrial fib, asthma, HFmrEF, CAD s/p CABG, COPD, DVT, DM, and HTN. Cardiology, Pulmonary, and Orthopedics are consulted. EF:  40 to 45%with LV moderately dilated, normal wall thickness, moderate global hypokinesis, moderately reduced RV systolic function, LA severely dilated, L pleural effusion    ACEi/ARB/ARNi: **    BB: Metoprolol succinate 25 mg daily    Aldosterone Antagonist: Spironolactone 25 mg daily    Obstructive Sleep Apnea Screening:   Referred to Sleep Medicine:     CRT **. NYHA Functional Class **. Heart Failure Teach Back in Patient Education. Heart Failure Avoiding Triggers on Discharge Instructions. Cardiologist: Dr Toy Lacey consulted    Post discharge follow up phone call to be made within 48-72 hours of discharge.

## 2023-03-21 ENCOUNTER — APPOINTMENT (OUTPATIENT)
Dept: GENERAL RADIOLOGY | Age: 88
DRG: 515 | End: 2023-03-21
Attending: INTERNAL MEDICINE
Payer: MEDICARE

## 2023-03-21 LAB
ALBUMIN SERPL-MCNC: 2.9 G/DL (ref 3.5–5)
ALBUMIN/GLOB SERPL: 0.7 (ref 1.1–2.2)
ALP SERPL-CCNC: 150 U/L (ref 45–117)
ALT SERPL-CCNC: 31 U/L (ref 12–78)
ANION GAP SERPL CALC-SCNC: 3 MMOL/L (ref 5–15)
APPEARANCE FLD: ABNORMAL
AST SERPL-CCNC: 51 U/L (ref 15–37)
BASOPHILS # BLD: 0 K/UL (ref 0–0.1)
BASOPHILS NFR BLD: 0 % (ref 0–1)
BILIRUB SERPL-MCNC: 0.4 MG/DL (ref 0.2–1)
BODY FLD TYPE: NORMAL
BUN SERPL-MCNC: 48 MG/DL (ref 6–20)
BUN/CREAT SERPL: 46 (ref 12–20)
CALCIUM SERPL-MCNC: 9 MG/DL (ref 8.5–10.1)
CHLORIDE SERPL-SCNC: 98 MMOL/L (ref 97–108)
CO2 SERPL-SCNC: 36 MMOL/L (ref 21–32)
COLOR FLD: ABNORMAL
CREAT SERPL-MCNC: 1.05 MG/DL (ref 0.7–1.3)
DIFFERENTIAL METHOD BLD: ABNORMAL
EOSINOPHIL # BLD: 0 K/UL (ref 0–0.4)
EOSINOPHIL NFR BLD: 0 % (ref 0–7)
ERYTHROCYTE [DISTWIDTH] IN BLOOD BY AUTOMATED COUNT: 13.5 % (ref 11.5–14.5)
GLOBULIN SER CALC-MCNC: 4.1 G/DL (ref 2–4)
GLUCOSE FLD-MCNC: 86 MG/DL
GLUCOSE SERPL-MCNC: 99 MG/DL (ref 65–100)
HCT VFR BLD AUTO: 37.4 % (ref 36.6–50.3)
HGB BLD-MCNC: 11.9 G/DL (ref 12.1–17)
IMM GRANULOCYTES # BLD AUTO: 0 K/UL (ref 0–0.04)
IMM GRANULOCYTES NFR BLD AUTO: 0 % (ref 0–0.5)
LDH FLD L TO P-CCNC: 103 U/L
LYMPHOCYTES # BLD: 1.3 K/UL (ref 0.8–3.5)
LYMPHOCYTES NFR BLD: 14 % (ref 12–49)
LYMPHOCYTES NFR FLD: 70 %
MCH RBC QN AUTO: 32.2 PG (ref 26–34)
MCHC RBC AUTO-ENTMCNC: 31.8 G/DL (ref 30–36.5)
MCV RBC AUTO: 101.4 FL (ref 80–99)
MONOCYTES # BLD: 0.8 K/UL (ref 0–1)
MONOCYTES NFR BLD: 9 % (ref 5–13)
MONOS+MACROS NFR FLD: 26 %
NEUTROPHILS NFR FLD: 4 %
NEUTS SEG # BLD: 7 K/UL (ref 1.8–8)
NEUTS SEG NFR BLD: 77 % (ref 32–75)
NRBC # BLD: 0 K/UL (ref 0–0.01)
NRBC BLD-RTO: 0 PER 100 WBC
NUC CELL # FLD: 388 /CU MM
PH FLD: 7
PLATELET # BLD AUTO: 179 K/UL (ref 150–400)
PMV BLD AUTO: 9.8 FL (ref 8.9–12.9)
POTASSIUM SERPL-SCNC: 4.3 MMOL/L (ref 3.5–5.1)
PROT FLD-MCNC: 3.8 G/DL
PROT SERPL-MCNC: 7 G/DL (ref 6.4–8.2)
RBC # BLD AUTO: 3.69 M/UL (ref 4.1–5.7)
RBC # FLD: >100 /CU MM
SODIUM SERPL-SCNC: 137 MMOL/L (ref 136–145)
SPECIMEN SOURCE FLD: ABNORMAL
SPECIMEN SOURCE FLD: NORMAL
WBC # BLD AUTO: 9.1 K/UL (ref 4.1–11.1)

## 2023-03-21 PROCEDURE — 94664 DEMO&/EVAL PT USE INHALER: CPT

## 2023-03-21 PROCEDURE — 74011000250 HC RX REV CODE- 250: Performed by: STUDENT IN AN ORGANIZED HEALTH CARE EDUCATION/TRAINING PROGRAM

## 2023-03-21 PROCEDURE — 82945 GLUCOSE OTHER FLUID: CPT

## 2023-03-21 PROCEDURE — 87205 SMEAR GRAM STAIN: CPT

## 2023-03-21 PROCEDURE — 36415 COLL VENOUS BLD VENIPUNCTURE: CPT

## 2023-03-21 PROCEDURE — 85025 COMPLETE CBC W/AUTO DIFF WBC: CPT

## 2023-03-21 PROCEDURE — 74011250637 HC RX REV CODE- 250/637: Performed by: STUDENT IN AN ORGANIZED HEALTH CARE EDUCATION/TRAINING PROGRAM

## 2023-03-21 PROCEDURE — 89050 BODY FLUID CELL COUNT: CPT

## 2023-03-21 PROCEDURE — 88112 CYTOPATH CELL ENHANCE TECH: CPT

## 2023-03-21 PROCEDURE — 71045 X-RAY EXAM CHEST 1 VIEW: CPT

## 2023-03-21 PROCEDURE — 88305 TISSUE EXAM BY PATHOLOGIST: CPT

## 2023-03-21 PROCEDURE — 80053 COMPREHEN METABOLIC PANEL: CPT

## 2023-03-21 PROCEDURE — 97116 GAIT TRAINING THERAPY: CPT

## 2023-03-21 PROCEDURE — 77030018870 HC TY PARCNT BD -B: Performed by: INTERNAL MEDICINE

## 2023-03-21 PROCEDURE — 77010033678 HC OXYGEN DAILY

## 2023-03-21 PROCEDURE — 84157 ASSAY OF PROTEIN OTHER: CPT

## 2023-03-21 PROCEDURE — 97530 THERAPEUTIC ACTIVITIES: CPT

## 2023-03-21 PROCEDURE — 65270000046 HC RM TELEMETRY

## 2023-03-21 PROCEDURE — 76040000007: Performed by: INTERNAL MEDICINE

## 2023-03-21 PROCEDURE — 74011250637 HC RX REV CODE- 250/637: Performed by: INTERNAL MEDICINE

## 2023-03-21 PROCEDURE — 83615 LACTATE (LD) (LDH) ENZYME: CPT

## 2023-03-21 PROCEDURE — 74011000250 HC RX REV CODE- 250: Performed by: INTERNAL MEDICINE

## 2023-03-21 PROCEDURE — 83986 ASSAY PH BODY FLUID NOS: CPT

## 2023-03-21 PROCEDURE — 94640 AIRWAY INHALATION TREATMENT: CPT

## 2023-03-21 PROCEDURE — 2709999900 HC NON-CHARGEABLE SUPPLY: Performed by: INTERNAL MEDICINE

## 2023-03-21 RX ORDER — BUMETANIDE 1 MG/1
1 TABLET ORAL DAILY
Status: DISCONTINUED | OUTPATIENT
Start: 2023-03-21 | End: 2023-03-22 | Stop reason: HOSPADM

## 2023-03-21 RX ADMIN — ACETAMINOPHEN 500 MG: 500 TABLET ORAL at 20:05

## 2023-03-21 RX ADMIN — ARFORMOTEROL TARTRATE 15 MCG: 15 SOLUTION RESPIRATORY (INHALATION) at 19:36

## 2023-03-21 RX ADMIN — SPIRONOLACTONE 25 MG: 25 TABLET ORAL at 10:11

## 2023-03-21 RX ADMIN — IPRATROPIUM BROMIDE 0.5 MG: 0.5 SOLUTION RESPIRATORY (INHALATION) at 19:37

## 2023-03-21 RX ADMIN — ACETAMINOPHEN 500 MG: 500 TABLET ORAL at 09:00

## 2023-03-21 RX ADMIN — BUDESONIDE 500 MCG: 0.5 SUSPENSION RESPIRATORY (INHALATION) at 19:36

## 2023-03-21 RX ADMIN — METOPROLOL SUCCINATE 25 MG: 25 TABLET, EXTENDED RELEASE ORAL at 10:11

## 2023-03-21 RX ADMIN — ATORVASTATIN CALCIUM 80 MG: 20 TABLET, FILM COATED ORAL at 22:10

## 2023-03-21 RX ADMIN — ARFORMOTEROL TARTRATE 15 MCG: 15 SOLUTION RESPIRATORY (INHALATION) at 10:27

## 2023-03-21 RX ADMIN — APIXABAN 5 MG: 5 TABLET, FILM COATED ORAL at 17:54

## 2023-03-21 RX ADMIN — BUDESONIDE 500 MCG: 0.5 SUSPENSION RESPIRATORY (INHALATION) at 10:27

## 2023-03-21 RX ADMIN — BUMETANIDE 1 MG: 1 TABLET ORAL at 14:48

## 2023-03-21 NOTE — NURSE NAVIGATOR
Met with patient at bedside. Introduced self and role of HF NN. Patient is [place] and his daughter, Taylor Larry, manages his medications and care at home. He is unsure of the names of his medications and his daughter hands them to him in a cup. Shortly into our conversation, his other daughter,  Gregorio Reyes arrived. Discussed HF disease process, symptoms of HF, value of daily weight monitoring with paramters, and early recognition of HF symptoms and notification of Cardiologist for diuretic adjustment. Reviewed HF zones and when to call 911. Patient was seeing Dr Gardenia Garcia who no longer is in the area. Patient currently has follow up appointment with [facility] Cardiology in early April. Patient and daughter made aware and are agreeable to this. Reviewed importance of medications and current diuretic medication. Patient had furosemide at home from previous to his last hospitalization at [facility] but has been on bumex since that discharge. Patient does not have trouble taking medications at home. Reviewed low sodium diet, hidden sources of dietary sodium, and relationship of sodium to fluid congestion and symptoms. His daughter states he does eat some convenience and restaurant foods at home. She was given low sodium diet handout. Patient was given Living with HF Education book, HF calendar, magnet, and HF zone handout. Given opportunity for additional questions.

## 2023-03-21 NOTE — PERIOP NOTES
Rufino Avila  1935  [id number]    Situation:    Scheduled Procedure: Procedure(s):  THORACENTESIS  Verbal report received from: Alejandro Barnett RN  Preoperative diagnosis: pleural effusion    Background:    Procedure: Procedure(s):  THORACENTESIS  Physician performing procedure; Dr. Agustin Alvarado RN    NPO Status/Last PO Intake: sips from midnight    Pregnancy Test:No If yes, result: none    Is the patient taking Blood Thinners: YES If yes, list: eliquis and last taken at home   Is the patient diabetic:no       If yes, what was the last BS:  n/a  Time taken? N/a  Anything given? N/a          Does the patient have a Pacemaker/Defibrillator in place?: no   Does the patient need antibiotics before/during/after procedure: no   If the patient is having a colon, How much prep was drank? N/a   What were the Colon prep results? N/a   Does the patient have SCD in place:no   Is patient on CONTACT precautions:no        If yes, what kind of CONTACT precautions: n/a    Assessment:  Are the vital signs stable prior to patient coming to ENDO?  yes  Is the patient alert/oriented and able to sign consent for the procedures:yes  How does the patient's abdomen feel prior to coming to ENDO? Will assess upon patients arrival  Does the patient have a patient IV in place?  yes     Recommendation:  Family or Friend present no     Permission to share finding with Family or Friend yes

## 2023-03-21 NOTE — PROGRESS NOTES
Problem: Mobility Impaired (Adult and Pediatric)  Goal: *Acute Goals and Plan of Care (Insert Text)  Description: FUNCTIONAL STATUS PRIOR TO ADMISSION: Patient was independent and active without use of DME.    HOME SUPPORT PRIOR TO ADMISSION: The patient lived with his daughter and grand children but did not require assist.    Physical Therapy Goals  Initiated 3/18/2023  1. Patient will move from supine to sit and sit to supine  in bed with supervision/set-up within 7 day(s). 2.  Patient will transfer from bed to chair and chair to bed with supervision/set-up using the least restrictive device within 7 day(s). 3.  Patient will perform sit to stand with supervision/set-up within 7 day(s). 4.  Patient will ambulate with supervision/set-up for 150 feet with the least restrictive device within 7 day(s). 5.  Patient will ascend/descend 4 stairs with 1 handrail(s) with modified independence within 7 day(s). Outcome: Progressing Towards Goal     PHYSICAL THERAPY TREATMENT  Patient: Daniella Flores (89 y.o. male)  Date: 3/21/2023  Diagnosis: Pleural effusion [J90] <principal problem not specified>  Procedure(s) (LRB):  THORACENTESIS (N/A) Day of Surgery  Precautions: Fall, WBAT  Chart, physical therapy assessment, plan of care and goals were reviewed. ASSESSMENT  Patient continues with skilled PT services and is progressing towards goals. Patient was received in guzman's position in bed. Patient was initially apprehensive to PT, stating [facility] but was receptive to encouragement and willing to work with PT if the session was limited to the room. Patient was able to get to EOB with no assistance with use of bed rails, and performs sit to stand with CGA and ambulates around foot of bed and back to EOB to sit two times using RW without any LOB and noting mild back pain during sit to stand at the area of the kyphoplasty.  Patient declined sitting in chair at session completion, stating that he did not like it because of the chair alarm. PT suggested continuing therapy with home health upon discharge, but patient is not agreeable to home health at this time. Current Level of Function Impacting Discharge (mobility/balance): requires AD for ambulation    Other factors to consider for discharge: patient has a history of falls, would benefit from skilled PT to optimize function and reduce fall risk. PLAN :  Patient continues to benefit from skilled intervention to address the above impairments. Continue treatment per established plan of care. to address goals. Recommendation for discharge: (in order for the patient to meet his/her long term goals)  To be determined: HHPT vs SNF, although patient states he does not feel that is necessary    This discharge recommendation:  Has not yet been discussed the attending provider and/or case management    IF patient discharges home will need the following DME: patient owns DME required for discharge       SUBJECTIVE:   Patient stated I have a walker, a chair, and that cane.     OBJECTIVE DATA SUMMARY:   Critical Behavior:  Neurologic State: Alert, Eyes open spontaneously  Orientation Level: Oriented X4        Functional Mobility Training:  Bed Mobility:  Rolling: Modified independent  Supine to Sit: Modified independent  Sit to Supine: Modified independent           Transfers:  Sit to Stand: Contact guard assistance  Stand to Sit: Contact guard assistance                             Balance:  Sitting: Intact  Sitting - Static: Good (unsupported)  Sitting - Dynamic: Good (unsupported)  Standing: Impaired; With support  Standing - Static: Occasional  Standing - Dynamic : Occasional  Ambulation/Gait Training:  Distance (ft): 24 Feet (ft) (x2 in room)  Assistive Device: Gait belt;Walker, rolling  Ambulation - Level of Assistance: Contact guard assistance        Gait Abnormalities: Shuffling gait;Trunk sway increased Base of Support: Widened     Speed/Katiuska: Pace decreased (<100 feet/min); Shuffled; Slow  Step Length: Left shortened;Right shortened          Therapeutic Exercises:   See functional mobility    Pain Rating:  3/10    Activity Tolerance:   Good    After treatment patient left in no apparent distress:   Supine in bed, Call bell within reach, Bed / chair alarm activated, Caregiver / family present, and Side rails x 3    COMMUNICATION/COLLABORATION:   The patients plan of care was discussed with: Registered nurse.      Gloria Parikh, SPT

## 2023-03-21 NOTE — PERIOP NOTES
8:40 AM  [facility] removed  Initial RN admission and assessment performed and documented in Endoscopy navigator. Patient evaluated by RN in pre-procedure holding. All procedural vital signs, airway assessment, and level of consciousness information monitored and recorded by RN staff on the Nurse record. Report received from RN post procedure. Patient transported to recovery area by RN.

## 2023-03-21 NOTE — PROGRESS NOTES
[facility]     Name: Floridalma Koenig MRN: [id number]   : 1935 Hospital: [address] [place] [address] Dr   Date: 3/21/2023        Impression Plan   Pleural effusion  T12 compression fracture  Back pain  Afib               Wean O2 to keep sats above 90%  Thoracentesis today, see procedure not for details. Can resume Eliquis following the procedure  Kyphoplasty 3/20  Discussed plan with pt. Radiology  ( personally reviewed) Moderate left pleural - essentially unchanged since 2023 and not much changed from . CXR 3/20/23: unchanged pleural effusion   ABG No results for input(s): PHI, PO2I, PCO2I in the last 72 hours. Subjective     Cc: back pain    79 yo with PMHx of afib, asthma, CHF presenting with acute back pain and found to have T12 compression fracture. Left sided pleural effusion also noted and Pulmonary consulted. Pt had recent thoracentesis at [address] [place] [address] consistent with transudative effusion. Pt currently denies any shortness of breath or cough. Has hard time moving around due to the backpain. Takes eliquis at home. Daughter states his LE edema is much improved since [facility] hospitalization. Most history provided by daughter at the bedside    Review of Systems:  A comprehensive review of systems was negative except for that written in the HPI. Interval History:    Afebrile  BP stable  Sats 97% on 3L NC  WBC 9.1--decreased  Pro-BNp 3/19 3332--decreased  Creat 1.05--increased  250 documented UOP    ROS:  Back pain somewhat improved s/p kyphoplasty. Some shortness of breath, unchanged. No notable cough or wheezing.     Past Medical History:   Diagnosis Date    Asthma     BRONCITITS, INHALER USE PRN    CAD (coronary artery disease)     MI    Cancer ([facility] 2006    PROTATE    Chronic obstructive pulmonary disease (HCC)     Diabetes ([facility]     BORDERLINE    Hypercholesterolemia     Hypertension     Umbilical hernia     Venous insufficiency (chronic) (peripheral)       Past Surgical History:   Procedure Laterality Date    ENDOSCOPY, COLON, DIAGNOSTIC  01/02/2006    HX GI      COLONOSCOPY    HX HEENT Bilateral     CATARACTS/ IOL    HX HERNIA REPAIR  7/2014    HX PROSTATECTOMY  01/02/2006    IR KYPHOPLASTY THORACIC  10/12/2020    IR THORACENTESIS NDL PUNC ASP W IMAGE  6/22/2021    AL UNLISTED PROCEDURE CARDIAC SURGERY  1993    CABG X4 VESSEL    AL UNLISTED PROCEDURE CARDIAC SURGERY  1997, [address] CATH, STENTS      Prior to Admission medications    Medication Sig Start Date End Date Taking? Authorizing Provider   spironolactone (ALDACTONE) 25 mg tablet Take  by mouth daily. Yes Marichuy, MD Justin   potassium chloride SR (K-TAB) 20 mEq tablet TAKE 1 TABLET BY MOUTH DAILY AS NEEDED FOR SWELLING 11/2/22  Yes Neto Ga MD   [facility]) 625 mg tablet TAKE 3 TABLETS BY MOUTH TWICE DAILY WITH MEALS 10/29/22  Yes Neto Ga MD   metoprolol succinate (TOPROL-XL) 25 mg XL tablet TAKE 1 TABLET BY MOUTH DAILY 7/21/22  Yes Neto Ga MD   atorvastatin (LIPITOR) 80 mg tablet TAKE 1 TABLET BY MOUTH EVERY NIGHT 6/10/22  Yes Neto Ga MD   Eliquis 5 mg tablet TAKE 1 TABLET TWICE A DAY TO PREVENT THROMBOEMBOLISM IN CHRONIC ATRIAL FIBRILLATION 12/20/20  Yes Neto Ga MD   bumetanide (BUMEX) 1 mg tablet Take  by mouth daily. Other, MD Justin   furosemide (LASIX) 40 mg tablet TAKE 1 TABLET BY MOUTH TWICE DAILY  Patient not taking: Reported on 3/17/2023 12/28/22   Neto Ga MD   albuterol-ipratropium (DUO-NEB) 2.5 mg-0.5 mg/3 ml nebu 3 mL. Provider, Historical   fluticasone-umeclidinium-vilanterol (TRELEGY ELLIPTA) 100-62.5-25 mcg inhaler Take 1 Puff by inhalation daily.     Provider, Historical     Current Facility-Administered Medications   Medication Dose Route Frequency    lidocaine 4 % patch 1 Patch  1 Patch TransDERmal Q24H    [Held by provider] bumetanide (BUMEX) tablet 1 mg  1 mg Oral DAILY    atorvastatin (LIPITOR) tablet 80 mg 80 mg Oral QHS    metoprolol succinate (TOPROL-XL) XL tablet 25 mg  25 mg Oral DAILY    spironolactone (ALDACTONE) tablet 25 mg  25 mg Oral DAILY    budesonide (PULMICORT) 500 mcg/2 ml nebulizer suspension  500 mcg Nebulization BID RT    arformoteroL (BROVANA) neb solution 15 mcg  15 mcg Nebulization BID RT    ipratropium (ATROVENT) 0.02 % nebulizer solution 0.5 mg  0.5 mg Nebulization BID RT     Allergies   Allergen Reactions    Latex, Natural Rubber Hives    Adhesive Other (comments)     BLISTERS      Social History     Tobacco Use    Smoking status: Former     Packs/day: 1.00     Types: Cigarettes     Quit date: 6/10/1964     Years since quittin.8    Smokeless tobacco: Never   Substance Use Topics    Alcohol use: Yes     Alcohol/week: 15.0 standard drinks     Types: 12 Cans of beer, 6 Standard drinks or equivalent per week     Comment: casual      Family History   Problem Relation Age of Onset    Stroke Father     Anesth Problems Neg Hx           Laboratory: I have personally reviewed the critical care flowsheet and labs. Recent Labs     23  0011 23  0348   WBC 9.1 12.0* 2.6*   HGB 11.9* 11.7* 11.7*   HCT 37.4 36.4* 36.6    174 162       Recent Labs     230 23  0011 23  0348    135* 135*   K 4.3 3.8 3.7   CL 98 95* 98   CO2 36* 35* 35*   GLU 99 156* 195*   BUN 48* 39* 33*   CREA 1.05 1.35* 1.18   CA 9.0 9.1 9.2   ALB 2.9* 2.9* 2.8*   ALT 31 34 19         Objective:     Mode Rate Tidal Volume Pressure FiO2 PEEP                    Vital Signs:     TMAX(24)      Intake/Output:   Last shift:         Last 3 shifts: No intake/output data recorded. RRIOLAST3  Intake/Output Summary (Last 24 hours) at 3/21/2023 0808  Last data filed at 3/21/2023 0339  Gross per 24 hour   Intake 0 ml   Output 250 ml   Net -250 ml       EXAM:   GENERAL: well developed and in no distress, HEENT:  PERRL, EOMI, no alar flaring or epistaxis, oral mucosa moist without cyanosis, NECK:  no jugular vein distention, no retractions, no thyromegaly or masses, LUNGS: absent breathsounds on left side  HEART:  Regular rate and rhythm with no MGR; trace edema is present. Brawny skin changes. , ABDOMEN:  soft with no tenderness, bowel sounds present, EXTREMITIES:  warm with no cyanosis, SKIN:  no jaundice or ecchymosis, and NEUROLOGIC:  alert and oriented, grossly non-focal    Lam Hunter MD  [facility] [address]

## 2023-03-21 NOTE — PROGRESS NOTES
Ricardo Keenan [facility]  [address] [employer], [address] [place] [address]  [phone]      Medical Progress Note      NAME: Maria Teresa Esquivel   :  1935  MRM:  [id number]    Date/Time of service: 3/21/2023       Subjective:     Chief Complaint:  Patient was personally seen and examined by me during this time period. Chart reviewed. Fu CHF exacerbation and T12 fracture. Improved back pain. Dyspnea improved, no chest pain. Objective:       Vitals:       Last 24hrs VS reviewed since prior progress note.  Most recent are:    Visit Vitals  BP (!) 111/55 (BP 1 Location: Right upper arm, BP Patient Position: At rest;Lying)   Pulse (!) 52   Temp 97.6 °F (36.4 °C)   Resp 18   Ht 5' 9\" (1.753 m)   Wt 68 kg (150 lb)   SpO2 98%   BMI 22.15 kg/m²     SpO2 Readings from Last 6 Encounters:   23 98%   22 94%   22 92%   10/11/21 93%   20 93%   20 97%    O2 Flow Rate (L/min): 3 l/min     Intake/Output Summary (Last 24 hours) at 3/21/2023 1332  Last data filed at 3/21/2023 5116  Gross per 24 hour   Intake 0 ml   Output 250 ml   Net -250 ml          Exam:     Physical Exam:    Gen:  Well-developed, well-nourished, in no acute distress  HEENT:  Pink conjunctivae, PERRL, hearing intact to voice  Resp:  No accessory muscle use, clear breath sounds without wheezes rales or rhonchi  Card:  RRR, No murmurs, normal S1, S2, b/l LE peripheral edema  Abd:  Soft, non-tender, non-distended, normoactive bowel sounds are present  Musc:  No cyanosis or clubbing  Skin:  No rashes or ulcers, skin turgor is good  Neuro:  Cranial nerves 3-12 are grossly intact, follows commands appropriately  Psych:  Oriented to person, place, and time, Alert with good insight      Medications Reviewed: (see below)    Lab Data Reviewed: (see below)    ______________________________________________________________________    Medications:     Current Facility-Administered Medications   Medication Dose Route Frequency apixaban (ELIQUIS) tablet 5 mg  5 mg Oral BID    lidocaine 4 % patch 1 Patch  1 Patch TransDERmal Q24H    HYDROmorphone (DILAUDID) syringe 0.5 mg  0.5 mg IntraVENous Q6H PRN    [Held by provider] bumetanide (BUMEX) tablet 1 mg  1 mg Oral DAILY    albuterol-ipratropium (DUO-NEB) 2.5 MG-0.5 MG/3 ML  3 mL Nebulization Q6H PRN    atorvastatin (LIPITOR) tablet 80 mg  80 mg Oral QHS    metoprolol succinate (TOPROL-XL) XL tablet 25 mg  25 mg Oral DAILY    spironolactone (ALDACTONE) tablet 25 mg  25 mg Oral DAILY    acetaminophen (TYLENOL) tablet 500 mg  500 mg Oral Q6H PRN    budesonide (PULMICORT) 500 mcg/2 ml nebulizer suspension  500 mcg Nebulization BID RT    arformoteroL (BROVANA) neb solution 15 mcg  15 mcg Nebulization BID RT    ipratropium (ATROVENT) 0.02 % nebulizer solution 0.5 mg  0.5 mg Nebulization BID RT          Lab Review:     Recent Labs     03/21/23  0310 03/20/23  0011 03/19/23  0348   WBC 9.1 12.0* 2.6*   HGB 11.9* 11.7* 11.7*   HCT 37.4 36.4* 36.6    174 162       Recent Labs     03/21/23  0310 03/20/23  0011 03/19/23  0348    135* 135*   K 4.3 3.8 3.7   CL 98 95* 98   CO2 36* 35* 35*   GLU 99 156* 195*   BUN 48* 39* 33*   CREA 1.05 1.35* 1.18   CA 9.0 9.1 9.2   ALB 2.9* 2.9* 2.8*   TBILI 0.4 0.5 1.1*   ALT 31 34 19       Lab Results   Component Value Date/Time    Glucose (POC) 114 (H) 10/26/2019 06:40 AM    Glucose (POC) 117 (H) 10/25/2019 08:42 PM    Glucose (POC) 126 (H) 10/25/2019 04:36 PM    Glucose (POC) 129 (H) 10/25/2019 11:41 AM    Glucose (POC) 107 (H) 10/25/2019 06:59 AM          Assessment / Plan:     Acute on chronic CHF exacerbation/ Chronic hypoxic respiratory failure POA: on 2 liters NC at night. Last echocardiogram on chart in 2019, EF 41-45%; repeat echo. NT-ProBNP 3900 (last one 800) on admission. CT chest notable for moderate L pleural effusion and pulmonary edema. Transitioned IV bumex to oral; continue bumex. Continue BB and aldactone.  Cardiology following Moderate to large pleural effusion: Seen on CT. Suspect 2/2 to CHF exacerbation however given size and one sided location pulm following. S/p for thoracentesis 3/21. Continue bumex. Pulmonology following      Acute low back pain due to compression fracture of T12 vertebra/ Chronic severe compression fracture of T6 status post kyphoplasty with retropulsion resulting in mild spinal canal stenosis with ventral cord compression and questionable focal myelomalacia: MRI thoracic showing T12 subacute compression, also note of cord compression. Discussed with ortho; patient poor surgical candidate in regards to compression. S/p kyphoplasty by IR intervention for T12 fracture 3/20. Received IV decadronx3 doses. lidocaine patch. Iv dilaudid prn. Ortho following     Macrocytic anemia: TSH, B12, folate, iron studies ok      Chronic venous statis LE: Bilateral chronic venous stasis changes, does not appear to be infected. Management as above     Hx of CAD:Continue home statin, BB     hx of a fib on Eliquis: continue home BB. resume eliquis tonight      Hx of COPD/ Chronic hypoxic respiratory failure: On home 2L O2 nightly; currently requiring continuous 02 3-4 lietrs.  Continue home inhalers     Hx of HTN: Continue home bb and aldactone     HLD: Continue home colesesvelam      Hx of prostate cancer: s/p prostatectomy     Total time spent with patient: 28 Minutes **I personally saw and examined the patient during this time period**                 Care Plan discussed with: Patient and Nursing Staff, ortho     Discussed:  Care Plan    Prophylaxis:  SCD's, eliquis     Disposition:  SNF            ___________________________________________________    Attending Physician: Oc Blackwell, DO

## 2023-03-21 NOTE — PROGRESS NOTES
Problem: Pressure Injury - Risk of  Goal: *Prevention of pressure injury  Description: Document Amrit Scale and appropriate interventions in the flowsheet. Outcome: Progressing Towards Goal  Note: Pressure Injury Interventions:  Sensory Interventions: Assess changes in LOC    Moisture Interventions: Absorbent underpads, Apply protective barrier, creams and emollients    Activity Interventions: PT/OT evaluation    Mobility Interventions: PT/OT evaluation    Nutrition Interventions: Document food/fluid/supplement intake    Friction and Shear Interventions: Apply protective barrier, creams and emollients                Problem: Falls - Risk of  Goal: *Absence of Falls  Description: Document Ron Fall Risk and appropriate interventions in the flowsheet.   Outcome: Progressing Towards Goal  Note: Fall Risk Interventions:

## 2023-03-21 NOTE — PROCEDURES
[facility]  [address]  [address]  [phone]    Skip Garcia  1935  [id number]      Date of Procedure: 3/21/2023    Preoperative diagnosis: pleural effusion    Procedure: Procedure(s):  THORACENTESIS    Indication: pleural effusion    :  Logan Bell MD    Assistant(s): Endoscopy Technician-1: Mimi Jernigan  Endoscopy RN-1: Edel Salas RN    Anesthesia/Sedation:  None      Procedure Details:  After infomed consent was obtained for the procedure, with all risks and benefits of procedure explained the patient was taken to the endoscopy suite and placed in the upright position leaning forward. The US was used to localize a pocket of fluid on the L amenable for thoracentesis. The site was marked, cleaned and prepped in the usual fashion. Lidocaine was administered to the subcutaneous tissue and then under continuous aspiration, the finder needle was advanced into the pleural space until fluid was aspirated. The needle was completely withdrawn and lidocaine was administered along the way. The thoracentesis catheter was advanced over the trochar under continuous aspiration until fluid was withdrawn. The catheter was fully advanced and the trochar was completely withdrawn. 900cc of livia colored fluid was withdrawn. The procedure was stopped as the patient was having frequent cough and there was some tension as fluid was being aspirated. Fluid sent for routine studies. Complications:   None noted; patient tolerated the procedure well.     EBL:  Minimal           Impression:   Recurrent pleural effusion    Recommendations:   CXR pending  Await fluid study results  Return to floor when meets criteria      Niko Ortiz MD  3/21/2023  8:45 AM

## 2023-03-21 NOTE — PROGRESS NOTES
1150:  Pt was sleeping. A list of snf's was left at bedside. Pt's dtr was called and updated. She stated pt would not want snf--nor does he want any therapy. PT to re-evaluate today. Transition of Care Plan: RUR-14%, LOS 5 days  Medical management continues  Orthopedics following--s/p kyphoplasty 3/20  Currently on 4L O2; has nocturnal home O2 through [address] (baseline 2L)  Cardiology following  Pulmonology following-s/p thoracentesis 3/21  PT/OT following-recommending snf  CM following for d/c needs  Family to transport pt at d/c  L. Elma Melendez

## 2023-03-21 NOTE — PERIOP NOTES
TRANSFER - OUT REPORT:    Verbal report given to LUCIO Segundo(name) on Floridalma Bitter  being transferred to room 333(unit) for routine progression of care       Report consisted of patients Situation, Background, Assessment and   Recommendations(SBAR). Information from the following report(s) SBAR was reviewed with the receiving nurse. Lines:   Peripheral IV 03/17/23 Left Antecubital (Active)   Site Assessment Clean, dry, & intact 03/21/23 0417   Phlebitis Assessment 0 03/21/23 0417   Infiltration Assessment 0 03/21/23 0417   Dressing Status Clean, dry, & intact 03/21/23 0417   Dressing Type Transparent 03/21/23 0417   Hub Color/Line Status Pink 03/21/23 0417   Action Taken Open ports on tubing capped 03/21/23 0417   Alcohol Cap Used Yes 03/21/23 0417       Peripheral IV 03/20/23 Right Antecubital (Active)   Site Assessment Clean, dry, & intact 03/21/23 0726   Phlebitis Assessment 0 03/21/23 0726   Infiltration Assessment 0 03/21/23 0726   Dressing Status Clean, dry, & intact 03/21/23 0726   Dressing Type Transparent;Tape 03/21/23 0726   Hub Color/Line Status Pink;Patent;Capped;Flushed 03/21/23 0726   Action Taken Open ports on tubing capped 03/21/23 0726   Alcohol Cap Used Yes 03/21/23 0726        Opportunity for questions and clarification was provided. Patient transported by transporter in a stable condition.

## 2023-03-21 NOTE — PROGRESS NOTES
Bedside and Verbal shift change report given to Raul RN(oncoming nurse) by Holley Sandhoff RN (offgoing nurse). Report included the following information SBAR and Kardex.

## 2023-03-21 NOTE — PROGRESS NOTES
Clovis Liliya  1935  [id number]    Situation:  Verbal report received from: Alina VALDES  Procedure: Procedure(s):  THORACENTESIS    Background:    Preoperative diagnosis: pleural effusion  Postoperative diagnosis: 1.- Pleural Effusion    :  Dr. Bahman Ramirez  Assistant(s): Endoscopy Technician-1: Lady Sharma  Endoscopy RN-1: Claudene Babinski, RN    Specimens: yes  Bandaid on left side of back dry and intact. H. Pylori  no    Assessment:  Intra-procedure medications   Local Lidocaine at left side of back.   Intravenous fluids: NS@ KVO     Vital signs stable yes    Abdominal assessment: round and soft yes    Recommendation:  Return to floor: yes  Family or Friend not at this time  Permission to share finding with family or friend yes

## 2023-03-22 VITALS
DIASTOLIC BLOOD PRESSURE: 57 MMHG | TEMPERATURE: 98 F | SYSTOLIC BLOOD PRESSURE: 104 MMHG | RESPIRATION RATE: 16 BRPM | OXYGEN SATURATION: 95 % | HEIGHT: 69 IN | WEIGHT: 150 LBS | HEART RATE: 72 BPM | BODY MASS INDEX: 22.22 KG/M2

## 2023-03-22 LAB
ALBUMIN SERPL-MCNC: 2.8 G/DL (ref 3.5–5)
ALBUMIN/GLOB SERPL: 0.6 (ref 1.1–2.2)
ALP SERPL-CCNC: 159 U/L (ref 45–117)
ALT SERPL-CCNC: 32 U/L (ref 12–78)
ANION GAP SERPL CALC-SCNC: 4 MMOL/L (ref 5–15)
AST SERPL-CCNC: 51 U/L (ref 15–37)
BASOPHILS # BLD: 0 K/UL (ref 0–0.1)
BASOPHILS NFR BLD: 0 % (ref 0–1)
BILIRUB SERPL-MCNC: 0.7 MG/DL (ref 0.2–1)
BUN SERPL-MCNC: 48 MG/DL (ref 6–20)
BUN/CREAT SERPL: 41 (ref 12–20)
CALCIUM SERPL-MCNC: 8.9 MG/DL (ref 8.5–10.1)
CHLORIDE SERPL-SCNC: 96 MMOL/L (ref 97–108)
CO2 SERPL-SCNC: 36 MMOL/L (ref 21–32)
CREAT SERPL-MCNC: 1.17 MG/DL (ref 0.7–1.3)
DIFFERENTIAL METHOD BLD: ABNORMAL
EOSINOPHIL # BLD: 0 K/UL (ref 0–0.4)
EOSINOPHIL NFR BLD: 0 % (ref 0–7)
ERYTHROCYTE [DISTWIDTH] IN BLOOD BY AUTOMATED COUNT: 13.6 % (ref 11.5–14.5)
GLOBULIN SER CALC-MCNC: 4.4 G/DL (ref 2–4)
GLUCOSE SERPL-MCNC: 112 MG/DL (ref 65–100)
HCT VFR BLD AUTO: 39 % (ref 36.6–50.3)
HGB BLD-MCNC: 12.5 G/DL (ref 12.1–17)
IMM GRANULOCYTES # BLD AUTO: 0 K/UL (ref 0–0.04)
IMM GRANULOCYTES NFR BLD AUTO: 0 % (ref 0–0.5)
LYMPHOCYTES # BLD: 1.6 K/UL (ref 0.8–3.5)
LYMPHOCYTES NFR BLD: 20 % (ref 12–49)
MCH RBC QN AUTO: 32.1 PG (ref 26–34)
MCHC RBC AUTO-ENTMCNC: 32.1 G/DL (ref 30–36.5)
MCV RBC AUTO: 100.3 FL (ref 80–99)
MONOCYTES # BLD: 1.3 K/UL (ref 0–1)
MONOCYTES NFR BLD: 16 % (ref 5–13)
NEUTS SEG # BLD: 5.3 K/UL (ref 1.8–8)
NEUTS SEG NFR BLD: 64 % (ref 32–75)
NRBC # BLD: 0 K/UL (ref 0–0.01)
NRBC BLD-RTO: 0 PER 100 WBC
PLATELET # BLD AUTO: 181 K/UL (ref 150–400)
PMV BLD AUTO: 10.2 FL (ref 8.9–12.9)
POTASSIUM SERPL-SCNC: 3.8 MMOL/L (ref 3.5–5.1)
PROT SERPL-MCNC: 7.2 G/DL (ref 6.4–8.2)
RBC # BLD AUTO: 3.89 M/UL (ref 4.1–5.7)
SODIUM SERPL-SCNC: 136 MMOL/L (ref 136–145)
WBC # BLD AUTO: 8.2 K/UL (ref 4.1–11.1)

## 2023-03-22 PROCEDURE — 97116 GAIT TRAINING THERAPY: CPT

## 2023-03-22 PROCEDURE — 74011000250 HC RX REV CODE- 250: Performed by: STUDENT IN AN ORGANIZED HEALTH CARE EDUCATION/TRAINING PROGRAM

## 2023-03-22 PROCEDURE — 94761 N-INVAS EAR/PLS OXIMETRY MLT: CPT

## 2023-03-22 PROCEDURE — 74011250637 HC RX REV CODE- 250/637: Performed by: INTERNAL MEDICINE

## 2023-03-22 PROCEDURE — 74011250637 HC RX REV CODE- 250/637: Performed by: STUDENT IN AN ORGANIZED HEALTH CARE EDUCATION/TRAINING PROGRAM

## 2023-03-22 PROCEDURE — 94640 AIRWAY INHALATION TREATMENT: CPT

## 2023-03-22 PROCEDURE — 85025 COMPLETE CBC W/AUTO DIFF WBC: CPT

## 2023-03-22 PROCEDURE — 80053 COMPREHEN METABOLIC PANEL: CPT

## 2023-03-22 PROCEDURE — 36415 COLL VENOUS BLD VENIPUNCTURE: CPT

## 2023-03-22 PROCEDURE — 97530 THERAPEUTIC ACTIVITIES: CPT

## 2023-03-22 PROCEDURE — 74011000250 HC RX REV CODE- 250: Performed by: INTERNAL MEDICINE

## 2023-03-22 RX ORDER — BUMETANIDE 1 MG/1
1 TABLET ORAL DAILY
Qty: 30 TABLET | Refills: 0 | Status: SHIPPED
Start: 2023-03-22 | End: 2023-04-22

## 2023-03-22 RX ORDER — ACETAMINOPHEN 500 MG
500 TABLET ORAL
Qty: 30 TABLET | Refills: 0 | Status: SHIPPED | OUTPATIENT
Start: 2023-03-22

## 2023-03-22 RX ORDER — LIDOCAINE 4 G/100G
PATCH TOPICAL
Qty: 4 PATCH | Refills: 0 | Status: ON HOLD | OUTPATIENT
Start: 2023-03-23 | End: 2023-05-02 | Stop reason: SDUPTHER

## 2023-03-22 RX ADMIN — SPIRONOLACTONE 25 MG: 25 TABLET ORAL at 09:49

## 2023-03-22 RX ADMIN — ARFORMOTEROL TARTRATE 15 MCG: 15 SOLUTION RESPIRATORY (INHALATION) at 07:31

## 2023-03-22 RX ADMIN — APIXABAN 5 MG: 5 TABLET, FILM COATED ORAL at 09:49

## 2023-03-22 RX ADMIN — IPRATROPIUM BROMIDE 0.5 MG: 0.5 SOLUTION RESPIRATORY (INHALATION) at 07:30

## 2023-03-22 RX ADMIN — APIXABAN 5 MG: 5 TABLET, FILM COATED ORAL at 18:00

## 2023-03-22 RX ADMIN — BUDESONIDE 500 MCG: 0.5 SUSPENSION RESPIRATORY (INHALATION) at 07:31

## 2023-03-22 RX ADMIN — BUMETANIDE 1 MG: 1 TABLET ORAL at 09:49

## 2023-03-22 RX ADMIN — METOPROLOL SUCCINATE 25 MG: 25 TABLET, EXTENDED RELEASE ORAL at 09:49

## 2023-03-22 NOTE — PROGRESS NOTES
3/22/2023  4:54 PM  [facility] has accepted pt, AVS updated, and sent in [address] [place], [place]       4:47 PM  CM discussed DC plan w/ pt's daughters Nicole Reynolds and Mariella Morocho in person, they agreed pt will need [facility] services at DC and is unlikely to agree to SNF,   PT worked w/ pt today and agrees w/ plan for DC w/ HH. FOC offered, consent for referral to [facility], orders and clinicals sent in [facility] await response. CM contacted [facility] as pt does not have portable tank for DC today, confirmed they will deliver tank to [facility] so pt can transport home w/ O2. CM updated nursing   Pt's Dtr will transport home at DC. CM notified MD Irving Rankinit DC order       Care Management Interventions  PCP Verified by CM: Yes Maki Caruso MD)  Mode of Transport at Discharge:  Other (see comment) (family)  Transition of Care Consult (CM Consult): Discharge Planning  MyChart Signup: No  Discharge Durable Medical Equipment: No  Physical Therapy Consult: No  Occupational Therapy Consult: No  Speech Therapy Consult: No  Support Systems: Child(sirena)  Confirm Follow Up Transport: Family  Discharge Location  Patient Expects to be Discharged to[de-identified] Home  RAFAELA Latham       2:47 PM  Pt emergently admitted 3/17/23 for pleural effusion, update via  chart review, pt is continuing to require medical management for Acute on chronic CHF exacerbation/ Chronic hypoxic respiratory failure, Moderate to large pleural effusion, Acute low back pain due to compression fracture of T12 vertebra/ Chronic severe compression fracture of T6, Macrocytic anemia, Chronic venous statis LE, Hx of CAD, Hx of COPD, HX of HTN, HLD, Hx of Prostate CA   Transitions of Care Plan:  RUR 16 % Moderate Risk of Readmission/Yellow   LOS 5 Days  Medical management continues   Pulmonary following, s/p Thoracentesis 3/21, pt weaned to 1 L O2, wean as tolerated   Cardiology following   Ortho s/p kyphoplasty 3/20, they have signed off  PT, OT following and the recommendation is for  [place] vs SNF, to work w/ pt now and determine safeest DC plan  CM discussed dispo w/ Dtr Dhaval Kim she confirmed her sister lives w/ the pt and works from home , however she spends most of the day  on calls, but pt sleeps most of the day and is awake at night. Jennifer eZlaya told me pt refuses HH and will not agree to SNF, he also refuses to use Home O2 during the day and only at night. They understand dispo to home w/ no HH is not ideal but realize pt will not agree to services   DC when stable to home w/ family assist and HH vs None  Outpatient follow up PCP, pulmonary, cardiology  Family to transport if appropriate  RAFAELA Metzger     .

## 2023-03-22 NOTE — PROGRESS NOTES
Problem: Pressure Injury - Risk of  Goal: *Prevention of pressure injury  Description: Document Amrit Scale and appropriate interventions in the flowsheet. Outcome: Progressing Towards Goal  Note: Pressure Injury Interventions:  Sensory Interventions: Assess changes in LOC    Moisture Interventions: Absorbent underpads, Apply protective barrier, creams and emollients    Activity Interventions: PT/OT evaluation    Mobility Interventions: PT/OT evaluation    Nutrition Interventions: Document food/fluid/supplement intake    Friction and Shear Interventions: Apply protective barrier, creams and emollients          Problem: Falls - Risk of  Goal: *Absence of Falls  Description: Document Ron Fall Risk and appropriate interventions in the flowsheet.   Outcome: Progressing Towards Goal  Note: Fall Risk Interventions:           Problem: Patient Education: Go to Patient Education Activity  Goal: Patient/Family Education  Outcome: Progressing Towards Goal

## 2023-03-22 NOTE — DISCHARGE INSTRUCTIONS
HOSPITALIST DISCHARGE INSTRUCTIONS  NAME: Keren Ro   :  1935   MRN:  [id number]     Date/Time:  3/22/2023 4:46 PM    ADMIT DATE: 3/17/2023     DISCHARGE DATE: 3/22/2023     ADMITTING DIAGNOSIS:  T12 compression fracture   CHF exacerbation  Plural Effusion     DISCHARGE DIAGNOSIS:  T12 compression fracture status post Kyphoplasty   CHF exacerbation  Plural Effusion        Compression Fracture of the Spine: Care Instructions  Overview     A compression fracture happens when the front part of a spinal bone breaks and collapses. A fall or other accident can cause it. A minor injury or moving the wrong way can cause a break if you have thin or brittle bones (osteoporosis). These types of breaks usually will heal within a few months. You may need to rest at first, but it's best to return to your normal activity as soon as you can. You may need medicine for pain. Your doctor may recommend physical therapy. Some doctors recommend that certain people with compression fractures wear braces. Your doctor also may treat thin or brittle bones. You may need surgery if you have lasting pain or if the bone presses on the spinal cord or nerves. You heal best when you take good care of yourself. Eat a variety of healthy foods, and don't smoke. Follow-up care is a key part of your treatment and safety. Be sure to make and go to all appointments, and call your doctor if you are having problems. It's also a good idea to know your test results and keep a list of the medicines you take. How can you care for yourself at home? Be safe with medicines. Read and follow all instructions on the label. If the doctor gave you a prescription medicine for pain, take it as prescribed. If you are not taking a prescription pain medicine, ask your doctor if you can take an over-the-counter medicine. Talk to your doctor about how to make your bones stronger. You may need medicines or a change in what you eat.   Be active only as directed by your doctor. When should you call for help? Call 911 anytime you think you may need emergency care. For example, call if:    You are unable to move an arm or a leg at all. Call your doctor now or seek immediate medical care if:    You have new or worse symptoms in your arms, legs, belly, or buttocks. Symptoms may include:  Numbness or tingling. Weakness. Pain. You lose bladder or bowel control. You have belly pain, bloating, vomiting, or nausea. Watch closely for changes in your health, and be sure to contact your doctor if:    You do not get better as expected. Where can you learn more? Go to http://www.gray.com/  Enter P445 in the search box to learn more about \"Compression Fracture of the Spine: Care Instructions. \"  Current as of: March 9, 2022               Content Version: 13.4  © 1755-2665 [employer]. Care instructions adapted under license by [employer] (which disclaims liability or warranty for this information). If you have questions about a medical condition or this instruction, always ask your healthcare professional. [place] [address] any warranty or liability for your use of this information. Kyphoplasty: What to Expect at [address] [facility]  After kyphoplasty to relieve pain from compression fractures, your back may feel sore where the hollow needle (trocar) went into your back. This should go away in a few days. Most people are able to return to their daily activities within a day. This care sheet gives you a general idea about how long it will take for you to recover. But each person recovers at a different pace. Follow the steps below to get better as quickly as possible. How can you care for yourself at home? Activity    Take it easy for the first 24 hours. Rest when you feel tired. Getting enough sleep will help you recover.      For the first day after the procedure, avoid lifting anything that would make you strain. This may include heavy grocery bags and milk containers, a heavy briefcase or backpack, cat litter or dog food bags, a vacuum , or a child. Diet    You can eat your normal diet. If your stomach is upset, try bland, low-fat foods like plain rice, broiled chicken, toast, and yogurt. Medicines    Your doctor will tell you if and when you can restart your medicines. You will also get instructions about taking any new medicines. If you stopped taking aspirin or some other blood thinner, your doctor will tell you when to start taking it again. Be safe with medicines. Take pain medicines exactly as directed. If the doctor gave you a prescription medicine for pain, take it as prescribed. If you are not taking a prescription pain medicine, ask your doctor if you can take an over-the-counter medicine. Do not take two or more pain medicines at the same time unless your doctor told you to. Many pain medicines have acetaminophen, which is Tylenol. Too much acetaminophen (Tylenol) can be harmful. Incision care    You will have a dressing over the cut (incision). A dressing helps the incision heal and protects it. Your doctor will tell you how to take care of this. Ice    If you are sore where the needle was inserted, put ice or a cold pack on your back for 10 to 20 minutes at a time. Try to do this every 1 to 2 hours for the next 3 days (when you are awake) or until the swelling goes down. Put a thin cloth between the ice and your skin. Follow-up care is a key part of your treatment and safety. Be sure to make and go to all appointments, and call your doctor if you are having problems. It's also a good idea to know your test results and keep a list of the medicines you take. When should you call for help? Call 911 anytime you think you may need emergency care. For example, call if:    You passed out (lost consciousness). You have severe trouble breathing.      You have sudden chest pain and shortness of breath, or you cough up blood. You are unable to move a leg at all. Call your doctor now or seek immediate medical care if:    You have new or worse symptoms in your legs, belly, or buttocks. Symptoms may include:  Numbness or tingling. Weakness. Pain. You lose bladder or bowel control. You have signs of infection, such as: Increased pain, swelling, warmth, or redness. Red streaks leading from the incision. Pus draining from the incision. A fever. Watch closely for any changes in your health, and be sure to contact your doctor if:    You do not get better as expected. Where can you learn more? Go to http://www.gray.com/  Enter O461 in the search box to learn more about \"Kyphoplasty: What to Expect at Home. \"  Current as of: March 9, 2022               Content Version: 13.4  © 2006-2022 [employer]. Care instructions adapted under license by [employer] (which disclaims liability or warranty for this information). If you have questions about a medical condition or this instruction, always ask your healthcare professional. [place] [address] any warranty or liability for your use of this information. MEDICATIONS:    It is important that you take the medication exactly as they are prescribed. Keep your medication in the bottles provided by the pharmacist and keep a list of the medication names, dosages, and times to be taken in your wallet.    Do not take other medications without consulting your doctor     Pain Management: per above medications    What to do at Home    Recommended diet:  Cardiac Diet, low sodium diet     Recommended activity: per orthopedics and physical therapy     1) Return to the hospital if you feel worse    2) If you experience any of the following symptoms then please call your primary care physician or return to the emergency room if you cannot get hold of your doctor:  Fever, chills, nausea, vomiting, diarrhea, change in mentation, falling, bleeding, shortness of breath, chest pain, severe headache, severe abdominal pain. Follow Up:  Nam Moore [address] [facility] [address]  [address]  [phone]  Follow up on 3/30/2023  Hospital Follow-up appointment at 9:00 am    Melania Arguelles, [address] [facility]  [address]  [address] [facility]  [phone]  Follow up on 4/5/2023  1:40 pm    Laura Borrego DO  [address] [address]  [address] [facility]  [phone]  Follow up in 2 week(s)  imaging. Orthopedics Follow [facility]  [phone]  Follow up  In Home Urgent Care, As needed    [facility] [address]  [place] [address]  [phone]  Schedule an appointment as soon as possible for a visit in 2 week(s)  Hospital Follow Up      Information obtained by :  I understand that if any problems occur once I am at home I am to contact my physician. I understand and acknowledge receipt of the instructions indicated above.                                                                                                                                            Physician's or R.N.'s Signature                                                                  Date/Time                                                                                                                                              Patient or Representative Signature                                                          Date/Time

## 2023-03-22 NOTE — PROGRESS NOTES
Orthopaedic Progress Note    2023 10:54 AM     Patient: Sophie Perez MRN: [id number]  SSN: [id number]    YOB: 1935  Age: 80 y.o. Sex: male      Admit date:  3/17/2023  Admitting Physician:  Kian Roberto MD   Consulting Physician(s): Treatment Team: Attending Provider: Reinaldo Corley DO; Consulting Provider: Carmenza Mclain MD; Consulting Provider: Yevgeniy Boateng; Consulting Provider: Jakub Chan MD; Utilization Review: Ene Allison; Care Manager: Aly Foreman.; Charge Nurse: Codie Padilla; Primary Nurse: Brandon Britton RN; Physical Therapist: [facility] PT    SUBJECTIVE:     Sophie Perez is a 80 y.o. male is resting in bed with both knees flexed. No complaints of back pain. No bowel or bladder incontinence. No complaints of nausea, vomiting, dizziness, lightheadedness, chest pain, or shortness of breath. OBJECTIVE:       Physical Exam:  General: Alert, cooperative, no distress. Respiratory: Respirations unlabored  MUSC/NEURO: Pain to palpation at T12 spinous process. No ecchymosis, lesions, or rash. Unable to sit patient up due to pain  BL LE:  Edema in both legs. Erythema in both legs to the midtibial level consistent with chronic venous stasis changes. Blanchable erythema. SILT BL LE. Moves both legs spontaneously. DTR: 2+     LLE:   PF: 4/5  DF: 4/5  EHL/EFL: 4/5  KF: 4/5  KE: 4/5  HF: did not test  Negative ankle clonus  Negative Babinski     RLE:  PF: 4/5  DF: 4/5  EHL/EFL: 4/5  KF: 4/5  KE: 4/5  HF: did not test  Negative ankle clonus  Negative Babinski     +DP and PT pulses.     Vital Signs:        Patient Vitals for the past 8 hrs:   BP Temp Pulse Resp SpO2   23 1231 121/75 99.1 °F (37.3 °C) 77 16 95 %   23 0931 110/65 98.4 °F (36.9 °C) 79 16 92 %   23 0731 -- -- -- -- 96 %   23 0700 -- -- 77 -- --                                          Temp (24hrs), Av.3 °F (36.8 °C), Min:97.7 °F (36.5 °C), Max:99.1 °F (37.3 °C)      Labs:        Recent Labs     03/22/23  0155   HCT 39.0   HGB 12.5     Lab Results   Component Value Date/Time    Sodium 136 03/22/2023 01:55 AM    Potassium 3.8 03/22/2023 01:55 AM    Chloride 96 (L) 03/22/2023 01:55 AM    CO2 36 (H) 03/22/2023 01:55 AM    Glucose 112 (H) 03/22/2023 01:55 AM    BUN 48 (H) 03/22/2023 01:55 AM    Creatinine 1.17 03/22/2023 01:55 AM    Calcium 8.9 03/22/2023 01:55 AM       PT/OT:        Gait  Base of Support: Widened  Speed/[place]: Pace decreased (<100 feet/min), Shuffled, Slow  Step Length: Left shortened, Right shortened  Gait Abnormalities: Shuffling gait, Trunk sway increased  Ambulation - Level of Assistance: Contact guard assistance  Distance (ft): 24 Feet (ft) (x2 in room)  Assistive Device: Gait belt, Walker, rolling       Patient mobility  Bed Mobility Training  Rolling: Modified independent  Supine to Sit: Modified independent  Sit to Supine: Modified independent  Transfer Training  Sit to Stand: Contact guard assistance  Stand to Sit: Contact guard assistance  Bed to Chair: Minimum assistance, Assist x2      Gait Training  Assistive Device: Gait belt, Walker, rolling  Ambulation - Level of Assistance: Contact guard assistance  Distance (ft): 24 Feet (ft) (x2 in room)            ASSESSMENT / PLAN:   Active Problems:    Pleural effusion (3/17/2023)            A: 1. S/P T12 compression fracture with kyphoplasty    P: 1. Doing well s/p kyphoplasty. Symptoms have improved. TLSO if increasing pain, otherwise does not need bracing at this point. OOB to chair for meals. Continue with current pain regimen as patient is doing well. 2. DISPO: SNF likely  3. Orthopedics will sign off. Please followup with Dr. Andrew West at [address] [facility].        Signed By:  Michelle Moss, [address] [facility] [address]

## 2023-03-22 NOTE — PROGRESS NOTES
Bedside and Verbal shift change report given to Matty Evans RN (oncoming nurse) by Regina Meneses RN (offgoing nurse). Report included the following information SBAR, Kardex, ED Summary, Intake/Output, MAR, and Recent Results.

## 2023-03-22 NOTE — PROGRESS NOTES
Problem: Mobility Impaired (Adult and Pediatric)  Goal: *Acute Goals and Plan of Care (Insert Text)  Description: FUNCTIONAL STATUS PRIOR TO ADMISSION: Patient was independent and active without use of DME.    HOME SUPPORT PRIOR TO ADMISSION: The patient lived with his daughter and grand children but did not require assist.    Physical Therapy Goals  Initiated 3/18/2023  1. Patient will move from supine to sit and sit to supine  in bed with supervision/set-up within 7 day(s). 2.  Patient will transfer from bed to chair and chair to bed with supervision/set-up using the least restrictive device within 7 day(s). 3.  Patient will perform sit to stand with supervision/set-up within 7 day(s). 4.  Patient will ambulate with supervision/set-up for 150 feet with the least restrictive device within 7 day(s). 5.  Patient will ascend/descend 4 stairs with 1 handrail(s) with modified independence within 7 day(s). Note:   PHYSICAL THERAPY TREATMENT  Patient: Dimitri Rankin (1 y.o. male)  Date: 3/22/2023  Diagnosis: Pleural effusion [J90] <principal problem not specified>  Procedure(s) (LRB):  THORACENTESIS (N/A) 1 Day Post-Op  Precautions: Fall, WBAT, Back (brace with OOB)  Chart, physical therapy assessment, plan of care and goals were reviewed. ASSESSMENT  Patient continues with skilled PT services. Pt supine to sit with CGA to min assist.Pt donned TLSO with max assist.Pt TLSO T bare was taken out as it choked pt with his kyphotic posture. Pt sit to stand with CGA. Pt ambulated 70ft with RW CGA to min assist.Pt back to bed with CGA. Pt mobilizing with increased mobility this afternoon. Continue goals. PLAN :  Patient continues to benefit from skilled intervention to address the above impairments. Continue treatment per established plan of care. to address goals.     Recommendation for discharge: (in order for the patient to meet his/her long term goals)  Physical therapy at least 2 days/week in the home AND ensure assist and/or supervision    This discharge recommendation:  Has been made in collaboration with the attending provider and/or case management    IF patient discharges home will need the following DME: rolling walker       SUBJECTIVE:       OBJECTIVE DATA SUMMARY:   Critical Behavior:  Neurologic State: Alert  Orientation Level: Oriented X4  Cognition: Follows commands     Functional Mobility Training:  Bed Mobility:     Supine to Sit: Contact guard assistance;Minimum assistance  Sit to Supine: Contact guard assistance           Transfers:  Sit to Stand: Contact guard assistance  Stand to Sit: Contact guard assistance                             Balance:  Sitting: High guard  Standing: With support  Ambulation/Gait Training:  Distance (ft): 70 Feet (ft)  Assistive Device: Gait belt;Walker, rolling  Ambulation - Level of Assistance: Contact guard assistance;Minimal assistance        Gait Abnormalities: Decreased step clearance        Base of Support: Narrowed     Speed/[place]: Pace decreased (<100 feet/min)  Step Length: Right shortened;Left shortened              Uneven Terrain - Level of Assistance: Contact guard assistance         Activity Tolerance:   Fair to good    After treatment patient left in no apparent distress:   Supine in bed    COMMUNICATION/COLLABORATION:   The patients plan of care was discussed with: Physical therapist.     Marlena Metzger PTA   Time Calculation: 23 mins

## 2023-03-22 NOTE — DISCHARGE SUMMARY
Ricardo Keenan [facility]  [address] [facility], [address] [place] [address]  [phone]    Physician Discharge Summary     Patient ID:  Kasey Roberts  [id number]  59 y.o.  1935    Admit date: 3/17/2023    Discharge date and time: 3/22/2023 4:57 PM    Admission Diagnoses: Pleural effusion [J90]    Discharge Diagnoses:  Principal Diagnosis <principal problem not specified>                                            Active Problems:    Pleural effusion (3/17/2023)           Hospital Course:     Acute on chronic CHF exacerbation/ Chronic hypoxic respiratory failure POA: on 2 liters NC at night. Last echocardiogram on chart in 2019, EF 41-45%; repeat echo. NT-ProBNP 3900 (last one 800) on admission. CT chest notable for moderate L pleural effusion and pulmonary edema. Transitioned IV bumex to oral; continue bumex. Continue BB and aldactone. Cardiology evaluated; FU OP      Moderate to large pleural effusion: Seen on CT. Suspect 2/2 to CHF exacerbation however given size and one sided location pulm following. S/p for thoracentesis 3/21. Continue bumex. Pulmonology evaluated; FU OP      Acute low back pain due to compression fracture of T12 vertebra/ Chronic severe compression fracture of T6 status post kyphoplasty with retropulsion resulting in mild spinal canal stenosis with ventral cord compression and questionable focal myelomalacia: MRI thoracic showing T12 subacute compression, also note of cord compression. Discussed with ortho; patient poor surgical candidate in regards to compression. S/p kyphoplasty by IR intervention for T12 fracture 3/20. Received IV decadronx3 doses. lidocaine patch prn and tylenol prn. Ortho evaluated; FU OP. Did not want SNF      Macrocytic anemia: TSH, B12, folate, iron studies ok      Chronic venous statis LE: Bilateral chronic venous stasis changes, does not appear to be infected.  Management as above     Hx of CAD:Continue home statin, BB     hx of a fib on Eliquis: continue home BB and eliquis      Hx of COPD/ Chronic hypoxic respiratory failure: On home 2L O2 nightly; improved 02 requirements here. Continue home inhalers     Hx of HTN: Continue home bb, bumex and aldactone     HLD: Continue home colesesvelam, statin      Hx of prostate cancer: s/p prostatectomy     PCP: Jeff Mcfarlane MD     Consults: Cardiology, Pulmonary/Intensive care, and Orthopedic Surgery    Significant Diagnostic Studies:     MRI lumbar      IMPRESSION  1. Please see dedicated MRI thoracic spine regarding subacute T12 compression  fracture. Otherwise no evidence of acute fracture in the lumbar spine. 2. Mild degenerative changes as detailed above, without significant spinal canal  or neural foraminal stenosis at any level. Discharge Exam:  Physical Exam:    Gen:  Well-developed, well-nourished, in no acute distress  HEENT:  Pink conjunctivae, PERRL, hearing intact to voice  Resp:  No accessory muscle use, clear breath sounds without wheezes rales or rhonchi  Card:  RRR, No murmurs, normal S1, S2, b/l LE peripheral edema  Abd:  Soft, non-tender, non-distended, normoactive bowel sounds are present  Musc:  No cyanosis or clubbing  Skin:  No rashes or ulcers, skin turgor is good  Neuro:  Cranial nerves 3-12 are grossly intact, follows commands appropriately  Psych:  Oriented to person, place, and time, Alert with good insight      Disposition: home  Discharge Condition: Stable    Patient Instructions:   Current Discharge Medication List        START taking these medications    Details   acetaminophen (TYLENOL) 500 mg tablet Take 1 Tablet by mouth every six (6) hours as needed for Pain. Qty: 30 Tablet, Refills: 0      lidocaine 4 % patch Apply patch to affected area . Apply patch to the affected area for 12 hours a day and remove for 12 hours a day.   Qty: 4 Patch, Refills: 0           CONTINUE these medications which have CHANGED    Details   bumetanide (BUMEX) 1 mg tablet Take 1 Tablet by mouth daily. Please discuss reducing your dose with your primary care doctor and/or cardiologist if your systolic blood pressures are less than 120. Qty: 30 Tablet, Refills: 0           CONTINUE these medications which have NOT CHANGED    Details   spironolactone (ALDACTONE) 25 mg tablet Take  by mouth daily. colesevelam (WELCHOL) 625 mg tablet TAKE 3 TABLETS BY MOUTH TWICE DAILY WITH MEALS  Qty: 540 Tablet, Refills: 0      metoprolol succinate (TOPROL-XL) 25 mg XL tablet TAKE 1 TABLET BY MOUTH DAILY  Qty: 90 Tablet, Refills: 1      atorvastatin (LIPITOR) 80 mg tablet TAKE 1 TABLET BY MOUTH EVERY NIGHT  Qty: 90 Tablet, Refills: 2      Eliquis 5 mg tablet TAKE 1 TABLET TWICE A DAY TO PREVENT THROMBOEMBOLISM IN CHRONIC ATRIAL FIBRILLATION  Qty: 180 Tab, Refills: 3    Associated Diagnoses: Persistent atrial fibrillation ([facility] [address] Essential hypertension; Coronary artery disease involving native coronary artery of native heart without angina pectoris; Mixed hyperlipidemia      albuterol-ipratropium (DUO-NEB) 2.5 mg-0.5 mg/3 ml nebu 3 mL. fluticasone-umeclidinium-vilanterol (TRELEGY ELLIPTA) 100-62.5-25 mcg inhaler Take 1 Puff by inhalation daily.            STOP taking these medications       potassium chloride SR (K-TAB) 20 mEq tablet Comments:   Reason for Stopping:         furosemide (LASIX) 40 mg tablet Comments:   Reason for Stopping:             Activity: per orthopedics and physical and occupational therapy   Diet: Cardiac Diet  Wound Care: Keep wound clean and dry    Follow-up with  Follow-up Information       Follow up With Specialties Details Why Contact Info    [facility] Nurse Practitioner Follow up on 3/30/2023 Hospital Follow-up appointment at 9:00 am [facility], [address] [facility] Nurse Practitioner, Cardiovascular Disease Physician Follow up on 4/5/2023 1:40 pm [facility] [address] [place] [address]      Landon Macias DO Physical Medicine and Rehabilitation Physician Follow up in 2 week(s) imaging. Orthopedics Follow Up [address] [address] [facility]  [phone]      [facility]  Follow up In Home Urgent Care, As needed [phone]    [facility] [address]  Schedule an appointment as soon as possible for a visit in 2 week(s) Hospital Follow Up [facility] [address]    [address] Call 34 [facility] Nitesh Cruz agency will contact you to schedule appointments 3 [facility] Stephen  [phone]            Follow-up tests/labs as above.      Signed:  Riddhi Rivas DO  3/22/2023  4:57 PM  **I personally spent 35 min on discharge**

## 2023-03-22 NOTE — PROGRESS NOTES
Spiritual Care Assessment/Progress Note  [address]      NAME: Mamie Charlton      MRN: [id number]  AGE: 80 y.o. SEX: male  Hindu Affiliation: Cheondoism   Language: English     3/22/2023     Total Time (in minutes): 15     Spiritual Assessment begun in [facility]  MED SURG 2 through conversation with:         []Patient        [] Family    [] Friend(s)        Reason for Consult: Initial/Spiritual assessment, patient floor     Spiritual beliefs: (Please include comment if needed)     [] Identifies with a dionte tradition:         [] Supported by a dionte community:            [] Claims no spiritual orientation:           [] Seeking spiritual identity:                [] Adheres to an individual form of spirituality:           [x] Not able to assess:                           Identified resources for coping:      [] Prayer                               [] Music                  [] Guided Imagery     [] Family/friends                 [] Pet visits     [] Devotional reading                         [x] Unknown     [] Other:                                               Interventions offered during this visit: (See comments for more details)    Patient Interventions: Prayer (actual)           Plan of Care:     [] Support spiritual and/or cultural needs    [] Support AMD and/or advance care planning process      [] Support grieving process   [] Coordinate Rites and/or Rituals    [] Coordination with community clergy   [] No spiritual needs identified at this time   [] Detailed Plan of Care below (See Comments)  [] Make referral to Music Therapy  [] Make referral to Pet Therapy     [] Make referral to Addiction services  [] Make referral to [facility]  [] Make referral to Spiritual Care Partner  [] No future visits requested        [x] Contact Spiritual Care for further referrals     Comments:  visit for the patient on Med Surg. Reviewed patient's chart and spoke with patient's nurse.  Upon arrival, patients appeared to be sleeping soundly. Chart indicates pt is [address] silent prayer on the patient's behalf. Provided a ministry of presence. Please contact Spiritual Care for further referrals.     Jered [address], Terence [address]ina 87, [address], [facility]  Staff   Paging service: [phone] (PRABERNY)

## 2023-03-22 NOTE — PROGRESS NOTES
[facility] [place]     Name: Daniela Neff MRN: [id number]   : 1935 Hospital: [address]   Date: 3/22/2023        Impression Plan   Pleural effusion  T12 compression fracture  Back pain  Afib               Wean O2 to keep sats above 90%  Thoracentesis today, see procedure not for details. Can resume Eliquis following the procedure  Kyphoplasty 3/20  Discussed plan with pt. Radiology  ( personally reviewed) Moderate left pleural - essentially unchanged since 2023 and not much changed from . CXR 3/20/23: unchanged pleural effusion   ABG No results for input(s): PHI, PO2I, PCO2I in the last 72 hours. Subjective     Cc: back pain    79 yo with PMHx of afib, asthma, CHF presenting with acute back pain and found to have T12 compression fracture. Left sided pleural effusion also noted and Pulmonary consulted. Pt had recent thoracentesis at [address] [place] [address] consistent with transudative effusion. Pt currently denies any shortness of breath or cough. Has hard time moving around due to the backpain. Takes eliquis at home. Daughter states his LE edema is much improved since  hospitalization. Most history provided by daughter at the bedside    Review of Systems:  A comprehensive review of systems was negative except for that written in the HPI. Interval History:    Afebrile  BP stable  Sats 96% on 1L NC--better  S/p thoracentesis on 3/21 with 900 mls removed  Pleural fluid culture pending  Pleural fluid borderline exudate  WBC 9.1--decreased  Pro-BNP 3/19 3332--decreased  Creat 1.17--increased  1200 documented UOP  CXR 3/21: interval decrease in left pleural effusion with basilar atx. Small right effusion    ROS:  Feeling better form a breathing standpoint. Denies SOB, cough. Back is sore.     Past Medical History:   Diagnosis Date    Asthma     BRONCITITS, INHALER USE PRN    CAD (coronary artery disease)     MI    Cancer ([facility] [address] 2006    PROTATE    Chronic obstructive pulmonary disease ([facility] [address]     Diabetes ([facility] [address]     BORDERLINE    Hypercholesterolemia     Hypertension     Umbilical hernia 9/23/3653    Venous insufficiency (chronic) (peripheral)       Past Surgical History:   Procedure Laterality Date    ENDOSCOPY, COLON, DIAGNOSTIC  01/02/2006    HX GI      COLONOSCOPY    HX HEENT Bilateral     CATARACTS/ IOL    HX HERNIA REPAIR  7/2014    HX PROSTATECTOMY  01/02/2006    IR KYPHOPLASTY THORACIC  10/12/2020    IR THORACENTESIS NDL PUNC ASP W IMAGE  6/22/2021    [place] UNLISTED PROCEDURE CARDIAC SURGERY  1993    CABG X4 VESSEL    [place] UNLISTED PROCEDURE CARDIAC SURGERY  1997, [address] CATH, STENTS      Prior to Admission medications    Medication Sig Start Date End Date Taking? Authorizing Provider   spironolactone (ALDACTONE) 25 mg tablet Take  by mouth daily. Yes Other, MD Justin   potassium chloride SR (K-TAB) 20 mEq tablet TAKE 1 TABLET BY MOUTH DAILY AS NEEDED FOR SWELLING 11/2/22  Yes Elizabeth James MD   [facility]) 625 mg tablet TAKE 3 TABLETS BY MOUTH TWICE DAILY WITH MEALS 10/29/22  Yes Elizabeth James MD   metoprolol succinate (TOPROL-XL) 25 mg XL tablet TAKE 1 TABLET BY MOUTH DAILY 7/21/22  Yes Elizabeth James MD   atorvastatin (LIPITOR) 80 mg tablet TAKE 1 TABLET BY MOUTH EVERY NIGHT 6/10/22  Yes Elizabeth James MD   Eliquis 5 mg tablet TAKE 1 TABLET TWICE A DAY TO PREVENT THROMBOEMBOLISM IN CHRONIC ATRIAL FIBRILLATION 12/20/20  Yes Elizabeth James MD   bumetanide (BUMEX) 1 mg tablet Take  by mouth daily. Other, MD Justin   furosemide (LASIX) 40 mg tablet TAKE 1 TABLET BY MOUTH TWICE DAILY  Patient not taking: Reported on 3/17/2023 12/28/22   Elizabeth James MD   albuterol-ipratropium (DUO-NEB) 2.5 mg-0.5 mg/3 ml nebu 3 mL. Provider, Historical   fluticasone-umeclidinium-vilanterol (TRELEGY ELLIPTA) 100-62.5-25 mcg inhaler Take 1 Puff by inhalation daily.     Provider, Historical     Current Facility-Administered Medications   Medication Dose Route Frequency    apixaban (ELIQUIS) tablet 5 mg  5 mg Oral BID    bumetanide (BUMEX) tablet 1 mg  1 mg Oral DAILY    lidocaine 4 % patch 1 Patch  1 Patch TransDERmal Q24H    atorvastatin (LIPITOR) tablet 80 mg  80 mg Oral QHS    metoprolol succinate (TOPROL-XL) XL tablet 25 mg  25 mg Oral DAILY    spironolactone (ALDACTONE) tablet 25 mg  25 mg Oral DAILY    budesonide (PULMICORT) 500 mcg/2 ml nebulizer suspension  500 mcg Nebulization BID RT    arformoteroL (BROVANA) neb solution 15 mcg  15 mcg Nebulization BID RT    ipratropium (ATROVENT) 0.02 % nebulizer solution 0.5 mg  0.5 mg Nebulization BID RT     Allergies   Allergen Reactions    Latex, Natural Rubber Hives    Adhesive Other (comments)     BLISTERS      Social History     Tobacco Use    Smoking status: Former     Packs/day: 1.00     Types: Cigarettes     Quit date: 6/10/1964     Years since quittin.8    Smokeless tobacco: Never   Substance Use Topics    Alcohol use: Yes     Alcohol/week: 15.0 standard drinks     Types: 12 Cans of beer, 6 Standard drinks or equivalent per week     Comment: casual      Family History   Problem Relation Age of Onset    Stroke Father     Anesth Problems Neg Hx           Laboratory: I have personally reviewed the critical care flowsheet and labs. Recent Labs     23  0155 23  0310 23  0011   WBC 8.2 9.1 12.0*   HGB 12.5 11.9* 11.7*   HCT 39.0 37.4 36.4*    179 174       Recent Labs     23  0155 23  0310 23  0011    137 135*   K 3.8 4.3 3.8   CL 96* 98 95*   CO2 36* 36* 35*   * 99 156*   BUN 48* 48* 39*   CREA 1.17 1.05 1.35*   CA 8.9 9.0 9.1   ALB 2.8* 2.9* 2.9*   ALT 32 31 34         Objective:     Mode Rate Tidal Volume Pressure FiO2 PEEP                    Vital Signs:     TMAX(24)      Intake/Output:   Last shift:         Last 3 shifts: No intake/output data recorded. RRIOLAST3  Intake/Output Summary (Last 24 hours) at 3/22/2023 8753  Last data filed at 3/22/2023 0419  Gross per 24 hour   Intake --   Output 1200 ml   Net -1200 ml       EXAM:   GENERAL: well developed and in no distress, HEENT:  PERRL, EOMI, no alar flaring or epistaxis, oral mucosa moist without cyanosis, NECK:  no jugular vein distention, no retractions, no thyromegaly or masses, LUNGS: CTA, slightly diminished left base HEART:  Regular rate and rhythm with no MGR; trace edema is present. Brawny skin changes. , ABDOMEN:  soft with no tenderness, bowel sounds present, EXTREMITIES:  warm with no cyanosis, SKIN:  no jaundice or ecchymosis, and NEUROLOGIC:  alert and oriented, grossly non-focal    Kingsley Oshea, NP  [facility]

## 2023-03-23 ENCOUNTER — APPOINTMENT (OUTPATIENT)
Dept: CT IMAGING | Age: 88
End: 2023-03-23
Attending: STUDENT IN AN ORGANIZED HEALTH CARE EDUCATION/TRAINING PROGRAM
Payer: MEDICARE

## 2023-03-23 ENCOUNTER — HOSPITAL ENCOUNTER (EMERGENCY)
Age: 88
Discharge: HOME | End: 2023-03-23
Attending: STUDENT IN AN ORGANIZED HEALTH CARE EDUCATION/TRAINING PROGRAM
Payer: MEDICARE

## 2023-03-23 ENCOUNTER — PATIENT OUTREACH (OUTPATIENT)
Dept: CASE MANAGEMENT | Age: 88
End: 2023-03-23

## 2023-03-23 ENCOUNTER — APPOINTMENT (OUTPATIENT)
Dept: GENERAL RADIOLOGY | Age: 88
End: 2023-03-23
Attending: STUDENT IN AN ORGANIZED HEALTH CARE EDUCATION/TRAINING PROGRAM
Payer: MEDICARE

## 2023-03-23 VITALS
TEMPERATURE: 98 F | HEART RATE: 78 BPM | OXYGEN SATURATION: 95 % | RESPIRATION RATE: 16 BRPM | SYSTOLIC BLOOD PRESSURE: 112 MMHG | DIASTOLIC BLOOD PRESSURE: 72 MMHG

## 2023-03-23 DIAGNOSIS — M54.6 CHRONIC THORACIC BACK PAIN, UNSPECIFIED BACK PAIN LATERALITY: Primary | ICD-10-CM

## 2023-03-23 DIAGNOSIS — G89.29 CHRONIC THORACIC BACK PAIN, UNSPECIFIED BACK PAIN LATERALITY: Primary | ICD-10-CM

## 2023-03-23 LAB
ALBUMIN SERPL-MCNC: 2.7 G/DL (ref 3.5–5)
ALBUMIN/GLOB SERPL: 0.6 (ref 1.1–2.2)
ALP SERPL-CCNC: 184 U/L (ref 45–117)
ALT SERPL-CCNC: 35 U/L (ref 12–78)
ANION GAP SERPL CALC-SCNC: 3 MMOL/L (ref 5–15)
AST SERPL-CCNC: 50 U/L (ref 15–37)
BASOPHILS # BLD: 0 K/UL (ref 0–0.1)
BASOPHILS NFR BLD: 0 % (ref 0–1)
BILIRUB SERPL-MCNC: 0.9 MG/DL (ref 0.2–1)
BNP SERPL-MCNC: 4140 PG/ML
BUN SERPL-MCNC: 40 MG/DL (ref 6–20)
BUN/CREAT SERPL: 41 (ref 12–20)
CALCIUM SERPL-MCNC: 9.3 MG/DL (ref 8.5–10.1)
CHLORIDE SERPL-SCNC: 100 MMOL/L (ref 97–108)
CO2 SERPL-SCNC: 36 MMOL/L (ref 21–32)
COMMENT, HOLDF: NORMAL
CREAT SERPL-MCNC: 0.98 MG/DL (ref 0.7–1.3)
DIFFERENTIAL METHOD BLD: ABNORMAL
EOSINOPHIL # BLD: 0.1 K/UL (ref 0–0.4)
EOSINOPHIL NFR BLD: 2 % (ref 0–7)
ERYTHROCYTE [DISTWIDTH] IN BLOOD BY AUTOMATED COUNT: 13.5 % (ref 11.5–14.5)
GLOBULIN SER CALC-MCNC: 4.3 G/DL (ref 2–4)
GLUCOSE SERPL-MCNC: 102 MG/DL (ref 65–100)
HCT VFR BLD AUTO: 38.3 % (ref 36.6–50.3)
HGB BLD-MCNC: 12.3 G/DL (ref 12.1–17)
IMM GRANULOCYTES # BLD AUTO: 0 K/UL (ref 0–0.04)
IMM GRANULOCYTES NFR BLD AUTO: 1 % (ref 0–0.5)
LYMPHOCYTES # BLD: 1.3 K/UL (ref 0.8–3.5)
LYMPHOCYTES NFR BLD: 21 % (ref 12–49)
MCH RBC QN AUTO: 32.4 PG (ref 26–34)
MCHC RBC AUTO-ENTMCNC: 32.1 G/DL (ref 30–36.5)
MCV RBC AUTO: 100.8 FL (ref 80–99)
MONOCYTES # BLD: 0.9 K/UL (ref 0–1)
MONOCYTES NFR BLD: 14 % (ref 5–13)
NEUTS SEG # BLD: 3.8 K/UL (ref 1.8–8)
NEUTS SEG NFR BLD: 62 % (ref 32–75)
NRBC # BLD: 0 K/UL (ref 0–0.01)
NRBC BLD-RTO: 0 PER 100 WBC
PLATELET # BLD AUTO: 175 K/UL (ref 150–400)
PMV BLD AUTO: 9.8 FL (ref 8.9–12.9)
POTASSIUM SERPL-SCNC: 3.7 MMOL/L (ref 3.5–5.1)
PROT SERPL-MCNC: 7 G/DL (ref 6.4–8.2)
RBC # BLD AUTO: 3.8 M/UL (ref 4.1–5.7)
SAMPLES BEING HELD,HOLD: NORMAL
SODIUM SERPL-SCNC: 139 MMOL/L (ref 136–145)
TROPONIN I SERPL HS-MCNC: 33 NG/L (ref 0–76)
WBC # BLD AUTO: 6.1 K/UL (ref 4.1–11.1)

## 2023-03-23 PROCEDURE — 36415 COLL VENOUS BLD VENIPUNCTURE: CPT

## 2023-03-23 PROCEDURE — 80053 COMPREHEN METABOLIC PANEL: CPT

## 2023-03-23 PROCEDURE — 72128 CT CHEST SPINE W/O DYE: CPT

## 2023-03-23 PROCEDURE — 99284 EMERGENCY DEPT VISIT MOD MDM: CPT

## 2023-03-23 PROCEDURE — 72131 CT LUMBAR SPINE W/O DYE: CPT

## 2023-03-23 PROCEDURE — 96375 TX/PRO/DX INJ NEW DRUG ADDON: CPT

## 2023-03-23 PROCEDURE — 74011250636 HC RX REV CODE- 250/636: Performed by: STUDENT IN AN ORGANIZED HEALTH CARE EDUCATION/TRAINING PROGRAM

## 2023-03-23 PROCEDURE — 96374 THER/PROPH/DIAG INJ IV PUSH: CPT

## 2023-03-23 PROCEDURE — 83880 ASSAY OF NATRIURETIC PEPTIDE: CPT

## 2023-03-23 PROCEDURE — 71046 X-RAY EXAM CHEST 2 VIEWS: CPT

## 2023-03-23 PROCEDURE — 85025 COMPLETE CBC W/AUTO DIFF WBC: CPT

## 2023-03-23 PROCEDURE — 84484 ASSAY OF TROPONIN QUANT: CPT

## 2023-03-23 RX ORDER — MORPHINE SULFATE 4 MG/ML
4 INJECTION INTRAVENOUS
Status: COMPLETED | OUTPATIENT
Start: 2023-03-23 | End: 2023-03-23

## 2023-03-23 RX ORDER — ONDANSETRON 2 MG/ML
4 INJECTION INTRAMUSCULAR; INTRAVENOUS
Status: COMPLETED | OUTPATIENT
Start: 2023-03-23 | End: 2023-03-23

## 2023-03-23 RX ADMIN — ONDANSETRON 4 MG: 2 INJECTION INTRAMUSCULAR; INTRAVENOUS at 13:07

## 2023-03-23 RX ADMIN — MORPHINE SULFATE 4 MG: 4 INJECTION, SOLUTION INTRAMUSCULAR; INTRAVENOUS at 13:08

## 2023-03-23 NOTE — ED PROVIDER NOTES
This is a 80-year-old malewho presents ED for evaluation of back pain. Patient was brought to ED from home after being discharged yesterday for back pain. Has had some worsening pain since then. Is been taking Tylenol without relief of symptoms. He has no chest pain or shortness of breath. Patient denies any nausea or vomiting. Denies any fevers or chills. Denies any abdominal pain. Pain is Glo in the mid thoracic back. Denies any recent falls or trauma. Back Pain   Pertinent negatives include no chest pain, no fever, no numbness, no abdominal pain and no weakness.       Past Medical History:   Diagnosis Date    Asthma     BRONCITITS, INHALER USE PRN    CAD (coronary artery disease)     MI    Cancer ([facility] [address]     PROTATE    Chronic obstructive pulmonary disease (HCC)     Diabetes ([facility] [address]     BORDERLINE    Hypercholesterolemia     Hypertension     Umbilical hernia     Venous insufficiency (chronic) (peripheral)        Past Surgical History:   Procedure Laterality Date    ENDOSCOPY, COLON, DIAGNOSTIC  2006    HX GI      COLONOSCOPY    HX HEENT Bilateral     CATARACTS/ IOL    HX HERNIA REPAIR  2014    HX PROSTATECTOMY  2006    IR KYPHOPLASTY THORACIC  10/12/2020    IR THORACENTESIS NDL PUNC ASP W IMAGE  2021    [place] UNLISTED PROCEDURE CARDIAC SURGERY      CABG X4 VESSEL    AK UNLISTED PROCEDURE CARDIAC SURGERY  ,     CARDIAC CATH, STENTS         Family History:   Problem Relation Age of Onset    Stroke Father     Anesth Problems Neg Hx        Social History     Socioeconomic History    Marital status: SINGLE     Spouse name: Not on file    Number of children: Not on file    Years of education: Not on file    Highest education level: Not on file   Occupational History    Not on file   Tobacco Use    Smoking status: Former     Packs/day: 1.00     Types: Cigarettes     Quit date: 6/10/1964     Years since quittin.8    Smokeless tobacco: Never   Substance and Sexual Activity    Alcohol use: Yes     Alcohol/week: 15.0 standard drinks     Types: 12 Cans of beer, 6 Standard drinks or equivalent per week     Comment: casual    Drug use: No    Sexual activity: Not Currently   Other Topics Concern    Not on file   Social History Narrative    Not on file     Social Determinants of Health     Financial Resource Strain: Low Risk     Difficulty of Paying Living Expenses: Not hard at all   Food Insecurity: No Food Insecurity    Worried About Running Out of Food in the Last Year: Never true    Ran Out of Food in the Last Year: Never true   Transportation Needs: No Transportation Needs    Lack of Transportation (Medical): No    Lack of Transportation (Non-Medical): No   Physical Activity: Inactive    Days of Exercise per Week: 0 days    Minutes of Exercise per Session: 0 min   Stress: No Stress Concern Present    Feeling of Stress : Only a little   Social Connections: Unknown    Frequency of Communication with Friends and Family: More than three times a week    Frequency of Social Gatherings with Friends and Family: More than three times a week    Attends Baptism Services: Never    Active Member of Clubs or Organizations: No    Attends Club or Organization Meetings: Never    Marital Status: Not on file   Intimate Partner Violence: Not At Risk    Fear of Current or Ex-Partner: No    Emotionally Abused: No    Physically Abused: No    Sexually Abused: No   Housing Stability: Low Risk     Unable to Pay for Housing in the Last Year: No    Number of [place] in the Last Year: 1    Unstable Housing in the Last Year: No         ALLERGIES: Latex, natural rubber and Adhesive    Review of Systems   Constitutional:  Negative for fever. Cardiovascular:  Negative for chest pain. Gastrointestinal:  Negative for abdominal pain. Genitourinary:  Positive for decreased urine volume. Musculoskeletal:  Positive for back pain. Neurological:  Negative for weakness and numbness.      Vitals: 03/23/23 1211   BP: 108/72   Pulse: 78   Resp: 16   Temp: 98 °F (36.7 °C)   SpO2: 98%            Physical Exam  Vitals and nursing note reviewed. Constitutional:       General: He is not in acute distress. Appearance: Normal appearance. He is not ill-appearing. HENT:      Head: Normocephalic and atraumatic. Right Ear: External ear normal.      Left Ear: External ear normal.      Nose: Nose normal.      Mouth/Throat:      Mouth: Mucous membranes are moist.      Pharynx: Oropharynx is clear. Eyes:      Extraocular Movements: Extraocular movements intact. Conjunctiva/sclera: Conjunctivae normal.   Cardiovascular:      Rate and Rhythm: Normal rate and regular rhythm. Pulses: Normal pulses. Heart sounds: Normal heart sounds. Pulmonary:      Effort: Pulmonary effort is normal. No respiratory distress. Breath sounds: Normal breath sounds. No wheezing. Abdominal:      General: Abdomen is flat. Bowel sounds are normal.      Palpations: Abdomen is soft. Tenderness: There is no abdominal tenderness. There is no guarding. Genitourinary:     Comments: Deferred  Musculoskeletal:         General: No swelling. Normal range of motion. Cervical back: Normal range of motion. Comments: Tenderness to palpation of the mid thoracic spine without step-off or deformity   Skin:     General: Skin is warm and dry. Capillary Refill: Capillary refill takes less than 2 seconds. Findings: No rash. Neurological:      General: No focal deficit present. Mental Status: He is alert and oriented to person, place, and time. Comments: Moving all extremities   Psychiatric:         Mood and Affect: Mood normal.         Behavior: Behavior normal.      Comments: Has decision making capacity        Medical Decision Making  Differential diagnosis includes worsening fracture, less likely but considered ACS, CHF, pneumonia, pneumothorax. Patient is here for pain in his back.   Was discharged yesterday. Was discharged yesterday after hospitalization for acute on chronic CHF, chronic hypoxic respiratory failure. Had an echo performed which showed 41 to 45% ejection fraction, he had elevated BNP of 3900, this is only minimally elevated from last evening. He has no respiratory symptoms no chest pain. Currently on Bumex and Aldactone. Imaging of his back showed no acute fracture of thoracic or lumbar spine. Chest x-ray shows a moderate-sized left effusion and left basilar atelectasis probable trace right pleural fluid. Patient had thoracentesis performed 2 days ago. Given patient's only symptoms are back pain recommended outpatient follow-up with cardiology and pulmonology. Patient was excepted rehab facility. Discussed care with the patient's family. Patient amenable to placement. Accepted to rehab facility. Amount and/or Complexity of Data Reviewed  Labs: ordered. Radiology: ordered. Risk  Prescription drug management.            Procedures

## 2023-03-23 NOTE — PROGRESS NOTES
Care Transitions Initial Call    Call within 2 business days of discharge: Yes     Patient Current Location: [place]    Patient: Maria Teresa Esquivel Patient : 1935 MRN: [id number]    Last Discharge [address]       Date Complaint Diagnosis Description Type Department Provider    3/23/23 Back Pain  ED JAYDEN Mar, DO            Was this an external facility discharge? No Discharge Facility: [facility]    Challenges to be reviewed by the provider   -Declined SNF placement. Lives with daughter. Method of communication with provider : none    Discussed COVID-19 related testing which was not done at this time. Advance Care Planning:   Does patient have an Advance Directive: not on file. Inpatient Readmission Risk score: Unplanned Readmit Risk Score: 16.3    Was this a readmission? no   Patient stated reason for the admission: \"back pain\"    Patients top risk factors for readmission: functional physical ability   Interventions to address risk factors:  Declines SNF, daughter states that [facility] PT was ordered. Daughter has contact information      Care Transition Nurse (CTN) contacted the family by telephone to perform post hospital discharge assessment. Verified name and  with family as identifiers. Provided introduction to self, and explanation of the CTN role. CTN reviewed discharge instructions, medical action plan and red flags with family who verbalized understanding. Were discharge instructions available to patient? yes. Reviewed appropriate site of care based on symptoms and resources available to patient including: PCP, Specialist, [address] [place] [address] Monico Mckee, and When to call 911. Family given an opportunity to ask questions and does not have any further questions or concerns at this time. The family agrees to contact the PCP office for questions related to their healthcare.      Medication reconciliation was performed with family, who verbalizes understanding of administration of home medications. Advised obtaining a 90-day supply of all daily and as-needed medications. Referral to Pharm D needed: no     Home Health/Outpatient orders at discharge: home health care, PT, and [address]  Date of initial visit: TBA    Durable Medical Equipment ordered at discharge: [address]: [address] received: Yes    Was patient discharged with a pulse oximeter? no    Discussed follow-up appointments. If no appointment was previously scheduled, appointment scheduling offered: yes. Is follow up appointment scheduled within 7 days of discharge? no.   [facility] follow up appointment(s):   Future Appointments   Date Time Provider Radha Bahena   3/30/2023  9:00 AM BOB Murillo AMB   4/5/2023  1:40 PM LEONIDAS Dupree AMB   5/1/2023 11:15 AM BOB Jones AMB     Non-[facility] follow up appointment(s): none    Plan for follow-up call in 5-7 days based on severity of symptoms and risk factors. Plan for next call: Follow up appointments  CTN provided contact information for future needs. Goals Addressed                   This Visit's Progress     ACP        3/23/2023  -No ACP on file. CTN to discuss next call  SP       Attends follow-up appointments as directed. 3/23/2023  -Daughter to call Pulmonology and Orthopedic for follow up appointments. Declines CTN's assistance.   -will attend PCP follow up on 3/30/2023  -CTN to follow up in 1 week  SP

## 2023-03-23 NOTE — PROGRESS NOTES
CARE MANAGEMENT NOTE      Pt discharged from [facility] with [facility] services on 3/22/23. Pt arrived to ED today with inability to ambulate and pain. Nurse notified CM that family was asking to see CM. CM met with Pt and daughter at bedside. Daughter states that with Pt's pain and inability to ambulate she can't safely care for him at home. If Pt does not need to be admitted, daughter reports family is interested in SNF placement. CM provided education on SNF rehab and provided family with SNF rehab list. CM made Pt/family aware that they would need to choose 3 facilities to be contacted for rehab placement. Daughter states that she wants to speak with her sister before making a decision. Daughter asked CM to come back in a little while. CM met with Pt's other daughter at bedside. Daughter reports Yanique Dangelohire of [address] as preferences (in that order). CM spoke with [facility] at [facility]. [facility] confirms they have bed available today and are able to accept. Pt will be admitted to room 115A. Dennise asked that nurse call report to [phone]. CM sent medicals to [facility] via [employer]. CM met with Pt and daughter at bedside. CM answered all additional questions. CM and family discussed transportation options. Family would like ambulance transport arranged. CM made family aware that CM could not guarantee insurance coverage of transportation. Family voiced understanding. CM spoke with Fritz Scott at [facility].  Transportation arranged for 5:30pm. PCS and medicals for [facility] trip provided to unit secretary for [facility] transport.       _____________________________________  Luis Avalos [address] [facility]   Available via [facility]  3/23/2023   3:38 PM

## 2023-03-23 NOTE — DISCHARGE INSTRUCTIONS
You presented to the ED for evaluation of back pain. You have had no respiratory or cardiac symptoms. You currently have a plan to follow-up outpatient with your pulmonologist.  We have been able to put you in the rehab facility to increase your strength given your pain. If you develop any worsening symptoms such as shortness of breath, chest pain, worsening fluid return the emergency department. Please continue taking your diuretic medications. Return to the emergency department for any worsening or concerning symptoms.   Please follow-up with your pulmonologist cardiologist.

## 2023-03-23 NOTE — ED TRIAGE NOTES
Patient brought to ED via ems from home. Patient was d/c from this hospital yesterday. Reports he had an injection in his back and was able to walk to car yesterday upon d/c. This morning patient reports waking up and not being able to walk. Daughter gave him tylenol and patient reports this did seem to help a little. Moaning when moving from ems stretcher to ed. Otherwise appears comfortable when lying still.

## 2023-03-25 LAB
BACTERIA SPEC CULT: NORMAL
GRAM STN SPEC: NORMAL
GRAM STN SPEC: NORMAL
SERVICE CMNT-IMP: NORMAL

## 2023-03-29 RX ORDER — ATORVASTATIN CALCIUM 80 MG/1
TABLET, FILM COATED ORAL
Qty: 90 TABLET | Refills: 2 | Status: SHIPPED | OUTPATIENT
Start: 2023-03-29

## 2023-03-30 ENCOUNTER — PATIENT OUTREACH (OUTPATIENT)
Dept: CASE MANAGEMENT | Age: 88
End: 2023-03-30

## 2023-03-30 NOTE — PROGRESS NOTES
CTN spoke with Daughter, Jessica Miller, who reports patient has been admitted to [facility].  CTN will follow up in 10 days

## 2023-04-07 ENCOUNTER — TELEPHONE (OUTPATIENT)
Dept: INTERNAL MEDICINE CLINIC | Age: 88
End: 2023-04-07

## 2023-04-22 RX ORDER — METOPROLOL SUCCINATE 25 MG/1
TABLET, EXTENDED RELEASE ORAL
Qty: 90 TABLET | Refills: 1 | Status: SHIPPED | OUTPATIENT
Start: 2023-04-22

## 2023-04-22 RX ORDER — BUMETANIDE 1 MG/1
TABLET ORAL
Qty: 180 TABLET | Refills: 1 | Status: SHIPPED | OUTPATIENT
Start: 2023-04-22

## 2023-04-24 ENCOUNTER — APPOINTMENT (OUTPATIENT)
Dept: GENERAL RADIOLOGY | Age: 88
DRG: 516 | End: 2023-04-24
Attending: NURSE PRACTITIONER
Payer: MEDICARE

## 2023-04-24 ENCOUNTER — APPOINTMENT (OUTPATIENT)
Dept: MRI IMAGING | Age: 88
DRG: 516 | End: 2023-04-24
Attending: NURSE PRACTITIONER
Payer: MEDICARE

## 2023-04-24 ENCOUNTER — HOSPITAL ENCOUNTER (INPATIENT)
Age: 88
LOS: 6 days | Discharge: NURSING, REHAB OR LONG-TERM CARE | DRG: 516 | End: 2023-05-02
Attending: EMERGENCY MEDICINE | Admitting: INTERNAL MEDICINE
Payer: MEDICARE

## 2023-04-24 DIAGNOSIS — I49.9 IRREGULAR HEART BEAT: ICD-10-CM

## 2023-04-24 DIAGNOSIS — I25.5 ISCHEMIC CARDIOMYOPATHY: ICD-10-CM

## 2023-04-24 DIAGNOSIS — S22.080D COMPRESSION FRACTURE OF T11 VERTEBRA WITH ROUTINE HEALING, SUBSEQUENT ENCOUNTER: ICD-10-CM

## 2023-04-24 DIAGNOSIS — I44.2 COMPLETE HEART BLOCK (HCC): ICD-10-CM

## 2023-04-24 DIAGNOSIS — S22.000A THORACIC COMPRESSION FRACTURE, CLOSED, INITIAL ENCOUNTER (HCC): Primary | ICD-10-CM

## 2023-04-24 DIAGNOSIS — M54.6 ACUTE MIDLINE THORACIC BACK PAIN: ICD-10-CM

## 2023-04-24 PROBLEM — I48.91 ATRIAL FIBRILLATION (HCC): Status: RESOLVED | Noted: 2017-03-15 | Resolved: 2023-04-24

## 2023-04-24 PROBLEM — L97.909 LEG ULCER (HCC): Status: RESOLVED | Noted: 2019-10-24 | Resolved: 2023-04-24

## 2023-04-24 PROBLEM — E87.6 HYPOKALEMIA: Status: RESOLVED | Noted: 2020-07-21 | Resolved: 2023-04-24

## 2023-04-24 PROBLEM — I50.9 ACUTE ON CHRONIC CONGESTIVE HEART FAILURE (HCC): Status: RESOLVED | Noted: 2022-04-11 | Resolved: 2023-04-24

## 2023-04-24 PROBLEM — I48.0 PAROXYSMAL A-FIB (HCC): Status: ACTIVE | Noted: 2017-03-15

## 2023-04-24 PROBLEM — I50.30 DIASTOLIC CHF (HCC): Status: RESOLVED | Noted: 2020-07-21 | Resolved: 2023-04-24

## 2023-04-24 PROBLEM — Z86.718 HX OF DEEP VENOUS THROMBOSIS: Status: ACTIVE | Noted: 2023-04-24

## 2023-04-24 PROBLEM — M48.44XA THORACIC STRESS FRACTURE: Status: ACTIVE | Noted: 2023-04-24

## 2023-04-24 PROBLEM — I83.009 VENOUS STASIS ULCER (HCC): Status: RESOLVED | Noted: 2020-07-21 | Resolved: 2023-04-24

## 2023-04-24 PROBLEM — I82.552 CHRONIC DEEP VEIN THROMBOSIS (DVT) OF LEFT PERONEAL VEIN (HCC): Status: RESOLVED | Noted: 2019-10-25 | Resolved: 2023-04-24

## 2023-04-24 PROBLEM — Z79.01 CHRONIC ANTICOAGULATION: Status: ACTIVE | Noted: 2023-04-24

## 2023-04-24 PROBLEM — I73.9 PERIPHERAL VASCULAR DISEASE (HCC): Status: RESOLVED | Noted: 2020-08-12 | Resolved: 2023-04-24

## 2023-04-24 PROBLEM — I50.9 CHF (CONGESTIVE HEART FAILURE) (HCC): Status: RESOLVED | Noted: 2019-10-24 | Resolved: 2023-04-24

## 2023-04-24 PROBLEM — I50.22 CHF (CONGESTIVE HEART FAILURE), NYHA CLASS I, CHRONIC, SYSTOLIC (HCC): Status: ACTIVE | Noted: 2023-04-24

## 2023-04-24 PROBLEM — J44.9 CHRONIC OBSTRUCTIVE PULMONARY DISEASE (HCC): Status: ACTIVE | Noted: 2023-04-24

## 2023-04-24 PROBLEM — L97.909 VENOUS STASIS ULCER (HCC): Status: RESOLVED | Noted: 2020-07-21 | Resolved: 2023-04-24

## 2023-04-24 PROBLEM — J90 PLEURAL EFFUSION: Status: RESOLVED | Noted: 2023-03-17 | Resolved: 2023-04-24

## 2023-04-24 PROBLEM — I10 HYPERTENSION: Status: ACTIVE | Noted: 2019-10-24

## 2023-04-24 LAB
ALBUMIN SERPL-MCNC: 2.8 G/DL (ref 3.5–5)
ALBUMIN/GLOB SERPL: 0.6 (ref 1.1–2.2)
ALP SERPL-CCNC: 204 U/L (ref 45–117)
ALT SERPL-CCNC: 33 U/L (ref 12–78)
ANION GAP SERPL CALC-SCNC: ABNORMAL MMOL/L (ref 5–15)
AST SERPL-CCNC: 42 U/L (ref 15–37)
BILIRUB SERPL-MCNC: 0.5 MG/DL (ref 0.2–1)
BUN SERPL-MCNC: 30 MG/DL (ref 6–20)
BUN/CREAT SERPL: 32 (ref 12–20)
CALCIUM SERPL-MCNC: 9.7 MG/DL (ref 8.5–10.1)
CHLORIDE SERPL-SCNC: 105 MMOL/L (ref 97–108)
CO2 SERPL-SCNC: 38 MMOL/L (ref 21–32)
CREAT SERPL-MCNC: 0.94 MG/DL (ref 0.7–1.3)
GLOBULIN SER CALC-MCNC: 4.6 G/DL (ref 2–4)
GLUCOSE SERPL-MCNC: 105 MG/DL (ref 65–100)
INR PPP: 1.1 (ref 0.9–1.1)
MAGNESIUM SERPL-MCNC: 2.1 MG/DL (ref 1.6–2.4)
PHOSPHATE SERPL-MCNC: 2.8 MG/DL (ref 2.6–4.7)
POTASSIUM SERPL-SCNC: 4 MMOL/L (ref 3.5–5.1)
PROT SERPL-MCNC: 7.4 G/DL (ref 6.4–8.2)
PROTHROMBIN TIME: 11.4 SEC (ref 9–11.1)
PSA SERPL-MCNC: 0 NG/ML (ref 0.01–4)
SODIUM SERPL-SCNC: 140 MMOL/L (ref 136–145)

## 2023-04-24 PROCEDURE — G0378 HOSPITAL OBSERVATION PER HR: HCPCS

## 2023-04-24 PROCEDURE — 84153 ASSAY OF PSA TOTAL: CPT

## 2023-04-24 PROCEDURE — 84100 ASSAY OF PHOSPHORUS: CPT

## 2023-04-24 PROCEDURE — 74011000250 HC RX REV CODE- 250: Performed by: INTERNAL MEDICINE

## 2023-04-24 PROCEDURE — 80053 COMPREHEN METABOLIC PANEL: CPT

## 2023-04-24 PROCEDURE — 94640 AIRWAY INHALATION TREATMENT: CPT

## 2023-04-24 PROCEDURE — 72146 MRI CHEST SPINE W/O DYE: CPT

## 2023-04-24 PROCEDURE — 83735 ASSAY OF MAGNESIUM: CPT

## 2023-04-24 PROCEDURE — 85610 PROTHROMBIN TIME: CPT

## 2023-04-24 PROCEDURE — 74011250637 HC RX REV CODE- 250/637: Performed by: INTERNAL MEDICINE

## 2023-04-24 PROCEDURE — 99285 EMERGENCY DEPT VISIT HI MDM: CPT

## 2023-04-24 PROCEDURE — 72070 X-RAY EXAM THORAC SPINE 2VWS: CPT

## 2023-04-24 PROCEDURE — 36415 COLL VENOUS BLD VENIPUNCTURE: CPT

## 2023-04-24 RX ORDER — SODIUM CHLORIDE 0.9 % (FLUSH) 0.9 %
5-40 SYRINGE (ML) INJECTION EVERY 8 HOURS
Status: DISCONTINUED | OUTPATIENT
Start: 2023-04-24 | End: 2023-04-29

## 2023-04-24 RX ORDER — ACETAMINOPHEN 650 MG/1
650 SUPPOSITORY RECTAL
Status: DISCONTINUED | OUTPATIENT
Start: 2023-04-24 | End: 2023-05-02 | Stop reason: HOSPADM

## 2023-04-24 RX ORDER — ATORVASTATIN CALCIUM 20 MG/1
80 TABLET, FILM COATED ORAL
Status: DISCONTINUED | OUTPATIENT
Start: 2023-04-24 | End: 2023-05-02 | Stop reason: HOSPADM

## 2023-04-24 RX ORDER — OXYCODONE AND ACETAMINOPHEN 5; 325 MG/1; MG/1
1 TABLET ORAL
Status: DISCONTINUED | OUTPATIENT
Start: 2023-04-24 | End: 2023-05-02 | Stop reason: HOSPADM

## 2023-04-24 RX ORDER — IPRATROPIUM BROMIDE AND ALBUTEROL SULFATE 2.5; .5 MG/3ML; MG/3ML
3 SOLUTION RESPIRATORY (INHALATION)
Status: DISCONTINUED | OUTPATIENT
Start: 2023-04-24 | End: 2023-05-02 | Stop reason: HOSPADM

## 2023-04-24 RX ORDER — IBUPROFEN 400 MG/1
400 TABLET ORAL
Status: DISCONTINUED | OUTPATIENT
Start: 2023-04-24 | End: 2023-04-26

## 2023-04-24 RX ORDER — POLYETHYLENE GLYCOL 3350 17 G/17G
17 POWDER, FOR SOLUTION ORAL DAILY PRN
Status: DISCONTINUED | OUTPATIENT
Start: 2023-04-24 | End: 2023-05-02 | Stop reason: HOSPADM

## 2023-04-24 RX ORDER — LIDOCAINE 4 G/100G
1 PATCH TOPICAL EVERY 24 HOURS
Status: DISCONTINUED | OUTPATIENT
Start: 2023-04-25 | End: 2023-05-02 | Stop reason: HOSPADM

## 2023-04-24 RX ORDER — ARFORMOTEROL TARTRATE 15 UG/2ML
15 SOLUTION RESPIRATORY (INHALATION)
Status: DISCONTINUED | OUTPATIENT
Start: 2023-04-24 | End: 2023-05-02 | Stop reason: HOSPADM

## 2023-04-24 RX ORDER — ACETAMINOPHEN 325 MG/1
650 TABLET ORAL
Status: DISCONTINUED | OUTPATIENT
Start: 2023-04-24 | End: 2023-05-02 | Stop reason: HOSPADM

## 2023-04-24 RX ORDER — BUDESONIDE 0.5 MG/2ML
500 INHALANT ORAL
Status: DISCONTINUED | OUTPATIENT
Start: 2023-04-24 | End: 2023-05-02 | Stop reason: HOSPADM

## 2023-04-24 RX ORDER — SODIUM CHLORIDE 0.9 % (FLUSH) 0.9 %
5-40 SYRINGE (ML) INJECTION AS NEEDED
Status: DISCONTINUED | OUTPATIENT
Start: 2023-04-24 | End: 2023-04-29

## 2023-04-24 RX ORDER — ONDANSETRON 2 MG/ML
4 INJECTION INTRAMUSCULAR; INTRAVENOUS
Status: DISCONTINUED | OUTPATIENT
Start: 2023-04-24 | End: 2023-05-02 | Stop reason: HOSPADM

## 2023-04-24 RX ORDER — METOPROLOL SUCCINATE 25 MG/1
25 TABLET, EXTENDED RELEASE ORAL DAILY
Status: DISCONTINUED | OUTPATIENT
Start: 2023-04-25 | End: 2023-05-02 | Stop reason: HOSPADM

## 2023-04-24 RX ORDER — ONDANSETRON 4 MG/1
4 TABLET, ORALLY DISINTEGRATING ORAL
Status: DISCONTINUED | OUTPATIENT
Start: 2023-04-24 | End: 2023-05-02 | Stop reason: HOSPADM

## 2023-04-24 RX ADMIN — ACETAMINOPHEN 650 MG: 325 TABLET ORAL at 21:00

## 2023-04-24 RX ADMIN — Medication 10 ML: at 21:04

## 2023-04-24 RX ADMIN — ARFORMOTEROL TARTRATE 15 MCG: 15 SOLUTION RESPIRATORY (INHALATION) at 19:55

## 2023-04-24 RX ADMIN — BUDESONIDE 500 MCG: 0.5 INHALANT RESPIRATORY (INHALATION) at 19:54

## 2023-04-24 RX ADMIN — ATORVASTATIN CALCIUM 80 MG: 20 TABLET, FILM COATED ORAL at 21:00

## 2023-04-25 LAB
ALBUMIN SERPL-MCNC: 2.3 G/DL (ref 3.5–5)
ALBUMIN/GLOB SERPL: 0.5 (ref 1.1–2.2)
ALP SERPL-CCNC: 190 U/L (ref 45–117)
ALT SERPL-CCNC: 30 U/L (ref 12–78)
ANION GAP SERPL CALC-SCNC: ABNORMAL MMOL/L (ref 5–15)
AST SERPL-CCNC: 33 U/L (ref 15–37)
BASOPHILS # BLD: 0 K/UL (ref 0–0.1)
BASOPHILS NFR BLD: 0 % (ref 0–1)
BILIRUB SERPL-MCNC: 0.4 MG/DL (ref 0.2–1)
BUN SERPL-MCNC: 28 MG/DL (ref 6–20)
BUN/CREAT SERPL: 32 (ref 12–20)
CALCIUM SERPL-MCNC: 8.7 MG/DL (ref 8.5–10.1)
CHLORIDE SERPL-SCNC: 107 MMOL/L (ref 97–108)
CO2 SERPL-SCNC: 34 MMOL/L (ref 21–32)
CREAT SERPL-MCNC: 0.88 MG/DL (ref 0.7–1.3)
DIFFERENTIAL METHOD BLD: ABNORMAL
EOSINOPHIL # BLD: 0.3 K/UL (ref 0–0.4)
EOSINOPHIL NFR BLD: 4 % (ref 0–7)
ERYTHROCYTE [DISTWIDTH] IN BLOOD BY AUTOMATED COUNT: 14 % (ref 11.5–14.5)
GLOBULIN SER CALC-MCNC: 4.2 G/DL (ref 2–4)
GLUCOSE SERPL-MCNC: 89 MG/DL (ref 65–100)
HCT VFR BLD AUTO: 34.1 % (ref 36.6–50.3)
HGB BLD-MCNC: 11 G/DL (ref 12.1–17)
IMM GRANULOCYTES # BLD AUTO: 0 K/UL (ref 0–0.04)
IMM GRANULOCYTES NFR BLD AUTO: 0 % (ref 0–0.5)
LYMPHOCYTES # BLD: 1.6 K/UL (ref 0.8–3.5)
LYMPHOCYTES NFR BLD: 25 % (ref 12–49)
MAGNESIUM SERPL-MCNC: 2 MG/DL (ref 1.6–2.4)
MCH RBC QN AUTO: 31.7 PG (ref 26–34)
MCHC RBC AUTO-ENTMCNC: 32.3 G/DL (ref 30–36.5)
MCV RBC AUTO: 98.3 FL (ref 80–99)
MONOCYTES # BLD: 0.8 K/UL (ref 0–1)
MONOCYTES NFR BLD: 12 % (ref 5–13)
NEUTS SEG # BLD: 3.9 K/UL (ref 1.8–8)
NEUTS SEG NFR BLD: 59 % (ref 32–75)
NRBC # BLD: 0 K/UL (ref 0–0.01)
NRBC BLD-RTO: 0 PER 100 WBC
PLATELET # BLD AUTO: 179 K/UL (ref 150–400)
PMV BLD AUTO: 9.6 FL (ref 8.9–12.9)
POTASSIUM SERPL-SCNC: 3.8 MMOL/L (ref 3.5–5.1)
PROT SERPL-MCNC: 6.5 G/DL (ref 6.4–8.2)
RBC # BLD AUTO: 3.47 M/UL (ref 4.1–5.7)
SODIUM SERPL-SCNC: 140 MMOL/L (ref 136–145)
WBC # BLD AUTO: 6.7 K/UL (ref 4.1–11.1)

## 2023-04-25 PROCEDURE — G0378 HOSPITAL OBSERVATION PER HR: HCPCS

## 2023-04-25 PROCEDURE — 36415 COLL VENOUS BLD VENIPUNCTURE: CPT

## 2023-04-25 PROCEDURE — 94640 AIRWAY INHALATION TREATMENT: CPT

## 2023-04-25 PROCEDURE — 83735 ASSAY OF MAGNESIUM: CPT

## 2023-04-25 PROCEDURE — 51798 US URINE CAPACITY MEASURE: CPT

## 2023-04-25 PROCEDURE — 94664 DEMO&/EVAL PT USE INHALER: CPT

## 2023-04-25 PROCEDURE — 85025 COMPLETE CBC W/AUTO DIFF WBC: CPT

## 2023-04-25 PROCEDURE — 74011000250 HC RX REV CODE- 250: Performed by: INTERNAL MEDICINE

## 2023-04-25 PROCEDURE — 74011250637 HC RX REV CODE- 250/637: Performed by: INTERNAL MEDICINE

## 2023-04-25 PROCEDURE — 80053 COMPREHEN METABOLIC PANEL: CPT

## 2023-04-25 RX ADMIN — METOPROLOL SUCCINATE 25 MG: 25 TABLET, EXTENDED RELEASE ORAL at 08:59

## 2023-04-25 RX ADMIN — Medication 10 ML: at 21:55

## 2023-04-25 RX ADMIN — Medication 20 ML: at 02:00

## 2023-04-25 RX ADMIN — ACETAMINOPHEN 650 MG: 325 TABLET ORAL at 18:15

## 2023-04-25 RX ADMIN — ARFORMOTEROL TARTRATE 15 MCG: 15 SOLUTION RESPIRATORY (INHALATION) at 07:36

## 2023-04-25 RX ADMIN — ARFORMOTEROL TARTRATE 15 MCG: 15 SOLUTION RESPIRATORY (INHALATION) at 20:15

## 2023-04-25 RX ADMIN — BUDESONIDE 500 MCG: 0.5 INHALANT RESPIRATORY (INHALATION) at 07:36

## 2023-04-25 RX ADMIN — BUDESONIDE 500 MCG: 0.5 INHALANT RESPIRATORY (INHALATION) at 20:15

## 2023-04-25 RX ADMIN — ATORVASTATIN CALCIUM 80 MG: 20 TABLET, FILM COATED ORAL at 21:55

## 2023-04-25 RX ADMIN — Medication 10 ML: at 13:58

## 2023-04-26 ENCOUNTER — TELEPHONE (OUTPATIENT)
Dept: INTERNAL MEDICINE CLINIC | Age: 88
End: 2023-04-26

## 2023-04-26 ENCOUNTER — APPOINTMENT (OUTPATIENT)
Dept: INTERVENTIONAL RADIOLOGY/VASCULAR | Age: 88
DRG: 516 | End: 2023-04-26
Attending: INTERNAL MEDICINE
Payer: MEDICARE

## 2023-04-26 PROCEDURE — 94640 AIRWAY INHALATION TREATMENT: CPT

## 2023-04-26 PROCEDURE — 74011250637 HC RX REV CODE- 250/637: Performed by: INTERNAL MEDICINE

## 2023-04-26 PROCEDURE — 94761 N-INVAS EAR/PLS OXIMETRY MLT: CPT

## 2023-04-26 PROCEDURE — G0378 HOSPITAL OBSERVATION PER HR: HCPCS

## 2023-04-26 PROCEDURE — 65270000029 HC RM PRIVATE

## 2023-04-26 PROCEDURE — 74011000250 HC RX REV CODE- 250: Performed by: INTERNAL MEDICINE

## 2023-04-26 RX ORDER — SPIRONOLACTONE 100 MG/1
100 TABLET, FILM COATED ORAL DAILY
Status: DISCONTINUED | OUTPATIENT
Start: 2023-04-27 | End: 2023-05-02 | Stop reason: HOSPADM

## 2023-04-26 RX ORDER — AMOXICILLIN 250 MG
1 CAPSULE ORAL 2 TIMES DAILY
Status: DISCONTINUED | OUTPATIENT
Start: 2023-04-26 | End: 2023-04-29

## 2023-04-26 RX ORDER — BUMETANIDE 1 MG/1
1 TABLET ORAL 2 TIMES DAILY
Status: DISCONTINUED | OUTPATIENT
Start: 2023-04-26 | End: 2023-05-02 | Stop reason: HOSPADM

## 2023-04-26 RX ADMIN — BUMETANIDE 1 MG: 1 TABLET ORAL at 17:59

## 2023-04-26 RX ADMIN — Medication 10 ML: at 14:45

## 2023-04-26 RX ADMIN — ATORVASTATIN CALCIUM 80 MG: 20 TABLET, FILM COATED ORAL at 21:27

## 2023-04-26 RX ADMIN — BUDESONIDE 500 MCG: 0.5 INHALANT RESPIRATORY (INHALATION) at 20:26

## 2023-04-26 RX ADMIN — BUDESONIDE 500 MCG: 0.5 INHALANT RESPIRATORY (INHALATION) at 07:24

## 2023-04-26 RX ADMIN — SENNOSIDES AND DOCUSATE SODIUM 1 TABLET: 50; 8.6 TABLET ORAL at 17:59

## 2023-04-26 RX ADMIN — ARFORMOTEROL TARTRATE 15 MCG: 15 SOLUTION RESPIRATORY (INHALATION) at 20:26

## 2023-04-26 RX ADMIN — ARFORMOTEROL TARTRATE 15 MCG: 15 SOLUTION RESPIRATORY (INHALATION) at 07:24

## 2023-04-26 RX ADMIN — Medication 10 ML: at 06:49

## 2023-04-26 RX ADMIN — Medication 10 ML: at 22:00

## 2023-04-26 RX ADMIN — ACETAMINOPHEN 650 MG: 325 TABLET ORAL at 18:00

## 2023-04-26 NOTE — TELEPHONE ENCOUNTER
----- Message from Edna Rahman sent at 4/26/2023 11:41 AM EDT -----  Subject: Message to Provider    QUESTIONS  Information for Provider? Patient needs to cancel appointment on May 1 @   11:15am. Also daughter Narendra Luciano would like to talk to the nurse. Please   call  ---------------------------------------------------------------------------  --------------  Doug TRAORE  [phone]; OK to leave message on voicemail  ---------------------------------------------------------------------------  --------------  SCRIPT ANSWERS  Relationship to Patient? Other/Third Party  Representative Name? Mickie Colvin  Is the representative on the Communication Release of Information (GURU)   form in Epic?  Yes

## 2023-04-27 ENCOUNTER — APPOINTMENT (OUTPATIENT)
Dept: INTERVENTIONAL RADIOLOGY/VASCULAR | Age: 88
DRG: 516 | End: 2023-04-27
Attending: INTERNAL MEDICINE
Payer: MEDICARE

## 2023-04-27 LAB
ANION GAP SERPL CALC-SCNC: 0 MMOL/L (ref 5–15)
APPEARANCE UR: CLEAR
BASOPHILS # BLD: 0 K/UL (ref 0–0.1)
BASOPHILS NFR BLD: 0 % (ref 0–1)
BILIRUB UR QL: NEGATIVE
BUN SERPL-MCNC: 24 MG/DL (ref 6–20)
BUN/CREAT SERPL: 26 (ref 12–20)
CALCIUM SERPL-MCNC: 8.7 MG/DL (ref 8.5–10.1)
CHLORIDE SERPL-SCNC: 104 MMOL/L (ref 97–108)
CO2 SERPL-SCNC: 33 MMOL/L (ref 21–32)
COLOR UR: NORMAL
CREAT SERPL-MCNC: 0.94 MG/DL (ref 0.7–1.3)
DIFFERENTIAL METHOD BLD: ABNORMAL
EOSINOPHIL # BLD: 0.4 K/UL (ref 0–0.4)
EOSINOPHIL NFR BLD: 5 % (ref 0–7)
ERYTHROCYTE [DISTWIDTH] IN BLOOD BY AUTOMATED COUNT: 14 % (ref 11.5–14.5)
GLUCOSE SERPL-MCNC: 95 MG/DL (ref 65–100)
GLUCOSE UR STRIP.AUTO-MCNC: NEGATIVE MG/DL
HCT VFR BLD AUTO: 36.8 % (ref 36.6–50.3)
HGB BLD-MCNC: 11.7 G/DL (ref 12.1–17)
HGB UR QL STRIP: NEGATIVE
IMM GRANULOCYTES # BLD AUTO: 0 K/UL (ref 0–0.04)
IMM GRANULOCYTES NFR BLD AUTO: 0 % (ref 0–0.5)
KETONES UR QL STRIP.AUTO: NEGATIVE MG/DL
LEUKOCYTE ESTERASE UR QL STRIP.AUTO: NEGATIVE
LYMPHOCYTES # BLD: 1.8 K/UL (ref 0.8–3.5)
LYMPHOCYTES NFR BLD: 25 % (ref 12–49)
MCH RBC QN AUTO: 31 PG (ref 26–34)
MCHC RBC AUTO-ENTMCNC: 31.8 G/DL (ref 30–36.5)
MCV RBC AUTO: 97.4 FL (ref 80–99)
MONOCYTES # BLD: 0.9 K/UL (ref 0–1)
MONOCYTES NFR BLD: 13 % (ref 5–13)
NEUTS SEG # BLD: 4 K/UL (ref 1.8–8)
NEUTS SEG NFR BLD: 57 % (ref 32–75)
NITRITE UR QL STRIP.AUTO: NEGATIVE
NRBC # BLD: 0 K/UL (ref 0–0.01)
NRBC BLD-RTO: 0 PER 100 WBC
PH UR STRIP: 5 (ref 5–8)
PLATELET # BLD AUTO: 180 K/UL (ref 150–400)
PMV BLD AUTO: 9.5 FL (ref 8.9–12.9)
POTASSIUM SERPL-SCNC: 3.7 MMOL/L (ref 3.5–5.1)
PROT UR STRIP-MCNC: NEGATIVE MG/DL
RBC # BLD AUTO: 3.78 M/UL (ref 4.1–5.7)
SODIUM SERPL-SCNC: 137 MMOL/L (ref 136–145)
SP GR UR REFRACTOMETRY: 1.01 (ref 1–1.03)
UROBILINOGEN UR QL STRIP.AUTO: 0.2 EU/DL (ref 0.2–1)
WBC # BLD AUTO: 7.1 K/UL (ref 4.1–11.1)

## 2023-04-27 PROCEDURE — 33210 INSERT ELECTRD/PM CATH SNGL: CPT | Performed by: STUDENT IN AN ORGANIZED HEALTH CARE EDUCATION/TRAINING PROGRAM

## 2023-04-27 PROCEDURE — 99223 1ST HOSP IP/OBS HIGH 75: CPT | Performed by: STUDENT IN AN ORGANIZED HEALTH CARE EDUCATION/TRAINING PROGRAM

## 2023-04-27 PROCEDURE — 2709999900 HC NON-CHARGEABLE SUPPLY

## 2023-04-27 PROCEDURE — 22513 PERQ VERTEBRAL AUGMENTATION: CPT

## 2023-04-27 PROCEDURE — 87186 SC STD MICRODIL/AGAR DIL: CPT

## 2023-04-27 PROCEDURE — 36415 COLL VENOUS BLD VENIPUNCTURE: CPT

## 2023-04-27 PROCEDURE — 74011000250 HC RX REV CODE- 250: Performed by: INTERNAL MEDICINE

## 2023-04-27 PROCEDURE — 87077 CULTURE AEROBIC IDENTIFY: CPT

## 2023-04-27 PROCEDURE — 87086 URINE CULTURE/COLONY COUNT: CPT

## 2023-04-27 PROCEDURE — 74011000250 HC RX REV CODE- 250: Performed by: STUDENT IN AN ORGANIZED HEALTH CARE EDUCATION/TRAINING PROGRAM

## 2023-04-27 PROCEDURE — 74011250636 HC RX REV CODE- 250/636: Performed by: INTERNAL MEDICINE

## 2023-04-27 PROCEDURE — 99152 MOD SED SAME PHYS/QHP 5/>YRS: CPT

## 2023-04-27 PROCEDURE — 5A1223Z PERFORMANCE OF CARDIAC PACING, CONTINUOUS: ICD-10-PCS | Performed by: STUDENT IN AN ORGANIZED HEALTH CARE EDUCATION/TRAINING PROGRAM

## 2023-04-27 PROCEDURE — 99153 MOD SED SAME PHYS/QHP EA: CPT

## 2023-04-27 PROCEDURE — 74011250637 HC RX REV CODE- 250/637: Performed by: INTERNAL MEDICINE

## 2023-04-27 PROCEDURE — 65610000006 HC RM INTENSIVE CARE

## 2023-04-27 PROCEDURE — 77030003666 HC NDL SPINAL BD -A

## 2023-04-27 PROCEDURE — 77030005320 HC CATH PACE TEMP STJU -B: Performed by: STUDENT IN AN ORGANIZED HEALTH CARE EDUCATION/TRAINING PROGRAM

## 2023-04-27 PROCEDURE — 74011250636 HC RX REV CODE- 250/636: Performed by: STUDENT IN AN ORGANIZED HEALTH CARE EDUCATION/TRAINING PROGRAM

## 2023-04-27 PROCEDURE — 77030041244 HC CBL PACE EXT TEMP REMG -B: Performed by: STUDENT IN AN ORGANIZED HEALTH CARE EDUCATION/TRAINING PROGRAM

## 2023-04-27 PROCEDURE — 81003 URINALYSIS AUTO W/O SCOPE: CPT

## 2023-04-27 PROCEDURE — 99211 OFF/OP EST MAY X REQ PHY/QHP: CPT

## 2023-04-27 PROCEDURE — 94640 AIRWAY INHALATION TREATMENT: CPT

## 2023-04-27 PROCEDURE — C1894 INTRO/SHEATH, NON-LASER: HCPCS | Performed by: STUDENT IN AN ORGANIZED HEALTH CARE EDUCATION/TRAINING PROGRAM

## 2023-04-27 PROCEDURE — 2709999900 HC NON-CHARGEABLE SUPPLY: Performed by: STUDENT IN AN ORGANIZED HEALTH CARE EDUCATION/TRAINING PROGRAM

## 2023-04-27 PROCEDURE — 80048 BASIC METABOLIC PNL TOTAL CA: CPT

## 2023-04-27 PROCEDURE — 94664 DEMO&/EVAL PT USE INHALER: CPT

## 2023-04-27 PROCEDURE — 85025 COMPLETE CBC W/AUTO DIFF WBC: CPT

## 2023-04-27 PROCEDURE — 77030002996 HC SUT SLK J&J -A: Performed by: STUDENT IN AN ORGANIZED HEALTH CARE EDUCATION/TRAINING PROGRAM

## 2023-04-27 DEVICE — BIPOLAR PACING CATHETER
Type: IMPLANTABLE DEVICE | Status: FUNCTIONAL
Brand: PACEL™

## 2023-04-27 RX ORDER — BUPIVACAINE HYDROCHLORIDE 2.5 MG/ML
5-30 INJECTION, SOLUTION EPIDURAL; INFILTRATION; INTRACAUDAL ONCE
Status: DISCONTINUED | OUTPATIENT
Start: 2023-04-27 | End: 2023-04-27

## 2023-04-27 RX ORDER — ATROPINE SULFATE 0.1 MG/ML
0.5 INJECTION INTRAVENOUS
Status: DISCONTINUED | OUTPATIENT
Start: 2023-04-27 | End: 2023-05-02 | Stop reason: HOSPADM

## 2023-04-27 RX ORDER — ATROPINE SULFATE 0.1 MG/ML
0.5 INJECTION INTRAVENOUS
Status: ACTIVE | OUTPATIENT
Start: 2023-04-27 | End: 2023-04-27

## 2023-04-27 RX ORDER — FENTANYL CITRATE 50 UG/ML
25-200 INJECTION, SOLUTION INTRAMUSCULAR; INTRAVENOUS
Status: DISCONTINUED | OUTPATIENT
Start: 2023-04-27 | End: 2023-04-27

## 2023-04-27 RX ORDER — BUPIVACAINE HYDROCHLORIDE 5 MG/ML
5-10 INJECTION, SOLUTION EPIDURAL; INTRACAUDAL
Status: DISCONTINUED | OUTPATIENT
Start: 2023-04-27 | End: 2023-04-27

## 2023-04-27 RX ORDER — LIDOCAINE HYDROCHLORIDE 10 MG/ML
INJECTION INFILTRATION; PERINEURAL AS NEEDED
Status: DISCONTINUED | OUTPATIENT
Start: 2023-04-27 | End: 2023-04-27 | Stop reason: HOSPADM

## 2023-04-27 RX ORDER — MIDAZOLAM HYDROCHLORIDE 1 MG/ML
.5-1 INJECTION, SOLUTION INTRAMUSCULAR; INTRAVENOUS
Status: DISCONTINUED | OUTPATIENT
Start: 2023-04-27 | End: 2023-04-27

## 2023-04-27 RX ORDER — DIPHENHYDRAMINE HYDROCHLORIDE 50 MG/ML
25 INJECTION, SOLUTION INTRAMUSCULAR; INTRAVENOUS ONCE
Status: COMPLETED | OUTPATIENT
Start: 2023-04-27 | End: 2023-04-27

## 2023-04-27 RX ORDER — LIDOCAINE HYDROCHLORIDE 10 MG/ML
5-15 INJECTION, SOLUTION EPIDURAL; INFILTRATION; INTRACAUDAL; PERINEURAL ONCE
Status: DISCONTINUED | OUTPATIENT
Start: 2023-04-27 | End: 2023-04-27

## 2023-04-27 RX ORDER — CEFAZOLIN SODIUM 1 G/3ML
2 INJECTION, POWDER, FOR SOLUTION INTRAMUSCULAR; INTRAVENOUS ONCE
Status: DISCONTINUED | OUTPATIENT
Start: 2023-04-27 | End: 2023-04-27

## 2023-04-27 RX ORDER — KETOROLAC TROMETHAMINE 30 MG/ML
15 INJECTION, SOLUTION INTRAMUSCULAR; INTRAVENOUS ONCE
Status: COMPLETED | OUTPATIENT
Start: 2023-04-27 | End: 2023-04-27

## 2023-04-27 RX ORDER — DOPAMINE HYDROCHLORIDE 320 MG/100ML
0-20 INJECTION, SOLUTION INTRAVENOUS
Status: DISCONTINUED | OUTPATIENT
Start: 2023-04-27 | End: 2023-04-28

## 2023-04-27 RX ORDER — ATROPINE SULFATE 0.1 MG/ML
0.5 INJECTION INTRAVENOUS
Status: DISCONTINUED | OUTPATIENT
Start: 2023-04-27 | End: 2023-04-27

## 2023-04-27 RX ADMIN — SENNOSIDES AND DOCUSATE SODIUM 1 TABLET: 50; 8.6 TABLET ORAL at 17:12

## 2023-04-27 RX ADMIN — BUDESONIDE 500 MCG: 0.5 INHALANT RESPIRATORY (INHALATION) at 20:48

## 2023-04-27 RX ADMIN — KETOROLAC TROMETHAMINE 15 MG: 30 INJECTION, SOLUTION INTRAMUSCULAR at 08:40

## 2023-04-27 RX ADMIN — FENTANYL CITRATE 50 MCG: 50 INJECTION, SOLUTION INTRAMUSCULAR; INTRAVENOUS at 08:45

## 2023-04-27 RX ADMIN — Medication 10 ML: at 06:10

## 2023-04-27 RX ADMIN — ARFORMOTEROL TARTRATE 15 MCG: 15 SOLUTION RESPIRATORY (INHALATION) at 20:48

## 2023-04-27 RX ADMIN — Medication 10 ML: at 22:50

## 2023-04-27 RX ADMIN — ATORVASTATIN CALCIUM 80 MG: 20 TABLET, FILM COATED ORAL at 22:49

## 2023-04-27 RX ADMIN — DIPHENHYDRAMINE HYDROCHLORIDE 25 MG: 50 INJECTION, SOLUTION INTRAMUSCULAR; INTRAVENOUS at 08:40

## 2023-04-27 RX ADMIN — MIDAZOLAM 1 MG: 1 INJECTION INTRAMUSCULAR; INTRAVENOUS at 08:50

## 2023-04-27 RX ADMIN — ATROPINE SULFATE 0.5 MG: 0.1 INJECTION INTRAVENOUS at 09:28

## 2023-04-27 RX ADMIN — Medication 10 ML: at 13:43

## 2023-04-27 RX ADMIN — WATER 2 G: 1 INJECTION INTRAMUSCULAR; INTRAVENOUS; SUBCUTANEOUS at 08:36

## 2023-04-28 ENCOUNTER — APPOINTMENT (OUTPATIENT)
Dept: GENERAL RADIOLOGY | Age: 88
DRG: 516 | End: 2023-04-28
Attending: INTERNAL MEDICINE
Payer: MEDICARE

## 2023-04-28 PROBLEM — Z95.0 BIVENTRICULAR CARDIAC PACEMAKER IN SITU: Status: ACTIVE | Noted: 2023-04-28

## 2023-04-28 LAB
ANION GAP SERPL CALC-SCNC: 2 MMOL/L (ref 5–15)
BASOPHILS # BLD: 0 K/UL (ref 0–0.1)
BASOPHILS NFR BLD: 1 % (ref 0–1)
BUN SERPL-MCNC: 28 MG/DL (ref 6–20)
BUN/CREAT SERPL: 35 (ref 12–20)
CALCIUM SERPL-MCNC: 8.2 MG/DL (ref 8.5–10.1)
CHLORIDE SERPL-SCNC: 109 MMOL/L (ref 97–108)
CO2 SERPL-SCNC: 28 MMOL/L (ref 21–32)
CREAT SERPL-MCNC: 0.8 MG/DL (ref 0.7–1.3)
DIFFERENTIAL METHOD BLD: ABNORMAL
EOSINOPHIL # BLD: 0.4 K/UL (ref 0–0.4)
EOSINOPHIL NFR BLD: 6 % (ref 0–7)
ERYTHROCYTE [DISTWIDTH] IN BLOOD BY AUTOMATED COUNT: 14.3 % (ref 11.5–14.5)
GLUCOSE SERPL-MCNC: 87 MG/DL (ref 65–100)
HCT VFR BLD AUTO: 36.7 % (ref 36.6–50.3)
HGB BLD-MCNC: 11.6 G/DL (ref 12.1–17)
IMM GRANULOCYTES # BLD AUTO: 0 K/UL (ref 0–0.04)
IMM GRANULOCYTES NFR BLD AUTO: 0 % (ref 0–0.5)
LYMPHOCYTES # BLD: 1.4 K/UL (ref 0.8–3.5)
LYMPHOCYTES NFR BLD: 21 % (ref 12–49)
MCH RBC QN AUTO: 31.4 PG (ref 26–34)
MCHC RBC AUTO-ENTMCNC: 31.6 G/DL (ref 30–36.5)
MCV RBC AUTO: 99.2 FL (ref 80–99)
MONOCYTES # BLD: 0.8 K/UL (ref 0–1)
MONOCYTES NFR BLD: 13 % (ref 5–13)
NEUTS SEG # BLD: 3.9 K/UL (ref 1.8–8)
NEUTS SEG NFR BLD: 59 % (ref 32–75)
NRBC # BLD: 0 K/UL (ref 0–0.01)
NRBC BLD-RTO: 0 PER 100 WBC
PLATELET # BLD AUTO: 178 K/UL (ref 150–400)
PMV BLD AUTO: 9.6 FL (ref 8.9–12.9)
POTASSIUM SERPL-SCNC: 3.5 MMOL/L (ref 3.5–5.1)
RBC # BLD AUTO: 3.7 M/UL (ref 4.1–5.7)
SODIUM SERPL-SCNC: 139 MMOL/L (ref 136–145)
WBC # BLD AUTO: 6.4 K/UL (ref 4.1–11.1)

## 2023-04-28 PROCEDURE — 0JH607Z INSERTION OF CARDIAC RESYNCHRONIZATION PACEMAKER PULSE GENERATOR INTO CHEST SUBCUTANEOUS TISSUE AND FASCIA, OPEN APPROACH: ICD-10-PCS | Performed by: INTERNAL MEDICINE

## 2023-04-28 PROCEDURE — 99153 MOD SED SAME PHYS/QHP EA: CPT | Performed by: INTERNAL MEDICINE

## 2023-04-28 PROCEDURE — C1887 CATHETER, GUIDING: HCPCS | Performed by: INTERNAL MEDICINE

## 2023-04-28 PROCEDURE — C1733 CATH, EP, OTHR THAN COOL-TIP: HCPCS | Performed by: INTERNAL MEDICINE

## 2023-04-28 PROCEDURE — 33208 INSRT HEART PM ATRIAL & VENT: CPT | Performed by: INTERNAL MEDICINE

## 2023-04-28 PROCEDURE — 74011250637 HC RX REV CODE- 250/637: Performed by: INTERNAL MEDICINE

## 2023-04-28 PROCEDURE — C1769 GUIDE WIRE: HCPCS | Performed by: INTERNAL MEDICINE

## 2023-04-28 PROCEDURE — 74011000250 HC RX REV CODE- 250: Performed by: INTERNAL MEDICINE

## 2023-04-28 PROCEDURE — 74011000636 HC RX REV CODE- 636: Performed by: INTERNAL MEDICINE

## 2023-04-28 PROCEDURE — 36415 COLL VENOUS BLD VENIPUNCTURE: CPT

## 2023-04-28 PROCEDURE — 2709999900 HC NON-CHARGEABLE SUPPLY: Performed by: INTERNAL MEDICINE

## 2023-04-28 PROCEDURE — 77030029684 HC NEB SM VOL KT MONA -A

## 2023-04-28 PROCEDURE — 97162 PT EVAL MOD COMPLEX 30 MIN: CPT

## 2023-04-28 PROCEDURE — 77030040934 HC CATH DIAG DXTERITY MEDT -A: Performed by: INTERNAL MEDICINE

## 2023-04-28 PROCEDURE — C1893 INTRO/SHEATH, FIXED,NON-PEEL: HCPCS | Performed by: INTERNAL MEDICINE

## 2023-04-28 PROCEDURE — C1892 INTRO/SHEATH,FIXED,PEEL-AWAY: HCPCS | Performed by: INTERNAL MEDICINE

## 2023-04-28 PROCEDURE — 65270000046 HC RM TELEMETRY

## 2023-04-28 PROCEDURE — 02HL3JZ INSERTION OF PACEMAKER LEAD INTO LEFT VENTRICLE, PERCUTANEOUS APPROACH: ICD-10-PCS | Performed by: INTERNAL MEDICINE

## 2023-04-28 PROCEDURE — 75820 VEIN X-RAY ARM/LEG: CPT | Performed by: INTERNAL MEDICINE

## 2023-04-28 PROCEDURE — 99152 MOD SED SAME PHYS/QHP 5/>YRS: CPT | Performed by: INTERNAL MEDICINE

## 2023-04-28 PROCEDURE — 02H63JZ INSERTION OF PACEMAKER LEAD INTO RIGHT ATRIUM, PERCUTANEOUS APPROACH: ICD-10-PCS | Performed by: INTERNAL MEDICINE

## 2023-04-28 PROCEDURE — 77030022704 HC SUT VLOC COVD -B: Performed by: INTERNAL MEDICINE

## 2023-04-28 PROCEDURE — 77030032060 HC PWDR HEMSTAT ARISTA ASRB 3GM BARD -C: Performed by: INTERNAL MEDICINE

## 2023-04-28 PROCEDURE — 77030018547 HC SUT ETHBND1 J&J -B: Performed by: INTERNAL MEDICINE

## 2023-04-28 PROCEDURE — C1900 LEAD, CORONARY VENOUS: HCPCS | Performed by: INTERNAL MEDICINE

## 2023-04-28 PROCEDURE — 97165 OT EVAL LOW COMPLEX 30 MIN: CPT

## 2023-04-28 PROCEDURE — 02HK3JZ INSERTION OF PACEMAKER LEAD INTO RIGHT VENTRICLE, PERCUTANEOUS APPROACH: ICD-10-PCS | Performed by: INTERNAL MEDICINE

## 2023-04-28 PROCEDURE — 77030031139 HC SUT VCRL2 J&J -A: Performed by: INTERNAL MEDICINE

## 2023-04-28 PROCEDURE — 94664 DEMO&/EVAL PT USE INHALER: CPT

## 2023-04-28 PROCEDURE — 99223 1ST HOSP IP/OBS HIGH 75: CPT | Performed by: INTERNAL MEDICINE

## 2023-04-28 PROCEDURE — 97535 SELF CARE MNGMENT TRAINING: CPT

## 2023-04-28 PROCEDURE — 33225 L VENTRIC PACING LEAD ADD-ON: CPT | Performed by: INTERNAL MEDICINE

## 2023-04-28 PROCEDURE — C1751 CATH, INF, PER/CENT/MIDLINE: HCPCS | Performed by: INTERNAL MEDICINE

## 2023-04-28 PROCEDURE — 80048 BASIC METABOLIC PNL TOTAL CA: CPT

## 2023-04-28 PROCEDURE — 85025 COMPLETE CBC W/AUTO DIFF WBC: CPT

## 2023-04-28 PROCEDURE — C1898 LEAD, PMKR, OTHER THAN TRANS: HCPCS | Performed by: INTERNAL MEDICINE

## 2023-04-28 PROCEDURE — C1781 MESH (IMPLANTABLE): HCPCS | Performed by: INTERNAL MEDICINE

## 2023-04-28 PROCEDURE — 74011250636 HC RX REV CODE- 250/636: Performed by: INTERNAL MEDICINE

## 2023-04-28 PROCEDURE — C1894 INTRO/SHEATH, NON-LASER: HCPCS | Performed by: INTERNAL MEDICINE

## 2023-04-28 PROCEDURE — C2621 PMKR, OTHER THAN SING/DUAL: HCPCS | Performed by: INTERNAL MEDICINE

## 2023-04-28 PROCEDURE — 94640 AIRWAY INHALATION TREATMENT: CPT

## 2023-04-28 PROCEDURE — 97530 THERAPEUTIC ACTIVITIES: CPT

## 2023-04-28 PROCEDURE — 71045 X-RAY EXAM CHEST 1 VIEW: CPT

## 2023-04-28 DEVICE — ENVELOPE CMRM6133 ABSORB LRG MR
Type: IMPLANTABLE DEVICE | Status: FUNCTIONAL
Brand: TYRX™

## 2023-04-28 DEVICE — LEAD 5076-58 MRI US RCMCRD
Type: IMPLANTABLE DEVICE | Status: FUNCTIONAL
Brand: CAPSUREFIX NOVUS MRI™ SURESCAN®

## 2023-04-28 DEVICE — LEAD 5076-52 MRI US RCMCRD
Type: IMPLANTABLE DEVICE | Status: FUNCTIONAL
Brand: CAPSUREFIX NOVUS MRI™ SURESCAN®

## 2023-04-28 DEVICE — CRTP W4TR01 PERCEPTA QUAD CRTP MRI US
Type: IMPLANTABLE DEVICE | Status: FUNCTIONAL
Brand: PERCEPTA™ QUAD CRT-P MRI SURESCAN™

## 2023-04-28 DEVICE — LEAD 429888 MRI CANT US
Type: IMPLANTABLE DEVICE | Status: FUNCTIONAL
Brand: ATTAIN PERFORMA™ MRI SURESCAN™

## 2023-04-28 RX ORDER — SODIUM CHLORIDE 0.9 % (FLUSH) 0.9 %
5-40 SYRINGE (ML) INJECTION EVERY 8 HOURS
Status: DISCONTINUED | OUTPATIENT
Start: 2023-04-28 | End: 2023-05-01 | Stop reason: HOSPADM

## 2023-04-28 RX ORDER — FENTANYL CITRATE 50 UG/ML
INJECTION, SOLUTION INTRAMUSCULAR; INTRAVENOUS AS NEEDED
Status: DISCONTINUED | OUTPATIENT
Start: 2023-04-28 | End: 2023-04-28 | Stop reason: HOSPADM

## 2023-04-28 RX ORDER — MIDAZOLAM HYDROCHLORIDE 1 MG/ML
INJECTION, SOLUTION INTRAMUSCULAR; INTRAVENOUS AS NEEDED
Status: DISCONTINUED | OUTPATIENT
Start: 2023-04-28 | End: 2023-04-28 | Stop reason: HOSPADM

## 2023-04-28 RX ORDER — NALOXONE HYDROCHLORIDE 0.4 MG/ML
0.4 INJECTION, SOLUTION INTRAMUSCULAR; INTRAVENOUS; SUBCUTANEOUS AS NEEDED
Status: DISCONTINUED | OUTPATIENT
Start: 2023-04-28 | End: 2023-05-02 | Stop reason: HOSPADM

## 2023-04-28 RX ORDER — SODIUM CHLORIDE 0.9 % (FLUSH) 0.9 %
5-40 SYRINGE (ML) INJECTION EVERY 8 HOURS
Status: DISCONTINUED | OUTPATIENT
Start: 2023-04-28 | End: 2023-04-29

## 2023-04-28 RX ORDER — SODIUM CHLORIDE 0.9 % (FLUSH) 0.9 %
5-40 SYRINGE (ML) INJECTION AS NEEDED
Status: DISCONTINUED | OUTPATIENT
Start: 2023-04-28 | End: 2023-04-29

## 2023-04-28 RX ORDER — CEFAZOLIN SODIUM 1 G/3ML
INJECTION, POWDER, FOR SOLUTION INTRAMUSCULAR; INTRAVENOUS AS NEEDED
Status: DISCONTINUED | OUTPATIENT
Start: 2023-04-28 | End: 2023-04-28 | Stop reason: HOSPADM

## 2023-04-28 RX ORDER — SODIUM CHLORIDE 0.9 % (FLUSH) 0.9 %
5-40 SYRINGE (ML) INJECTION AS NEEDED
Status: DISCONTINUED | OUTPATIENT
Start: 2023-04-28 | End: 2023-05-01 | Stop reason: HOSPADM

## 2023-04-28 RX ADMIN — SODIUM CHLORIDE, PRESERVATIVE FREE 10 ML: 5 INJECTION INTRAVENOUS at 13:23

## 2023-04-28 RX ADMIN — BUMETANIDE 1 MG: 1 TABLET ORAL at 18:23

## 2023-04-28 RX ADMIN — ARFORMOTEROL TARTRATE 15 MCG: 15 SOLUTION RESPIRATORY (INHALATION) at 19:26

## 2023-04-28 RX ADMIN — Medication 10 ML: at 22:01

## 2023-04-28 RX ADMIN — CEFAZOLIN 2 G: 1 INJECTION, POWDER, FOR SOLUTION INTRAMUSCULAR; INTRAVENOUS at 11:36

## 2023-04-28 RX ADMIN — POLYETHYLENE GLYCOL 3350 17 G: 17 POWDER, FOR SOLUTION ORAL at 18:27

## 2023-04-28 RX ADMIN — SENNOSIDES AND DOCUSATE SODIUM 1 TABLET: 50; 8.6 TABLET ORAL at 09:53

## 2023-04-28 RX ADMIN — ATORVASTATIN CALCIUM 80 MG: 20 TABLET, FILM COATED ORAL at 21:58

## 2023-04-28 RX ADMIN — Medication 10 ML: at 09:43

## 2023-04-28 RX ADMIN — SENNOSIDES AND DOCUSATE SODIUM 1 TABLET: 50; 8.6 TABLET ORAL at 18:23

## 2023-04-28 RX ADMIN — ARFORMOTEROL TARTRATE 15 MCG: 15 SOLUTION RESPIRATORY (INHALATION) at 09:34

## 2023-04-28 RX ADMIN — ACETAMINOPHEN 650 MG: 325 TABLET ORAL at 14:38

## 2023-04-28 RX ADMIN — BUDESONIDE 500 MCG: 0.5 INHALANT RESPIRATORY (INHALATION) at 19:26

## 2023-04-28 RX ADMIN — BUDESONIDE 500 MCG: 0.5 INHALANT RESPIRATORY (INHALATION) at 09:34

## 2023-04-28 RX ADMIN — SODIUM CHLORIDE, PRESERVATIVE FREE 10 ML: 5 INJECTION INTRAVENOUS at 22:09

## 2023-04-28 RX ADMIN — Medication 10 ML: at 13:23

## 2023-04-28 RX ADMIN — ACETAMINOPHEN 650 MG: 325 TABLET ORAL at 22:07

## 2023-04-29 PROBLEM — S22.080A COMPRESSION FRACTURE OF T11 VERTEBRA (HCC): Status: ACTIVE | Noted: 2023-04-29

## 2023-04-29 LAB
ANION GAP SERPL CALC-SCNC: 0 MMOL/L (ref 5–15)
BACTERIA SPEC CULT: ABNORMAL
BASOPHILS # BLD: 0 K/UL (ref 0–0.1)
BASOPHILS NFR BLD: 0 % (ref 0–1)
BUN SERPL-MCNC: 26 MG/DL (ref 6–20)
BUN/CREAT SERPL: 29 (ref 12–20)
CALCIUM SERPL-MCNC: 8.9 MG/DL (ref 8.5–10.1)
CC UR VC: ABNORMAL
CHLORIDE SERPL-SCNC: 102 MMOL/L (ref 97–108)
CO2 SERPL-SCNC: 34 MMOL/L (ref 21–32)
CREAT SERPL-MCNC: 0.91 MG/DL (ref 0.7–1.3)
DIFFERENTIAL METHOD BLD: ABNORMAL
EOSINOPHIL # BLD: 0.2 K/UL (ref 0–0.4)
EOSINOPHIL NFR BLD: 2 % (ref 0–7)
ERYTHROCYTE [DISTWIDTH] IN BLOOD BY AUTOMATED COUNT: 14.1 % (ref 11.5–14.5)
GLUCOSE SERPL-MCNC: 102 MG/DL (ref 65–100)
HCT VFR BLD AUTO: 37.7 % (ref 36.6–50.3)
HGB BLD-MCNC: 12.3 G/DL (ref 12.1–17)
IMM GRANULOCYTES # BLD AUTO: 0 K/UL (ref 0–0.04)
IMM GRANULOCYTES NFR BLD AUTO: 0 % (ref 0–0.5)
LYMPHOCYTES # BLD: 1.1 K/UL (ref 0.8–3.5)
LYMPHOCYTES NFR BLD: 13 % (ref 12–49)
MCH RBC QN AUTO: 31.8 PG (ref 26–34)
MCHC RBC AUTO-ENTMCNC: 32.6 G/DL (ref 30–36.5)
MCV RBC AUTO: 97.4 FL (ref 80–99)
MONOCYTES # BLD: 1 K/UL (ref 0–1)
MONOCYTES NFR BLD: 12 % (ref 5–13)
NEUTS SEG # BLD: 6.5 K/UL (ref 1.8–8)
NEUTS SEG NFR BLD: 73 % (ref 32–75)
NRBC # BLD: 0 K/UL (ref 0–0.01)
NRBC BLD-RTO: 0 PER 100 WBC
PLATELET # BLD AUTO: 174 K/UL (ref 150–400)
PMV BLD AUTO: 9.5 FL (ref 8.9–12.9)
POTASSIUM SERPL-SCNC: 3.7 MMOL/L (ref 3.5–5.1)
RBC # BLD AUTO: 3.87 M/UL (ref 4.1–5.7)
SERVICE CMNT-IMP: ABNORMAL
SODIUM SERPL-SCNC: 136 MMOL/L (ref 136–145)
WBC # BLD AUTO: 8.9 K/UL (ref 4.1–11.1)

## 2023-04-29 PROCEDURE — 94640 AIRWAY INHALATION TREATMENT: CPT

## 2023-04-29 PROCEDURE — 80048 BASIC METABOLIC PNL TOTAL CA: CPT

## 2023-04-29 PROCEDURE — 74011250637 HC RX REV CODE- 250/637: Performed by: INTERNAL MEDICINE

## 2023-04-29 PROCEDURE — 74011000250 HC RX REV CODE- 250: Performed by: INTERNAL MEDICINE

## 2023-04-29 PROCEDURE — 36415 COLL VENOUS BLD VENIPUNCTURE: CPT

## 2023-04-29 PROCEDURE — 65270000029 HC RM PRIVATE

## 2023-04-29 PROCEDURE — 94664 DEMO&/EVAL PT USE INHALER: CPT

## 2023-04-29 PROCEDURE — 85025 COMPLETE CBC W/AUTO DIFF WBC: CPT

## 2023-04-29 RX ORDER — POLYETHYLENE GLYCOL 3350 17 G/17G
17 POWDER, FOR SOLUTION ORAL DAILY
Status: DISCONTINUED | OUTPATIENT
Start: 2023-04-29 | End: 2023-05-02 | Stop reason: HOSPADM

## 2023-04-29 RX ORDER — AMOXICILLIN 250 MG
2 CAPSULE ORAL 2 TIMES DAILY
Status: DISCONTINUED | OUTPATIENT
Start: 2023-04-29 | End: 2023-05-02 | Stop reason: HOSPADM

## 2023-04-29 RX ORDER — POLYETHYLENE GLYCOL 3350 17 G/17G
17 POWDER, FOR SOLUTION ORAL 2 TIMES DAILY
Status: DISCONTINUED | OUTPATIENT
Start: 2023-04-29 | End: 2023-04-29

## 2023-04-29 RX ORDER — NITROFURANTOIN 25; 75 MG/1; MG/1
100 CAPSULE ORAL EVERY 12 HOURS
Status: DISCONTINUED | OUTPATIENT
Start: 2023-04-29 | End: 2023-05-02 | Stop reason: HOSPADM

## 2023-04-29 RX ADMIN — BUDESONIDE 500 MCG: 0.5 INHALANT RESPIRATORY (INHALATION) at 20:04

## 2023-04-29 RX ADMIN — Medication 10 ML: at 05:36

## 2023-04-29 RX ADMIN — ATORVASTATIN CALCIUM 80 MG: 20 TABLET, FILM COATED ORAL at 21:05

## 2023-04-29 RX ADMIN — SODIUM CHLORIDE, PRESERVATIVE FREE 10 ML: 5 INJECTION INTRAVENOUS at 05:36

## 2023-04-29 RX ADMIN — SENNOSIDES AND DOCUSATE SODIUM 2 TABLET: 50; 8.6 TABLET ORAL at 18:08

## 2023-04-29 RX ADMIN — ARFORMOTEROL TARTRATE 15 MCG: 15 SOLUTION RESPIRATORY (INHALATION) at 20:04

## 2023-04-29 RX ADMIN — ACETAMINOPHEN 650 MG: 325 TABLET ORAL at 20:51

## 2023-04-29 RX ADMIN — SODIUM CHLORIDE, PRESERVATIVE FREE 10 ML: 5 INJECTION INTRAVENOUS at 05:35

## 2023-04-29 RX ADMIN — SENNOSIDES AND DOCUSATE SODIUM 1 TABLET: 50; 8.6 TABLET ORAL at 08:49

## 2023-04-29 RX ADMIN — POLYETHYLENE GLYCOL 3350 17 G: 17 POWDER, FOR SOLUTION ORAL at 11:52

## 2023-04-29 RX ADMIN — SODIUM CHLORIDE, PRESERVATIVE FREE 10 ML: 5 INJECTION INTRAVENOUS at 21:06

## 2023-04-29 RX ADMIN — NITROFURANTOIN MONOHYDRATE/MACROCRYSTALLINE 100 MG: 25; 75 CAPSULE ORAL at 11:53

## 2023-04-29 RX ADMIN — NITROFURANTOIN MONOHYDRATE/MACROCRYSTALLINE 100 MG: 25; 75 CAPSULE ORAL at 20:52

## 2023-04-29 RX ADMIN — BUDESONIDE 500 MCG: 0.5 INHALANT RESPIRATORY (INHALATION) at 08:08

## 2023-04-29 RX ADMIN — METOPROLOL SUCCINATE 25 MG: 25 TABLET, EXTENDED RELEASE ORAL at 08:49

## 2023-04-29 RX ADMIN — ARFORMOTEROL TARTRATE 15 MCG: 15 SOLUTION RESPIRATORY (INHALATION) at 08:08

## 2023-04-29 RX ADMIN — SODIUM CHLORIDE, PRESERVATIVE FREE 10 ML: 5 INJECTION INTRAVENOUS at 21:07

## 2023-04-30 LAB
ANION GAP SERPL CALC-SCNC: 0 MMOL/L (ref 5–15)
BASOPHILS # BLD: 0 K/UL (ref 0–0.1)
BASOPHILS NFR BLD: 0 % (ref 0–1)
BUN SERPL-MCNC: 29 MG/DL (ref 6–20)
BUN/CREAT SERPL: 32 (ref 12–20)
CALCIUM SERPL-MCNC: 9.2 MG/DL (ref 8.5–10.1)
CHLORIDE SERPL-SCNC: 100 MMOL/L (ref 97–108)
CO2 SERPL-SCNC: 35 MMOL/L (ref 21–32)
CREAT SERPL-MCNC: 0.91 MG/DL (ref 0.7–1.3)
DIFFERENTIAL METHOD BLD: ABNORMAL
EOSINOPHIL # BLD: 0.1 K/UL (ref 0–0.4)
EOSINOPHIL NFR BLD: 1 % (ref 0–7)
ERYTHROCYTE [DISTWIDTH] IN BLOOD BY AUTOMATED COUNT: 14.3 % (ref 11.5–14.5)
GLUCOSE SERPL-MCNC: 103 MG/DL (ref 65–100)
HCT VFR BLD AUTO: 37.4 % (ref 36.6–50.3)
HGB BLD-MCNC: 12.1 G/DL (ref 12.1–17)
IMM GRANULOCYTES # BLD AUTO: 0.1 K/UL (ref 0–0.04)
IMM GRANULOCYTES NFR BLD AUTO: 0 % (ref 0–0.5)
LYMPHOCYTES # BLD: 1.4 K/UL (ref 0.8–3.5)
LYMPHOCYTES NFR BLD: 11 % (ref 12–49)
MCH RBC QN AUTO: 31.9 PG (ref 26–34)
MCHC RBC AUTO-ENTMCNC: 32.4 G/DL (ref 30–36.5)
MCV RBC AUTO: 98.7 FL (ref 80–99)
MONOCYTES # BLD: 1.6 K/UL (ref 0–1)
MONOCYTES NFR BLD: 12 % (ref 5–13)
NEUTS SEG # BLD: 10.2 K/UL (ref 1.8–8)
NEUTS SEG NFR BLD: 76 % (ref 32–75)
NRBC # BLD: 0 K/UL (ref 0–0.01)
NRBC BLD-RTO: 0 PER 100 WBC
PLATELET # BLD AUTO: 172 K/UL (ref 150–400)
PMV BLD AUTO: 9.6 FL (ref 8.9–12.9)
POTASSIUM SERPL-SCNC: 3.8 MMOL/L (ref 3.5–5.1)
RBC # BLD AUTO: 3.79 M/UL (ref 4.1–5.7)
SODIUM SERPL-SCNC: 135 MMOL/L (ref 136–145)
WBC # BLD AUTO: 13.4 K/UL (ref 4.1–11.1)

## 2023-04-30 PROCEDURE — 94640 AIRWAY INHALATION TREATMENT: CPT

## 2023-04-30 PROCEDURE — 74011000250 HC RX REV CODE- 250: Performed by: INTERNAL MEDICINE

## 2023-04-30 PROCEDURE — 74011250637 HC RX REV CODE- 250/637: Performed by: INTERNAL MEDICINE

## 2023-04-30 PROCEDURE — 36415 COLL VENOUS BLD VENIPUNCTURE: CPT

## 2023-04-30 PROCEDURE — 94664 DEMO&/EVAL PT USE INHALER: CPT

## 2023-04-30 PROCEDURE — 2709999900 HC NON-CHARGEABLE SUPPLY

## 2023-04-30 PROCEDURE — 80048 BASIC METABOLIC PNL TOTAL CA: CPT

## 2023-04-30 PROCEDURE — 65270000029 HC RM PRIVATE

## 2023-04-30 PROCEDURE — 85025 COMPLETE CBC W/AUTO DIFF WBC: CPT

## 2023-04-30 RX ADMIN — ATORVASTATIN CALCIUM 80 MG: 20 TABLET, FILM COATED ORAL at 22:09

## 2023-04-30 RX ADMIN — ARFORMOTEROL TARTRATE 15 MCG: 15 SOLUTION RESPIRATORY (INHALATION) at 20:22

## 2023-04-30 RX ADMIN — SODIUM CHLORIDE, PRESERVATIVE FREE 10 ML: 5 INJECTION INTRAVENOUS at 06:05

## 2023-04-30 RX ADMIN — SODIUM CHLORIDE, PRESERVATIVE FREE 10 ML: 5 INJECTION INTRAVENOUS at 14:43

## 2023-04-30 RX ADMIN — SODIUM CHLORIDE, PRESERVATIVE FREE 10 ML: 5 INJECTION INTRAVENOUS at 22:10

## 2023-04-30 RX ADMIN — BUDESONIDE 500 MCG: 0.5 INHALANT RESPIRATORY (INHALATION) at 08:28

## 2023-04-30 RX ADMIN — SODIUM CHLORIDE, PRESERVATIVE FREE 10 ML: 5 INJECTION INTRAVENOUS at 06:04

## 2023-04-30 RX ADMIN — NITROFURANTOIN MONOHYDRATE/MACROCRYSTALLINE 100 MG: 25; 75 CAPSULE ORAL at 22:09

## 2023-04-30 RX ADMIN — ARFORMOTEROL TARTRATE 15 MCG: 15 SOLUTION RESPIRATORY (INHALATION) at 08:28

## 2023-04-30 RX ADMIN — NITROFURANTOIN MONOHYDRATE/MACROCRYSTALLINE 100 MG: 25; 75 CAPSULE ORAL at 09:21

## 2023-04-30 RX ADMIN — BUMETANIDE 1 MG: 1 TABLET ORAL at 17:53

## 2023-04-30 RX ADMIN — ACETAMINOPHEN 650 MG: 325 TABLET ORAL at 17:35

## 2023-04-30 RX ADMIN — POLYETHYLENE GLYCOL 3350 17 G: 17 POWDER, FOR SOLUTION ORAL at 09:21

## 2023-04-30 RX ADMIN — BUDESONIDE 500 MCG: 0.5 INHALANT RESPIRATORY (INHALATION) at 20:22

## 2023-04-30 RX ADMIN — SENNOSIDES AND DOCUSATE SODIUM 2 TABLET: 50; 8.6 TABLET ORAL at 17:35

## 2023-04-30 RX ADMIN — SENNOSIDES AND DOCUSATE SODIUM 2 TABLET: 50; 8.6 TABLET ORAL at 09:21

## 2023-05-01 ENCOUNTER — APPOINTMENT (OUTPATIENT)
Dept: INTERVENTIONAL RADIOLOGY/VASCULAR | Age: 88
DRG: 516 | End: 2023-05-01
Attending: INTERNAL MEDICINE
Payer: MEDICARE

## 2023-05-01 LAB
ANION GAP SERPL CALC-SCNC: 2 MMOL/L (ref 5–15)
BASOPHILS # BLD: 0 K/UL (ref 0–0.1)
BASOPHILS NFR BLD: 0 % (ref 0–1)
BUN SERPL-MCNC: 33 MG/DL (ref 6–20)
BUN/CREAT SERPL: 33 (ref 12–20)
CALCIUM SERPL-MCNC: 9 MG/DL (ref 8.5–10.1)
CHLORIDE SERPL-SCNC: 100 MMOL/L (ref 97–108)
CO2 SERPL-SCNC: 32 MMOL/L (ref 21–32)
CREAT SERPL-MCNC: 1.01 MG/DL (ref 0.7–1.3)
DIFFERENTIAL METHOD BLD: ABNORMAL
EOSINOPHIL # BLD: 0.1 K/UL (ref 0–0.4)
EOSINOPHIL NFR BLD: 1 % (ref 0–7)
ERYTHROCYTE [DISTWIDTH] IN BLOOD BY AUTOMATED COUNT: 14.3 % (ref 11.5–14.5)
GLUCOSE SERPL-MCNC: 95 MG/DL (ref 65–100)
HCT VFR BLD AUTO: 37.5 % (ref 36.6–50.3)
HGB BLD-MCNC: 12.1 G/DL (ref 12.1–17)
IMM GRANULOCYTES # BLD AUTO: 0.1 K/UL (ref 0–0.04)
IMM GRANULOCYTES NFR BLD AUTO: 0 % (ref 0–0.5)
LYMPHOCYTES # BLD: 1.4 K/UL (ref 0.8–3.5)
LYMPHOCYTES NFR BLD: 11 % (ref 12–49)
MCH RBC QN AUTO: 31.4 PG (ref 26–34)
MCHC RBC AUTO-ENTMCNC: 32.3 G/DL (ref 30–36.5)
MCV RBC AUTO: 97.4 FL (ref 80–99)
MONOCYTES # BLD: 1.4 K/UL (ref 0–1)
MONOCYTES NFR BLD: 11 % (ref 5–13)
NEUTS SEG # BLD: 9.3 K/UL (ref 1.8–8)
NEUTS SEG NFR BLD: 77 % (ref 32–75)
NRBC # BLD: 0 K/UL (ref 0–0.01)
NRBC BLD-RTO: 0 PER 100 WBC
PLATELET # BLD AUTO: 190 K/UL (ref 150–400)
PMV BLD AUTO: 9.9 FL (ref 8.9–12.9)
POTASSIUM SERPL-SCNC: 3.6 MMOL/L (ref 3.5–5.1)
RBC # BLD AUTO: 3.85 M/UL (ref 4.1–5.7)
SODIUM SERPL-SCNC: 134 MMOL/L (ref 136–145)
WBC # BLD AUTO: 12.2 K/UL (ref 4.1–11.1)

## 2023-05-01 PROCEDURE — 77030021783 HC SYS CEM DEL MEDT -D

## 2023-05-01 PROCEDURE — C1713 ANCHOR/SCREW BN/BN,TIS/BN: HCPCS

## 2023-05-01 PROCEDURE — 74011000250 HC RX REV CODE- 250: Performed by: INTERNAL MEDICINE

## 2023-05-01 PROCEDURE — 94640 AIRWAY INHALATION TREATMENT: CPT

## 2023-05-01 PROCEDURE — 77030021782 HC SYS CEM CART DEL KYPH -C

## 2023-05-01 PROCEDURE — 74011250637 HC RX REV CODE- 250/637: Performed by: INTERNAL MEDICINE

## 2023-05-01 PROCEDURE — 85025 COMPLETE CBC W/AUTO DIFF WBC: CPT

## 2023-05-01 PROCEDURE — 0PU43JZ SUPPLEMENT THORACIC VERTEBRA WITH SYNTHETIC SUBSTITUTE, PERCUTANEOUS APPROACH: ICD-10-PCS | Performed by: STUDENT IN AN ORGANIZED HEALTH CARE EDUCATION/TRAINING PROGRAM

## 2023-05-01 PROCEDURE — 2709999900 HC NON-CHARGEABLE SUPPLY

## 2023-05-01 PROCEDURE — 74011000636 HC RX REV CODE- 636: Performed by: STUDENT IN AN ORGANIZED HEALTH CARE EDUCATION/TRAINING PROGRAM

## 2023-05-01 PROCEDURE — 80048 BASIC METABOLIC PNL TOTAL CA: CPT

## 2023-05-01 PROCEDURE — 36415 COLL VENOUS BLD VENIPUNCTURE: CPT

## 2023-05-01 PROCEDURE — 77030003666 HC NDL SPINAL BD -A

## 2023-05-01 PROCEDURE — 77030040393 HC DRSG OPTIFOAM GENT MDII -B

## 2023-05-01 PROCEDURE — 99152 MOD SED SAME PHYS/QHP 5/>YRS: CPT

## 2023-05-01 PROCEDURE — 22513 PERQ VERTEBRAL AUGMENTATION: CPT

## 2023-05-01 PROCEDURE — 74011250636 HC RX REV CODE- 250/636: Performed by: STUDENT IN AN ORGANIZED HEALTH CARE EDUCATION/TRAINING PROGRAM

## 2023-05-01 PROCEDURE — 0PS43ZZ REPOSITION THORACIC VERTEBRA, PERCUTANEOUS APPROACH: ICD-10-PCS | Performed by: STUDENT IN AN ORGANIZED HEALTH CARE EDUCATION/TRAINING PROGRAM

## 2023-05-01 PROCEDURE — 94664 DEMO&/EVAL PT USE INHALER: CPT

## 2023-05-01 PROCEDURE — 65270000029 HC RM PRIVATE

## 2023-05-01 PROCEDURE — 74011000250 HC RX REV CODE- 250: Performed by: STUDENT IN AN ORGANIZED HEALTH CARE EDUCATION/TRAINING PROGRAM

## 2023-05-01 RX ORDER — MIDAZOLAM HYDROCHLORIDE 1 MG/ML
.5-1 INJECTION, SOLUTION INTRAMUSCULAR; INTRAVENOUS
Status: DISCONTINUED | OUTPATIENT
Start: 2023-05-01 | End: 2023-05-01 | Stop reason: HOSPADM

## 2023-05-01 RX ORDER — KETOROLAC TROMETHAMINE 30 MG/ML
15 INJECTION, SOLUTION INTRAMUSCULAR; INTRAVENOUS ONCE
Status: COMPLETED | OUTPATIENT
Start: 2023-05-01 | End: 2023-05-01

## 2023-05-01 RX ORDER — FENTANYL CITRATE 50 UG/ML
25-200 INJECTION, SOLUTION INTRAMUSCULAR; INTRAVENOUS
Status: DISCONTINUED | OUTPATIENT
Start: 2023-05-01 | End: 2023-05-01 | Stop reason: HOSPADM

## 2023-05-01 RX ORDER — DIPHENHYDRAMINE HYDROCHLORIDE 50 MG/ML
25 INJECTION, SOLUTION INTRAMUSCULAR; INTRAVENOUS ONCE
Status: COMPLETED | OUTPATIENT
Start: 2023-05-01 | End: 2023-05-01

## 2023-05-01 RX ORDER — CEFAZOLIN SODIUM 1 G/3ML
2 INJECTION, POWDER, FOR SOLUTION INTRAMUSCULAR; INTRAVENOUS ONCE
Status: DISCONTINUED | OUTPATIENT
Start: 2023-05-01 | End: 2023-05-01

## 2023-05-01 RX ORDER — LIDOCAINE HYDROCHLORIDE 10 MG/ML
5-15 INJECTION, SOLUTION EPIDURAL; INFILTRATION; INTRACAUDAL; PERINEURAL ONCE
Status: COMPLETED | OUTPATIENT
Start: 2023-05-01 | End: 2023-05-01

## 2023-05-01 RX ORDER — BUPIVACAINE HYDROCHLORIDE 2.5 MG/ML
5-15 INJECTION, SOLUTION EPIDURAL; INFILTRATION; INTRACAUDAL ONCE
Status: COMPLETED | OUTPATIENT
Start: 2023-05-01 | End: 2023-05-01

## 2023-05-01 RX ADMIN — BUMETANIDE 1 MG: 1 TABLET ORAL at 09:12

## 2023-05-01 RX ADMIN — BUDESONIDE 500 MCG: 0.5 INHALANT RESPIRATORY (INHALATION) at 20:13

## 2023-05-01 RX ADMIN — ACETAMINOPHEN 650 MG: 325 TABLET ORAL at 09:20

## 2023-05-01 RX ADMIN — ARFORMOTEROL TARTRATE 15 MCG: 15 SOLUTION RESPIRATORY (INHALATION) at 07:22

## 2023-05-01 RX ADMIN — KETOROLAC TROMETHAMINE 15 MG: 30 INJECTION, SOLUTION INTRAMUSCULAR at 14:23

## 2023-05-01 RX ADMIN — DIPHENHYDRAMINE HYDROCHLORIDE 25 MG: 50 INJECTION, SOLUTION INTRAMUSCULAR; INTRAVENOUS at 14:23

## 2023-05-01 RX ADMIN — SODIUM CHLORIDE, PRESERVATIVE FREE 10 ML: 5 INJECTION INTRAVENOUS at 05:37

## 2023-05-01 RX ADMIN — NITROFURANTOIN MONOHYDRATE/MACROCRYSTALLINE 100 MG: 25; 75 CAPSULE ORAL at 09:12

## 2023-05-01 RX ADMIN — IOPAMIDOL 30 ML: 408 INJECTION, SOLUTION INTRATHECAL at 14:48

## 2023-05-01 RX ADMIN — FENTANYL CITRATE 50 MCG: 50 INJECTION, SOLUTION INTRAMUSCULAR; INTRAVENOUS at 14:22

## 2023-05-01 RX ADMIN — MIDAZOLAM 0.5 MG: 1 INJECTION INTRAMUSCULAR; INTRAVENOUS at 14:25

## 2023-05-01 RX ADMIN — MIDAZOLAM 1 MG: 1 INJECTION INTRAMUSCULAR; INTRAVENOUS at 14:22

## 2023-05-01 RX ADMIN — ARFORMOTEROL TARTRATE 15 MCG: 15 SOLUTION RESPIRATORY (INHALATION) at 20:13

## 2023-05-01 RX ADMIN — SENNOSIDES AND DOCUSATE SODIUM 2 TABLET: 50; 8.6 TABLET ORAL at 09:12

## 2023-05-01 RX ADMIN — FENTANYL CITRATE 50 MCG: 50 INJECTION, SOLUTION INTRAMUSCULAR; INTRAVENOUS at 14:24

## 2023-05-01 RX ADMIN — BUPIVACAINE HYDROCHLORIDE 15 MG: 2.5 INJECTION, SOLUTION EPIDURAL; INFILTRATION; INTRACAUDAL; PERINEURAL at 14:34

## 2023-05-01 RX ADMIN — ATORVASTATIN CALCIUM 80 MG: 20 TABLET, FILM COATED ORAL at 22:15

## 2023-05-01 RX ADMIN — METOPROLOL SUCCINATE 25 MG: 25 TABLET, EXTENDED RELEASE ORAL at 09:12

## 2023-05-01 RX ADMIN — BUDESONIDE 500 MCG: 0.5 INHALANT RESPIRATORY (INHALATION) at 07:22

## 2023-05-01 RX ADMIN — SPIRONOLACTONE 100 MG: 100 TABLET ORAL at 09:12

## 2023-05-01 RX ADMIN — LIDOCAINE HYDROCHLORIDE 15 ML: 10 INJECTION, SOLUTION EPIDURAL; INFILTRATION; INTRACAUDAL; PERINEURAL at 14:25

## 2023-05-01 RX ADMIN — NITROFURANTOIN MONOHYDRATE/MACROCRYSTALLINE 100 MG: 25; 75 CAPSULE ORAL at 22:15

## 2023-05-01 NOTE — PROGRESS NOTES
Bedside and Verbal shift change report given to Star Alfaro RN (oncoming nurse) by Matthias Saeed RN (offgoing nurse). Report included the following information SBAR, Kardex, Procedure Summary, Intake/Output, MAR, and Recent Results.

## 2023-05-01 NOTE — PROGRESS NOTES
TRANSFER - OUT REPORT:    Verbal report given to LUCIO Bowens on Patricia Mercado being transferred to [facility] for routine progression of care       Report consisted of patient's Situation, Background, Assessment and   Recommendations(SBAR). Information from the following report(s) Procedure Summary was reviewed with the receiving nurse. Opportunity for questions and clarification was provided.       Patient transported with:   Registered Nurse

## 2023-05-01 NOTE — PROGRESS NOTES
Problem: Pressure Injury - Risk of  Goal: *Prevention of pressure injury  Outcome: Progressing Towards Goal  Note: Pressure Injury Interventions:  Sensory Interventions: Assess changes in LOC, Keep linens dry and wrinkle-free    Moisture Interventions: Absorbent underpads, Minimize layers    Activity Interventions: Increase time out of bed, Pressure redistribution bed/mattress(bed type)    Mobility Interventions: Float heels, Pressure redistribution bed/mattress (bed type)    Nutrition Interventions: Document food/fluid/supplement intake, Offer support with meals,snacks and hydration    Friction and Shear Interventions: Apply protective barrier, creams and emollients, Foam dressings/transparent film/skin sealants, Feet elevated on foot rest, Minimize layers                Problem: Patient Education: Go to Patient Education Activity  Goal: Patient/Family Education  Outcome: Progressing Towards Goal     Problem: Falls - Risk of  Goal: *Absence of Falls  Outcome: Progressing Towards Goal  Note: Fall Risk Interventions:                                Problem: Patient Education: Go to Patient Education Activity  Goal: Patient/Family Education  Outcome: Progressing Towards Goal     Problem: Falls - Risk of  Goal: *Absence of Falls  Outcome: Progressing Towards Goal  Note: Fall Risk Interventions:                                Problem: Patient Education: Go to Patient Education Activity  Goal: Patient/Family Education  Outcome: Progressing Towards Goal

## 2023-05-01 NOTE — PROGRESS NOTES
TRANSFER - IN REPORT:    Verbal report received from LUCIO Johnston on Fermin Crew  being received from [facility] for ordered procedure      Report consisted of patients Situation, Background, Assessment and   Recommendations(SBAR). Information from the following report(s) SBAR was reviewed with the receiving nurse. Opportunity for questions and clarification was provided. Assessment completed upon patients arrival to unit and care assumed.

## 2023-05-01 NOTE — ROUTINE PROCESS
Bedside and Verbal shift change report given to Qing Wu (oncoming nurse) by Faustino Houston (offgoing nurse). Report included the following information SBAR and Kardex.

## 2023-05-01 NOTE — PROGRESS NOTES
Occupational Therapy Note  5/1/2023    OT treatment attempted at [phone] however patient off floor.     Thank you,  Fausto Snider OTR/L

## 2023-05-01 NOTE — ROUTINE PROCESS
2:46 PM  TRANSFER - IN REPORT:    Verbal report received from nomi Fong.(name) on Dimitri Rankin  being received from angio lab(unit) for routine progression of care      Report consisted of patients Situation, Background, Assessment and   Recommendations(SBAR). Information from the following report(s) Procedure Summary was reviewed with the receiving nurse. Opportunity for questions and clarification was provided. Assessment completed upon patients arrival to unit and care assumed. 30 min report called to floor. 3:51 PM  SBAR given to floor nurse. Back to room with transport and tele box.

## 2023-05-01 NOTE — PROGRESS NOTES
1315  Patient off floor for procedure at this time. 1550  Patient returned to floor. Family at bedside, no needs at this time.

## 2023-05-01 NOTE — PROGRESS NOTES
9:30 am:  CM updated patient and daughter, Cathie Hodgkins, that [facility] patient for rehab. Transition of Care Plan - RUR 17%:  Medical management continues  Kyphoplasty planned today  Cardiology, PT/OT following  Patient has been accepted by [facility], and they can admit him on Tuesday, 5/2. Patient will need a rapid COVID test prior to placement.     Chelo Martinez LCSW

## 2023-05-01 NOTE — PROGRESS NOTES
6836  Notified MD via [employer] that tele reported episode of HR getting into the 120's-140's. Upon my assessment pt resting in bed eating breakfast with HR of 110.

## 2023-05-01 NOTE — PROGRESS NOTES
Ricardo Keenan [address] [address]  [address] [facility], [address] [place] [address]  [phone]      Hospitalist  Progress Note      NAME:         Abhishek Sandra   :        1935  MRM:        [id number]    Date of service: 2023      Subjective: Patient seen and examined by me. Patient admitted with back pain and found to have a complete heart block. Still with aching pain, worse with movement. No new symptoms. Discussed with his nurse for collaborative hx.       Objective:    Vital Signs:    Visit Vitals  /76 (BP 1 Location: Right upper arm, BP Patient Position: At rest)   Pulse (!) 110   Temp 99.3 °F (37.4 °C)   Resp 16   Ht 5' 9\" (1.753 m)   Wt 65.3 kg (144 lb)   SpO2 90%   BMI 21.27 kg/m²        Intake/Output Summary (Last 24 hours) at 2023 1036  Last data filed at 2023 0055  Gross per 24 hour   Intake 250 ml   Output 275 ml   Net -25 ml          Current inpatient medications reviewed:  Current Facility-Administered Medications   Medication Dose Route Frequency    senna-docusate (PERICOLACE) 8.6-50 mg per tablet 2 Tablet  2 Tablet Oral BID    polyethylene glycol (MIRALAX) packet 17 g  17 g Oral DAILY    nitrofurantoin (macrocrystal-monohydrate) (MACROBID) capsule 100 mg  100 mg Oral Q12H    sodium chloride (NS) flush 5-40 mL  5-40 mL IntraVENous Q8H    sodium chloride (NS) flush 5-40 mL  5-40 mL IntraVENous PRN    naloxone (NARCAN) injection 0.4 mg  0.4 mg IntraVENous PRN    atropine injection 0.5 mg  0.5 mg IntraVENous Q30MIN PRN    bumetanide (BUMEX) tablet 1 mg  1 mg Oral BID    spironolactone (ALDACTONE) tablet 100 mg  100 mg Oral DAILY    albuterol-ipratropium (DUO-NEB) 2.5 MG-0.5 MG/3 ML  3 mL Nebulization Q6H PRN    atorvastatin (LIPITOR) tablet 80 mg  80 mg Oral QHS    arformoteroL (BROVANA) neb solution 15 mcg  15 mcg Nebulization BID RT    budesonide (PULMICORT) 500 mcg/2 ml nebulizer suspension  500 mcg Nebulization BID RT    lidocaine 4 % patch 1 Patch  1 Patch TransDERmal Q24H    metoprolol succinate (TOPROL-XL) XL tablet 25 mg  25 mg Oral DAILY    acetaminophen (TYLENOL) tablet 650 mg  650 mg Oral Q6H PRN    Or    acetaminophen (TYLENOL) suppository 650 mg  650 mg Rectal Q6H PRN    polyethylene glycol (MIRALAX) packet 17 g  17 g Oral DAILY PRN    ondansetron (ZOFRAN ODT) tablet 4 mg  4 mg Oral Q8H PRN    Or    ondansetron (ZOFRAN) injection 4 mg  4 mg IntraVENous Q6H PRN    oxyCODONE-acetaminophen (PERCOCET) 5-325 mg per tablet 1 Tablet  1 Tablet Oral Q4H PRN       Physical Examination:    General:   Weak and frail looking, not in distress   Eyes:   pink conjunctivae, PERRLA with no discharge. ENT:   no ottorrhea or rhinorrhea with dry mucous membranes  Pulm:  decreased but clear breath sounds without crackles or wheezes  Card:  has a fibrillation without thrills, bruits. + peripheral edema  Abd:  Soft, non-tender, non-distended, normoactive bowel sounds  Musc:  No cyanosis, clubbing, atrophy or deformities. Skin:  No rashes, bruising or ulcers. Neuro: Awake and alert.  Generally a non focal exam.   Psych:  Has little insight to his illness     Diagnostic testing:    Laboratory data reviewed and independently interpreted:    Recent Labs     05/01/23 0143 04/30/23  0629 04/29/23  0537   WBC 12.2* 13.4* 8.9   HGB 12.1 12.1 12.3   HCT 37.5 37.4 37.7    172 174       Recent Labs     05/01/23 0143 04/30/23  0629 04/29/23  0537   * 135* 136   K 3.6 3.8 3.7    100 102   CO2 32 35* 34*   GLU 95 103* 102*   BUN 33* 29* 26*   CREA 1.01 0.91 0.91   CA 9.0 9.2 8.9       No components found for: GLPOC    Cultures, Imaging studies reviewed and reports noted, Telemetry reviewed and independently interpreted by me and available notes from other care providers - all reviewed by me on: 5/1/2023    Assessment and Plan:    Compression fracture of T11 vertebra ([facility] [address] (4/29/2023) / History of kyphoplasty (3/1/2021) /  Ally Waite (10/11/2021) POA: admitted with back pain. MRi thoracic spine done 4/24 showed treated T6 severe compression fracture. Treated mild T7 compression fracture, pre-existing mild untreated T12 compression fracture. T5 is unchanged. New mild T11 compression fracture. Edema and further compression of the interval partially treated T12 fracture. Kyphoplasty 4/27 cancelled given he developed heart block. Eliquis has been on hold. Continue Lidocaine patch, Percocet PRN. Plan is for kyphoplasty today. CAD (coronary artery disease) / Hypercholesterolemia /  Hypertension (10/24/2019) POA: sp a prior CABG and subsequent stenting x 2. He has remained stable. Continue Lipitor, Metoprolol. 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 He is sp pacemaker 4/28. Cardiology and EP following. Eliquis on hold pending kyphoplasty     Chronic obstructive pulmonary disease ([facility] [address] (4/24/2023) POA: not wheezing. Stable. Continue Brovana, Pulmicort and Duo Neb PRN    CHF (congestive heart failure), NYHA class I, chronic, systolic (HCC) (0/48/8176) POA: stable. Continue Bumex, Spironolactone    Prostate cancer ([facility] [address] POA: stable. Outpatient follow up    Venous insufficiency of both lower extremities POA; stable. Supportive care    Care Plan discussed with: Patient, Nursing, CM     Prophylaxis:  SCD's                Anticipated Disposition:  SNF/LTC    PCP:      Elaine Mcrae MD     I have personally examined and treated the patient at bedside during this period.  To assist coordination of care and communication with nursing and staff, this note may be preliminary early in the day, but finalized by end of the day.         ___________________________________________________    Attending Physician:   Fawn Garnett MD

## 2023-05-01 NOTE — ROUTINE PROCESS
1:45 PM    Patient in preop taken by transport. Patient was sitting in a large amount of diarrhea. Appears to have blood in stool. Notified procedural MD.      Verbal consent given on the phone with Dr. Katherin De La Rosa and myself by daughter Jennifer Zelaya.

## 2023-05-02 VITALS
DIASTOLIC BLOOD PRESSURE: 75 MMHG | TEMPERATURE: 98.3 F | HEIGHT: 69 IN | OXYGEN SATURATION: 90 % | RESPIRATION RATE: 16 BRPM | SYSTOLIC BLOOD PRESSURE: 123 MMHG | HEART RATE: 90 BPM | BODY MASS INDEX: 21.33 KG/M2 | WEIGHT: 144 LBS

## 2023-05-02 LAB
SARS-COV-2 RDRP RESP QL NAA+PROBE: NOT DETECTED
SOURCE, COVRS: NORMAL

## 2023-05-02 PROCEDURE — 74011000250 HC RX REV CODE- 250: Performed by: INTERNAL MEDICINE

## 2023-05-02 PROCEDURE — 74011250637 HC RX REV CODE- 250/637: Performed by: INTERNAL MEDICINE

## 2023-05-02 PROCEDURE — 94664 DEMO&/EVAL PT USE INHALER: CPT

## 2023-05-02 PROCEDURE — 94640 AIRWAY INHALATION TREATMENT: CPT

## 2023-05-02 PROCEDURE — 87635 SARS-COV-2 COVID-19 AMP PRB: CPT

## 2023-05-02 RX ORDER — POLYETHYLENE GLYCOL 3350 17 G/17G
17 POWDER, FOR SOLUTION ORAL DAILY
Qty: 30 PACKET | Refills: 0 | Status: SHIPPED
Start: 2023-05-02 | End: 2023-06-01

## 2023-05-02 RX ORDER — OXYCODONE AND ACETAMINOPHEN 5; 325 MG/1; MG/1
1 TABLET ORAL
Qty: 3 TABLET | Refills: 0 | Status: SHIPPED | OUTPATIENT
Start: 2023-05-02 | End: 2023-05-04

## 2023-05-02 RX ORDER — AMOXICILLIN 250 MG
2 CAPSULE ORAL 2 TIMES DAILY
Qty: 120 TABLET | Refills: 0 | Status: SHIPPED
Start: 2023-05-02 | End: 2023-06-01

## 2023-05-02 RX ORDER — NITROFURANTOIN 25; 75 MG/1; MG/1
100 CAPSULE ORAL EVERY 12 HOURS
Qty: 7 CAPSULE | Refills: 0 | Status: SHIPPED
Start: 2023-05-02 | End: 2023-05-06

## 2023-05-02 RX ORDER — LIDOCAINE 4 G/100G
PATCH TOPICAL
Qty: 4 PATCH | Refills: 0 | Status: SHIPPED | OUTPATIENT
Start: 2023-05-02

## 2023-05-02 RX ADMIN — SPIRONOLACTONE 100 MG: 100 TABLET ORAL at 10:30

## 2023-05-02 RX ADMIN — ARFORMOTEROL TARTRATE 15 MCG: 15 SOLUTION RESPIRATORY (INHALATION) at 07:20

## 2023-05-02 RX ADMIN — OXYCODONE AND ACETAMINOPHEN 1 TABLET: 5; 325 TABLET ORAL at 15:09

## 2023-05-02 RX ADMIN — BUMETANIDE 1 MG: 1 TABLET ORAL at 10:30

## 2023-05-02 RX ADMIN — POLYETHYLENE GLYCOL 3350 17 G: 17 POWDER, FOR SOLUTION ORAL at 10:29

## 2023-05-02 RX ADMIN — NITROFURANTOIN MONOHYDRATE/MACROCRYSTALLINE 100 MG: 25; 75 CAPSULE ORAL at 10:30

## 2023-05-02 RX ADMIN — SENNOSIDES AND DOCUSATE SODIUM 2 TABLET: 50; 8.6 TABLET ORAL at 10:30

## 2023-05-02 RX ADMIN — BUDESONIDE 500 MCG: 0.5 INHALANT RESPIRATORY (INHALATION) at 07:20

## 2023-05-02 NOTE — PROGRESS NOTES
Problem: Pressure Injury - Risk of  Goal: *Prevention of pressure injury  Description: Document Amrit Scale and appropriate interventions in the flowsheet. Outcome: Progressing Towards Goal  Note: Pressure Injury Interventions:  Sensory Interventions: Assess changes in LOC, Keep linens dry and wrinkle-free    Moisture Interventions: Absorbent underpads, Apply protective barrier, creams and emollients, Assess need for specialty bed, Internal/External urinary devices, Limit adult briefs, Maintain skin hydration (lotion/cream), Minimize layers, Moisture barrier    Activity Interventions: Assess need for specialty bed, Increase time out of bed, Pressure redistribution bed/mattress(bed type), PT/OT evaluation    Mobility Interventions: Assess need for specialty bed, HOB 30 degrees or less, Pressure redistribution bed/mattress (bed type), PT/OT evaluation, Turn and reposition approx. every two hours(pillow and wedges)    Nutrition Interventions: Document food/fluid/supplement intake, Offer support with meals,snacks and hydration    Friction and Shear Interventions: Apply protective barrier, creams and emollients, Minimize layers, Foam dressings/transparent film/skin sealants                Problem: Patient Education: Go to Patient Education Activity  Goal: Patient/Family Education  Outcome: Progressing Towards Goal     Problem: Falls - Risk of  Goal: *Absence of Falls  Description: Document Ron Fall Risk and appropriate interventions in the flowsheet. Outcome: Progressing Towards Goal  Note: Fall Risk Interventions:                                Problem: Patient Education: Go to Patient Education Activity  Goal: Patient/Family Education  Outcome: Progressing Towards Goal     Problem: Falls - Risk of  Goal: *Absence of Falls  Description: Document Eudelia Romberg Fall Risk and appropriate interventions in the flowsheet.   Outcome: Progressing Towards Goal  Note: Fall Risk Interventions: Problem: Patient Education: Go to Patient Education Activity  Goal: Patient/Family Education  Outcome: Progressing Towards Goal     Problem: Patient Education: Go to Patient Education Activity  Goal: Patient/Family Education  Outcome: Progressing Towards Goal     Problem: Patient Education: Go to Patient Education Activity  Goal: Patient/Family Education  Outcome: Progressing Towards Goal

## 2023-05-05 ENCOUNTER — PATIENT OUTREACH (OUTPATIENT)
Dept: CASE MANAGEMENT | Age: 88
End: 2023-05-05

## 2023-05-10 ENCOUNTER — TELEPHONE (OUTPATIENT)
Age: 88
End: 2023-05-10

## 2023-05-15 ENCOUNTER — HOSPITAL ENCOUNTER (INPATIENT)
Facility: HOSPITAL | Age: 88
LOS: 4 days | Discharge: NURSING, REHAB OR LONG-TERM CARE | End: 2023-05-19
Attending: STUDENT IN AN ORGANIZED HEALTH CARE EDUCATION/TRAINING PROGRAM | Admitting: FAMILY MEDICINE
Payer: MEDICARE

## 2023-05-15 ENCOUNTER — TELEPHONE (OUTPATIENT)
Age: 88
End: 2023-05-15

## 2023-05-15 DIAGNOSIS — Z79.01 CURRENT USE OF ANTICOAGULANT THERAPY: ICD-10-CM

## 2023-05-15 DIAGNOSIS — Z95.0 S/P PLACEMENT OF CARDIAC PACEMAKER: ICD-10-CM

## 2023-05-15 DIAGNOSIS — I48.91 AF (ATRIAL FIBRILLATION) (HCC): ICD-10-CM

## 2023-05-15 DIAGNOSIS — S20.212A HEMATOMA OF LEFT CHEST WALL, INITIAL ENCOUNTER: Primary | ICD-10-CM

## 2023-05-15 LAB
ALBUMIN SERPL-MCNC: 2.5 G/DL (ref 3.5–5)
ALBUMIN/GLOB SERPL: 0.4 (ref 1.1–2.2)
ALP SERPL-CCNC: 338 U/L (ref 45–117)
ALT SERPL-CCNC: 27 U/L (ref 12–78)
ANION GAP SERPL CALC-SCNC: 3 MMOL/L (ref 5–15)
AST SERPL-CCNC: 42 U/L (ref 15–37)
BILIRUB SERPL-MCNC: 0.6 MG/DL (ref 0.2–1)
BUN SERPL-MCNC: 29 MG/DL (ref 6–20)
BUN/CREAT SERPL: 22 (ref 12–20)
CALCIUM SERPL-MCNC: 9.6 MG/DL (ref 8.5–10.1)
CHLORIDE SERPL-SCNC: 97 MMOL/L (ref 97–108)
CO2 SERPL-SCNC: 36 MMOL/L (ref 21–32)
COMMENT:: NORMAL
CREAT SERPL-MCNC: 1.34 MG/DL (ref 0.7–1.3)
ERYTHROCYTE [DISTWIDTH] IN BLOOD BY AUTOMATED COUNT: 13.9 % (ref 11.5–14.5)
GLOBULIN SER CALC-MCNC: 6 G/DL (ref 2–4)
GLUCOSE BLD STRIP.AUTO-MCNC: 113 MG/DL (ref 65–117)
GLUCOSE SERPL-MCNC: 134 MG/DL (ref 65–100)
HCT VFR BLD AUTO: 41.8 % (ref 36.6–50.3)
HGB BLD-MCNC: 13 G/DL (ref 12.1–17)
MCH RBC QN AUTO: 30.9 PG (ref 26–34)
MCHC RBC AUTO-ENTMCNC: 31.1 G/DL (ref 30–36.5)
MCV RBC AUTO: 99.3 FL (ref 80–99)
NRBC # BLD: 0 K/UL (ref 0–0.01)
NRBC BLD-RTO: 0 PER 100 WBC
PLATELET # BLD AUTO: 283 K/UL (ref 150–400)
PMV BLD AUTO: 9.7 FL (ref 8.9–12.9)
POTASSIUM SERPL-SCNC: 3.9 MMOL/L (ref 3.5–5.1)
PROT SERPL-MCNC: 8.5 G/DL (ref 6.4–8.2)
RBC # BLD AUTO: 4.21 M/UL (ref 4.1–5.7)
SERVICE CMNT-IMP: NORMAL
SODIUM SERPL-SCNC: 136 MMOL/L (ref 136–145)
SPECIMEN HOLD: NORMAL
WBC # BLD AUTO: 10.3 K/UL (ref 4.1–11.1)

## 2023-05-15 PROCEDURE — 82962 GLUCOSE BLOOD TEST: CPT

## 2023-05-15 PROCEDURE — 99223 1ST HOSP IP/OBS HIGH 75: CPT | Performed by: INTERNAL MEDICINE

## 2023-05-15 PROCEDURE — 99285 EMERGENCY DEPT VISIT HI MDM: CPT

## 2023-05-15 PROCEDURE — 80053 COMPREHEN METABOLIC PANEL: CPT

## 2023-05-15 PROCEDURE — 1100000003 HC PRIVATE W/ TELEMETRY

## 2023-05-15 PROCEDURE — 36415 COLL VENOUS BLD VENIPUNCTURE: CPT

## 2023-05-15 PROCEDURE — 6360000002 HC RX W HCPCS: Performed by: FAMILY MEDICINE

## 2023-05-15 PROCEDURE — 85027 COMPLETE CBC AUTOMATED: CPT

## 2023-05-15 PROCEDURE — 6370000000 HC RX 637 (ALT 250 FOR IP): Performed by: FAMILY MEDICINE

## 2023-05-15 RX ORDER — COLESEVELAM 180 1/1
625 TABLET ORAL 2 TIMES DAILY WITH MEALS
Status: DISCONTINUED | OUTPATIENT
Start: 2023-05-15 | End: 2023-05-19 | Stop reason: HOSPADM

## 2023-05-15 RX ORDER — LIDOCAINE 4 G/G
1 PATCH TOPICAL DAILY
Status: DISCONTINUED | OUTPATIENT
Start: 2023-05-15 | End: 2023-05-19 | Stop reason: HOSPADM

## 2023-05-15 RX ORDER — ONDANSETRON 2 MG/ML
4 INJECTION INTRAMUSCULAR; INTRAVENOUS EVERY 6 HOURS PRN
Status: DISCONTINUED | OUTPATIENT
Start: 2023-05-15 | End: 2023-05-19 | Stop reason: HOSPADM

## 2023-05-15 RX ORDER — ONDANSETRON 4 MG/1
4 TABLET, ORALLY DISINTEGRATING ORAL EVERY 8 HOURS PRN
Status: DISCONTINUED | OUTPATIENT
Start: 2023-05-15 | End: 2023-05-19 | Stop reason: HOSPADM

## 2023-05-15 RX ORDER — METOPROLOL SUCCINATE 25 MG/1
25 TABLET, EXTENDED RELEASE ORAL DAILY
Status: DISCONTINUED | OUTPATIENT
Start: 2023-05-15 | End: 2023-05-19 | Stop reason: HOSPADM

## 2023-05-15 RX ORDER — SODIUM CHLORIDE 9 MG/ML
INJECTION, SOLUTION INTRAVENOUS PRN
Status: DISCONTINUED | OUTPATIENT
Start: 2023-05-15 | End: 2023-05-19 | Stop reason: HOSPADM

## 2023-05-15 RX ORDER — INSULIN LISPRO 100 [IU]/ML
0-4 INJECTION, SOLUTION INTRAVENOUS; SUBCUTANEOUS
Status: DISCONTINUED | OUTPATIENT
Start: 2023-05-16 | End: 2023-05-19 | Stop reason: HOSPADM

## 2023-05-15 RX ORDER — POLYETHYLENE GLYCOL 3350 17 G/17G
17 POWDER, FOR SOLUTION ORAL DAILY PRN
Status: DISCONTINUED | OUTPATIENT
Start: 2023-05-15 | End: 2023-05-19 | Stop reason: HOSPADM

## 2023-05-15 RX ORDER — ATORVASTATIN CALCIUM 40 MG/1
80 TABLET, FILM COATED ORAL NIGHTLY
Status: DISCONTINUED | OUTPATIENT
Start: 2023-05-15 | End: 2023-05-19 | Stop reason: HOSPADM

## 2023-05-15 RX ORDER — IPRATROPIUM BROMIDE AND ALBUTEROL SULFATE 2.5; .5 MG/3ML; MG/3ML
1 SOLUTION RESPIRATORY (INHALATION) EVERY 4 HOURS PRN
Status: DISCONTINUED | OUTPATIENT
Start: 2023-05-15 | End: 2023-05-19 | Stop reason: HOSPADM

## 2023-05-15 RX ORDER — INSULIN LISPRO 100 [IU]/ML
0-4 INJECTION, SOLUTION INTRAVENOUS; SUBCUTANEOUS NIGHTLY
Status: DISCONTINUED | OUTPATIENT
Start: 2023-05-15 | End: 2023-05-19 | Stop reason: HOSPADM

## 2023-05-15 RX ORDER — DEXTROSE MONOHYDRATE 100 MG/ML
INJECTION, SOLUTION INTRAVENOUS CONTINUOUS PRN
Status: DISCONTINUED | OUTPATIENT
Start: 2023-05-15 | End: 2023-05-19 | Stop reason: HOSPADM

## 2023-05-15 RX ORDER — BUMETANIDE 1 MG/1
1 TABLET ORAL DAILY
Status: DISCONTINUED | OUTPATIENT
Start: 2023-05-15 | End: 2023-05-19 | Stop reason: HOSPADM

## 2023-05-15 RX ORDER — SODIUM CHLORIDE 0.9 % (FLUSH) 0.9 %
5-40 SYRINGE (ML) INJECTION PRN
Status: DISCONTINUED | OUTPATIENT
Start: 2023-05-15 | End: 2023-05-19 | Stop reason: HOSPADM

## 2023-05-15 RX ORDER — ACETAMINOPHEN 650 MG/1
650 SUPPOSITORY RECTAL EVERY 6 HOURS PRN
Status: DISCONTINUED | OUTPATIENT
Start: 2023-05-15 | End: 2023-05-19 | Stop reason: HOSPADM

## 2023-05-15 RX ORDER — ACETAMINOPHEN 325 MG/1
650 TABLET ORAL EVERY 6 HOURS PRN
Status: DISCONTINUED | OUTPATIENT
Start: 2023-05-15 | End: 2023-05-19 | Stop reason: HOSPADM

## 2023-05-15 RX ORDER — SODIUM CHLORIDE 0.9 % (FLUSH) 0.9 %
5-40 SYRINGE (ML) INJECTION EVERY 12 HOURS SCHEDULED
Status: DISCONTINUED | OUTPATIENT
Start: 2023-05-15 | End: 2023-05-19 | Stop reason: HOSPADM

## 2023-05-15 RX ORDER — SPIRONOLACTONE 25 MG/1
25 TABLET ORAL DAILY
Status: DISCONTINUED | OUTPATIENT
Start: 2023-05-15 | End: 2023-05-19 | Stop reason: HOSPADM

## 2023-05-15 RX ADMIN — IPRATROPIUM BROMIDE 0.5 MG: 0.5 SOLUTION RESPIRATORY (INHALATION) at 20:48

## 2023-05-15 RX ADMIN — SPIRONOLACTONE 25 MG: 25 TABLET ORAL at 20:45

## 2023-05-15 RX ADMIN — ARFORMOTEROL TARTRATE: 15 SOLUTION RESPIRATORY (INHALATION) at 21:12

## 2023-05-15 RX ADMIN — COLESEVELAM HYDROCHLORIDE 625 MG: 625 TABLET, FILM COATED ORAL at 20:43

## 2023-05-15 RX ADMIN — METOPROLOL SUCCINATE 25 MG: 25 TABLET, EXTENDED RELEASE ORAL at 20:46

## 2023-05-15 RX ADMIN — ATORVASTATIN CALCIUM 80 MG: 40 TABLET, FILM COATED ORAL at 20:46

## 2023-05-15 RX ADMIN — BUMETANIDE 1 MG: 1 TABLET ORAL at 20:44

## 2023-05-15 ASSESSMENT — PAIN SCALES - GENERAL
PAINLEVEL_OUTOF10: 3
PAINLEVEL_OUTOF10: 0
PAINLEVEL_OUTOF10: 0
PAINLEVEL_OUTOF10: 5

## 2023-05-15 ASSESSMENT — ENCOUNTER SYMPTOMS: SHORTNESS OF BREATH: 0

## 2023-05-15 NOTE — CONSULTS
Cardiac Electrophysiology Initial Visit      Reason for encounter: biventricular pacemaker pocket hematoma        HPI:         Dez Womack is an 80 y. o.male who is transferred from [facility] to [facility] today  His daughter Natty Carrillo is with him  She said today she was called by staff there that there is drainage from the pacer pocket  She said today was first time she was told there is a large hematoma  He has been on eliquis 5 mg bid  He had biventricular pacer with me for Syncope, complete av block and LVEF 40-45%       He has hx of CABG 1995 and AFIB   COPD   LVEF 40-45%   LBBB   Subjective:        Dez Womack reports no pain, he is not communicative         Assessment and Plan        Irreversible bradycardia with third degree av block   Underlying conduction disease LBBB   Syncope that led to vertebral fracture   He had biventricular pacer 4/28/23 With LVEF 40-45%     He has chronic AF and on eliquis full strength   Biventricular pacer pocket has a large hard hematoma  It has been there for sometime but recognized only today  The skin is not red and I do not see an erosion   I recommend and his daughter agreed and signed consent for hematoma evacuation in 2 days  Need to hold eliquis now      CAD- CABG almost 30 years ago, see Dr Cathy Buckley     DM2 and COPD- per primary team team      Risks involve device pocket hematoma evacuation include but are not limited to bleeding, infection   If infection in the pocket it would require total extraction and device and leads and because he is dependent he will need leadless pacer                                ____________________________________________________________      Cardiac testing   03/17/23      ECHO ADULT COMPLETE 03/18/2023 3/18/2023      Interpretation Summary     Left Ventricle: Normal left ventricular systolic function with a visually estimated EF of 40 - 45%. Left ventricle  is moderately dilated. Normal wall thickness. Moderate global hypokinesis present.

## 2023-05-15 NOTE — ED NOTES
Verbal shift change report given to Nick Mahmood RN (oncoming nurse) by Kristin Paez (offgoing nurse). Report included the following information ED Encounter Summary, ED SBAR, MAR, and Recent Results.        Candice Jacobs RN  05/15/23 1204

## 2023-05-15 NOTE — ED PROVIDER NOTES
[facility] EMERGENCY DEP  EMERGENCY DEPARTMENT ENCOUNTER      Pt Name: Dez Womack  MRN: [id number]  Porschegfrodriguez 1935  Date of evaluation: 5/15/2023  Provider: Durga Alex DO    CHIEF COMPLAINT       Chief Complaint   Patient presents with    Other     Bleeding at pacemaker insertion site         HISTORY OF PRESENT ILLNESS    66-year-old male presents ED from [facility] via EMS for evaluation of some bleeding at his pacemaker insertion site of his left chest.  Patient recently had pacemaker placement on April 28, 2023. Staff noticed that he was having some bleeding and a hematoma from the area earlier today. We will the contacted his cardiologist and he was sent to the ED for further evaluation. Denies any chest pain or shortness of breath. Currently on Eliquis. Review of External Medical Records:     Nursing Notes were reviewed. REVIEW OF SYSTEMS       Review of Systems   Constitutional:  Negative for fever. Respiratory:  Negative for shortness of breath. Cardiovascular:  Negative for chest pain. Skin:  Positive for wound. Except as noted above the remainder of the review of systems was reviewed and negative.        PAST MEDICAL HISTORY     Past Medical History:   Diagnosis Date    Asthma     CAD (coronary artery disease)     CHF (congestive heart failure), NYHA class I, chronic, systolic (HCC)     Chronic anticoagulation     Chronic obstructive pulmonary disease (HCC)     Hx of deep venous thrombosis     Hypercholesterolemia     Hypertension     Paroxysmal A-fib (HCC)     Prostate cancer ([facility] [address]     Umbilical hernia 87/99/5468    Venous insufficiency (chronic) (peripheral)          SURGICAL HISTORY       Past Surgical History:   Procedure Laterality Date    COLONOSCOPY  01/02/2006    GI      COLONOSCOPY    HEENT Bilateral     CATARACTS/ IOL    HERNIA REPAIR  7/2014    IR KYPHOPLASTY THORACIC FIRST LEVEL  10/12/2020    IR KYPHOPLASTY THORACIC FIRST LEVEL [facility] CC AMB HISTORICAL

## 2023-05-15 NOTE — ED NOTES
TRIAGE: Pt arrives via EMS from [facility] after staff noticed bleeding at pacemaker insertion site. Saturated bandage and swelling noted over pacemaker site. Pacemaker was placed in April 2023. Pt A&Ox1 and wears 2L via NC at baseline. Pt does not appear to be in acute distress at this time.       Taty Irvin, IZAIAH  05/15/23 199 [facility] IZAIAH Mcmillan  05/15/23 1626

## 2023-05-16 ENCOUNTER — APPOINTMENT (OUTPATIENT)
Facility: HOSPITAL | Age: 88
End: 2023-05-16
Payer: MEDICARE

## 2023-05-16 LAB
ALBUMIN SERPL-MCNC: 2.3 G/DL (ref 3.5–5)
ALBUMIN/GLOB SERPL: 0.4 (ref 1.1–2.2)
ALP SERPL-CCNC: 290 U/L (ref 45–117)
ALT SERPL-CCNC: 24 U/L (ref 12–78)
ANION GAP SERPL CALC-SCNC: 2 MMOL/L (ref 5–15)
APTT PPP: 28.5 SEC (ref 22.1–31)
AST SERPL-CCNC: 39 U/L (ref 15–37)
BASOPHILS # BLD: 0.1 K/UL (ref 0–0.1)
BASOPHILS NFR BLD: 1 % (ref 0–1)
BILIRUB SERPL-MCNC: 0.6 MG/DL (ref 0.2–1)
BUN SERPL-MCNC: 28 MG/DL (ref 6–20)
BUN/CREAT SERPL: 25 (ref 12–20)
CALCIUM SERPL-MCNC: 9.2 MG/DL (ref 8.5–10.1)
CHLORIDE SERPL-SCNC: 99 MMOL/L (ref 97–108)
CO2 SERPL-SCNC: 34 MMOL/L (ref 21–32)
CREAT SERPL-MCNC: 1.1 MG/DL (ref 0.7–1.3)
DIFFERENTIAL METHOD BLD: ABNORMAL
EOSINOPHIL # BLD: 0.3 K/UL (ref 0–0.4)
EOSINOPHIL NFR BLD: 4 % (ref 0–7)
ERYTHROCYTE [DISTWIDTH] IN BLOOD BY AUTOMATED COUNT: 14 % (ref 11.5–14.5)
GLOBULIN SER CALC-MCNC: 5.3 G/DL (ref 2–4)
GLUCOSE BLD STRIP.AUTO-MCNC: 100 MG/DL (ref 65–117)
GLUCOSE BLD STRIP.AUTO-MCNC: 107 MG/DL (ref 65–117)
GLUCOSE BLD STRIP.AUTO-MCNC: 110 MG/DL (ref 65–117)
GLUCOSE BLD STRIP.AUTO-MCNC: 115 MG/DL (ref 65–117)
GLUCOSE SERPL-MCNC: 93 MG/DL (ref 65–100)
HCT VFR BLD AUTO: 39.1 % (ref 36.6–50.3)
HGB BLD-MCNC: 12.1 G/DL (ref 12.1–17)
IMM GRANULOCYTES # BLD AUTO: 0 K/UL (ref 0–0.04)
IMM GRANULOCYTES NFR BLD AUTO: 0 % (ref 0–0.5)
INR PPP: 1.1 (ref 0.9–1.1)
LACTATE SERPL-SCNC: 0.9 MMOL/L (ref 0.4–2)
LYMPHOCYTES # BLD: 1.7 K/UL (ref 0.8–3.5)
LYMPHOCYTES NFR BLD: 20 % (ref 12–49)
MAGNESIUM SERPL-MCNC: 2.1 MG/DL (ref 1.6–2.4)
MCH RBC QN AUTO: 31 PG (ref 26–34)
MCHC RBC AUTO-ENTMCNC: 30.9 G/DL (ref 30–36.5)
MCV RBC AUTO: 100.3 FL (ref 80–99)
MONOCYTES # BLD: 1 K/UL (ref 0–1)
MONOCYTES NFR BLD: 11 % (ref 5–13)
NEUTS SEG # BLD: 5.3 K/UL (ref 1.8–8)
NEUTS SEG NFR BLD: 64 % (ref 32–75)
NRBC # BLD: 0 K/UL (ref 0–0.01)
NRBC BLD-RTO: 0 PER 100 WBC
PLATELET # BLD AUTO: 237 K/UL (ref 150–400)
PMV BLD AUTO: 9.4 FL (ref 8.9–12.9)
POTASSIUM SERPL-SCNC: 3.4 MMOL/L (ref 3.5–5.1)
PROT SERPL-MCNC: 7.6 G/DL (ref 6.4–8.2)
PROTHROMBIN TIME: 11.9 SEC (ref 9–11.1)
RBC # BLD AUTO: 3.9 M/UL (ref 4.1–5.7)
SERVICE CMNT-IMP: NORMAL
SODIUM SERPL-SCNC: 135 MMOL/L (ref 136–145)
THERAPEUTIC RANGE: NORMAL SECS (ref 58–77)
WBC # BLD AUTO: 8.3 K/UL (ref 4.1–11.1)

## 2023-05-16 PROCEDURE — 83605 ASSAY OF LACTIC ACID: CPT

## 2023-05-16 PROCEDURE — 71045 X-RAY EXAM CHEST 1 VIEW: CPT

## 2023-05-16 PROCEDURE — 83735 ASSAY OF MAGNESIUM: CPT

## 2023-05-16 PROCEDURE — 85730 THROMBOPLASTIN TIME PARTIAL: CPT

## 2023-05-16 PROCEDURE — 2580000003 HC RX 258: Performed by: FAMILY MEDICINE

## 2023-05-16 PROCEDURE — 2700000000 HC OXYGEN THERAPY PER DAY

## 2023-05-16 PROCEDURE — 1100000003 HC PRIVATE W/ TELEMETRY

## 2023-05-16 PROCEDURE — 6370000000 HC RX 637 (ALT 250 FOR IP): Performed by: FAMILY MEDICINE

## 2023-05-16 PROCEDURE — 85025 COMPLETE CBC W/AUTO DIFF WBC: CPT

## 2023-05-16 PROCEDURE — 82962 GLUCOSE BLOOD TEST: CPT

## 2023-05-16 PROCEDURE — 85610 PROTHROMBIN TIME: CPT

## 2023-05-16 PROCEDURE — 80053 COMPREHEN METABOLIC PANEL: CPT

## 2023-05-16 PROCEDURE — 36415 COLL VENOUS BLD VENIPUNCTURE: CPT

## 2023-05-16 PROCEDURE — 94640 AIRWAY INHALATION TREATMENT: CPT

## 2023-05-16 PROCEDURE — 6360000002 HC RX W HCPCS: Performed by: FAMILY MEDICINE

## 2023-05-16 RX ADMIN — ARFORMOTEROL TARTRATE: 15 SOLUTION RESPIRATORY (INHALATION) at 07:50

## 2023-05-16 RX ADMIN — IPRATROPIUM BROMIDE 0.5 MG: 0.5 SOLUTION RESPIRATORY (INHALATION) at 07:50

## 2023-05-16 RX ADMIN — COLESEVELAM HYDROCHLORIDE 625 MG: 625 TABLET, FILM COATED ORAL at 17:42

## 2023-05-16 RX ADMIN — COLESEVELAM HYDROCHLORIDE 625 MG: 625 TABLET, FILM COATED ORAL at 10:18

## 2023-05-16 RX ADMIN — ATORVASTATIN CALCIUM 80 MG: 40 TABLET, FILM COATED ORAL at 22:10

## 2023-05-16 RX ADMIN — IPRATROPIUM BROMIDE 0.5 MG: 0.5 SOLUTION RESPIRATORY (INHALATION) at 20:06

## 2023-05-16 RX ADMIN — ARFORMOTEROL TARTRATE: 15 SOLUTION RESPIRATORY (INHALATION) at 20:06

## 2023-05-16 RX ADMIN — SODIUM CHLORIDE, PRESERVATIVE FREE 10 ML: 5 INJECTION INTRAVENOUS at 22:10

## 2023-05-16 ASSESSMENT — PAIN SCALES - GENERAL
PAINLEVEL_OUTOF10: 0

## 2023-05-16 NOTE — H&P
History and Physical    Date of Service:  5/15/2023  Primary Care Provider: Karlie Peck MD  Source of information: Chart review    Chief Complaint: Patient does not provide      History of Presenting Illness:   Gia Goldberg is a 80 y.o. 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 Patient at current is somnolent, nonverbal, and not answering questions. Majority of history is obtained per review of ED and electronic medical records. Per these reports, patient has had recent history of hospitalization 3/17/2023 to 3/22/2023 with diagnosis of acute on chronic hypoxic respiratory failure, large pleural effusion, acute lower back pain secondary to T12 compression fracture. Underwent T12 kyphoplasty on 3/20/2023. Patient was last hospitalized 4/24/2023 to 5/2/2023 with diagnosis of T11 compression fracture, paroxysmal atrial fibrillation, complete third-degree heart block. On 4/20/2023, patient underwent biventricular pacemaker insertion. 5/1/2023 patient underwent IR T11 kyphoplasty. Patient has been on Eliquis 5 mg twice daily. Per report patient started having bleeding from the pacemaker insertion site with hematoma. On arrival in the ED initial recorded vital signs were temperature 97.9 °F, /60, heart rate 86, respiratory rate 18, O2 saturation 90% room air. Abnormal labs included serum CO2 36, BUN 29, creatinine 1.34, GFR 51, glucose 134, albumin 2.5, alk phos 6/1/1938, AST 42.  ED consulted cardiologist.  Patient was diagnosed with hematoma of left chest wall.   Plan was to hold

## 2023-05-16 NOTE — ED NOTES
TRANSFER - OUT REPORT:    Verbal report given to [facility] RN  on Mayra Linda  being transferred to [phone] for routine progression of patient care       Report consisted of patient's Situation, Background, Assessment and   Recommendations(SBAR). Information from the following report(s) ED SBAR, MAR, and Recent Results was reviewed with the receiving nurse. [place] Assessment: Presents to emergency department  because of falls (Syncope, seizure, or loss of consciousness): No, Age > 79: Yes, Altered Mental Status, Intoxication with alcohol or substance confusion (Disorientation, impaired judgment, poor safety awaremess, or inability to follow instructions): Yes, Impaired Mobility: Ambulates or transfers with assistive devices or assistance; Unable to ambulate or transer.: Yes, Nursing Judgement: Yes  Lines:   Peripheral IV 05/15/23 Left Antecubital (Active)   Site Assessment Clean, dry & intact 05/15/23 1402   Line Status Specimen collected;Normal saline locked 05/15/23 1402   Phlebitis Assessment No symptoms 05/15/23 1402   Infiltration Assessment 0 05/15/23 1402   Dressing Status Clean, dry & intact 05/15/23 1402   Dressing Type Transparent 05/15/23 1402        Opportunity for questions and clarification was provided.       Patient transported with:  Monitor, O2 @ 2lpm, and Registered Nurse           Trenton Ramirez RN  05/15/23 0109

## 2023-05-17 LAB
ALBUMIN SERPL-MCNC: 2.5 G/DL (ref 3.5–5)
ALBUMIN/GLOB SERPL: 0.5 (ref 1.1–2.2)
ALP SERPL-CCNC: 294 U/L (ref 45–117)
ALT SERPL-CCNC: 26 U/L (ref 12–78)
ANION GAP SERPL CALC-SCNC: 2 MMOL/L (ref 5–15)
AST SERPL-CCNC: 41 U/L (ref 15–37)
BILIRUB SERPL-MCNC: 0.8 MG/DL (ref 0.2–1)
BUN SERPL-MCNC: 34 MG/DL (ref 6–20)
BUN/CREAT SERPL: 31 (ref 12–20)
CALCIUM SERPL-MCNC: 9.2 MG/DL (ref 8.5–10.1)
CHLORIDE SERPL-SCNC: 99 MMOL/L (ref 97–108)
CO2 SERPL-SCNC: 33 MMOL/L (ref 21–32)
CREAT SERPL-MCNC: 1.1 MG/DL (ref 0.7–1.3)
ERYTHROCYTE [DISTWIDTH] IN BLOOD BY AUTOMATED COUNT: 13.7 % (ref 11.5–14.5)
GLOBULIN SER CALC-MCNC: 4.6 G/DL (ref 2–4)
GLUCOSE BLD STRIP.AUTO-MCNC: 106 MG/DL (ref 65–117)
GLUCOSE BLD STRIP.AUTO-MCNC: 115 MG/DL (ref 65–117)
GLUCOSE BLD STRIP.AUTO-MCNC: 87 MG/DL (ref 65–117)
GLUCOSE BLD STRIP.AUTO-MCNC: 95 MG/DL (ref 65–117)
GLUCOSE SERPL-MCNC: 90 MG/DL (ref 65–100)
HCT VFR BLD AUTO: 38.2 % (ref 36.6–50.3)
HGB BLD-MCNC: 12.2 G/DL (ref 12.1–17)
MCH RBC QN AUTO: 31.3 PG (ref 26–34)
MCHC RBC AUTO-ENTMCNC: 31.9 G/DL (ref 30–36.5)
MCV RBC AUTO: 97.9 FL (ref 80–99)
NRBC # BLD: 0 K/UL (ref 0–0.01)
NRBC BLD-RTO: 0 PER 100 WBC
PLATELET # BLD AUTO: 247 K/UL (ref 150–400)
PMV BLD AUTO: 9.6 FL (ref 8.9–12.9)
POTASSIUM SERPL-SCNC: 4 MMOL/L (ref 3.5–5.1)
PROT SERPL-MCNC: 7.1 G/DL (ref 6.4–8.2)
RBC # BLD AUTO: 3.9 M/UL (ref 4.1–5.7)
SERVICE CMNT-IMP: NORMAL
SODIUM SERPL-SCNC: 134 MMOL/L (ref 136–145)
WBC # BLD AUTO: 8.6 K/UL (ref 4.1–11.1)

## 2023-05-17 PROCEDURE — 10140 I&D HMTMA SEROMA/FLUID COLLJ: CPT | Performed by: INTERNAL MEDICINE

## 2023-05-17 PROCEDURE — 6370000000 HC RX 637 (ALT 250 FOR IP): Performed by: FAMILY MEDICINE

## 2023-05-17 PROCEDURE — 99024 POSTOP FOLLOW-UP VISIT: CPT | Performed by: INTERNAL MEDICINE

## 2023-05-17 PROCEDURE — 36415 COLL VENOUS BLD VENIPUNCTURE: CPT

## 2023-05-17 PROCEDURE — 0W980ZZ DRAINAGE OF CHEST WALL, OPEN APPROACH: ICD-10-PCS | Performed by: INTERNAL MEDICINE

## 2023-05-17 PROCEDURE — 2580000003 HC RX 258: Performed by: FAMILY MEDICINE

## 2023-05-17 PROCEDURE — 33222 RELOCATION POCKET PACEMAKER: CPT | Performed by: INTERNAL MEDICINE

## 2023-05-17 PROCEDURE — 82962 GLUCOSE BLOOD TEST: CPT

## 2023-05-17 PROCEDURE — 1100000003 HC PRIVATE W/ TELEMETRY

## 2023-05-17 PROCEDURE — 2709999900 HC NON-CHARGEABLE SUPPLY: Performed by: INTERNAL MEDICINE

## 2023-05-17 PROCEDURE — C1889 IMPLANT/INSERT DEVICE, NOC: HCPCS | Performed by: INTERNAL MEDICINE

## 2023-05-17 PROCEDURE — 6360000002 HC RX W HCPCS: Performed by: FAMILY MEDICINE

## 2023-05-17 PROCEDURE — 99152 MOD SED SAME PHYS/QHP 5/>YRS: CPT | Performed by: INTERNAL MEDICINE

## 2023-05-17 PROCEDURE — 6360000002 HC RX W HCPCS: Performed by: INTERNAL MEDICINE

## 2023-05-17 PROCEDURE — 85027 COMPLETE CBC AUTOMATED: CPT

## 2023-05-17 PROCEDURE — 94640 AIRWAY INHALATION TREATMENT: CPT

## 2023-05-17 PROCEDURE — 0JC60ZZ EXTIRPATION OF MATTER FROM CHEST SUBCUTANEOUS TISSUE AND FASCIA, OPEN APPROACH: ICD-10-PCS | Performed by: INTERNAL MEDICINE

## 2023-05-17 PROCEDURE — 99153 MOD SED SAME PHYS/QHP EA: CPT | Performed by: INTERNAL MEDICINE

## 2023-05-17 PROCEDURE — 2720000010 HC SURG SUPPLY STERILE: Performed by: INTERNAL MEDICINE

## 2023-05-17 PROCEDURE — 6370000000 HC RX 637 (ALT 250 FOR IP): Performed by: NURSE PRACTITIONER

## 2023-05-17 PROCEDURE — 80053 COMPREHEN METABOLIC PANEL: CPT

## 2023-05-17 PROCEDURE — 2700000000 HC OXYGEN THERAPY PER DAY

## 2023-05-17 DEVICE — ENVELOPE CMRM6133 ABSORB LRG MR
Type: IMPLANTABLE DEVICE | Status: FUNCTIONAL
Brand: TYRX™

## 2023-05-17 RX ORDER — MIDAZOLAM HYDROCHLORIDE 1 MG/ML
INJECTION INTRAMUSCULAR; INTRAVENOUS PRN
Status: DISCONTINUED | OUTPATIENT
Start: 2023-05-17 | End: 2023-05-17 | Stop reason: HOSPADM

## 2023-05-17 RX ORDER — HYDROCODONE BITARTRATE AND ACETAMINOPHEN 5; 325 MG/1; MG/1
1 TABLET ORAL EVERY 6 HOURS PRN
Status: DISCONTINUED | OUTPATIENT
Start: 2023-05-17 | End: 2023-05-19 | Stop reason: HOSPADM

## 2023-05-17 RX ORDER — AMOXICILLIN AND CLAVULANATE POTASSIUM 500; 125 MG/1; MG/1
1 TABLET, FILM COATED ORAL EVERY 12 HOURS SCHEDULED
Status: DISCONTINUED | OUTPATIENT
Start: 2023-05-17 | End: 2023-05-19 | Stop reason: HOSPADM

## 2023-05-17 RX ORDER — CEFAZOLIN SODIUM 1 G/3ML
INJECTION, POWDER, FOR SOLUTION INTRAMUSCULAR; INTRAVENOUS PRN
Status: DISCONTINUED | OUTPATIENT
Start: 2023-05-17 | End: 2023-05-17 | Stop reason: HOSPADM

## 2023-05-17 RX ORDER — FENTANYL CITRATE 50 UG/ML
INJECTION, SOLUTION INTRAMUSCULAR; INTRAVENOUS PRN
Status: DISCONTINUED | OUTPATIENT
Start: 2023-05-17 | End: 2023-05-17 | Stop reason: HOSPADM

## 2023-05-17 RX ADMIN — ARFORMOTEROL TARTRATE: 15 SOLUTION RESPIRATORY (INHALATION) at 20:11

## 2023-05-17 RX ADMIN — COLESEVELAM HYDROCHLORIDE 625 MG: 625 TABLET, FILM COATED ORAL at 11:44

## 2023-05-17 RX ADMIN — ARFORMOTEROL TARTRATE: 15 SOLUTION RESPIRATORY (INHALATION) at 08:03

## 2023-05-17 RX ADMIN — IPRATROPIUM BROMIDE 0.5 MG: 0.5 SOLUTION RESPIRATORY (INHALATION) at 08:03

## 2023-05-17 RX ADMIN — AMOXICILLIN AND CLAVULANATE POTASSIUM 1 TABLET: 500; 125 TABLET, FILM COATED ORAL at 23:04

## 2023-05-17 RX ADMIN — SODIUM CHLORIDE, PRESERVATIVE FREE 10 ML: 5 INJECTION INTRAVENOUS at 22:07

## 2023-05-17 RX ADMIN — IPRATROPIUM BROMIDE 0.5 MG: 0.5 SOLUTION RESPIRATORY (INHALATION) at 11:50

## 2023-05-17 RX ADMIN — ATORVASTATIN CALCIUM 80 MG: 40 TABLET, FILM COATED ORAL at 23:04

## 2023-05-17 RX ADMIN — SODIUM CHLORIDE, PRESERVATIVE FREE 10 ML: 5 INJECTION INTRAVENOUS at 09:00

## 2023-05-17 RX ADMIN — IPRATROPIUM BROMIDE 0.5 MG: 0.5 SOLUTION RESPIRATORY (INHALATION) at 20:11

## 2023-05-17 RX ADMIN — IPRATROPIUM BROMIDE 0.5 MG: 0.5 SOLUTION RESPIRATORY (INHALATION) at 16:08

## 2023-05-17 ASSESSMENT — PAIN SCALES - GENERAL: PAINLEVEL_OUTOF10: 0

## 2023-05-17 NOTE — PROCEDURES
Cardiac Procedure Note   Patient: Sally Levy  MRN: [id number]  SSN: [id number]   YOB: 1935 Age: 80 y.o.   Sex: male    Date of Procedure: 5/17/2023   Pre-procedure Diagnosis: Atrial Fibrillation, av block, biventricular pacer, pocket hematoma  Post-procedure Diagnosis: same  Procedure: chest wall hematoma evacuation  Pocket revision  :  Dr. Nancy Irwin MD    Assistant(s):  None  Anesthesia: conscious   Estimated Blood Loss: Less than 10 mL   Specimens Removed: None  Findings: large amount of hematoma removed  No active bleeding in pocket  Hematoma was formed as he was given high dose of eliquis 5 mg bid  Recommend 2.5 mg bid to start in 2 days   Complications: None   Implants:  None, he has preexisting biventricular medtronic pacer  Signed by:  Nancy Irwin MD  5/17/2023  5:50 PM

## 2023-05-17 NOTE — DISCHARGE INSTRUCTIONS
Hospice    Other:     CDMP Checked:   Yes x     PROBLEM LIST Updated:  Yes x       Signed:   Glenda Lindquist MD  5/18/2023  11:02 AM

## 2023-05-18 PROBLEM — S20.212A CHEST WALL HEMATOMA, LEFT, INITIAL ENCOUNTER: Status: RESOLVED | Noted: 2023-05-15 | Resolved: 2023-05-18

## 2023-05-18 LAB
ERYTHROCYTE [DISTWIDTH] IN BLOOD BY AUTOMATED COUNT: 13.8 % (ref 11.5–14.5)
GLUCOSE BLD STRIP.AUTO-MCNC: 110 MG/DL (ref 65–117)
GLUCOSE BLD STRIP.AUTO-MCNC: 146 MG/DL (ref 65–117)
GLUCOSE BLD STRIP.AUTO-MCNC: 147 MG/DL (ref 65–117)
GLUCOSE BLD STRIP.AUTO-MCNC: 148 MG/DL (ref 65–117)
HCT VFR BLD AUTO: 39.1 % (ref 36.6–50.3)
HGB BLD-MCNC: 12.3 G/DL (ref 12.1–17)
MCH RBC QN AUTO: 31 PG (ref 26–34)
MCHC RBC AUTO-ENTMCNC: 31.5 G/DL (ref 30–36.5)
MCV RBC AUTO: 98.5 FL (ref 80–99)
NRBC # BLD: 0 K/UL (ref 0–0.01)
NRBC BLD-RTO: 0 PER 100 WBC
PLATELET # BLD AUTO: 256 K/UL (ref 150–400)
PMV BLD AUTO: 9.8 FL (ref 8.9–12.9)
RBC # BLD AUTO: 3.97 M/UL (ref 4.1–5.7)
SERVICE CMNT-IMP: ABNORMAL
SERVICE CMNT-IMP: NORMAL
WBC # BLD AUTO: 11 K/UL (ref 4.1–11.1)

## 2023-05-18 PROCEDURE — 6370000000 HC RX 637 (ALT 250 FOR IP): Performed by: FAMILY MEDICINE

## 2023-05-18 PROCEDURE — 82962 GLUCOSE BLOOD TEST: CPT

## 2023-05-18 PROCEDURE — 6370000000 HC RX 637 (ALT 250 FOR IP): Performed by: HOSPITALIST

## 2023-05-18 PROCEDURE — 97530 THERAPEUTIC ACTIVITIES: CPT | Performed by: PHYSICAL THERAPIST

## 2023-05-18 PROCEDURE — 6370000000 HC RX 637 (ALT 250 FOR IP): Performed by: NURSE PRACTITIONER

## 2023-05-18 PROCEDURE — 85027 COMPLETE CBC AUTOMATED: CPT

## 2023-05-18 PROCEDURE — 94640 AIRWAY INHALATION TREATMENT: CPT

## 2023-05-18 PROCEDURE — 97161 PT EVAL LOW COMPLEX 20 MIN: CPT | Performed by: PHYSICAL THERAPIST

## 2023-05-18 PROCEDURE — 2580000003 HC RX 258: Performed by: FAMILY MEDICINE

## 2023-05-18 PROCEDURE — 6360000002 HC RX W HCPCS: Performed by: FAMILY MEDICINE

## 2023-05-18 PROCEDURE — 1100000003 HC PRIVATE W/ TELEMETRY

## 2023-05-18 PROCEDURE — 36415 COLL VENOUS BLD VENIPUNCTURE: CPT

## 2023-05-18 RX ORDER — AMOXICILLIN AND CLAVULANATE POTASSIUM 500; 125 MG/1; MG/1
1 TABLET, FILM COATED ORAL EVERY 12 HOURS SCHEDULED
Qty: 12 TABLET | Refills: 0 | Status: SHIPPED | OUTPATIENT
Start: 2023-05-18 | End: 2023-05-24

## 2023-05-18 RX ORDER — AMMONIUM LACTATE 12 G/100G
LOTION TOPICAL DAILY
Status: DISCONTINUED | OUTPATIENT
Start: 2023-05-18 | End: 2023-05-19 | Stop reason: HOSPADM

## 2023-05-18 RX ORDER — CASTOR OIL AND BALSAM, PERU 788; 87 MG/G; MG/G
OINTMENT TOPICAL DAILY
Status: DISCONTINUED | OUTPATIENT
Start: 2023-05-18 | End: 2023-05-19 | Stop reason: HOSPADM

## 2023-05-18 RX ADMIN — ARFORMOTEROL TARTRATE: 15 SOLUTION RESPIRATORY (INHALATION) at 07:53

## 2023-05-18 RX ADMIN — Medication: at 12:37

## 2023-05-18 RX ADMIN — IPRATROPIUM BROMIDE 0.5 MG: 0.5 SOLUTION RESPIRATORY (INHALATION) at 19:57

## 2023-05-18 RX ADMIN — ATORVASTATIN CALCIUM 80 MG: 40 TABLET, FILM COATED ORAL at 20:18

## 2023-05-18 RX ADMIN — SODIUM CHLORIDE, PRESERVATIVE FREE 10 ML: 5 INJECTION INTRAVENOUS at 09:11

## 2023-05-18 RX ADMIN — AMOXICILLIN AND CLAVULANATE POTASSIUM 1 TABLET: 500; 125 TABLET, FILM COATED ORAL at 20:18

## 2023-05-18 RX ADMIN — SODIUM CHLORIDE, PRESERVATIVE FREE 10 ML: 5 INJECTION INTRAVENOUS at 20:18

## 2023-05-18 RX ADMIN — COLESEVELAM HYDROCHLORIDE 625 MG: 625 TABLET, FILM COATED ORAL at 18:19

## 2023-05-18 RX ADMIN — AMOXICILLIN AND CLAVULANATE POTASSIUM 1 TABLET: 500; 125 TABLET, FILM COATED ORAL at 09:02

## 2023-05-18 RX ADMIN — IPRATROPIUM BROMIDE 0.5 MG: 0.5 SOLUTION RESPIRATORY (INHALATION) at 07:53

## 2023-05-18 RX ADMIN — BUMETANIDE 1 MG: 1 TABLET ORAL at 10:55

## 2023-05-18 RX ADMIN — ARFORMOTEROL TARTRATE: 15 SOLUTION RESPIRATORY (INHALATION) at 19:57

## 2023-05-18 RX ADMIN — SPIRONOLACTONE 25 MG: 25 TABLET ORAL at 12:38

## 2023-05-18 RX ADMIN — METOPROLOL SUCCINATE 25 MG: 25 TABLET, EXTENDED RELEASE ORAL at 09:02

## 2023-05-18 RX ADMIN — IPRATROPIUM BROMIDE 0.5 MG: 0.5 SOLUTION RESPIRATORY (INHALATION) at 15:50

## 2023-05-18 RX ADMIN — COLESEVELAM HYDROCHLORIDE 625 MG: 625 TABLET, FILM COATED ORAL at 09:02

## 2023-05-18 RX ADMIN — IPRATROPIUM BROMIDE 0.5 MG: 0.5 SOLUTION RESPIRATORY (INHALATION) at 11:54

## 2023-05-18 ASSESSMENT — PAIN - FUNCTIONAL ASSESSMENT
PAIN_FUNCTIONAL_ASSESSMENT: ACTIVITIES ARE NOT PREVENTED
PAIN_FUNCTIONAL_ASSESSMENT: ACTIVITIES ARE NOT PREVENTED

## 2023-05-18 ASSESSMENT — PAIN SCALES - GENERAL
PAINLEVEL_OUTOF10: 0
PAINLEVEL_OUTOF10: 0

## 2023-05-18 NOTE — WOUND CARE
WOCN Note:   New consult for redness on back and sacrum    Aguilar Parrish is a 80 y.o. 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 Admitted on 5/15/23. Assessment:   Alert and appropriately communicative, confused. Denies pain. Mobile and repositions independently for visual assessment of sacrum  Wearing briefs  Surface: tony gel mattress  Bilateral heels intact and without erythema. Heels offloaded with pillows. Noted hemosiderin staining and plaques of dry skin to bilateral lower legs  Buttocks and sacrum intact without erythema. Coccyx with blanching minor lavender erythema. Foam dressing in place    1. POA Erythema to Mid Back  2 x 2 cm blanching erythema to bony prominence of spine   Skin intact  Tx: applied sacral foam lengthwise for protection     Wound Recommendations:    Mid back: apply Balsam peru-castor oil (Venelex) daily and keep protected with foam dressing   Bilateral lower legs: apply Lac-Hydrin lotion daily     PI Prevention:  Turn/reposition approximately every 2 hours  Offload heels with heels hanging off end of pillow at all times while in bed. Sacral Foam dressing: lift to assess regularly; change as needed. Discontinue if incontinence is frequently soiling dressing.        Transition of Care: Plan to follow weekly and as needed while admitted to hospital.     Vishal Erickson  MSN, RN  [facility] Inpatient [address] [facility]   Available through [address] [place] [address]

## 2023-05-18 NOTE — DISCHARGE SUMMARY
Discharge Summary       PATIENT ID: Lisa George  MRN: [id number]   YOB: 1935    DATE OF ADMISSION: 5/15/2023  1:42 PM    DATE OF DISCHARGE: 5/18/23   PRIMARY CARE PROVIDER: Jose Luis Morrison MD     ATTENDING PHYSICIAN: Malathi Finley  DISCHARGING PROVIDER: Riya Hernandez MD    To contact this individual call [phone] and ask the  to page. If unavailable ask to be transferred the Adult Hospitalist Department. CONSULTATIONS: IP CONSULT TO HOSPITALIST  IP CONSULT TO CARDIOLOGY  IP CONSULT TO PHYSICAL THERAPY  IP CONSULT TO OCCUPATIONAL THERAPY  IP WOUND CARE NURSE CONSULT TO EVAL    PROCEDURES/SURGERIES: Procedure(s):  Pocket revision    ADMITTING [address] [place] [address] COURSE:  Chest wall hematoma    Lisa George is a 80 y.o. 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 Patient at current is somnolent, nonverbal, and not answering questions. Majority of history is obtained per review of ED and electronic medical records. Per these reports, patient has had recent history of hospitalization 3/17/2023 to 3/22/2023 with diagnosis of acute on chronic hypoxic respiratory failure, large pleural effusion, acute lower back pain secondary to T12 compression fracture. Underwent T12 kyphoplasty on 3/20/2023. Patient was last hospitalized 4/24/2023 to 5/2/2023 with diagnosis of T11 compression fracture, paroxysmal atrial fibrillation, complete third-degree heart block. On 4/20/2023, patient underwent biventricular pacemaker insertion. 5/1/2023 patient underwent IR T11 kyphoplasty.

## 2023-05-19 VITALS
WEIGHT: 132.28 LBS | TEMPERATURE: 97.9 F | SYSTOLIC BLOOD PRESSURE: 112 MMHG | HEIGHT: 69 IN | OXYGEN SATURATION: 96 % | DIASTOLIC BLOOD PRESSURE: 78 MMHG | HEART RATE: 92 BPM | BODY MASS INDEX: 19.59 KG/M2 | RESPIRATION RATE: 18 BRPM

## 2023-05-19 LAB
GLUCOSE BLD STRIP.AUTO-MCNC: 117 MG/DL (ref 65–117)
GLUCOSE BLD STRIP.AUTO-MCNC: 153 MG/DL (ref 65–117)
SARS-COV-2 RDRP RESP QL NAA+PROBE: NOT DETECTED
SERVICE CMNT-IMP: ABNORMAL
SERVICE CMNT-IMP: NORMAL
SOURCE: NORMAL

## 2023-05-19 PROCEDURE — 82962 GLUCOSE BLOOD TEST: CPT

## 2023-05-19 PROCEDURE — 97535 SELF CARE MNGMENT TRAINING: CPT

## 2023-05-19 PROCEDURE — 6360000002 HC RX W HCPCS: Performed by: FAMILY MEDICINE

## 2023-05-19 PROCEDURE — 97165 OT EVAL LOW COMPLEX 30 MIN: CPT

## 2023-05-19 PROCEDURE — 2580000003 HC RX 258: Performed by: FAMILY MEDICINE

## 2023-05-19 PROCEDURE — 87635 SARS-COV-2 COVID-19 AMP PRB: CPT

## 2023-05-19 PROCEDURE — 6370000000 HC RX 637 (ALT 250 FOR IP): Performed by: NURSE PRACTITIONER

## 2023-05-19 PROCEDURE — 6370000000 HC RX 637 (ALT 250 FOR IP): Performed by: FAMILY MEDICINE

## 2023-05-19 PROCEDURE — 94640 AIRWAY INHALATION TREATMENT: CPT

## 2023-05-19 RX ADMIN — Medication: at 08:55

## 2023-05-19 RX ADMIN — SODIUM CHLORIDE, PRESERVATIVE FREE 10 ML: 5 INJECTION INTRAVENOUS at 08:56

## 2023-05-19 RX ADMIN — SPIRONOLACTONE 25 MG: 25 TABLET ORAL at 10:28

## 2023-05-19 RX ADMIN — COLESEVELAM HYDROCHLORIDE 625 MG: 625 TABLET, FILM COATED ORAL at 08:54

## 2023-05-19 RX ADMIN — ARFORMOTEROL TARTRATE: 15 SOLUTION RESPIRATORY (INHALATION) at 09:50

## 2023-05-19 RX ADMIN — IPRATROPIUM BROMIDE 0.5 MG: 0.5 SOLUTION RESPIRATORY (INHALATION) at 09:53

## 2023-05-19 RX ADMIN — METOPROLOL SUCCINATE 25 MG: 25 TABLET, EXTENDED RELEASE ORAL at 09:42

## 2023-05-19 RX ADMIN — IPRATROPIUM BROMIDE 0.5 MG: 0.5 SOLUTION RESPIRATORY (INHALATION) at 13:22

## 2023-05-19 RX ADMIN — BUMETANIDE 1 MG: 1 TABLET ORAL at 08:54

## 2023-05-19 RX ADMIN — AMOXICILLIN AND CLAVULANATE POTASSIUM 1 TABLET: 500; 125 TABLET, FILM COATED ORAL at 08:54

## 2023-05-19 NOTE — DISCHARGE SUMMARY
Discharge Summary       PATIENT ID: Dimitrios Godinez  MRN: [id number]   YOB: 1935    DATE OF ADMISSION: 5/15/2023  1:42 PM    DATE OF DISCHARGE: 5/18/23   PRIMARY CARE PROVIDER: Celio Carbajal MD     ATTENDING PHYSICIAN: Adeel Edwards  DISCHARGING PROVIDER: Francisca Boone MD    To contact this individual call [phone] and ask the  to page. If unavailable ask to be transferred the Adult Hospitalist Department. CONSULTATIONS: IP CONSULT TO HOSPITALIST  IP CONSULT TO CARDIOLOGY  IP CONSULT TO PHYSICAL THERAPY  IP CONSULT TO OCCUPATIONAL THERAPY  IP WOUND CARE NURSE CONSULT TO EVAL    PROCEDURES/SURGERIES: Procedure(s):  Pocket revision    ADMITTING [address] [place] [address] COURSE:  Chest wall hematoma    Dimitrios Godinez is a 80 y.o. 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 Patient at current is somnolent, nonverbal, and not answering questions. Majority of history is obtained per review of ED and electronic medical records. Per these reports, patient has had recent history of hospitalization 3/17/2023 to 3/22/2023 with diagnosis of acute on chronic hypoxic respiratory failure, large pleural effusion, acute lower back pain secondary to T12 compression fracture. Underwent T12 kyphoplasty on 3/20/2023. Patient was last hospitalized 4/24/2023 to 5/2/2023 with diagnosis of T11 compression fracture, paroxysmal atrial fibrillation, complete third-degree heart block. On 4/20/2023, patient underwent biventricular pacemaker insertion. 5/1/2023 patient underwent IR T11 kyphoplasty.

## 2023-05-19 NOTE — PLAN OF CARE
Problem: Occupational Therapy - Adult  Goal: By Discharge: Performs self-care activities at highest level of function for planned discharge setting. See evaluation for individualized goals. Description: FUNCTIONAL STATUS PRIOR TO ADMISSION: Pt poor historian (A&O x2) at time of evaluation. Per pt report, he was ambulatory w/o AD, however, have been currently using RW since recent admission to SNF.   , ADL Assistance: Independent (Independent at baseline),  ,  ,  ,  ,  , Homemaking Assistance: Needs assistance, Ambulation Assistance: Independent (w/o assistive device), Transfer Assistance: Independent,       HOME SUPPORT: Patient lived w/family. Occupational Therapy Goals:  Initiated 5/19/2023  1. Patient will perform standing grooming with [place] within 7 day(s). 2.  Patient will perform upper body dressing and lower body dressing in seated/standing position with S within 7 day(s). 3.  Patient will perform all aspects of bed mobility with Modified [place] within 7 day(s). 4.  Patient will perform toilet transfers with Supervision  within 7 day(s). 5.  Patient will perform all aspects of toileting with Supervision within 7 day(s). 6.  Patient will utilize energy conservation and fall prevention techniques during functional activities with verbal cues within 7 day(s). Outcome: Progressing   OCCUPATIONAL THERAPY EVALUATION    Patient: Radha Guillermo (65 y.o. male)  Date: 5/19/2023  Primary Diagnosis: S/P placement of cardiac pacemaker [Z95.0]  Hematoma of left chest wall, initial encounter [S20.212A]  Current use of anticoagulant therapy [Z79.01]  Procedure(s) (LRB):  Pocket revision (N/A) 2 Days Post-Op     Precautions:                    ASSESSMENT :  The patient is limited by decreased functional mobility, independence in ADLs, high-level IADLs, ROM, strength, body mechanics, activity tolerance, safety awareness, balance, posture and cognition.  OT orders received, chart reviewed, Rn
IR KYPHOPLASTY THORACIC FIRST LEVEL  2023    IR KYPHOPLASTY THORACIC FIRST LEVEL 2023 [facility]    IR KYPHOPLASTY THORACIC FIRST LEVEL  3/20/2023    IR KYPHOPLASTY THORACIC FIRST LEVEL 3/20/2023 [facility]    IR THORACENTESIS PLEURAL W IMAGING  2021    DE UNLISTED PROCEDURE CARDIAC SURGERY  ,     CARDIAC CATH, STENTS    DE UNLISTED PROCEDURE CARDIAC SURGERY      CABG X4 VESSEL    PROSTATECTOMY  2006       Home Situation:       Cognitive/Behavioral Status:   Alert and oriented x 2         Strength:    Strength: Generally decreased, functional      Range Of Motion:  AROM: Generally decreased, functional       Functional Mobility:  Bed Mobility:     Bed Mobility Training  Rolling: Minimum assistance  Supine to Sit: Minimum assistance  Sit to Supine: Minimum assistance  Scooting: Supervision; Additional time  Transfers:     Transfer Training  Transfer Training: Yes  Sit to Stand: Minimum assistance  Stand to Sit: Minimum assistance  Balance:               Balance  Sitting: Intact  Standing: Impaired  Standing - Static: Constant support; Fair  Standing - Dynamic: Constant support; Fair  Ambulation/Gait Training:                       Gait  Base of Support: Widened  Speed/Holly: Slow;Shuffled;Pace decreased (< 100 feet/min)  Step Length: Left shortened;Right shortened  Gait Abnormalities: Decreased step clearance;Shuffling gait  Distance (ft): 5 Feet (sidesteps to the [facility])  Assistive Device: Gait belt;Walker, rolling                           Pain Ratin/10   Pain Intervention(s):        Activity Tolerance:   Fair  and requires frequent rest breaks    After treatment:   Patient left in no apparent distress sitting up in bed, Call bell within reach, Caregiver / family present, and Side rails x3    COMMUNICATION/EDUCATION:   The patient's plan of care was discussed with: physical therapist, occupational therapist, and registered nurse    Patient Education  Education Given To:

## 2023-05-19 NOTE — PROGRESS NOTES
1220: Report was called to [facility] to [facility]. Opportunity for questions and clarification. None at this time.
26- Report received per nurse Tab. Per nurse pt not consentable. Pt to remain NPO for procedure.
Bedside and Verbal shift change report given to Caterina (oncoming nurse) by Jw Majano RN   (offgoing nurse).  Report included the following information Index, Intake/Output, MAR, Recent Results, Med Rec Status, and Cardiac Rhythm ST .
Cardiac Cath Lab Procedure Area Arrival Note:    Gorge Limon arrived to Cardiac Cath Lab, Procedure Area. Patient identifiers verified with NAME and DATE OF BIRTH. Procedure verified with patient. Consent forms verified. Allergies verified. Patient informed of procedure and plan of care. Questions answered with review. Patient voiced understanding of procedure and plan of care. Patient on cardiac monitor, non-invasive blood pressure, SPO2 monitor. On stretcher to room 3 for left pacer pocket hematoma evacuation. to O2 @ 3 lpm via NC.  IV of  ml on pump at [facility] ml/hr. Patient status doing well without problems. Patient is A&Ox 2. Patient reports no complaints. Patient medicated during procedure with orders obtained and verified by Dr. Rowan Painting.
EP/Cath LAB to Recovery Room Report    Procedure:  Hematoma evacuation left chest pacer pocket    MD: PITO Snider MD    Verbal Report given to Recovery Nurse on patient being transferred to Recovery Room for routine post-op. Patient stable upon transfer to . Pt had IV conscious sedation with procedural RN. Vitals, mental status, MAR, procedural summary discussed with recovery RN.      Conscious sedation medication given by Procedural RN:  Versed 5 mg  Fentanyl 100 mcg    Procedural Site:    Incision site made to left chest.     Pacer setting VVIR 
Hospitalist Progress Note  Loni Segura MD  Answering service: [phone]        Date of Service:  2023  NAME:  Natividad Abbott  :  1935  MRN:  [id number]      Admission Summary:   Natividad Abbott is a 80 y.o. 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 Patient at current is somnolent, nonverbal, and not answering questions. Majority of history is obtained per review of ED and electronic medical records. Per these reports, patient has had recent history of hospitalization 3/17/2023 to 3/22/2023 with diagnosis of acute on chronic hypoxic respiratory failure, large pleural effusion, acute lower back pain secondary to T12 compression fracture. Underwent T12 kyphoplasty on 3/20/2023. Patient was last hospitalized 2023 to 2023 with diagnosis of T11 compression fracture, paroxysmal atrial fibrillation, complete third-degree heart block. On 2023, patient underwent biventricular pacemaker insertion. 2023 patient underwent IR T11 kyphoplasty. Patient has been on Eliquis 5 mg twice daily. Per report patient started having bleeding from the pacemaker insertion site with hematoma. On arrival in the ED initial recorded vital signs were temperature 97.9 °F, /60, heart rate 86, respiratory rate 18, O2 saturation 90% room air.   Abnormal labs included serum CO2 36, BUN 29, creatinine 1.34, GFR 51, glucose 134, albumin 2.5, alk phos 1938, AST 42.  ED consulted cardiologist.  Patient was
Hospitalist Progress Note  Sydni Mackey MD  Answering service: [phone]        Date of Service:  2023  NAME:  Oumou Ford  :  1935  MRN:  [id number]      Admission Summary:   Oumou Ford is a 80 y.o. 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 Patient at current is somnolent, nonverbal, and not answering questions. Majority of history is obtained per review of ED and electronic medical records. Per these reports, patient has had recent history of hospitalization 3/17/2023 to 3/22/2023 with diagnosis of acute on chronic hypoxic respiratory failure, large pleural effusion, acute lower back pain secondary to T12 compression fracture. Underwent T12 kyphoplasty on 3/20/2023. Patient was last hospitalized 2023 to 2023 with diagnosis of T11 compression fracture, paroxysmal atrial fibrillation, complete third-degree heart block. On 2023, patient underwent biventricular pacemaker insertion. 2023 patient underwent IR T11 kyphoplasty. Patient has been on Eliquis 5 mg twice daily. Per report patient started having bleeding from the pacemaker insertion site with hematoma. On arrival in the ED initial recorded vital signs were temperature 97.9 °F, /60, heart rate 86, respiratory rate 18, O2 saturation 90% room air.   Abnormal labs included serum CO2 36, BUN 29, creatinine 1.34, GFR 51, glucose 134, albumin 2.5, alk phos 1938, AST 42.  ED consulted cardiologist.  Patient was
Occupational Therapy: defer    Chart reviewed in preparation for OT evaluation. Note patient readmitted from SNF, now POD 1 evacuation of chest wall hematoma. Note patient participated in PT evaluation earlier this morning. Attempted OT evaluation. Patient was received asleep, arose to voice, and declined additional activity at this time, citing need for rest.  Note patient has a d/c order to return to SNF and per CM, an OT note is not needed at this time. Will defer OT evaluation and will follow-up as able if needed.     Damaris Vanessa, OTR/L
Patient may be transferred back to . Lipowa 6 dose should be half what it was 2.5 mg bid to restart May 21  Pocket is still a bit swollen  My nurse will make appt for follow up   Monitor for infection   Started augmentin 1 week but also placed TYRX antibiotic envelope inside the pocket   I spoke to his daughter last night after procedure
Physician Progress Note      PATIENT:               Deepak Aggarwal  CSN #:                  [id number]  :                       1935  ADMIT DATE:       5/15/2023 1:42 PM  [address] [place] DATE:  RESPONDING  PROVIDER #: Loni Segura MD          QUERY TEXT:    Patient admitted with chest wall hematoma and is on chronic anticoagulation. If possible, please document in the progress notes and discharge summary if   you are evaluating and/or treating any of the following: The medical record reflects the following:  Risk Factors: on Eliquis, recent PPM placement    Clinical Indicators:    Snider CN 5/15-  He has chronic AF and on eliquis full strength  Biventricular pacer pocket has a large hard hematoma  It has been there for sometime but recognized only today  The skin is not red and I do not see an erosion  I recommend and his daughter agreed and signed consent for hematoma evacuation   in 2 days  Need to hold eliquis now    Treatment: Hold Eliquis; hematoma evac    Thank you,  Fabio Guthrie RN, BSN, CRCR, CCDS, SMART  Clinical Documentation Improvement  Wanda Sparks. [email or web address]  [phone] or via Perfect Serve  Options provided:  -- Hematoma associated with Eliquis  -- Hematoma unrelated to Eliquis  -- Other - I will add my own diagnosis  -- Disagree - Not applicable / Not valid  -- Disagree - Clinically unable to determine / Unknown  -- Refer to Clinical Documentation Reviewer    PROVIDER RESPONSE TEXT:    This patient has hematoma associated with Eliquis. Query created by: Knovel on 2023 8:49 AM      QUERY TEXT:    Pt noted to have COPD and is on chronic O2. If possible, please document in   the progress notes and discharge summary if you are evaluating and/or treating   any of the following: The medical record reflects the following:  Risk Factors: COPD    Clinical Indicators:    ED PN 5/15- Pt A&Ox1 and wears 2L via NC at baseline.     2L NC 93-99%    Treatment: O2    Thank
Pt didn't  have a bed at [facility] yesterday, will be available today  Stable for d/c
TRANSFER - IN Cath Lab RR REPORT:    Verbal report received from [facility] on Hever Dickens  being received from the EP Lab for routine progression of care. Report consisted of patients Situation, Background, Assessment and Recommendations(SBAR). Procedural summary and MAR reviewed with receiving nurse. Opportunity for questions and clarification was provided. Assessment completed upon patients arrival to [address] [place] [address] and care assumed. Cardiac Cath Lab Recovery Arrival Note:    Hever Dickens arrived to [facility] recovery area. Patient procedure= Pocket Revision. Patient on cardiac monitor, non-invasive blood pressure, SPO2 monitor. On O2 @ 2lpm via NC. Patient is A&Ox asleep. Patient sleeping    PROCEDURE SITE CHECK:    Procedure site: No bleeding and no hematoma, no pain/discomfort reported at procedure site. No change in patient status. Continue to monitor patient and status.
TRANSFER - OUT REPORT:    Verbal report given to Angel Tavera on Brianda Roberts  being transferred to [facility] for routine progression of patient care       Report consisted of patient's Situation, Background, Assessment and   Recommendations(SBAR). Information from the following report(s) Nurse Handoff Report, Surgery Report, Intake/Output, MAR, and Recent Results was reviewed with the receiving nurse. Clayton Assessment: Presents to emergency department  because of falls (Syncope, seizure, or loss of consciousness): No, Age > 79: Yes, Altered Mental Status, Intoxication with alcohol or substance confusion (Disorientation, impaired judgment, poor safety awaremess, or inability to follow instructions): Yes, Impaired Mobility: Ambulates or transfers with assistive devices or assistance; Unable to ambulate or transer.: Yes, Nursing Judgement: Yes  Lines:   Peripheral IV 05/15/23 Left Antecubital (Active)   Site Assessment Clean, dry & intact 05/16/23 1200   Line Status Capped 05/16/23 1200   Line Care Ports disinfected 05/16/23 1200   Phlebitis Assessment No symptoms 05/16/23 1200   Infiltration Assessment 0 05/16/23 1200   Alcohol Cap Used Yes 05/16/23 1200   Dressing Status Clean, dry & intact 05/16/23 1200   Dressing Type Transparent 05/16/23 1200        Opportunity for questions and clarification was provided.       Patient transported with:  Registered Nurse
(GLYCOLAX) packet 17 g  17 g Oral Daily PRN    acetaminophen (TYLENOL) tablet 650 mg  650 mg Oral Q6H PRN    Or    acetaminophen (TYLENOL) suppository 650 mg  650 mg Rectal Q6H PRN    atorvastatin (LIPITOR) tablet 80 mg  80 mg Oral Nightly    colesevelam (WELCHOL) tablet 625 mg  625 mg Oral BID WC    ipratropium-albuterol (DUONEB) nebulizer solution 1 ampule  1 ampule Inhalation Q4H PRN    lidocaine 4 % external patch 1 patch  1 patch TransDERmal Daily    metoprolol succinate (TOPROL XL) extended release tablet 25 mg  25 mg Oral Daily    spironolactone (ALDACTONE) tablet 25 mg  25 mg Oral Daily    bumetanide (BUMEX) tablet 1 mg  1 mg Oral Daily    glucose chewable tablet 16 g  4 tablet Oral PRN    dextrose bolus 10% 125 mL  125 mL IntraVENous PRN    Or    dextrose bolus 10% 250 mL  250 mL IntraVENous PRN    glucagon injection 1 mg  1 mg SubCUTAneous PRN    dextrose 10 % infusion   IntraVENous Continuous PRN    insulin lispro (HUMALOG) injection vial 0-4 Units  0-4 Units SubCUTAneous TID WC    insulin lispro (HUMALOG) injection vial 0-4 Units  0-4 Units SubCUTAneous Nightly    arformoterol 15 mcg-budesonide 0.5 mg neb solution   Nebulization BID    ipratropium (ATROVENT) 0.02 % nebulizer solution 0.5 mg  0.5 mg Nebulization 4x daily         Osmani Samano M.D.    Electrophysiology/Cardiology   [facility] [place]   [address] [place] [address], [address] [place] [address] Avenue                             [address] [place] [address]   [phone]     [facility] Cardiology   Call center: D) [phone] (e) [phone]

## 2023-05-19 NOTE — CARE COORDINATION
05/18/23 1549   Service Assessment   Patient Orientation Unable to Assess  (Pt was asleep)   Cognition Other (see comment)  (Pt was asleep)   History Provided By Child/Family   Primary Caregiver Self  (Pt admittted from SNF rehab unit and was getting toileting support)   Accompanied By/Relationship Daughter by phone. nobody at [address]  Children;Family Members;Friends/Neighbors   Patient's Healthcare Decision Maker is:   (Pt's daughter Ryan Lehman)   PCP Verified by CM Yes  (PCP retired and will start seeing another dr in same practice)   Last Visit to PCP Within last year   Prior Functional Level Assistance with the following:;Toileting;Cooking;Housework; Shopping;Mobility   Current Functional Level Assistance with the following:;Toileting;Cooking;Housework; Shopping;Mobility   Can patient return to prior living arrangement Yes   Ability to make needs known: Good   Family able to assist with home care needs: Yes   Would you like for me to discuss the discharge plan with any other family members/significant others, and if so, who?  Yes  (Daughter Ryan Lehman [phone])   Financial Resources [place] ([place])   Community Resources Other (Comment)  (SNF rehab)     NEGRO Coles
Case Management Assessment  Initial Evaluation    Disposition: OT/PT orders put in. Attending provided MICAELA in 3N IDR of tomorrow 05/18. CM following for dc needs, including to send clinicals to [facility] for pt's return and to schedule dc transport. [facility]. Medical transport will be needed. Daughter, Lisa Her, in agreement with this plan. Pt asleep at [facility] bedside visit. CM called pt's daughter/primary contact/medical decision maker - Lisa Her [phone] to conduct assessment. Vanesa 1st IM received 05/16. Pt admitted from [facility] where he was in rehab post [address] admission with 5/2 dc. Pt was 100% assist with all ADLS except feeding. Pt was incontinent of bowel and bladder. Prior functioning: Pt was using RW and WC at SNF. Family meeting scheduled for 05/23 to discuss pt's possible declining baseline. Pt was unable to fully participate in PT schedule. [facility] is accepting pt to return when medically clear for dc. CM to keep SNF updated. Date/Time of Evaluation: 5/17/2023 3:10 PM  Assessment Completed by: Charity Gibbs      If patient is discharged prior to next notation, then this note serves as note for discharge by case management. Patient Name: Gia Goldberg                   YOB: 1935  Diagnosis: S/P placement of cardiac pacemaker [Z95.0]  Hematoma of left chest wall, initial encounter [S20.212A]  Current use of anticoagulant therapy [Z79.01]                   Date / Time: 5/15/2023  1:42 PM    Patient Admission Status: Inpatient   Readmission Risk (Low < 19, Mod (19-27), High > 27): Readmission Risk Score: 13.2    Current PCP: Karlie Peck MD  PCP verified by CM? Yes Dr Jerrod Cross semi retired. Pt to see a partner in this practice, has been in and out of hospital and hasn't seen new doctor yet.      Chart Reviewed: Yes      History Provided by:  Violette Her  Patient Orientation:    unable to
Case Management Assessment  Initial Evaluation    Disposition: Return to [facility]. Medical transport will be needed. Daughter, Sabine Romo, in agreement with this plan. Pt asleep at [facility] bedside visit. CM called pt's daughter/primary contact/medical decision maker - Sabine Romo [phone] to conduct assessment. Vanesa green IM received 05/16. Pt admitted from [facility] where he was in rehab post [address] admission with 5/2 dc. Pt was 100% assist with all ADLS except feeding. Pt was incontinent of bowel and bladder. Prior functioning: Pt was using RW and WC at SNF. Family meeting scheduled for 05/23 to discuss pt's possible declining baseline. Pt was unable to fully participate in PT schedule. [facility] is accepting pt to return when medically clear for dc. CM to keep SNF updated. Date/Time of Evaluation: 5/16/2023 10:16 AM  Assessment Completed by: Carlos Rush      If patient is discharged prior to next notation, then this note serves as note for discharge by case management. Patient Name: Aguilar Parrish                   YOB: 1935  Diagnosis: S/P placement of cardiac pacemaker [Z95.0]  Hematoma of left chest wall, initial encounter Melissa Ada  Current use of anticoagulant therapy [Z79.01]                   Date / Time: 5/15/2023  1:42 PM    Patient Admission Status: Inpatient   Readmission Risk (Low < 19, Mod (19-27), High > 27): Readmission Risk Score: 12.3    Current PCP: Angelia Carrillo MD  PCP verified by CM? Yes Dr Ashley Copeland semi retired. Pt to see a partner in this practice, has been in and out of hospital and hasn't seen new doctor yet.      Chart Reviewed: Yes      History Provided by:  Daughter Sabine Romo  Patient Orientation:    unable to assess, asleep  Patient Cognition: unable to assess, asleep    Hospitalization in the last 30 days (Readmission):  Yes    If yes, Readmission Assessment in  Navigator will be
left chest wall, initial encounter [S20.212A]  Current use of anticoagulant therapy [Z79.01]        The Patient and/or Patient Representative Agree with the Discharge Plan?   Disposition plan pending medical stability    Renee Vicente  Case Management Department  Ph: [phone]
might be at [facility] on [place] contract, will update when known     No Pharmacies Listed    Notes:    Factors facilitating achievement of predicted outcomes: Family support    Barriers to discharge: None identified at this time   Additional Case Management Notes: The Plan for Transition of Care is related to the following treatment goals of S/P placement of cardiac pacemaker [Z95.0]  Hematoma of left chest wall, initial encounter [S20.212A]  Current use of anticoagulant therapy [Z79.01]        The Patient and/or Patient Representative Agree with the Discharge Plan?   Disposition plan pending medical stability    Mariana Nevarez  Case Management Department  Ph: [phone]

## 2023-05-31 ENCOUNTER — NURSE ONLY (OUTPATIENT)
Age: 88
End: 2023-05-31

## 2023-05-31 NOTE — PATIENT INSTRUCTIONS
Please call for any redness, swelling, or drainage noted to site. Please plug in remote box on patient's bedside table. Follow up with device clinic and Dr. Alejandro Corado in 3 months.

## 2023-08-30 ENCOUNTER — TELEPHONE (OUTPATIENT)
Age: 88
End: 2023-08-30

## 2023-08-30 NOTE — TELEPHONE ENCOUNTER
Received a call from patient's dentist, Dr. Celeste Landa, [phone] [facility] verified using two patient identifiers. Spoke with Aj Duffy. Zo is calling because Mr. Jackie Talley is currently in the chair at the dentist for an emergency tooth extraction. She is seeking advice regarding his blood thinner and would like a letter faxed over with recommendations and stating it is ok for the patient to proceed with the tooth extraction. Advised Zo that per Dr. Carlos Thrasher the patient should hold his eliquis 2 days prior to any tooth extraction and resume taking as soon as possible after the procedure. We are currently in the middle of clinic seeing patients. A letter will be faxed by the end of the day to [phone]. Zo verbalized understanding and will call back with any other questions or concerns.     Future Appointments   Date Time Provider [address]   9/6/2023  2:15 PM Kathleen BONILLA BS AMB   9/7/2023  3:20 PM [email or web address] BS AMB   9/7/2023  3:40 PM MD IRENE Daniels AMB

## 2023-09-06 ENCOUNTER — TELEPHONE (OUTPATIENT)
Age: 88
End: 2023-09-06

## 2023-09-06 NOTE — TELEPHONE ENCOUNTER
Pt's daughter called cancel pacer check and appt with Dr. Holly Singer on 9/7/23, the daughter would like a call back to rs appt.        Geovany Marinelli (daughter)  [phone]

## 2023-09-07 NOTE — TELEPHONE ENCOUNTER
Spoke with daughter, Areli Bates, confirmed listed on PHI. Verified patient using two identifiers. Provided appointment per request. Appreciative of the phone call.      Future Appointments   Date Time Provider [address]   10/11/2023 11:00 AM PACEMAKER3, JOSEPH LOZADA   10/11/2023 11:20 AM Kirsty Mehta APRN - LYNDA EDWARDS AMB

## 2023-09-20 RX ORDER — COLESEVELAM 180 1/1
TABLET ORAL
Qty: 540 TABLET | Refills: 0 | Status: SHIPPED | OUTPATIENT
Start: 2023-09-20

## 2023-10-05 NOTE — PROGRESS NOTES
[employer] [facility]  Cardiac Electrophysiology Clinic Care Note                  []Initial visit     [x]Established visit     Patient Name: Corinne Barajas - O:[birth year] - [facility]:[id number]  Primary Cardiologist: None  Electrophysiologist: Valeria Singh MD     Reason for visit: Pacemaker follow up    HPI:  Mr. Peggy Gottron is a 80 y.o. male who presents for follow up s/p Medtronic biventricular pacemaker (DOI 04/28/2023). He reports doing well, denies chest pain, palpitations, resting SOB, PND, orthopnea, syncope, or edema. Dilated CM. LVEF 40-45% with moderately reduced RVEF & severely dilated LA in 03/2023. NYHA II. No ACEi/ARB due to BP, otherwise on appropriate GDMT. BP controlled. Anticoagulated with Eliquis 2.5 mg po bid (age, weight). Denies significant bleeding issues. Previous:  Admitted 05/15/2023-05/18/2023 with bleeding from pacemaker site, was found to have hematoma of her left chest wall. Required pocket revision. Medtronic biventricular pacemaker (DOI 04/28/2023). Admitted 04/2023 with T11 compression fracture, PAF, & 3rd degree AVB. Underwent pacemaker implant & kyphoplasty. S/p CABG in 1995. Assessment and Plan     Medtronic biventricular pacemaker (DOI 04/28/2023): Implanted for syncope & complete AVB. Device check today shows proper lead & generator function. Generator longevity estimated 9.8 years. RA 0%, RV 97.5%, CRT 97.4%. NSVT x 22 episodes, longest 2 seconds; most of these occurred on 05/29/2023. Required pocket revision due to hematoma shortly after implant, but site is now well healed. Dilated CM: LVEF 40-45% per echo in 03/2023. NYHA II. No ACEi/ARB due to BP, otherwise continue GDMT. Recheck limited 2D echo to assess for improvement s/p CRT. CAD: S/p CABG in 1995. No angina. Continue statin. Anticoagulation: Continue Eliquis 2.5 mg po bid for embolic CVA prophylaxis (age, weight).     Lower extremity edema:

## 2023-10-11 ENCOUNTER — PROCEDURE VISIT (OUTPATIENT)
Age: 88
End: 2023-10-11

## 2023-10-11 ENCOUNTER — OFFICE VISIT (OUTPATIENT)
Age: 88
End: 2023-10-11

## 2023-10-11 VITALS
WEIGHT: 147 LBS | HEIGHT: 69 IN | DIASTOLIC BLOOD PRESSURE: 60 MMHG | SYSTOLIC BLOOD PRESSURE: 120 MMHG | RESPIRATION RATE: 16 BRPM | HEART RATE: 93 BPM | OXYGEN SATURATION: 95 % | BODY MASS INDEX: 21.77 KG/M2

## 2023-10-11 DIAGNOSIS — I87.8 VENOUS STASIS: ICD-10-CM

## 2023-10-11 DIAGNOSIS — I50.22 CHRONIC SYSTOLIC CHF (CONGESTIVE HEART FAILURE) (HCC): ICD-10-CM

## 2023-10-11 DIAGNOSIS — Z95.0 CARDIAC PACEMAKER IN SITU: Primary | ICD-10-CM

## 2023-10-11 DIAGNOSIS — I42.0 DILATED CARDIOMYOPATHY (HCC): ICD-10-CM

## 2023-10-11 DIAGNOSIS — I25.10 CORONARY ARTERY DISEASE INVOLVING NATIVE CORONARY ARTERY OF NATIVE HEART WITHOUT ANGINA PECTORIS: ICD-10-CM

## 2023-10-11 DIAGNOSIS — Z95.0 STATUS POST BIVENTRICULAR PACEMAKER: Primary | ICD-10-CM

## 2023-10-11 DIAGNOSIS — Z95.1 HX OF CABG: ICD-10-CM

## 2023-10-11 DIAGNOSIS — R60.0 LOWER EXTREMITY EDEMA: ICD-10-CM

## 2023-10-25 ENCOUNTER — ANCILLARY PROCEDURE (OUTPATIENT)
Age: 88
End: 2023-10-25
Payer: MEDICARE

## 2023-10-25 VITALS
DIASTOLIC BLOOD PRESSURE: 60 MMHG | BODY MASS INDEX: 21.77 KG/M2 | WEIGHT: 147 LBS | SYSTOLIC BLOOD PRESSURE: 120 MMHG | HEIGHT: 69 IN

## 2023-10-25 DIAGNOSIS — I42.0 DILATED CARDIOMYOPATHY (HCC): ICD-10-CM

## 2023-10-25 DIAGNOSIS — I25.10 CORONARY ARTERY DISEASE INVOLVING NATIVE CORONARY ARTERY OF NATIVE HEART WITHOUT ANGINA PECTORIS: ICD-10-CM

## 2023-10-25 PROCEDURE — 93308 TTE F-UP OR LMTD: CPT | Performed by: INTERNAL MEDICINE

## 2023-10-29 LAB
ECHO AO ASC DIAM: 4 CM
ECHO AO ASCENDING AORTA INDEX: 2.21 CM/M2
ECHO AO ROOT DIAM: 3.5 CM
ECHO AO ROOT INDEX: 1.93 CM/M2
ECHO BSA: 1.8 M2
ECHO LA DIAMETER INDEX: 2.21 CM/M2
ECHO LA DIAMETER: 4 CM
ECHO LA TO AORTIC ROOT RATIO: 1.14
ECHO LA VOL 2C: 124 ML (ref 18–58)
ECHO LA VOL 4C: 112 ML (ref 18–58)
ECHO LA VOL BP: 123 ML (ref 18–58)
ECHO LA VOL/BSA BIPLANE: 68 ML/M2 (ref 16–34)
ECHO LA VOLUME AREA LENGTH: 127 ML
ECHO LA VOLUME INDEX A2C: 69 ML/M2 (ref 16–34)
ECHO LA VOLUME INDEX A4C: 62 ML/M2 (ref 16–34)
ECHO LA VOLUME INDEX AREA LENGTH: 70 ML/M2 (ref 16–34)
ECHO LV EDV A2C: 103 ML
ECHO LV EDV A4C: 99 ML
ECHO LV EDV BP: 102 ML (ref 67–155)
ECHO LV EDV INDEX A4C: 55 ML/M2
ECHO LV EDV INDEX BP: 56 ML/M2
ECHO LV EDV NDEX A2C: 57 ML/M2
ECHO LV EJECTION FRACTION A2C: 32 %
ECHO LV EJECTION FRACTION A4C: 45 %
ECHO LV EJECTION FRACTION BIPLANE: 38 % (ref 55–100)
ECHO LV ESV A2C: 70 ML
ECHO LV ESV A4C: 55 ML
ECHO LV ESV BP: 63 ML (ref 22–58)
ECHO LV ESV INDEX A2C: 39 ML/M2
ECHO LV ESV INDEX A4C: 30 ML/M2
ECHO LV ESV INDEX BP: 35 ML/M2
ECHO LV FRACTIONAL SHORTENING: 19 % (ref 28–44)
ECHO LV INTERNAL DIMENSION DIASTOLE INDEX: 2.6 CM/M2
ECHO LV INTERNAL DIMENSION DIASTOLIC: 4.7 CM (ref 4.2–5.9)
ECHO LV INTERNAL DIMENSION SYSTOLIC INDEX: 2.1 CM/M2
ECHO LV INTERNAL DIMENSION SYSTOLIC: 3.8 CM
ECHO LV IVSD: 1 CM (ref 0.6–1)
ECHO LV MASS 2D: 164.5 G (ref 88–224)
ECHO LV MASS INDEX 2D: 90.9 G/M2 (ref 49–115)
ECHO LV POSTERIOR WALL DIASTOLIC: 1 CM (ref 0.6–1)
ECHO LV RELATIVE WALL THICKNESS RATIO: 0.43
ECHO RV BASAL DIMENSION: 4.6 CM

## 2023-10-29 PROCEDURE — 93308 TTE F-UP OR LMTD: CPT | Performed by: INTERNAL MEDICINE

## 2023-10-31 ENCOUNTER — TELEPHONE (OUTPATIENT)
Age: 88
End: 2023-10-31

## 2023-10-31 NOTE — TELEPHONE ENCOUNTER
----- Message from Lauri Davidson MD sent at 10/29/2023  1:31 PM EDT -----    Echo 10/25/23  LVEF and LV size have not improved with biventricular pacing  LVEF 35%  (Do not recommend ICD upgrade at his age)  Bp is not high enough to add GDMT    Continue with office follow up

## 2023-10-31 NOTE — TELEPHONE ENCOUNTER
Verified patient with two types of identifiers. Spoke with patient's daughter verified on PHI. Notified of results and MD recommendations. Anthony Hair states patient sees to be feeling well and agreeable with plan. Patient verbalized understanding and will call with any other questions.       Future Appointments   Date Time Provider [address] [place] [address]   10/31/2024  2:00 PM [email or web address] AMB   10/31/2024  2:20 PM MD IRENE Noland AMB

## 2023-11-07 ENCOUNTER — OFFICE VISIT (OUTPATIENT)
Age: 88
End: 2023-11-07
Payer: MEDICARE

## 2023-11-07 VITALS
BODY MASS INDEX: 22.72 KG/M2 | RESPIRATION RATE: 16 BRPM | HEIGHT: 69 IN | OXYGEN SATURATION: 94 % | WEIGHT: 153.4 LBS | SYSTOLIC BLOOD PRESSURE: 130 MMHG | DIASTOLIC BLOOD PRESSURE: 87 MMHG | HEART RATE: 87 BPM | TEMPERATURE: 98.2 F

## 2023-11-07 DIAGNOSIS — E11.59 TYPE 2 DIABETES MELLITUS WITH OTHER CIRCULATORY COMPLICATIONS (HCC): ICD-10-CM

## 2023-11-07 DIAGNOSIS — M81.0 AGE-RELATED OSTEOPOROSIS WITHOUT CURRENT PATHOLOGICAL FRACTURE: ICD-10-CM

## 2023-11-07 DIAGNOSIS — M81.0 AGE-RELATED OSTEOPOROSIS WITHOUT CURRENT PATHOLOGICAL FRACTURE: Primary | ICD-10-CM

## 2023-11-07 DIAGNOSIS — C61 MALIGNANT NEOPLASM OF PROSTATE (HCC): ICD-10-CM

## 2023-11-07 DIAGNOSIS — J43.2 CENTRILOBULAR EMPHYSEMA (HCC): ICD-10-CM

## 2023-11-07 PROCEDURE — 90694 VACC AIIV4 NO PRSRV 0.5ML IM: CPT | Performed by: INTERNAL MEDICINE

## 2023-11-07 PROCEDURE — PBSHW INFLUENZA, FLUAD, (AGE 65 Y+), IM, PF, 0.5 ML: Performed by: INTERNAL MEDICINE

## 2023-11-07 PROCEDURE — 99214 OFFICE O/P EST MOD 30 MIN: CPT | Performed by: INTERNAL MEDICINE

## 2023-11-07 PROCEDURE — G8420 CALC BMI NORM PARAMETERS: HCPCS | Performed by: INTERNAL MEDICINE

## 2023-11-07 PROCEDURE — 1123F ACP DISCUSS/DSCN MKR DOCD: CPT | Performed by: INTERNAL MEDICINE

## 2023-11-07 PROCEDURE — 1036F TOBACCO NON-USER: CPT | Performed by: INTERNAL MEDICINE

## 2023-11-07 PROCEDURE — G8427 DOCREV CUR MEDS BY ELIG CLIN: HCPCS | Performed by: INTERNAL MEDICINE

## 2023-11-07 PROCEDURE — 3023F SPIROM DOC REV: CPT | Performed by: INTERNAL MEDICINE

## 2023-11-07 PROCEDURE — G8484 FLU IMMUNIZE NO ADMIN: HCPCS | Performed by: INTERNAL MEDICINE

## 2023-11-07 RX ORDER — ALENDRONATE SODIUM 70 MG/1
70 TABLET ORAL
Qty: 12 TABLET | Refills: 1 | Status: SHIPPED | OUTPATIENT
Start: 2023-11-07

## 2023-11-07 NOTE — PROGRESS NOTES
Chief Complaint   Patient presents with    Follow-Up from Hospital     Mr. Georgina Esquivel is a elderly 80-year-old gentleman who is got a history of cardiac disease has a permanent pacemaker that was implanted has a history of poor hypertension in the past he has been doing well in March April 2023 he was hospitalized with an acute new thoracic compression fracture at the same time they found that he needed a permanent pacemaker he has 2 other compression fractures of his spine in addition he eventually underwent a kyphoplasty no treatment for osteoporosis was offered he had a total of 3 compression fractures over a period of years not related to injury so by the patient he has osteoporosis he is not an alcohol user his diet is pretty good    He ambulates reasonably well and is got significant kyphosis he is never been on oral biphosphonate's never been on IV biphosphonate he he would prefer an oral he has never had protein electrophoresis or other test to make sure that were not missing myeloma    He is followed by cardiology his pacemaker function is good he had 2 nursing home rehab stays 1 [facility] and the second stay was at and in [facility] and he has been home since around June and gets along pretty well he denies any specific problems has no pain he ambulates well and has been no falls        Patient Active Problem List    Diagnosis Date Noted    Compression fracture of T11 vertebra ([address]) 04/29/2023    Biventricular cardiac pacemaker in situ 04/28/2023    Chronic obstructive pulmonary disease ([address]) 04/24/2023    Thoracic stress fracture 04/24/2023    CHF (congestive heart failure), NYHA class I, chronic, systolic ([address]) 38/81/8491    Hx of deep venous thrombosis 04/24/2023    Chronic anticoagulation 04/24/2023    Debility 10/11/2021    History of kyphoplasty 03/01/2021    DM type 2 causing vascular disease ([address]) 10/25/2019    Hypertension 10/24/2019    Paroxysmal A-fib ([address]) 03/15/2017    Venous insufficiency of both

## 2023-11-07 NOTE — ASSESSMENT & PLAN NOTE
Unclear control, lifestyle modifications recommended      Hemoglobin A1C   Date Value Ref Range Status   11/01/2022 5.4 4.0 - 5.6 % Final     Comment:     NEW METHOD  PLEASE NOTE NEW REFERENCE RANGE  (NOTE)  HbA1C Interpretive Ranges  <5.7              Normal  5.7 - 6.4         Consider Prediabetes  >6.5              Consider Diabetes

## 2023-11-08 LAB
25(OH)D3 SERPL-MCNC: 52.6 NG/ML (ref 30–100)
ALBUMIN SERPL-MCNC: 3.3 G/DL (ref 3.5–5)
ALBUMIN/GLOB SERPL: 0.7 (ref 1.1–2.2)
ALP SERPL-CCNC: 140 U/L (ref 45–117)
ALT SERPL-CCNC: 24 U/L (ref 12–78)
ANION GAP SERPL CALC-SCNC: 4 MMOL/L (ref 5–15)
AST SERPL-CCNC: 36 U/L (ref 15–37)
BASOPHILS # BLD: 0 K/UL (ref 0–0.1)
BASOPHILS NFR BLD: 1 % (ref 0–1)
BILIRUB SERPL-MCNC: 0.7 MG/DL (ref 0.2–1)
BUN SERPL-MCNC: 30 MG/DL (ref 6–20)
BUN/CREAT SERPL: 27 (ref 12–20)
CALCIUM SERPL-MCNC: 9.3 MG/DL (ref 8.5–10.1)
CHLORIDE SERPL-SCNC: 104 MMOL/L (ref 97–108)
CO2 SERPL-SCNC: 33 MMOL/L (ref 21–32)
CREAT SERPL-MCNC: 1.1 MG/DL (ref 0.7–1.3)
DIFFERENTIAL METHOD BLD: ABNORMAL
EOSINOPHIL # BLD: 0.7 K/UL (ref 0–0.4)
EOSINOPHIL NFR BLD: 12 % (ref 0–7)
ERYTHROCYTE [DISTWIDTH] IN BLOOD BY AUTOMATED COUNT: 15.1 % (ref 11.5–14.5)
GLOBULIN SER CALC-MCNC: 4.9 G/DL (ref 2–4)
GLUCOSE SERPL-MCNC: 95 MG/DL (ref 65–100)
HCT VFR BLD AUTO: 34.1 % (ref 36.6–50.3)
HGB BLD-MCNC: 11 G/DL (ref 12.1–17)
IMM GRANULOCYTES # BLD AUTO: 0 K/UL (ref 0–0.04)
IMM GRANULOCYTES NFR BLD AUTO: 0 % (ref 0–0.5)
LYMPHOCYTES # BLD: 2.2 K/UL (ref 0.8–3.5)
LYMPHOCYTES NFR BLD: 36 % (ref 12–49)
MCH RBC QN AUTO: 33 PG (ref 26–34)
MCHC RBC AUTO-ENTMCNC: 32.3 G/DL (ref 30–36.5)
MCV RBC AUTO: 102.4 FL (ref 80–99)
MONOCYTES # BLD: 0.6 K/UL (ref 0–1)
MONOCYTES NFR BLD: 9 % (ref 5–13)
NEUTS SEG # BLD: 2.6 K/UL (ref 1.8–8)
NEUTS SEG NFR BLD: 42 % (ref 32–75)
NRBC # BLD: 0 K/UL (ref 0–0.01)
NRBC BLD-RTO: 0 PER 100 WBC
PLATELET # BLD AUTO: 168 K/UL (ref 150–400)
PMV BLD AUTO: 10 FL (ref 8.9–12.9)
POTASSIUM SERPL-SCNC: 4.2 MMOL/L (ref 3.5–5.1)
PROT SERPL-MCNC: 8.2 G/DL (ref 6.4–8.2)
RBC # BLD AUTO: 3.33 M/UL (ref 4.1–5.7)
SODIUM SERPL-SCNC: 141 MMOL/L (ref 136–145)
WBC # BLD AUTO: 6 K/UL (ref 4.1–11.1)

## 2023-11-13 LAB
ALBUMIN SERPL ELPH-MCNC: 3.4 G/DL (ref 2.9–4.4)
ALBUMIN/GLOB SERPL: 0.8 (ref 0.7–1.7)
ALPHA1 GLOB SERPL ELPH-MCNC: 0.2 G/DL (ref 0–0.4)
ALPHA2 GLOB SERPL ELPH-MCNC: 0.7 G/DL (ref 0.4–1)
B-GLOBULIN SERPL ELPH-MCNC: 1.2 G/DL (ref 0.7–1.3)
GAMMA GLOB SERPL ELPH-MCNC: 2.3 G/DL (ref 0.4–1.8)
GLOBULIN SER-MCNC: 4.4 G/DL (ref 2.2–3.9)
IGA SERPL-MCNC: 415 MG/DL (ref 61–437)
IGG SERPL-MCNC: 2586 MG/DL (ref 603–1613)
IGM SERPL-MCNC: 138 MG/DL (ref 15–143)
INTERPRETATION SERPL IEP-IMP: ABNORMAL
KAPPA LC FREE SER-MCNC: 125.9 MG/L (ref 3.3–19.4)
KAPPA LC FREE/LAMBDA FREE SER: 1.73 (ref 0.26–1.65)
LAMBDA LC FREE SERPL-MCNC: 72.8 MG/L (ref 5.7–26.3)
M PROTEIN SERPL ELPH-MCNC: ABNORMAL G/DL
PROT SERPL-MCNC: 7.8 G/DL (ref 6–8.5)

## 2023-11-14 ENCOUNTER — TELEPHONE (OUTPATIENT)
Age: 88
End: 2023-11-14

## 2023-11-14 NOTE — TELEPHONE ENCOUNTER
Called pt and lvm. Need to jarrod appt. \"Morrell, [address] [place] [address] Staff  Subject: Appointment Request     Reason for Call: Established Patient Appointment needed: Routine Existing   Condition Follow Up     QUESTIONS     Reason for appointment request? Other - Cannot schedule with the [facility] ([facility])       Additional Information for Provider? Pt needs to schedule 3 month follow   up in February. Please call charline Corona to schedule.

## 2023-12-21 ENCOUNTER — HOSPITAL ENCOUNTER (INPATIENT)
Facility: HOSPITAL | Age: 88
LOS: 2 days | Discharge: HOME | DRG: 190 | End: 2023-12-23
Attending: STUDENT IN AN ORGANIZED HEALTH CARE EDUCATION/TRAINING PROGRAM | Admitting: INTERNAL MEDICINE
Payer: MEDICARE

## 2023-12-21 ENCOUNTER — APPOINTMENT (OUTPATIENT)
Facility: HOSPITAL | Age: 88
DRG: 190 | End: 2023-12-21
Payer: MEDICARE

## 2023-12-21 DIAGNOSIS — U07.1 COVID: ICD-10-CM

## 2023-12-21 DIAGNOSIS — R09.02 HYPOXIA: ICD-10-CM

## 2023-12-21 DIAGNOSIS — J44.1 COPD EXACERBATION (HCC): Primary | ICD-10-CM

## 2023-12-21 PROBLEM — D69.6 THROMBOCYTOPENIA (HCC): Status: ACTIVE | Noted: 2023-12-21

## 2023-12-21 PROBLEM — J96.11 CHRONIC RESPIRATORY FAILURE WITH HYPOXIA (HCC): Status: ACTIVE | Noted: 2023-12-21

## 2023-12-21 PROBLEM — N17.9 AKI (ACUTE KIDNEY INJURY) (HCC): Status: ACTIVE | Noted: 2023-12-21

## 2023-12-21 LAB
ALBUMIN SERPL-MCNC: 2.8 G/DL (ref 3.5–5)
ALBUMIN/GLOB SERPL: 0.5 (ref 1.1–2.2)
ALP SERPL-CCNC: 137 U/L (ref 45–117)
ALT SERPL-CCNC: 25 U/L (ref 12–78)
ANION GAP SERPL CALC-SCNC: 5 MMOL/L (ref 5–15)
AST SERPL-CCNC: 48 U/L (ref 15–37)
BASOPHILS # BLD: 0 K/UL (ref 0–0.1)
BASOPHILS NFR BLD: 0 % (ref 0–1)
BILIRUB SERPL-MCNC: 0.4 MG/DL (ref 0.2–1)
BUN SERPL-MCNC: 32 MG/DL (ref 6–20)
BUN/CREAT SERPL: 24 (ref 12–20)
CALCIUM SERPL-MCNC: 8.5 MG/DL (ref 8.5–10.1)
CHLORIDE SERPL-SCNC: 107 MMOL/L (ref 97–108)
CO2 SERPL-SCNC: 32 MMOL/L (ref 21–32)
COMMENT:: NORMAL
CREAT SERPL-MCNC: 1.36 MG/DL (ref 0.7–1.3)
CRP SERPL-MCNC: 0.77 MG/DL (ref 0–0.6)
DIFFERENTIAL METHOD BLD: ABNORMAL
EKG DIAGNOSIS: NORMAL
EKG Q-T INTERVAL: 420 MS
EKG QRS DURATION: 134 MS
EKG QTC CALCULATION (BAZETT): 481 MS
EKG R AXIS: 264 DEGREES
EKG T AXIS: 86 DEGREES
EKG VENTRICULAR RATE: 79 BPM
EOSINOPHIL # BLD: 0 K/UL (ref 0–0.4)
EOSINOPHIL NFR BLD: 1 % (ref 0–7)
ERYTHROCYTE [DISTWIDTH] IN BLOOD BY AUTOMATED COUNT: 15.5 % (ref 11.5–14.5)
FLUAV AG NPH QL IA: NEGATIVE
FLUBV AG NOSE QL IA: NEGATIVE
GLOBULIN SER CALC-MCNC: 5.4 G/DL (ref 2–4)
GLUCOSE SERPL-MCNC: 111 MG/DL (ref 65–100)
HCT VFR BLD AUTO: 32.5 % (ref 36.6–50.3)
HGB BLD-MCNC: 10.3 G/DL (ref 12.1–17)
IMM GRANULOCYTES # BLD AUTO: 0 K/UL (ref 0–0.04)
IMM GRANULOCYTES NFR BLD AUTO: 0 % (ref 0–0.5)
LYMPHOCYTES # BLD: 1.1 K/UL (ref 0.8–3.5)
LYMPHOCYTES NFR BLD: 29 % (ref 12–49)
MCH RBC QN AUTO: 32.8 PG (ref 26–34)
MCHC RBC AUTO-ENTMCNC: 31.7 G/DL (ref 30–36.5)
MCV RBC AUTO: 103.5 FL (ref 80–99)
MONOCYTES # BLD: 0.7 K/UL (ref 0–1)
MONOCYTES NFR BLD: 18 % (ref 5–13)
NEUTS SEG # BLD: 2 K/UL (ref 1.8–8)
NEUTS SEG NFR BLD: 52 % (ref 32–75)
NRBC # BLD: 0 K/UL (ref 0–0.01)
NRBC BLD-RTO: 0 PER 100 WBC
NT PRO BNP: 3632 PG/ML
PLATELET # BLD AUTO: 141 K/UL (ref 150–400)
PMV BLD AUTO: 9.8 FL (ref 8.9–12.9)
POTASSIUM SERPL-SCNC: 4 MMOL/L (ref 3.5–5.1)
PROCALCITONIN SERPL-MCNC: 0.05 NG/ML
PROT SERPL-MCNC: 8.2 G/DL (ref 6.4–8.2)
RBC # BLD AUTO: 3.14 M/UL (ref 4.1–5.7)
SARS-COV-2 RDRP RESP QL NAA+PROBE: DETECTED
SODIUM SERPL-SCNC: 144 MMOL/L (ref 136–145)
SOURCE: ABNORMAL
SPECIMEN HOLD: NORMAL
TROPONIN I SERPL HS-MCNC: 51 NG/L (ref 0–76)
WBC # BLD AUTO: 3.9 K/UL (ref 4.1–11.1)

## 2023-12-21 PROCEDURE — 93005 ELECTROCARDIOGRAM TRACING: CPT

## 2023-12-21 PROCEDURE — 71046 X-RAY EXAM CHEST 2 VIEWS: CPT

## 2023-12-21 PROCEDURE — 86140 C-REACTIVE PROTEIN: CPT

## 2023-12-21 PROCEDURE — 36415 COLL VENOUS BLD VENIPUNCTURE: CPT

## 2023-12-21 PROCEDURE — 6360000002 HC RX W HCPCS: Performed by: INTERNAL MEDICINE

## 2023-12-21 PROCEDURE — 93010 ELECTROCARDIOGRAM REPORT: CPT | Performed by: INTERNAL MEDICINE

## 2023-12-21 PROCEDURE — 80053 COMPREHEN METABOLIC PANEL: CPT

## 2023-12-21 PROCEDURE — 84484 ASSAY OF TROPONIN QUANT: CPT

## 2023-12-21 PROCEDURE — 2580000003 HC RX 258: Performed by: INTERNAL MEDICINE

## 2023-12-21 PROCEDURE — 83880 ASSAY OF NATRIURETIC PEPTIDE: CPT

## 2023-12-21 PROCEDURE — 87804 INFLUENZA ASSAY W/OPTIC: CPT

## 2023-12-21 PROCEDURE — 84145 PROCALCITONIN (PCT): CPT

## 2023-12-21 PROCEDURE — 87635 SARS-COV-2 COVID-19 AMP PRB: CPT

## 2023-12-21 PROCEDURE — 6370000000 HC RX 637 (ALT 250 FOR IP): Performed by: INTERNAL MEDICINE

## 2023-12-21 PROCEDURE — 99285 EMERGENCY DEPT VISIT HI MDM: CPT

## 2023-12-21 PROCEDURE — 85025 COMPLETE CBC W/AUTO DIFF WBC: CPT

## 2023-12-21 PROCEDURE — 1100000000 HC RM PRIVATE

## 2023-12-21 RX ORDER — POTASSIUM CHLORIDE 7.45 MG/ML
10 INJECTION INTRAVENOUS PRN
Status: DISCONTINUED | OUTPATIENT
Start: 2023-12-21 | End: 2023-12-23 | Stop reason: HOSPADM

## 2023-12-21 RX ORDER — SODIUM CHLORIDE 0.9 % (FLUSH) 0.9 %
5-40 SYRINGE (ML) INJECTION EVERY 12 HOURS SCHEDULED
Status: DISCONTINUED | OUTPATIENT
Start: 2023-12-21 | End: 2023-12-23 | Stop reason: HOSPADM

## 2023-12-21 RX ORDER — SODIUM CHLORIDE 0.9 % (FLUSH) 0.9 %
5-40 SYRINGE (ML) INJECTION PRN
Status: DISCONTINUED | OUTPATIENT
Start: 2023-12-21 | End: 2023-12-23 | Stop reason: HOSPADM

## 2023-12-21 RX ORDER — POTASSIUM CHLORIDE 750 MG/1
40 TABLET, FILM COATED, EXTENDED RELEASE ORAL PRN
Status: DISCONTINUED | OUTPATIENT
Start: 2023-12-21 | End: 2023-12-23 | Stop reason: HOSPADM

## 2023-12-21 RX ORDER — IPRATROPIUM BROMIDE AND ALBUTEROL SULFATE 2.5; .5 MG/3ML; MG/3ML
1 SOLUTION RESPIRATORY (INHALATION)
Status: DISCONTINUED | OUTPATIENT
Start: 2023-12-21 | End: 2023-12-22

## 2023-12-21 RX ORDER — ONDANSETRON 4 MG/1
4 TABLET, ORALLY DISINTEGRATING ORAL EVERY 8 HOURS PRN
Status: DISCONTINUED | OUTPATIENT
Start: 2023-12-21 | End: 2023-12-23 | Stop reason: HOSPADM

## 2023-12-21 RX ORDER — ATORVASTATIN CALCIUM 20 MG/1
80 TABLET, FILM COATED ORAL NIGHTLY
Status: DISCONTINUED | OUTPATIENT
Start: 2023-12-21 | End: 2023-12-23 | Stop reason: HOSPADM

## 2023-12-21 RX ORDER — BUDESONIDE 0.5 MG/2ML
0.5 INHALANT ORAL
Status: DISCONTINUED | OUTPATIENT
Start: 2023-12-21 | End: 2023-12-22

## 2023-12-21 RX ORDER — METOPROLOL SUCCINATE 25 MG/1
25 TABLET, EXTENDED RELEASE ORAL DAILY
Status: DISCONTINUED | OUTPATIENT
Start: 2023-12-21 | End: 2023-12-23 | Stop reason: HOSPADM

## 2023-12-21 RX ORDER — POLYETHYLENE GLYCOL 3350 17 G/17G
17 POWDER, FOR SOLUTION ORAL DAILY PRN
Status: DISCONTINUED | OUTPATIENT
Start: 2023-12-21 | End: 2023-12-23 | Stop reason: HOSPADM

## 2023-12-21 RX ORDER — ACETAMINOPHEN 325 MG/1
650 TABLET ORAL EVERY 6 HOURS PRN
Status: DISCONTINUED | OUTPATIENT
Start: 2023-12-21 | End: 2023-12-23 | Stop reason: HOSPADM

## 2023-12-21 RX ORDER — MAGNESIUM SULFATE IN WATER 40 MG/ML
2000 INJECTION, SOLUTION INTRAVENOUS PRN
Status: DISCONTINUED | OUTPATIENT
Start: 2023-12-21 | End: 2023-12-23 | Stop reason: HOSPADM

## 2023-12-21 RX ORDER — ARFORMOTEROL TARTRATE 15 UG/2ML
15 SOLUTION RESPIRATORY (INHALATION)
Status: DISCONTINUED | OUTPATIENT
Start: 2023-12-21 | End: 2023-12-22

## 2023-12-21 RX ORDER — SODIUM CHLORIDE 9 MG/ML
INJECTION, SOLUTION INTRAVENOUS PRN
Status: DISCONTINUED | OUTPATIENT
Start: 2023-12-21 | End: 2023-12-23 | Stop reason: HOSPADM

## 2023-12-21 RX ORDER — BUMETANIDE 1 MG/1
1 TABLET ORAL DAILY
Status: DISCONTINUED | OUTPATIENT
Start: 2023-12-21 | End: 2023-12-23 | Stop reason: HOSPADM

## 2023-12-21 RX ORDER — ONDANSETRON 2 MG/ML
4 INJECTION INTRAMUSCULAR; INTRAVENOUS EVERY 6 HOURS PRN
Status: DISCONTINUED | OUTPATIENT
Start: 2023-12-21 | End: 2023-12-23 | Stop reason: HOSPADM

## 2023-12-21 RX ORDER — ACETAMINOPHEN 650 MG/1
650 SUPPOSITORY RECTAL EVERY 6 HOURS PRN
Status: DISCONTINUED | OUTPATIENT
Start: 2023-12-21 | End: 2023-12-23 | Stop reason: HOSPADM

## 2023-12-21 RX ADMIN — METOPROLOL SUCCINATE 25 MG: 25 TABLET, EXTENDED RELEASE ORAL at 17:56

## 2023-12-21 RX ADMIN — WATER 60 MG: 1 INJECTION INTRAMUSCULAR; INTRAVENOUS; SUBCUTANEOUS at 17:52

## 2023-12-21 RX ADMIN — WATER 60 MG: 1 INJECTION INTRAMUSCULAR; INTRAVENOUS; SUBCUTANEOUS at 23:12

## 2023-12-21 RX ADMIN — SODIUM CHLORIDE, PRESERVATIVE FREE 10 ML: 5 INJECTION INTRAVENOUS at 23:14

## 2023-12-21 ASSESSMENT — PAIN - FUNCTIONAL ASSESSMENT: PAIN_FUNCTIONAL_ASSESSMENT: NONE - DENIES PAIN

## 2023-12-21 NOTE — ED TRIAGE NOTES
Daughter brings in patient for complaints of fatigue, cough, shortness of breath and confusion that started 2 days ago. Daughter states that he has been exposed to sick family members. Denies any chest pain, fevers, vomiting or diarrhea. Daughter states patient wears 2L at baseline when he sleeps.

## 2023-12-21 NOTE — ED NOTES
11:33 AM    I have evaluated the patient as the Provider in Rapid Medical Evaluation (RME). I have reviewed his vital signs and the triage nurse assessment. I have talked with the patient and any available family and advised that I am the provider in triage and have ordered the appropriate study to initiate their work up based on the clinical presentation during my assessment. I have advised that the patient will be accommodated in the Main ED as soon as possible. I have also requested to contact the triage nurse or myself immediately if the patient experiences any changes in their condition during this brief waiting period. PMH: CAD, asthma, COPD, T2DM, prostate cancer, HTN, CHF, Afib, pacemaker, DVT     Presents with daughter who is caretaker. Concern for URI based on recent sick contacts. Disoriented, difficulty breathing, increased sleeping, decreased eating, rhinorrhea, productive cough. On 2-3L NC O2 when sleeping. No chest pain. SpO2 91% in triage. Orders placed for CBC, CMP, trop, EKG, CXR.      Ronnell Art PA-C  12/21/23 0049

## 2023-12-21 NOTE — ED NOTES
Verbal shift change report given to Milton Bethea (oncoming nurse) by Shailesh Villarreal (offgoing nurse). Report included the following information Nurse Handoff Report.

## 2023-12-22 LAB
ANION GAP SERPL CALC-SCNC: 1 MMOL/L (ref 5–15)
BASOPHILS # BLD: 0 K/UL (ref 0–0.1)
BASOPHILS NFR BLD: 0 % (ref 0–1)
BUN SERPL-MCNC: 32 MG/DL (ref 6–20)
BUN/CREAT SERPL: 30 (ref 12–20)
CALCIUM SERPL-MCNC: 8.2 MG/DL (ref 8.5–10.1)
CHLORIDE SERPL-SCNC: 108 MMOL/L (ref 97–108)
CO2 SERPL-SCNC: 32 MMOL/L (ref 21–32)
CREAT SERPL-MCNC: 1.05 MG/DL (ref 0.7–1.3)
DIFFERENTIAL METHOD BLD: ABNORMAL
EOSINOPHIL # BLD: 0 K/UL (ref 0–0.4)
EOSINOPHIL NFR BLD: 0 % (ref 0–7)
ERYTHROCYTE [DISTWIDTH] IN BLOOD BY AUTOMATED COUNT: 15.1 % (ref 11.5–14.5)
GLUCOSE SERPL-MCNC: 126 MG/DL (ref 65–100)
HCT VFR BLD AUTO: 31.6 % (ref 36.6–50.3)
HGB BLD-MCNC: 10.1 G/DL (ref 12.1–17)
IMM GRANULOCYTES # BLD AUTO: 0 K/UL (ref 0–0.04)
IMM GRANULOCYTES NFR BLD AUTO: 0 % (ref 0–0.5)
LYMPHOCYTES # BLD: 0.6 K/UL (ref 0.8–3.5)
LYMPHOCYTES NFR BLD: 25 % (ref 12–49)
MCH RBC QN AUTO: 33.1 PG (ref 26–34)
MCHC RBC AUTO-ENTMCNC: 32 G/DL (ref 30–36.5)
MCV RBC AUTO: 103.6 FL (ref 80–99)
MONOCYTES # BLD: 0.1 K/UL (ref 0–1)
MONOCYTES NFR BLD: 4 % (ref 5–13)
NEUTS SEG # BLD: 1.5 K/UL (ref 1.8–8)
NEUTS SEG NFR BLD: 71 % (ref 32–75)
NRBC # BLD: 0 K/UL (ref 0–0.01)
NRBC BLD-RTO: 0 PER 100 WBC
PLATELET # BLD AUTO: 118 K/UL (ref 150–400)
PMV BLD AUTO: 9.6 FL (ref 8.9–12.9)
POTASSIUM SERPL-SCNC: 4.3 MMOL/L (ref 3.5–5.1)
RBC # BLD AUTO: 3.05 M/UL (ref 4.1–5.7)
RBC MORPH BLD: ABNORMAL
RBC MORPH BLD: ABNORMAL
SODIUM SERPL-SCNC: 141 MMOL/L (ref 136–145)
WBC # BLD AUTO: 2.2 K/UL (ref 4.1–11.1)

## 2023-12-22 PROCEDURE — 97162 PT EVAL MOD COMPLEX 30 MIN: CPT

## 2023-12-22 PROCEDURE — 2580000003 HC RX 258: Performed by: INTERNAL MEDICINE

## 2023-12-22 PROCEDURE — 6370000000 HC RX 637 (ALT 250 FOR IP): Performed by: INTERNAL MEDICINE

## 2023-12-22 PROCEDURE — 94761 N-INVAS EAR/PLS OXIMETRY MLT: CPT

## 2023-12-22 PROCEDURE — 85025 COMPLETE CBC W/AUTO DIFF WBC: CPT

## 2023-12-22 PROCEDURE — 94640 AIRWAY INHALATION TREATMENT: CPT

## 2023-12-22 PROCEDURE — 36415 COLL VENOUS BLD VENIPUNCTURE: CPT

## 2023-12-22 PROCEDURE — 94664 DEMO&/EVAL PT USE INHALER: CPT

## 2023-12-22 PROCEDURE — 80048 BASIC METABOLIC PNL TOTAL CA: CPT

## 2023-12-22 PROCEDURE — 6360000002 HC RX W HCPCS: Performed by: INTERNAL MEDICINE

## 2023-12-22 PROCEDURE — 2700000000 HC OXYGEN THERAPY PER DAY

## 2023-12-22 PROCEDURE — 1100000000 HC RM PRIVATE

## 2023-12-22 PROCEDURE — 97116 GAIT TRAINING THERAPY: CPT

## 2023-12-22 RX ORDER — GUAIFENESIN 600 MG/1
600 TABLET, EXTENDED RELEASE ORAL 2 TIMES DAILY
Status: DISCONTINUED | OUTPATIENT
Start: 2023-12-22 | End: 2023-12-23 | Stop reason: HOSPADM

## 2023-12-22 RX ORDER — ALBUTEROL SULFATE 90 UG/1
2 AEROSOL, METERED RESPIRATORY (INHALATION)
Status: DISCONTINUED | OUTPATIENT
Start: 2023-12-22 | End: 2023-12-23 | Stop reason: HOSPADM

## 2023-12-22 RX ADMIN — IPRATROPIUM BROMIDE 2 PUFF: 17 AEROSOL, METERED RESPIRATORY (INHALATION) at 10:59

## 2023-12-22 RX ADMIN — WATER 60 MG: 1 INJECTION INTRAMUSCULAR; INTRAVENOUS; SUBCUTANEOUS at 16:10

## 2023-12-22 RX ADMIN — SODIUM CHLORIDE, PRESERVATIVE FREE 10 ML: 5 INJECTION INTRAVENOUS at 21:58

## 2023-12-22 RX ADMIN — BUMETANIDE 1 MG: 1 TABLET ORAL at 10:46

## 2023-12-22 RX ADMIN — MOMETASONE FUROATE AND FORMOTEROL FUMARATE DIHYDRATE 2 PUFF: 200; 5 AEROSOL RESPIRATORY (INHALATION) at 23:09

## 2023-12-22 RX ADMIN — ALBUTEROL SULFATE 2 PUFF: 90 AEROSOL, METERED RESPIRATORY (INHALATION) at 16:09

## 2023-12-22 RX ADMIN — WATER 60 MG: 1 INJECTION INTRAMUSCULAR; INTRAVENOUS; SUBCUTANEOUS at 10:48

## 2023-12-22 RX ADMIN — ALBUTEROL SULFATE 2 PUFF: 90 AEROSOL, METERED RESPIRATORY (INHALATION) at 07:00

## 2023-12-22 RX ADMIN — ALBUTEROL SULFATE 2 PUFF: 90 AEROSOL, METERED RESPIRATORY (INHALATION) at 11:00

## 2023-12-22 RX ADMIN — APIXABAN 2.5 MG: 2.5 TABLET, FILM COATED ORAL at 21:57

## 2023-12-22 RX ADMIN — GUAIFENESIN 600 MG: 600 TABLET ORAL at 21:57

## 2023-12-22 RX ADMIN — IPRATROPIUM BROMIDE 2 PUFF: 17 AEROSOL, METERED RESPIRATORY (INHALATION) at 07:08

## 2023-12-22 RX ADMIN — MOMETASONE FUROATE AND FORMOTEROL FUMARATE DIHYDRATE 2 PUFF: 200; 5 AEROSOL RESPIRATORY (INHALATION) at 07:20

## 2023-12-22 RX ADMIN — WATER 60 MG: 1 INJECTION INTRAMUSCULAR; INTRAVENOUS; SUBCUTANEOUS at 06:45

## 2023-12-22 RX ADMIN — ATORVASTATIN CALCIUM 80 MG: 20 TABLET, FILM COATED ORAL at 21:57

## 2023-12-22 RX ADMIN — APIXABAN 2.5 MG: 2.5 TABLET, FILM COATED ORAL at 10:47

## 2023-12-22 RX ADMIN — IPRATROPIUM BROMIDE 2 PUFF: 17 AEROSOL, METERED RESPIRATORY (INHALATION) at 16:09

## 2023-12-22 RX ADMIN — ALBUTEROL SULFATE 2 PUFF: 90 AEROSOL, METERED RESPIRATORY (INHALATION) at 23:02

## 2023-12-22 RX ADMIN — METOPROLOL SUCCINATE 25 MG: 25 TABLET, EXTENDED RELEASE ORAL at 10:47

## 2023-12-22 RX ADMIN — IPRATROPIUM BROMIDE 2 PUFF: 17 AEROSOL, METERED RESPIRATORY (INHALATION) at 23:08

## 2023-12-22 NOTE — PLAN OF CARE
Problem: Skin/Tissue Integrity  Goal: Absence of new skin breakdown  Description: 1. Monitor for areas of redness and/or skin breakdown  2. Assess vascular access sites hourly  3. Every 4-6 hours minimum:  Change oxygen saturation probe site  4. Every 4-6 hours:  If on nasal continuous positive airway pressure, respiratory therapy assess nares and determine need for appliance change or resting period.   Outcome: Progressing     Problem: ABCDS Injury Assessment  Goal: Absence of physical injury  Outcome: Progressing     Problem: Discharge Planning  Goal: Discharge to home or other facility with appropriate resources  Outcome: Progressing     Problem: Safety - Adult  Goal: Free from fall injury  Outcome: Progressing

## 2023-12-22 NOTE — PROGRESS NOTES
Hospitalist Progress Note    NAME: Courtney Washington   :  1935   MRN:  [id number]     Date/Time:  2023 9:07 AM    Patient PCP: Herbert Ulloa MD       Subjective:   CHIEF COMPLAINT: Dyspnea         Patient states that he is feeling better.  Less dyspnea.  Continues to have wet cough.  No sputum production.  No fever.  Less wheezing.        Objective:   VITALS:    Vitals:    23 0800   BP: 113/70   Pulse:    Resp:    Temp:    SpO2: 99%       PHYSICAL EXAM:      General:   No acute distress, resting comfortably in bed.  Heart:  RRR, No MRG  Lungs: Rhonchi, prolonged expiratory phase, no significant wheezing, wet cough non-labored breathing.  Abdomen:  Soft .  Nondistended.  NTTP.  BS present.  Extremities:  No edema.  Skin:  No rash  Neuro:  Alert and interactive.  No focal deficits.  Psych: Mood stable.        LAB DATA REVIEWED:    Recent Results (from the past 24 hour(s))   Brain Natriuretic Peptide    Collection Time: 23 11:41 AM   Result Value Ref Range    NT Pro-BNP 3,632 (H) <450 PG/ML   Procalcitonin    Collection Time: 23 11:41 AM   Result Value Ref Range    Procalcitonin 0.05 ng/mL   C-Reactive Protein    Collection Time: 23 11:41 AM   Result Value Ref Range    CRP 0.77 (H) 0.00 - 0.60 mg/dL   CBC with Auto Differential    Collection Time: 23 11:42 AM   Result Value Ref Range    WBC 3.9 (L) 4.1 - 11.1 K/uL    RBC 3.14 (L) 4.10 - 5.70 M/uL    Hemoglobin 10.3 (L) 12.1 - 17.0 g/dL    Hematocrit 32.5 (L) 36.6 - 50.3 %    .5 (H) 80.0 - 99.0 FL    MCH 32.8 26.0 - 34.0 PG    MCHC 31.7 30.0 - 36.5 g/dL    RDW 15.5 (H) 11.5 - 14.5 %    Platelets 141 (L) 150 - 400 K/uL    MPV 9.8 8.9 - 12.9 FL    Nucleated RBCs 0.0 0  WBC    nRBC 0.00 0.00 - 0.01 K/uL    Neutrophils % 52 32 - 75 %    Lymphocytes % 29 12 - 49 %    Monocytes % 18 (H) 5 - 13 %    Eosinophils % 1 0 - 7 %    Basophils % 0 0 - 1 %    Immature Granulocytes 0 0.0 - 0.5 %    Neutrophils Absolute 2.0  Result      Stable moderate left pleural effusion with underlying consolidation.                CURRENT MEDICATION ORDERS:  Current Facility-Administered Medications: mometasone-formoterol (DULERA) 200-5 MCG/ACT inhaler 2 puff, 2 puff, Inhalation, BID RT  albuterol sulfate HFA (PROVENTIL;VENTOLIN;PROAIR) 108 (90 Base) MCG/ACT inhaler 2 puff, 2 puff, Inhalation, 4x Daily RT **AND** ipratropium (ATROVENT HFA) 17 MCG/ACT inhaler 2 puff, 2 puff, Inhalation, 4x Daily RT  apixaban (ELIQUIS) tablet 2.5 mg, 2.5 mg, Oral, BID  atorvastatin (LIPITOR) tablet 80 mg, 80 mg, Oral, Nightly  metoprolol succinate (TOPROL XL) extended release tablet 25 mg, 25 mg, Oral, Daily  methylPREDNISolone sodium succ (SOLU-MEDROL) 60 mg in sterile water 0.96 mL injection, 60 mg, IntraVENous, Q6H  bumetanide (BUMEX) tablet 1 mg, 1 mg, Oral, Daily  sodium chloride flush 0.9 % injection 5-40 mL, 5-40 mL, IntraVENous, 2 times per day  sodium chloride flush 0.9 % injection 5-40 mL, 5-40 mL, IntraVENous, PRN  0.9 % sodium chloride infusion, , IntraVENous, PRN  potassium chloride (KLOR-CON) extended release tablet 40 mEq, 40 mEq, Oral, PRN **OR** potassium bicarb-citric acid (EFFER-K) effervescent tablet 40 mEq, 40 mEq, Oral, PRN **OR** potassium chloride 10 mEq/100 mL IVPB (Peripheral Line), 10 mEq, IntraVENous, PRN  magnesium sulfate 2000 mg in 50 mL IVPB premix, 2,000 mg, IntraVENous, PRN  ondansetron (ZOFRAN-ODT) disintegrating tablet 4 mg, 4 mg, Oral, Q8H PRN **OR** ondansetron (ZOFRAN) injection 4 mg, 4 mg, IntraVENous, Q6H PRN  polyethylene glycol (GLYCOLAX) packet 17 g, 17 g, Oral, Daily PRN  acetaminophen (TYLENOL) tablet 650 mg, 650 mg, Oral, Q6H PRN **OR** acetaminophen (TYLENOL) suppository 650 mg, 650 mg, Rectal, Q6H PRN       ASSESSMENT and PLAN:     Principal Problem:    COPD exacerbation (HCC)  Active Problems:    CAD (coronary artery disease)    DM type 2 causing vascular disease (HCC)    Prostate cancer (HCC)    Hypertension Hypercholesterolemia    CHF (congestive heart failure), NYHA class I, chronic, systolic (HCC)    Paroxysmal A-fib (HCC)    Hx of deep venous thrombosis    Chronic anticoagulation    COVID-19    RAE (acute kidney injury) (HCC)    Thrombocytopenia (HCC)    Chronic respiratory failure with hypoxia (HCC)  Resolved Problems:    * No resolved hospital problems. *      Acute on chronic hypoxic respiratory failure, on 2.5 L at baseline  Acute COPD exacerbation  COVID-19 infection  -Continue IV steroids, decrease to 60 mg IV twice daily  -Continue supplemental oxygen, wean to home O22.5 Liters via nasal cannula  -Continue DuoNebs, Pulmicort, and Brovana  -Procalcitonin level not elevated, no empiric antibiotics at present  -Mucinex     Paroxysmal A-fib (HCC).   -Heart rate is controlled  -Continue metoprolol and Eliquis     CAD (coronary artery disease)  Hypertension  -continue metoprolol   -Not on antiplatelet as taking Eliquis for atrial fibrillation as above    CHF (congestive heart failure), NYHA class I, chronic, systolic (HCC).    -Left pleural effusion on chest x-ray  - Continue home Bumex.       Hypercholesterolemia.    - Continue atorvastatin      RAE (acute kidney injury)  -Creatinine improved from 1.36-1.05  -Continue to follow BMP      Pancytopenia  Leukopenia  Thrombocytopenia  Anemia  -Worsening with neutropenia, down to 2.1  -Stable anemia  -Platelets down to 118  -Likely due to COVID-19 infection  -Follow CBC with differential    DVT ppx:  eliquis    Dispo:  home      CODE STATUS:  FULL CODE        Risk of deterioration: high         Total time spent with patient care: 35 min, critical care time (excluding procedures):  [ ] yes  [x] no.  I personally saw and examined the patient during this time period.                                                                                                                 Care Plan discussed with: Patient, nurse,    Discussed:  Care Plan         Procedures: see  electronic medical records for all procedures/Xrays and details which were not copied into this note but were reviewed prior to creation of Plan. I have personally reviewed the radiographs, laboratory data in Epic and decisions and statements above are based partially on this personal interpretation.       Signed: Leah Ford MD

## 2023-12-23 VITALS
HEIGHT: 66 IN | WEIGHT: 153 LBS | DIASTOLIC BLOOD PRESSURE: 72 MMHG | BODY MASS INDEX: 24.59 KG/M2 | SYSTOLIC BLOOD PRESSURE: 119 MMHG | RESPIRATION RATE: 18 BRPM | OXYGEN SATURATION: 97 % | TEMPERATURE: 97.5 F | HEART RATE: 77 BPM

## 2023-12-23 LAB
ANION GAP SERPL CALC-SCNC: 1 MMOL/L (ref 5–15)
BASOPHILS # BLD: 0 K/UL (ref 0–0.1)
BASOPHILS NFR BLD: 0 % (ref 0–1)
BUN SERPL-MCNC: 42 MG/DL (ref 6–20)
BUN/CREAT SERPL: 36 (ref 12–20)
CALCIUM SERPL-MCNC: 8.1 MG/DL (ref 8.5–10.1)
CHLORIDE SERPL-SCNC: 105 MMOL/L (ref 97–108)
CO2 SERPL-SCNC: 34 MMOL/L (ref 21–32)
CREAT SERPL-MCNC: 1.18 MG/DL (ref 0.7–1.3)
DIFFERENTIAL METHOD BLD: ABNORMAL
EOSINOPHIL # BLD: 0 K/UL (ref 0–0.4)
EOSINOPHIL NFR BLD: 0 % (ref 0–7)
ERYTHROCYTE [DISTWIDTH] IN BLOOD BY AUTOMATED COUNT: 14.8 % (ref 11.5–14.5)
GLUCOSE SERPL-MCNC: 118 MG/DL (ref 65–100)
HCT VFR BLD AUTO: 31.3 % (ref 36.6–50.3)
HGB BLD-MCNC: 10.2 G/DL (ref 12.1–17)
IMM GRANULOCYTES # BLD AUTO: 0 K/UL (ref 0–0.04)
IMM GRANULOCYTES NFR BLD AUTO: 0 % (ref 0–0.5)
LYMPHOCYTES # BLD: 0.6 K/UL (ref 0.8–3.5)
LYMPHOCYTES NFR BLD: 13 % (ref 12–49)
MAGNESIUM SERPL-MCNC: 2.1 MG/DL (ref 1.6–2.4)
MCH RBC QN AUTO: 32.9 PG (ref 26–34)
MCHC RBC AUTO-ENTMCNC: 32.6 G/DL (ref 30–36.5)
MCV RBC AUTO: 101 FL (ref 80–99)
MONOCYTES # BLD: 0.5 K/UL (ref 0–1)
MONOCYTES NFR BLD: 10 % (ref 5–13)
NEUTS SEG # BLD: 3.4 K/UL (ref 1.8–8)
NEUTS SEG NFR BLD: 77 % (ref 32–75)
NRBC # BLD: 0 K/UL (ref 0–0.01)
NRBC BLD-RTO: 0 PER 100 WBC
PLATELET # BLD AUTO: 145 K/UL (ref 150–400)
PMV BLD AUTO: 9.9 FL (ref 8.9–12.9)
POTASSIUM SERPL-SCNC: 4 MMOL/L (ref 3.5–5.1)
RBC # BLD AUTO: 3.1 M/UL (ref 4.1–5.7)
RBC MORPH BLD: ABNORMAL
SODIUM SERPL-SCNC: 140 MMOL/L (ref 136–145)
WBC # BLD AUTO: 4.5 K/UL (ref 4.1–11.1)

## 2023-12-23 PROCEDURE — 83735 ASSAY OF MAGNESIUM: CPT

## 2023-12-23 PROCEDURE — 2580000003 HC RX 258: Performed by: INTERNAL MEDICINE

## 2023-12-23 PROCEDURE — 6370000000 HC RX 637 (ALT 250 FOR IP): Performed by: INTERNAL MEDICINE

## 2023-12-23 PROCEDURE — 80048 BASIC METABOLIC PNL TOTAL CA: CPT

## 2023-12-23 PROCEDURE — 97165 OT EVAL LOW COMPLEX 30 MIN: CPT | Performed by: OCCUPATIONAL THERAPIST

## 2023-12-23 PROCEDURE — 6360000002 HC RX W HCPCS: Performed by: INTERNAL MEDICINE

## 2023-12-23 PROCEDURE — 85025 COMPLETE CBC W/AUTO DIFF WBC: CPT

## 2023-12-23 PROCEDURE — 36415 COLL VENOUS BLD VENIPUNCTURE: CPT

## 2023-12-23 PROCEDURE — 94761 N-INVAS EAR/PLS OXIMETRY MLT: CPT

## 2023-12-23 PROCEDURE — 2700000000 HC OXYGEN THERAPY PER DAY

## 2023-12-23 PROCEDURE — 97535 SELF CARE MNGMENT TRAINING: CPT | Performed by: OCCUPATIONAL THERAPIST

## 2023-12-23 PROCEDURE — 94640 AIRWAY INHALATION TREATMENT: CPT

## 2023-12-23 RX ORDER — GUAIFENESIN 600 MG/1
600 TABLET, EXTENDED RELEASE ORAL 2 TIMES DAILY
Qty: 60 TABLET | Refills: 0 | Status: SHIPPED | OUTPATIENT
Start: 2023-12-23

## 2023-12-23 RX ORDER — PREDNISONE 20 MG/1
TABLET ORAL
Qty: 18 TABLET | Refills: 0 | Status: SHIPPED | OUTPATIENT
Start: 2023-12-23 | End: 2024-01-01

## 2023-12-23 RX ORDER — AMOXICILLIN AND CLAVULANATE POTASSIUM 875; 125 MG/1; MG/1
1 TABLET, FILM COATED ORAL 2 TIMES DAILY
Qty: 10 TABLET | Refills: 0 | Status: SHIPPED | OUTPATIENT
Start: 2023-12-23 | End: 2023-12-28

## 2023-12-23 RX ADMIN — SODIUM CHLORIDE, PRESERVATIVE FREE 10 ML: 5 INJECTION INTRAVENOUS at 08:08

## 2023-12-23 RX ADMIN — APIXABAN 2.5 MG: 2.5 TABLET, FILM COATED ORAL at 08:06

## 2023-12-23 RX ADMIN — IPRATROPIUM BROMIDE 2 PUFF: 17 AEROSOL, METERED RESPIRATORY (INHALATION) at 12:35

## 2023-12-23 RX ADMIN — IPRATROPIUM BROMIDE 2 PUFF: 17 AEROSOL, METERED RESPIRATORY (INHALATION) at 08:07

## 2023-12-23 RX ADMIN — MOMETASONE FUROATE AND FORMOTEROL FUMARATE DIHYDRATE 2 PUFF: 200; 5 AEROSOL RESPIRATORY (INHALATION) at 08:08

## 2023-12-23 RX ADMIN — ALBUTEROL SULFATE 2 PUFF: 90 AEROSOL, METERED RESPIRATORY (INHALATION) at 08:06

## 2023-12-23 RX ADMIN — ALBUTEROL SULFATE 2 PUFF: 90 AEROSOL, METERED RESPIRATORY (INHALATION) at 12:35

## 2023-12-23 RX ADMIN — GUAIFENESIN 600 MG: 600 TABLET ORAL at 08:06

## 2023-12-23 RX ADMIN — WATER 60 MG: 1 INJECTION INTRAMUSCULAR; INTRAVENOUS; SUBCUTANEOUS at 05:12

## 2023-12-23 NOTE — DISCHARGE SUMMARY
Hospitalist Discharge Summary     Patient ID:  Ramon Mcclendon  [id number]  80 y.o.  1935    Admit date: 12/21/2023    Discharge date and time: 12/23/2023    Admission Diagnoses: Hypoxia [R09.02]  COPD exacerbation ([address]) [J44.1]  COVID [U07.1]      Discharge Diagnoses:      Principal Problem:    COPD exacerbation ([address])  Active Problems:    CAD (coronary artery disease)    DM type 2 causing vascular disease ([address])    Prostate cancer ([address])    Hypertension    Hypercholesterolemia    CHF (congestive heart failure), NYHA class I, chronic, systolic (HCC)    Paroxysmal A-fib (HCC)    Hx of deep venous thrombosis    Chronic anticoagulation    COVID-19    RAE (acute kidney injury) ([address])    Thrombocytopenia (HCC)    Chronic respiratory failure with hypoxia ([address])  Resolved Problems:    * No resolved hospital problems. *          Acute on chronic hypoxic respiratory failure, on 2.5 L at baseline  Acute COPD exacerbation  COVID-19 infection  Acute bronchitis  Paroxysmal A-fib ([address]). CAD (coronary artery disease)  Hypertension  CHF (congestive heart failure), NYHA class I, chronic, systolic ([address]). Left pleural effusion   Hypercholesterolemia.     RAE (acute kidney injury)  Pancytopenia  Leukopenia  Thrombocytopenia  Anemia    Hospital Course:     Ramon Mcclendon  is a 80 y.o.  male with history of coronary artery disease, atrial fibrillation on Eliquis, chronic systolic congestive heart failure, COPD, and chronic respiratory failure on 2.5 L home O2 presenting with increasing dyspnea and cough secondary to COPD exacerbation and COVID-19 infection     Acute on chronic hypoxic respiratory failure, on 2.5 L at baseline  Acute COPD exacerbation  COVID-19 infection  -Patient was admitted to the hospitalist service and started on IV Solu-Medrol  -He was continued on supplemental oxygen at 2.5 L which is his baseline  -DuoNebs, Pulmicort, Brovana, and ipratropium nebulizers were given  -Procalcitonin level was not elevated so Regular; Low Sodium (2 gm)     Activity:  Activity as tolerated     Disposition: Home with [facility],[address]         Medication List        START taking these medications      amoxicillin-clavulanate 875-125 MG per tablet  Commonly known as: AUGMENTIN  Take 1 tablet by mouth 2 times daily for 5 days     guaiFENesin 600 MG extended release tablet  Commonly known as: MUCINEX  Take 1 tablet by mouth 2 times daily     predniSONE 20 MG tablet  Commonly known as: DELTASONE  Take 3 tablets by mouth daily for 3 days, THEN 2 tablets daily for 3 days, THEN 1 tablet daily for 3 days.   Start taking on: December 23, 2023            CONTINUE taking these medications      acetaminophen 500 MG tablet  Commonly known as: TYLENOL     alendronate 70 MG tablet  Commonly known as: FOSAMAX  Take 1 tablet by mouth every 7 days     apixaban 2.5 MG Tabs tablet  Commonly known as: ELIQUIS  Take 1 tablet by mouth 2 times daily     atorvastatin 80 MG tablet  Commonly known as: LIPITOR     bumetanide 1 MG tablet  Commonly known as: BUMEX     colesevelam 625 MG tablet  Commonly known as: WELCHOL  TAKE 3 TABLETS BY MOUTH TWICE DAILY WITH MEALS     fluticasone-umeclidin-vilant 100-62.5-25 MCG/ACT Aepb inhaler  Commonly known as: TRELEGY ELLIPTA     ipratropium 0.5 mg-albuterol 2.5 mg 0.5-2.5 (3) MG/3ML Soln nebulizer solution  Commonly known as: DUONEB     metoprolol succinate 25 MG extended release tablet  Commonly known as: TOPROL XL     potassium chloride 20 MEQ extended release tablet  Commonly known as: KLOR-CON M               Where to Get Your Medications        These medications were sent to [facility], [address] [place] [address] [phone] [facility] [phone]  [facility], [address] [facility] [address]      Phone: [phone]   amoxicillin-clavulanate 875-125 MG per tablet  guaiFENesin 600 MG extended release tablet  predniSONE 20 MG tablet          I have reviewed discharge instructions with the patient, and they verbalized understanding.     Approximate time spent in patient care on day of discharge: 35 mins    Signed:  Avi Deng MD  12/23/2023  12:09 PM

## 2023-12-23 NOTE — CARE COORDINATION
12/23/2023 12:28 PM   Discharge home today. Home health order received. CM spoke with pt over the phone, discussed home health. Pt politely declined home health, MD notified. Pt confirmed he has home O2 at 2L at home. Pt reported his daughter will transport him home today. CM added [facility] information added to AVS.   Mahesh Parks, [facility]       12/23/23 1226   Discharge Planning   Type of Residence House   Living Arrangements Family Members   Current Services Prior To Admission None   Potential Assistance Needed N/A   DME Ordered? No   Potential Assistance Purchasing Medications No   Type of Home Care Services None   Patient expects to be discharged to: [facility]   Transition of Care Consult (CM Consult) [facility] No   Reason Outside Agency Chosen Patient declined ordered home [facility] Discharge None   [place] Resource Information Provided?  No   Mode of Transport at Discharge Other (see comment)  (Pt's daughter)

## 2023-12-23 NOTE — DISCHARGE INSTRUCTIONS
HOSPITALIST DISCHARGE INSTRUCTIONS  NAME: Nelia Gonzalez   :  1935   MRN:  [id number]     Date/Time:  2023 12:08 PM    ADMIT DATE: 2023     DISCHARGE DATE: 2023       DISCHARGE DIAGNOSIS:  Acute COPD exacerbation  COVID 19  Acute bronchitis      DIET:  regular    ACTIVITY:  as tolerated    OTHER INSTRUCTIONS:    Home health for PT/OT      MEDICATIONS:    It is important that you take the medication exactly as they are prescribed. Keep your medication in the bottles provided by the pharmacist and keep a list of the medication names, dosages, and times to be taken in your wallet. Do not take other medications without consulting your doctor. If you experience any of the following symptoms then please call your primary care physician or return to the emergency room if you cannot get hold of your doctor:  Fever, chills, nausea, vomiting, diarrhea, change in mentation, falling, bleeding, shortness of breath    Follow Up:  Please call and set up an appointment to see them in 1 week. Yahaira Painting MD  [address] [place] [address]  [phone]    Follow up in 1 week(s)          Information obtained by :  I understand that if any problems occur once I am at home I am to contact my physician. I understand and acknowledge receipt of the instructions indicated above.                                                                                                                                                Physician's or R.N.'s Signature                                                                  Date/Time                                                                                                                                              Patient or Representative Signature                                                          Date/Time Patient or Representative Signature                                                          Date/Time      Signed By: Jeanne Carroll MD     December 23, 2023

## 2023-12-23 NOTE — PLAN OF CARE
Problem: Skin/Tissue Integrity  Goal: Absence of new skin breakdown  Description: 1. Monitor for areas of redness and/or skin breakdown  2. Assess vascular access sites hourly  3. Every 4-6 hours minimum:  Change oxygen saturation probe site  4. Every 4-6 hours:  If on nasal continuous positive airway pressure, respiratory therapy assess nares and determine need for appliance change or resting period.   Outcome: Progressing     Problem: ABCDS Injury Assessment  Goal: Absence of physical injury  Outcome: Progressing     Problem: Discharge Planning  Goal: Discharge to home or other facility with appropriate resources  Outcome: Progressing  Flowsheets (Taken 12/22/2023 2032 by Vidhya Goins RN)  Discharge to home or other facility with appropriate resources: Identify barriers to discharge with patient and caregiver     Problem: Safety - Adult  Goal: Free from fall injury  Outcome: Progressing

## 2023-12-27 ENCOUNTER — TELEPHONE (OUTPATIENT)
Age: 88
End: 2023-12-27

## 2023-12-27 NOTE — TELEPHONE ENCOUNTER
Care Transitions Initial Follow Up Call    Outreach made within 2 business days of discharge: Yes    Patient: Talat Palacio   Patient : 1935   MRN: [id number]    Reason for Admission: COPD exacerbation (per daughter pt was having trouble focusing, and test COVID + while in hospital)  Discharge Date: 23       Spoke with: daughter    Discharge department/facility: [address] [place] [address] Interactive Patient Contact:  Was patient able to fill all prescriptions: Yes  Was patient instructed to bring all medications to the follow-up visit: Yes  Is patient taking all medications as directed in the discharge summary? Yes  Does patient understand their discharge instructions: Yes  Does patient have questions or concerns that need addressed prior to 7-14 day follow up office visit: no    Scheduled appointment with PCP within 7-14 days   -Pt has appointment with pulmonologist 1/3/2024:  per pt daughter, would like to see the pulmonologist before scheduling an appointment with Dr. Francisco Javier Ling. Advised daughter to call to schedule an appointment if needed.      Follow Up  Future Appointments   Date Time Provider [address] [place] [address]   2024  1:00 PM MD SNEHAL Casey BS AMB   10/31/2024  2:00 PM HERMAN3JOSEPH BS AMB   10/31/2024  2:20 PM MD IRENE Weir BS AMB       Nitin Santana LPN

## 2024-01-01 NOTE — PROGRESS NOTES
Physician Progress Note      PATIENT:               SU ALEXANDER  [facility] #:                  [id number]  :                       1935  ADMIT DATE:       2023 11:49 AM  DISCH DATE:        2023 3:40 PM  RESPONDING  PROVIDER #:        Avi Deng MD          QUERY TEXT:    Good morning.    Patient admitted with cough secondary to COPD exacerbation and Covid-19   infection. Noted documentation of acute on chronic hypoxic respiratory failure   in progress notes dated  and  and  DC summary.  H&P   states Chronic respiratory failure with hypoxia. Patient is noted to require   supplemental O2 at 2.5 L at baseline.    In order to support the diagnosis of acute respiratory failure, please include   additional clinical indicators in your documentation.  Or please document if   the diagnosis of acute respiratory failure has been ruled out after further   study.      The medical record reflects the following:    Risk Factors: 88 yr old male, Covid-19 infection, COPD requiring 2.5 l   supplemental O2 at baseline, CAD, DM II, HFrEF, PAF    Clinical Indicators:  Rapid covid: detected;  CXR: Stable moderate   left pleural effusion with underlying consolidation; 23 11:42 CO2: 32,   23 02:57 CO2: 32, 23 03:35 CO2: 34;  VS: 11:01-HR 93, RR 14,   RA SpO2 91%;  VS: 10:01-HR 87, RR 18, SpO2 97% on 2 liters;  VS:   20:01-HR 75, RR 16, SpO2 95% on 2 liters;  VS at DC: 15:01-HR 77, RR 18,   SpO2 97% on O2;    Treatment: labs, CXR, Vital signs per unit protocol, Supplemental O2,   Albuterol inhaler, Atrovent inhaler, Dulera inhaler,  IV Magnesium Sulfate, IV   Solu-medrol      Thank you,  Sara Munoz RN, CDI  ____________________________________    Acute Respiratory Failure Clinical Indicators per  MS-DR[facility] Training Guide and   Quick Reference Guide:  pO2 < 60 mmHg or SpO2 (pulse oximetry) < 91% breathing room air  pCO2 > 50 and pH < 7.35  P/F

## 2024-01-05 RX ORDER — METOPROLOL SUCCINATE 25 MG/1
TABLET, EXTENDED RELEASE ORAL
Qty: 90 TABLET | Refills: 1 | Status: SHIPPED | OUTPATIENT
Start: 2024-01-05

## 2024-02-06 ENCOUNTER — OFFICE VISIT (OUTPATIENT)
Age: 89
End: 2024-02-06
Payer: MEDICARE

## 2024-02-06 VITALS
HEART RATE: 90 BPM | SYSTOLIC BLOOD PRESSURE: 131 MMHG | OXYGEN SATURATION: 93 % | TEMPERATURE: 97.8 F | BODY MASS INDEX: 28.54 KG/M2 | DIASTOLIC BLOOD PRESSURE: 83 MMHG | RESPIRATION RATE: 14 BRPM | HEIGHT: 66 IN | WEIGHT: 177.6 LBS

## 2024-02-06 DIAGNOSIS — R41.89 COGNITIVE DECLINE: ICD-10-CM

## 2024-02-06 DIAGNOSIS — I50.33 ACUTE ON CHRONIC DIASTOLIC HEART FAILURE (HCC): ICD-10-CM

## 2024-02-06 DIAGNOSIS — Z00.00 MEDICARE ANNUAL WELLNESS VISIT, SUBSEQUENT: ICD-10-CM

## 2024-02-06 DIAGNOSIS — J96.11 CHRONIC RESPIRATORY FAILURE WITH HYPOXIA (HCC): Primary | ICD-10-CM

## 2024-02-06 DIAGNOSIS — Z71.89 ACP (ADVANCE CARE PLANNING): ICD-10-CM

## 2024-02-06 DIAGNOSIS — D69.6 THROMBOCYTOPENIA (HCC): ICD-10-CM

## 2024-02-06 DIAGNOSIS — J44.1 COPD EXACERBATION (HCC): ICD-10-CM

## 2024-02-06 DIAGNOSIS — E11.59 DM TYPE 2 CAUSING VASCULAR DISEASE (HCC): ICD-10-CM

## 2024-02-06 DIAGNOSIS — I50.22 CHF (CONGESTIVE HEART FAILURE), NYHA CLASS I, CHRONIC, SYSTOLIC (HCC): ICD-10-CM

## 2024-02-06 DIAGNOSIS — I48.0 PAROXYSMAL A-FIB (HCC): ICD-10-CM

## 2024-02-06 DIAGNOSIS — C61 PROSTATE CANCER (HCC): ICD-10-CM

## 2024-02-06 PROCEDURE — G0439 PPPS, SUBSEQ VISIT: HCPCS | Performed by: INTERNAL MEDICINE

## 2024-02-06 PROCEDURE — G8484 FLU IMMUNIZE NO ADMIN: HCPCS | Performed by: INTERNAL MEDICINE

## 2024-02-06 PROCEDURE — 99214 OFFICE O/P EST MOD 30 MIN: CPT | Performed by: INTERNAL MEDICINE

## 2024-02-06 PROCEDURE — 3023F SPIROM DOC REV: CPT | Performed by: INTERNAL MEDICINE

## 2024-02-06 PROCEDURE — G8419 CALC BMI OUT NRM PARAM NOF/U: HCPCS | Performed by: INTERNAL MEDICINE

## 2024-02-06 PROCEDURE — 1123F ACP DISCUSS/DSCN MKR DOCD: CPT | Performed by: INTERNAL MEDICINE

## 2024-02-06 PROCEDURE — 1036F TOBACCO NON-USER: CPT | Performed by: INTERNAL MEDICINE

## 2024-02-06 PROCEDURE — G8428 CUR MEDS NOT DOCUMENT: HCPCS | Performed by: INTERNAL MEDICINE

## 2024-02-06 RX ORDER — BUMETANIDE 1 MG/1
TABLET ORAL
Qty: 60 TABLET | Refills: 5 | Status: SHIPPED | OUTPATIENT
Start: 2024-02-06 | End: 2024-02-07 | Stop reason: SDUPTHER

## 2024-02-06 ASSESSMENT — LIFESTYLE VARIABLES
HOW MANY STANDARD DRINKS CONTAINING ALCOHOL DO YOU HAVE ON A TYPICAL DAY: PATIENT DOES NOT DRINK
HOW OFTEN DO YOU HAVE A DRINK CONTAINING ALCOHOL: NEVER

## 2024-02-06 ASSESSMENT — PATIENT HEALTH QUESTIONNAIRE - PHQ9
SUM OF ALL RESPONSES TO PHQ QUESTIONS 1-9: 0
2. FEELING DOWN, DEPRESSED OR HOPELESS: 0
SUM OF ALL RESPONSES TO PHQ QUESTIONS 1-9: 0

## 2024-02-06 NOTE — PROGRESS NOTES
exacerbation (HCC)  -     SC OFFICE OUTPATIENT VISIT 25 MINUTES [12943]    CHF (congestive heart failure), NYHA class I, chronic, systolic (HCC)  -     SC OFFICE OUTPATIENT VISIT 25 MINUTES [65993]    DM type 2 causing vascular disease (HCC)    Paroxysmal A-fib (HCC)  -     Comprehensive Metabolic Panel; Future  -     SC OFFICE OUTPATIENT VISIT 25 MINUTES [96787]    Thrombocytopenia (HCC)    Prostate cancer (HCC)    Acute on chronic diastolic heart failure (HCC)  -     CBC with Auto Differential; Future  -     Brain Natriuretic Peptide; Future  -     Comprehensive Metabolic Panel; Future  -     SC OFFICE OUTPATIENT VISIT 25 MINUTES [10854]    Cognitive decline  -     SC OFFICE OUTPATIENT VISIT 25 MINUTES [29556]    Medicare annual wellness visit, subsequent    ACP (advance care planning)      Double   bumex for a month    May need more help at home down the road  Nutrition seems Ok

## 2024-02-07 ENCOUNTER — TELEPHONE (OUTPATIENT)
Age: 89
End: 2024-02-07

## 2024-02-07 DIAGNOSIS — J96.11 CHRONIC RESPIRATORY FAILURE WITH HYPOXIA (HCC): ICD-10-CM

## 2024-02-07 LAB
ALBUMIN SERPL-MCNC: 3.1 G/DL (ref 3.5–5)
ALBUMIN/GLOB SERPL: 0.7 (ref 1.1–2.2)
ALP SERPL-CCNC: 162 U/L (ref 45–117)
ALT SERPL-CCNC: 18 U/L (ref 12–78)
ANION GAP SERPL CALC-SCNC: 4 MMOL/L (ref 5–15)
AST SERPL-CCNC: 43 U/L (ref 15–37)
BASOPHILS # BLD: 0.1 K/UL (ref 0–0.1)
BASOPHILS NFR BLD: 1 % (ref 0–1)
BILIRUB SERPL-MCNC: 0.7 MG/DL (ref 0.2–1)
BUN SERPL-MCNC: 20 MG/DL (ref 6–20)
BUN/CREAT SERPL: 20 (ref 12–20)
CALCIUM SERPL-MCNC: 9 MG/DL (ref 8.5–10.1)
CHLORIDE SERPL-SCNC: 109 MMOL/L (ref 97–108)
CO2 SERPL-SCNC: 27 MMOL/L (ref 21–32)
CREAT SERPL-MCNC: 1.01 MG/DL (ref 0.7–1.3)
DIFFERENTIAL METHOD BLD: ABNORMAL
EOSINOPHIL # BLD: 0.3 K/UL (ref 0–0.4)
EOSINOPHIL NFR BLD: 5 % (ref 0–7)
ERYTHROCYTE [DISTWIDTH] IN BLOOD BY AUTOMATED COUNT: 15.4 % (ref 11.5–14.5)
GLOBULIN SER CALC-MCNC: 4.3 G/DL (ref 2–4)
GLUCOSE SERPL-MCNC: 102 MG/DL (ref 65–100)
HCT VFR BLD AUTO: 36.2 % (ref 36.6–50.3)
HGB BLD-MCNC: 11.2 G/DL (ref 12.1–17)
IMM GRANULOCYTES # BLD AUTO: 0 K/UL (ref 0–0.04)
IMM GRANULOCYTES NFR BLD AUTO: 0 % (ref 0–0.5)
LYMPHOCYTES # BLD: 1.5 K/UL (ref 0.8–3.5)
LYMPHOCYTES NFR BLD: 23 % (ref 12–49)
MCH RBC QN AUTO: 34.6 PG (ref 26–34)
MCHC RBC AUTO-ENTMCNC: 30.9 G/DL (ref 30–36.5)
MCV RBC AUTO: 111.7 FL (ref 80–99)
MONOCYTES # BLD: 0.6 K/UL (ref 0–1)
MONOCYTES NFR BLD: 10 % (ref 5–13)
NEUTS SEG # BLD: 3.9 K/UL (ref 1.8–8)
NEUTS SEG NFR BLD: 61 % (ref 32–75)
NRBC # BLD: 0 K/UL (ref 0–0.01)
NRBC BLD-RTO: 0 PER 100 WBC
NT PRO BNP: 7555 PG/ML
PLATELET # BLD AUTO: 148 K/UL (ref 150–400)
PMV BLD AUTO: 11.3 FL (ref 8.9–12.9)
POTASSIUM SERPL-SCNC: 5.1 MMOL/L (ref 3.5–5.1)
PROT SERPL-MCNC: 7.4 G/DL (ref 6.4–8.2)
RBC # BLD AUTO: 3.24 M/UL (ref 4.1–5.7)
RBC MORPH BLD: ABNORMAL
RBC MORPH BLD: ABNORMAL
SODIUM SERPL-SCNC: 140 MMOL/L (ref 136–145)
WBC # BLD AUTO: 6.4 K/UL (ref 4.1–11.1)

## 2024-02-07 RX ORDER — BUMETANIDE 1 MG/1
TABLET ORAL
Qty: 90 TABLET | Refills: 5 | Status: SHIPPED | OUTPATIENT
Start: 2024-02-07

## 2024-02-07 NOTE — TELEPHONE ENCOUNTER
Patients daughter, Kylie calling to discuss a medication change for her father.  Patient statest that yesterday Dr Ulloa told the patient to increase his Bumex to BID for 4 weeks and then to go back to once daily.  Per daughter the patient was already taking the Bumex BID so she is wondering if he should increase it to TID for 4 weeks and then cut back to BID.  Please advise what the correct dose and instructions are for the patient.      To call daughter back at [phone].    Sb ZIMMERMAN LPN

## 2024-02-07 NOTE — TELEPHONE ENCOUNTER
Spoke with daughter  not compliant    Chf worsening so 3 mg bumex  daily advised    2  AM and 1 PM

## 2024-05-02 ENCOUNTER — HOSPITAL ENCOUNTER (EMERGENCY)
Facility: HOSPITAL | Age: 89
Discharge: HOME | End: 2024-05-02
Attending: EMERGENCY MEDICINE
Payer: MEDICARE

## 2024-05-02 VITALS
TEMPERATURE: 97.9 F | OXYGEN SATURATION: 95 % | BODY MASS INDEX: 28.14 KG/M2 | HEART RATE: 78 BPM | DIASTOLIC BLOOD PRESSURE: 69 MMHG | SYSTOLIC BLOOD PRESSURE: 120 MMHG | HEIGHT: 69 IN | WEIGHT: 190 LBS | RESPIRATION RATE: 16 BRPM

## 2024-05-02 DIAGNOSIS — L03.119 CELLULITIS OF LOWER EXTREMITY, UNSPECIFIED LATERALITY: Primary | ICD-10-CM

## 2024-05-02 DIAGNOSIS — R60.0 LEG EDEMA: ICD-10-CM

## 2024-05-02 LAB
ALBUMIN SERPL-MCNC: 3.1 G/DL (ref 3.5–5)
ALBUMIN/GLOB SERPL: 0.6 (ref 1.1–2.2)
ALP SERPL-CCNC: 170 U/L (ref 45–117)
ALT SERPL-CCNC: 23 U/L (ref 12–78)
ANION GAP SERPL CALC-SCNC: 0 MMOL/L (ref 5–15)
AST SERPL-CCNC: 35 U/L (ref 15–37)
BASOPHILS # BLD: 0.1 K/UL (ref 0–0.1)
BASOPHILS NFR BLD: 1 % (ref 0–1)
BILIRUB SERPL-MCNC: 0.6 MG/DL (ref 0.2–1)
BUN SERPL-MCNC: 29 MG/DL (ref 6–20)
BUN/CREAT SERPL: 27 (ref 12–20)
CALCIUM SERPL-MCNC: 9.3 MG/DL (ref 8.5–10.1)
CHLORIDE SERPL-SCNC: 103 MMOL/L (ref 97–108)
CO2 SERPL-SCNC: 34 MMOL/L (ref 21–32)
CREAT SERPL-MCNC: 1.08 MG/DL (ref 0.7–1.3)
DIFFERENTIAL METHOD BLD: ABNORMAL
EOSINOPHIL # BLD: 0.9 K/UL (ref 0–0.4)
EOSINOPHIL NFR BLD: 17 % (ref 0–7)
ERYTHROCYTE [DISTWIDTH] IN BLOOD BY AUTOMATED COUNT: 15.1 % (ref 11.5–14.5)
GLOBULIN SER CALC-MCNC: 5 G/DL (ref 2–4)
GLUCOSE SERPL-MCNC: 93 MG/DL (ref 65–100)
HCT VFR BLD AUTO: 36.1 % (ref 36.6–50.3)
HGB BLD-MCNC: 11.7 G/DL (ref 12.1–17)
IMM GRANULOCYTES # BLD AUTO: 0 K/UL (ref 0–0.04)
IMM GRANULOCYTES NFR BLD AUTO: 0 % (ref 0–0.5)
LYMPHOCYTES # BLD: 1.4 K/UL (ref 0.8–3.5)
LYMPHOCYTES NFR BLD: 28 % (ref 12–49)
MCH RBC QN AUTO: 32.8 PG (ref 26–34)
MCHC RBC AUTO-ENTMCNC: 32.4 G/DL (ref 30–36.5)
MCV RBC AUTO: 101.1 FL (ref 80–99)
MONOCYTES # BLD: 0.5 K/UL (ref 0–1)
MONOCYTES NFR BLD: 10 % (ref 5–13)
NEUTS SEG # BLD: 2.2 K/UL (ref 1.8–8)
NEUTS SEG NFR BLD: 44 % (ref 32–75)
NRBC # BLD: 0 K/UL (ref 0–0.01)
NRBC BLD-RTO: 0 PER 100 WBC
NT PRO BNP: 3550 PG/ML
PLATELET # BLD AUTO: 149 K/UL (ref 150–400)
PMV BLD AUTO: 10.3 FL (ref 8.9–12.9)
POTASSIUM SERPL-SCNC: 4.5 MMOL/L (ref 3.5–5.1)
PROT SERPL-MCNC: 8.1 G/DL (ref 6.4–8.2)
RBC # BLD AUTO: 3.57 M/UL (ref 4.1–5.7)
RBC MORPH BLD: ABNORMAL
SODIUM SERPL-SCNC: 137 MMOL/L (ref 136–145)
TROPONIN I SERPL HS-MCNC: 25 NG/L (ref 0–76)
WBC # BLD AUTO: 5.1 K/UL (ref 4.1–11.1)

## 2024-05-02 PROCEDURE — 83880 ASSAY OF NATRIURETIC PEPTIDE: CPT

## 2024-05-02 PROCEDURE — 85025 COMPLETE CBC W/AUTO DIFF WBC: CPT

## 2024-05-02 PROCEDURE — 99283 EMERGENCY DEPT VISIT LOW MDM: CPT

## 2024-05-02 PROCEDURE — 80053 COMPREHEN METABOLIC PANEL: CPT

## 2024-05-02 PROCEDURE — 36415 COLL VENOUS BLD VENIPUNCTURE: CPT

## 2024-05-02 PROCEDURE — 84484 ASSAY OF TROPONIN QUANT: CPT

## 2024-05-02 RX ORDER — CEPHALEXIN 500 MG/1
500 CAPSULE ORAL 4 TIMES DAILY
Qty: 28 CAPSULE | Refills: 0 | Status: SHIPPED | OUTPATIENT
Start: 2024-05-02 | End: 2024-05-09

## 2024-05-02 ASSESSMENT — PAIN SCALES - GENERAL: PAINLEVEL_OUTOF10: 0

## 2024-05-02 ASSESSMENT — PAIN - FUNCTIONAL ASSESSMENT
PAIN_FUNCTIONAL_ASSESSMENT: 0-10
PAIN_FUNCTIONAL_ASSESSMENT: NONE - DENIES PAIN

## 2024-05-02 NOTE — ED TRIAGE NOTES
Patient to ER for complaints of worsening bilateral lower leg extremity edema.     Patient has some leakage from left leg edema.     Denies any chest pain and SOB.     Hx of CHF, chronic edema.     Provider in triage to assess patient.

## 2024-05-02 NOTE — ED PROVIDER NOTES
[facility] EMERGENCY DEPT  EMERGENCY DEPARTMENT ENCOUNTER      Pt Name: Courtney Washington  MRN: [id number]  Birthdate 1935  Date of evaluation: 5/2/2024  Provider: Nicole Canales MD    CHIEF COMPLAINT       Chief Complaint   Patient presents with    Leg Swelling         HISTORY OF PRESENT ILLNESS   (Location/Symptom, Timing/Onset, Context/Setting, Quality, Duration, Modifying Factors, Severity)  Note limiting factors.   Courtney Washington is a 88-year-old male with past medical history significant for COPD, CHF, chronic L DVT who presents to the ED with worsening edema over the last 2 days.  Reports increased erythema and swelling, and blisters on the legs.  Of note, patient has a history of LLE DVT and chronic edema L>R , and has had multiple episodes of cellulitis of the lower extremities in the past.  Patient takes Bumex twice a day, however daughter reports patient at times misses doses to avoid going to the bathroom.  Patient is on Eliquis and has been taking it consistently per family.  Patient is on 2 L oxygen at night at baseline, and denies any change in shortness of breath, sputum, or cough.  Patient denies fever, chills, chest pain.              Review of External Medical Records:     Nursing Notes were reviewed.    REVIEW OF SYSTEMS    (2-9 systems for level 4, 10 or more for level 5)     Review of Systems    Except as noted above the remainder of the review of systems was reviewed and negative.       PAST MEDICAL HISTORY     Past Medical History:   Diagnosis Date    Asthma     CAD (coronary artery disease)     CHF (congestive heart failure), NYHA class I, chronic, systolic (HCC)     Chronic anticoagulation     Chronic obstructive pulmonary disease (HCC)     Hx of deep venous thrombosis     Hypercholesterolemia     Hypertension     Paroxysmal A-fib (HCC)     Prostate cancer (HCC)     Umbilical hernia 07/11/2014    Venous insufficiency (chronic) (peripheral)          SURGICAL HISTORY       Past

## 2024-05-07 ENCOUNTER — OFFICE VISIT (OUTPATIENT)
Age: 89
End: 2024-05-07
Payer: MEDICARE

## 2024-05-07 VITALS
RESPIRATION RATE: 14 BRPM | WEIGHT: 177.8 LBS | TEMPERATURE: 97.6 F | SYSTOLIC BLOOD PRESSURE: 129 MMHG | DIASTOLIC BLOOD PRESSURE: 73 MMHG | HEART RATE: 93 BPM | HEIGHT: 69 IN | BODY MASS INDEX: 26.33 KG/M2 | OXYGEN SATURATION: 90 %

## 2024-05-07 DIAGNOSIS — F02.80 DEMENTIA DUE TO ANOTHER GENERAL MEDICAL CONDITION (HCC): ICD-10-CM

## 2024-05-07 DIAGNOSIS — Z71.89 ACP (ADVANCE CARE PLANNING): ICD-10-CM

## 2024-05-07 DIAGNOSIS — M81.0 AGE-RELATED OSTEOPOROSIS WITHOUT CURRENT PATHOLOGICAL FRACTURE: Primary | ICD-10-CM

## 2024-05-07 DIAGNOSIS — J41.8 MIXED SIMPLE AND MUCOPURULENT CHRONIC BRONCHITIS (HCC): ICD-10-CM

## 2024-05-07 DIAGNOSIS — S81.809D OPEN WOUND OF LOWER EXTREMITY, UNSPECIFIED LATERALITY, SUBSEQUENT ENCOUNTER: ICD-10-CM

## 2024-05-07 DIAGNOSIS — I87.2 VENOUS INSUFFICIENCY OF BOTH LOWER EXTREMITIES: ICD-10-CM

## 2024-05-07 PROCEDURE — 3023F SPIROM DOC REV: CPT | Performed by: INTERNAL MEDICINE

## 2024-05-07 PROCEDURE — 99214 OFFICE O/P EST MOD 30 MIN: CPT | Performed by: INTERNAL MEDICINE

## 2024-05-07 PROCEDURE — 1123F ACP DISCUSS/DSCN MKR DOCD: CPT | Performed by: INTERNAL MEDICINE

## 2024-05-07 PROCEDURE — 1036F TOBACCO NON-USER: CPT | Performed by: INTERNAL MEDICINE

## 2024-05-07 PROCEDURE — G8419 CALC BMI OUT NRM PARAM NOF/U: HCPCS | Performed by: INTERNAL MEDICINE

## 2024-05-07 PROCEDURE — G8428 CUR MEDS NOT DOCUMENT: HCPCS | Performed by: INTERNAL MEDICINE

## 2024-05-07 RX ORDER — ZOLEDRONIC ACID 5 MG/100ML
5 INJECTION, SOLUTION INTRAVENOUS ONCE
Qty: 100 ML | Refills: 0 | Status: SHIPPED | OUTPATIENT
Start: 2024-05-07 | End: 2024-05-07

## 2024-05-07 NOTE — PATIENT INSTRUCTIONS

## 2024-05-07 NOTE — PROGRESS NOTES
Advance Care Planning   Discussed the patient’s choices for care and treatment preferences in case of a health event that adversely affects decision-making abilities or is life-limiting. Recommended the patient document care preferences in state-specific advance directives. Also reviewed the process of designating a trusted capable adult as an Agent (or Health Care Power of ) to make health care decisions for the patient if the patient becomes unable due to incapacity. Patient was asked to complete advance directive forms, if not already done, and to provide a dated, signed and witnessed (or notarized) copy, per the forms' requirements, to the practice office.    Time spent (minutes): <16 minutes (Non-Billable)   Has new leg wound related to edema and  new blister  He was seen in urgent care recently and was found to have a new blister on his left finger on his leg related to edema and fluid overload he is not compliant with his Bumex his dose of Bumex at 1 mg daily take 1 mg twice daily when his edema is worse he was not taking any the family is his making him take 1 a day unfortunately he oftentimes hides the pill and does not take it takes his other medications he has been getting more more dementia he has short-term memory loss with confusion disorientation and inability to care for himself.  He has a caretaker from the family who helps him with showers helps him change his close he can do some other basic things on his own he cannot take care of his own medications any longer his dementia has gotten    Not possible the family had to take care of him but he is not always cooperative sometimes gets agitated and mean    His BP was 129/73 his pulse was irregular chest was clear his legs were wrapped I saw wounds of his lower leg with a an area where a blister had opened and drained and now he just had a large area with the top layer of epidermis was gone and just red skin this was what was seen in the

## 2024-05-16 NOTE — TELEPHONE ENCOUNTER
Refill request received from Noemi for   Requested Prescriptions     Pending Prescriptions Disp Refills    atorvastatin (LIPITOR) 80 MG tablet [Pharmacy Med Name: ATORVASTATIN 80MG TABLETS] 90 tablet      Sig: TAKE 1 TABLET BY MOUTH EVERY NIGHT     Last appointment: 5/7/2024   Next appointment: 11/12/2024     Routed to Dr Herbert Ulloa for review.

## 2024-05-17 RX ORDER — ATORVASTATIN CALCIUM 80 MG/1
TABLET, FILM COATED ORAL
Qty: 90 TABLET | Refills: 3 | Status: SHIPPED | OUTPATIENT
Start: 2024-05-17

## 2024-06-19 RX ORDER — COLESEVELAM 180 1/1
TABLET ORAL
Qty: 540 TABLET | Refills: 3 | Status: SHIPPED | OUTPATIENT
Start: 2024-06-19

## 2024-07-23 PROCEDURE — 93294 REM INTERROG EVL PM/LDLS PM: CPT | Performed by: INTERNAL MEDICINE

## 2024-09-11 ENCOUNTER — TELEPHONE (OUTPATIENT)
Age: 89
End: 2024-09-11

## 2024-09-11 ENCOUNTER — CLINICAL DOCUMENTATION (OUTPATIENT)
Age: 89
End: 2024-09-11

## 2024-09-11 DIAGNOSIS — I25.10 CORONARY ARTERY DISEASE INVOLVING NATIVE CORONARY ARTERY OF NATIVE HEART WITHOUT ANGINA PECTORIS: ICD-10-CM

## 2024-09-11 DIAGNOSIS — I42.0 DILATED CARDIOMYOPATHY (HCC): Primary | ICD-10-CM

## 2024-09-11 DIAGNOSIS — I50.22 CHRONIC SYSTOLIC CHF (CONGESTIVE HEART FAILURE) (HCC): ICD-10-CM

## 2024-09-12 DIAGNOSIS — I42.0 DILATED CARDIOMYOPATHY (HCC): ICD-10-CM

## 2024-09-12 DIAGNOSIS — I50.22 CHRONIC SYSTOLIC CHF (CONGESTIVE HEART FAILURE) (HCC): ICD-10-CM

## 2024-09-12 DIAGNOSIS — I25.10 CORONARY ARTERY DISEASE INVOLVING NATIVE CORONARY ARTERY OF NATIVE HEART WITHOUT ANGINA PECTORIS: ICD-10-CM

## 2024-09-13 ENCOUNTER — TELEPHONE (OUTPATIENT)
Age: 89
End: 2024-09-13

## 2024-09-13 LAB
ANION GAP SERPL CALC-SCNC: 0 MMOL/L (ref 2–12)
BUN SERPL-MCNC: 34 MG/DL (ref 6–20)
BUN/CREAT SERPL: 32 (ref 12–20)
CALCIUM SERPL-MCNC: 9.4 MG/DL (ref 8.5–10.1)
CHLORIDE SERPL-SCNC: 105 MMOL/L (ref 97–108)
CO2 SERPL-SCNC: 34 MMOL/L (ref 21–32)
COMMENT:: NORMAL
CREAT SERPL-MCNC: 1.06 MG/DL (ref 0.7–1.3)
GLUCOSE SERPL-MCNC: 105 MG/DL (ref 65–100)
MAGNESIUM SERPL-MCNC: 2 MG/DL (ref 1.6–2.4)
POTASSIUM SERPL-SCNC: 4.5 MMOL/L (ref 3.5–5.1)
SODIUM SERPL-SCNC: 139 MMOL/L (ref 136–145)
SPECIMEN HOLD: NORMAL

## 2024-09-25 ENCOUNTER — ANCILLARY PROCEDURE (OUTPATIENT)
Age: 89
End: 2024-09-25
Payer: MEDICARE

## 2024-09-25 VITALS
DIASTOLIC BLOOD PRESSURE: 60 MMHG | WEIGHT: 177 LBS | HEIGHT: 69 IN | HEART RATE: 76 BPM | SYSTOLIC BLOOD PRESSURE: 102 MMHG | BODY MASS INDEX: 26.22 KG/M2

## 2024-09-25 DIAGNOSIS — I10 HTN (HYPERTENSION): ICD-10-CM

## 2024-09-25 DIAGNOSIS — E78.5 HLD (HYPERLIPIDEMIA): ICD-10-CM

## 2024-09-25 DIAGNOSIS — I25.10 CAD (CORONARY ARTERY DISEASE): ICD-10-CM

## 2024-09-25 DIAGNOSIS — I47.29 NSVT (NONSUSTAINED VENTRICULAR TACHYCARDIA) (HCC): ICD-10-CM

## 2024-09-25 PROCEDURE — A9500 TC99M SESTAMIBI: HCPCS | Performed by: INTERNAL MEDICINE

## 2024-09-25 PROCEDURE — 78452 HT MUSCLE IMAGE SPECT MULT: CPT | Performed by: INTERNAL MEDICINE

## 2024-09-25 RX ORDER — REGADENOSON 0.08 MG/ML
0.4 INJECTION, SOLUTION INTRAVENOUS
Status: COMPLETED | OUTPATIENT
Start: 2024-09-25 | End: 2024-09-25

## 2024-09-25 RX ORDER — TETRAKIS(2-METHOXYISOBUTYLISOCYANIDE)COPPER(I) TETRAFLUOROBORATE 1 MG/ML
8.2 INJECTION, POWDER, LYOPHILIZED, FOR SOLUTION INTRAVENOUS
Status: COMPLETED | OUTPATIENT
Start: 2024-09-25 | End: 2024-09-25

## 2024-09-25 RX ORDER — TETRAKIS(2-METHOXYISOBUTYLISOCYANIDE)COPPER(I) TETRAFLUOROBORATE 1 MG/ML
23.9 INJECTION, POWDER, LYOPHILIZED, FOR SOLUTION INTRAVENOUS
Status: COMPLETED | OUTPATIENT
Start: 2024-09-25 | End: 2024-09-25

## 2024-09-25 RX ORDER — AMINOPHYLLINE 25 MG/ML
100 INJECTION, SOLUTION INTRAVENOUS
Status: COMPLETED | OUTPATIENT
Start: 2024-09-25 | End: 2024-09-25

## 2024-09-25 RX ADMIN — TECHNETIUM TC-99M SESTAMIBI 8.2 MILLICURIE: 1 INJECTION INTRAVENOUS at 12:50

## 2024-09-25 RX ADMIN — TECHNETIUM TC-99M SESTAMIBI 23.9 MILLICURIE: 1 INJECTION INTRAVENOUS at 14:00

## 2024-09-25 RX ADMIN — REGADENOSON 0.4 MG: 0.08 INJECTION, SOLUTION INTRAVENOUS at 14:00

## 2024-09-25 RX ADMIN — AMINOPHYLLINE 100 MG: 25 INJECTION, SOLUTION INTRAVENOUS at 14:05

## 2024-09-28 LAB
ECHO BSA: 1.98 M2
NUC STRESS EJECTION FRACTION: 41 %
STRESS BASELINE DIAS BP: 60 MMHG
STRESS BASELINE HR: 76 BPM
STRESS BASELINE SYS BP: 102 MMHG
STRESS ESTIMATED WORKLOAD: 1 METS
STRESS O2 SAT PEAK: 95 %
STRESS O2 SAT REST: 96 %
STRESS PEAK DIAS BP: 50 MMHG
STRESS PEAK SYS BP: 100 MMHG
STRESS PERCENT HR ACHIEVED: 57 %
STRESS POST PEAK HR: 75 BPM
STRESS RATE PRESSURE PRODUCT: 7500 BPM*MMHG
STRESS TARGET HR: 131 BPM
TID: 1.28

## 2024-09-28 PROCEDURE — PBSHW PBB SHADOW CHARGE: Performed by: INTERNAL MEDICINE

## 2024-09-28 PROCEDURE — 93016 CV STRESS TEST SUPVJ ONLY: CPT | Performed by: INTERNAL MEDICINE

## 2024-09-28 PROCEDURE — 93018 CV STRESS TEST I&R ONLY: CPT | Performed by: INTERNAL MEDICINE

## 2024-09-28 PROCEDURE — 78452 HT MUSCLE IMAGE SPECT MULT: CPT | Performed by: INTERNAL MEDICINE

## 2024-10-01 ENCOUNTER — TELEPHONE (OUTPATIENT)
Age: 89
End: 2024-10-01

## 2024-10-01 NOTE — TELEPHONE ENCOUNTER
Patient's daughter calling to inquire about eliquis medication she states that it needs a PA through insurance. I called [facility] pharmacy and radha Byrd, per Rochelle it does not require a PA but it is going through insurance and processing with a $550 copay. Called daughter back and let her know they need to contact  in regards to out of pocket cost.

## 2024-10-03 ENCOUNTER — TELEPHONE (OUTPATIENT)
Age: 89
End: 2024-10-03

## 2024-10-03 NOTE — TELEPHONE ENCOUNTER
Vinay Snider MD Agee, Lauren G, RN  Cc: Camilo Munoz, IZAIAH; Joan Young APRN - NP; Alicia Byers LPN  Basal inferior/inferoseptal Ischemia present- small but RV uptake of isotope indicated more than 1 vessel RCA disease  He had CABG in 1995  LVEF 41% with biventricular pacing  The study was done for NSVTs up to 11 seconds on BIV pacer alert  Bp is usually low, no further beta blocker can be given  At age 89, cardiac cath can be done if he wants to pursue

## 2024-10-08 ENCOUNTER — TELEPHONE (OUTPATIENT)
Age: 89
End: 2024-10-08

## 2024-10-08 NOTE — TELEPHONE ENCOUNTER
Patients daughter returned call to nurse, called nurse she will follow up with her    P#: [phone]

## 2024-10-08 NOTE — TELEPHONE ENCOUNTER
Hospitalist Progress Note    Patient:  Tawny Rico      Unit/Bed:7K-03/003-A    YOB: 1954    MRN: [id number]       Acct: [id number]     PCP: Gregg Horne MD    Date of Admission: 8/6/2024    Assessment/Plan:    Nonhealing wound of the lumbar spine (POA) S/P sharp incisional debridement and excision of wound edge of lumbar spine wound with closure of wound following debridement, all suprafascial--Per orthopedics; Ancef from 8/7  Diabetes mellitus type 2, uncontrolled--hemoglobin A1c noted to be 7.2 on May 3, 2024; on low-dose sliding scale; at home takes Glucophage 1000 mg twice daily and Januvia 100 mg daily  CAROL--baseline creatinine around 1.9, improved to 1.3, monitor  Hypercalcemia--resulted to normal  Normocytic anemia--monitor, transfuse if hemoglobin less than 7.0 or hemodynamically unstable  Primary hypertension, uncontrolled--Tenormin on hold, monitor  Asymptomatic bacteriuria  Status post removal of hardware L4-L5, L5 and S1, posterior lateral interbody fusion, L2-L4 laminectomy and posterior spine fusion with local bone graft, demineralized bone matrix and instrumentation completed on May 1, 2024  Obesity class II with BMI 36.44      Expected discharge date: Pending clinical course    Disposition:    [] Home       [] TCU       [] Rehab       [] Psych       [] SNF       [] Long Term Care Facility       [] Other-    Chief Complaint: Lumbar spine wound    Hospital Course: Per consult note done 8/6/2024: \"This is a 70-year-old female with a past medical history of essential hypertension, recurrent UTI, and type 2 diabetes mellitus who presented to [facility] for a planned procedure secondary to a known nonhealing lumbar spinal wound.  Patient was seen and evaluated in the wound care center on 08/05/2024.  Patient is notably status post removal of hardware L4-L5, L5-S1 posterior lateral interbody fusion, L2-L4 laminectomy and posterior spinal fusion with local bone graft,  See other encounter.   MD Royce on 08/06/2024 10:26 PM      Code Status: Full Code    Tele:   [] yes             [x] no    LDA: []CVC / []PICC / []Midline / []Disla / []Drains / []Mediport / [x]None  Antibiotics: None  Steroids: None  Labs (still needed?): []Yes / [x]No  IVF (still needed?): []Yes / [x]No    Level of care: []Step Down / [x]Med-Surg  Bed Status: []Inpatient / [x]Observation  PT/OT: []Yes / [x]No    DVT Prophylaxis: [] Lovenox / [] Heparin / [x] SCDs / [] Already on Systemic Anticoagulation / [] None       Active Hospital Problems    Diagnosis Date Noted    Postoperative wound dehiscence, initial encounter [T81.31XA] 08/06/2024    Open wound of lumbar region [S31.000A] 08/05/2024       Electronically signed by WILLIS Mares - CNP on 8/7/2024 at 6:41 AM

## 2024-10-08 NOTE — TELEPHONE ENCOUNTER
Verified patient with two types of identifiers.   Spoke with Kylie and discussed NST results and Dr Snider recommendation. She would like to discuss with family and patient and decide if they would like to proceed. They will call back if they decide to move forward.  She verbalized understanding and will call with any other questions.

## 2024-10-29 ENCOUNTER — APPOINTMENT (OUTPATIENT)
Facility: HOSPITAL | Age: 89
End: 2024-10-29
Payer: MEDICARE

## 2024-10-29 ENCOUNTER — HOSPITAL ENCOUNTER (EMERGENCY)
Facility: HOSPITAL | Age: 89
Discharge: HOME | End: 2024-10-29
Attending: EMERGENCY MEDICINE
Payer: MEDICARE

## 2024-10-29 VITALS
BODY MASS INDEX: 26.14 KG/M2 | HEART RATE: 77 BPM | SYSTOLIC BLOOD PRESSURE: 117 MMHG | OXYGEN SATURATION: 90 % | TEMPERATURE: 97.5 F | RESPIRATION RATE: 16 BRPM | DIASTOLIC BLOOD PRESSURE: 71 MMHG | HEIGHT: 69 IN

## 2024-10-29 DIAGNOSIS — R53.83 OTHER FATIGUE: Primary | ICD-10-CM

## 2024-10-29 DIAGNOSIS — R53.1 GENERALIZED WEAKNESS: ICD-10-CM

## 2024-10-29 LAB
ALBUMIN SERPL-MCNC: 2.9 G/DL (ref 3.5–5)
ALBUMIN/GLOB SERPL: 0.5 (ref 1.1–2.2)
ALP SERPL-CCNC: 151 U/L (ref 45–117)
ALT SERPL-CCNC: 21 U/L (ref 12–78)
ANION GAP SERPL CALC-SCNC: ABNORMAL MMOL/L (ref 2–12)
AST SERPL-CCNC: 37 U/L (ref 15–37)
BASOPHILS # BLD: 0 K/UL (ref 0–0.1)
BASOPHILS NFR BLD: 1 % (ref 0–1)
BILIRUB SERPL-MCNC: 0.5 MG/DL (ref 0.2–1)
BUN SERPL-MCNC: 34 MG/DL (ref 6–20)
BUN/CREAT SERPL: 32 (ref 12–20)
CALCIUM SERPL-MCNC: 9.1 MG/DL (ref 8.5–10.1)
CHLORIDE SERPL-SCNC: 104 MMOL/L (ref 97–108)
CO2 SERPL-SCNC: 39 MMOL/L (ref 21–32)
COMMENT:: NORMAL
CREAT SERPL-MCNC: 1.05 MG/DL (ref 0.7–1.3)
DIFFERENTIAL METHOD BLD: ABNORMAL
EOSINOPHIL # BLD: 0.8 K/UL (ref 0–0.4)
EOSINOPHIL NFR BLD: 13 % (ref 0–7)
ERYTHROCYTE [DISTWIDTH] IN BLOOD BY AUTOMATED COUNT: 13.6 % (ref 11.5–14.5)
FLUAV RNA SPEC QL NAA+PROBE: NOT DETECTED
FLUBV RNA SPEC QL NAA+PROBE: NOT DETECTED
GLOBULIN SER CALC-MCNC: 5.6 G/DL (ref 2–4)
GLUCOSE SERPL-MCNC: 108 MG/DL (ref 65–100)
HCT VFR BLD AUTO: 37.9 % (ref 36.6–50.3)
HGB BLD-MCNC: 11.7 G/DL (ref 12.1–17)
IMM GRANULOCYTES # BLD AUTO: 0 K/UL (ref 0–0.04)
IMM GRANULOCYTES NFR BLD AUTO: 0 % (ref 0–0.5)
LYMPHOCYTES # BLD: 1.9 K/UL (ref 0.8–3.5)
LYMPHOCYTES NFR BLD: 30 % (ref 12–49)
MCH RBC QN AUTO: 32.3 PG (ref 26–34)
MCHC RBC AUTO-ENTMCNC: 30.9 G/DL (ref 30–36.5)
MCV RBC AUTO: 104.7 FL (ref 80–99)
MONOCYTES # BLD: 0.6 K/UL (ref 0–1)
MONOCYTES NFR BLD: 10 % (ref 5–13)
NEUTS SEG # BLD: 2.9 K/UL (ref 1.8–8)
NEUTS SEG NFR BLD: 46 % (ref 32–75)
NRBC # BLD: 0 K/UL (ref 0–0.01)
NRBC BLD-RTO: 0 PER 100 WBC
PLATELET # BLD AUTO: 178 K/UL (ref 150–400)
PMV BLD AUTO: 10 FL (ref 8.9–12.9)
POTASSIUM SERPL-SCNC: 3.8 MMOL/L (ref 3.5–5.1)
PROT SERPL-MCNC: 8.5 G/DL (ref 6.4–8.2)
RBC # BLD AUTO: 3.62 M/UL (ref 4.1–5.7)
SARS-COV-2 RNA RESP QL NAA+PROBE: NOT DETECTED
SODIUM SERPL-SCNC: 142 MMOL/L (ref 136–145)
SOURCE: NORMAL
SPECIMEN HOLD: NORMAL
WBC # BLD AUTO: 6.3 K/UL (ref 4.1–11.1)

## 2024-10-29 PROCEDURE — 93005 ELECTROCARDIOGRAM TRACING: CPT | Performed by: EMERGENCY MEDICINE

## 2024-10-29 PROCEDURE — 80053 COMPREHEN METABOLIC PANEL: CPT

## 2024-10-29 PROCEDURE — 6370000000 HC RX 637 (ALT 250 FOR IP): Performed by: EMERGENCY MEDICINE

## 2024-10-29 PROCEDURE — 87636 SARSCOV2 & INF A&B AMP PRB: CPT

## 2024-10-29 PROCEDURE — 85025 COMPLETE CBC W/AUTO DIFF WBC: CPT

## 2024-10-29 PROCEDURE — 94640 AIRWAY INHALATION TREATMENT: CPT

## 2024-10-29 PROCEDURE — 99285 EMERGENCY DEPT VISIT HI MDM: CPT

## 2024-10-29 PROCEDURE — 71045 X-RAY EXAM CHEST 1 VIEW: CPT

## 2024-10-29 RX ORDER — IPRATROPIUM BROMIDE AND ALBUTEROL SULFATE 2.5; .5 MG/3ML; MG/3ML
1 SOLUTION RESPIRATORY (INHALATION)
Status: COMPLETED | OUTPATIENT
Start: 2024-10-29 | End: 2024-10-29

## 2024-10-29 RX ADMIN — IPRATROPIUM BROMIDE AND ALBUTEROL SULFATE 1 DOSE: 2.5; .5 SOLUTION RESPIRATORY (INHALATION) at 13:13

## 2024-10-29 ASSESSMENT — ENCOUNTER SYMPTOMS
RHINORRHEA: 0
ABDOMINAL PAIN: 0
DIARRHEA: 0
NAUSEA: 0
SORE THROAT: 0
BACK PAIN: 0
COUGH: 0
SHORTNESS OF BREATH: 0
COLOR CHANGE: 0
EYE PAIN: 0
VOMITING: 0

## 2024-10-29 ASSESSMENT — PAIN SCALES - GENERAL: PAINLEVEL_OUTOF10: 0

## 2024-10-29 ASSESSMENT — PAIN - FUNCTIONAL ASSESSMENT: PAIN_FUNCTIONAL_ASSESSMENT: 0-10

## 2024-10-29 NOTE — ED TRIAGE NOTES
Family brings in patient for some concerns of patient sleeping longer and more often. They have noticed that he has had no appetite or interest in eating.     Family states that he wears 2L of oxygen at baseline during the night for COPD and states that they have had to increase the oxygen to 3L intermittently.     Pt states that he \"feels fine\" but does struggle with memory at times.

## 2024-10-29 NOTE — ED PROVIDER NOTES
components within normal limits   COMPREHENSIVE METABOLIC PANEL - Abnormal; Notable for the following components:    CO2 39 (*)     Glucose 108 (*)     BUN 34 (*)     BUN/Creatinine Ratio 32 (*)     Alk Phosphatase 151 (*)     Total Protein 8.5 (*)     Albumin 2.9 (*)     Globulin 5.6 (*)     Albumin/Globulin Ratio 0.5 (*)     All other components within normal limits   COVID-19 & INFLUENZA COMBO   EXTRA TUBES HOLD       All other labs were within normal range or not returned as of this dictation.    EMERGENCY DEPARTMENT COURSE and DIFFERENTIAL DIAGNOSIS/MDM:   Vitals:    Vitals:    10/29/24 1157   BP: 117/71   Pulse: 77   Resp: 16   Temp: 97.5 °F (36.4 °C)   TempSrc: Oral   SpO2: 90%   Height: 1.753 m (5' 9\")           Medical Decision Making  Generalized weakness and fatigue with no finding today on evaluation.  Patient is stable and suitable for discharge.    Amount and/or Complexity of Data Reviewed  Labs: ordered.  Radiology: ordered.  ECG/medicine tests: ordered.    Risk  Prescription drug management.            REASSESSMENT     ED Course as of 10/29/24 1706   Tue Oct 29, 2024   1401 EKG at 1142 read 1150 shows pacemaker rhythm at 81 bpm with good capture. [VM]   1657 Workup today is negative and patient has difficulty giving a urine sample and does not want a straight cath.  He is therefore [VM]      ED Course User Index  [VM] Mert Mejias MD           CONSULTS:  None    PROCEDURES:  Unless otherwise noted below, none     Procedures      FINAL IMPRESSION      1. Other fatigue    2. Generalized weakness          DISPOSITION/PLAN   DISPOSITION Decision To Discharge 10/29/2024 02:56:12 PM      PATIENT REFERRED TO:  Herbert Ulloa MD  [address] [address]  [phone]          [facility] EMERGENCY DEPT  [address] [address]  [phone]    As needed, If symptoms worsen      DISCHARGE MEDICATIONS:  New Prescriptions    No medications on file

## 2024-10-30 LAB
EKG ATRIAL RATE: 81 BPM
EKG DIAGNOSIS: NORMAL
EKG P AXIS: 88 DEGREES
EKG P-R INTERVAL: 288 MS
EKG Q-T INTERVAL: 428 MS
EKG QRS DURATION: 136 MS
EKG QTC CALCULATION (BAZETT): 497 MS
EKG R AXIS: -69 DEGREES
EKG T AXIS: 110 DEGREES
EKG VENTRICULAR RATE: 81 BPM

## 2024-10-31 ENCOUNTER — PROCEDURE VISIT (OUTPATIENT)
Age: 89
End: 2024-10-31

## 2024-10-31 ENCOUNTER — OFFICE VISIT (OUTPATIENT)
Age: 89
End: 2024-10-31
Payer: MEDICARE

## 2024-10-31 VITALS
HEIGHT: 69 IN | OXYGEN SATURATION: 90 % | HEART RATE: 94 BPM | SYSTOLIC BLOOD PRESSURE: 110 MMHG | RESPIRATION RATE: 16 BRPM | DIASTOLIC BLOOD PRESSURE: 60 MMHG | WEIGHT: 126 LBS | BODY MASS INDEX: 18.66 KG/M2

## 2024-10-31 DIAGNOSIS — Z95.1 HX OF CABG: ICD-10-CM

## 2024-10-31 DIAGNOSIS — Z95.0 BIVENTRICULAR CARDIAC PACEMAKER IN SITU: Primary | ICD-10-CM

## 2024-10-31 DIAGNOSIS — I25.10 CORONARY ARTERY DISEASE INVOLVING NATIVE CORONARY ARTERY OF NATIVE HEART WITHOUT ANGINA PECTORIS: ICD-10-CM

## 2024-10-31 DIAGNOSIS — I47.29 NSVT (NONSUSTAINED VENTRICULAR TACHYCARDIA) (HCC): ICD-10-CM

## 2024-10-31 DIAGNOSIS — I87.8 VENOUS STASIS: ICD-10-CM

## 2024-10-31 DIAGNOSIS — I50.22 CHRONIC SYSTOLIC CHF (CONGESTIVE HEART FAILURE) (HCC): ICD-10-CM

## 2024-10-31 PROCEDURE — 99214 OFFICE O/P EST MOD 30 MIN: CPT | Performed by: INTERNAL MEDICINE

## 2024-10-31 NOTE — PROGRESS NOTES
T12 vertebroplasty cement is again demonstrated. Severe vertebral   compression at T6 and mild vertebral compression at T12 is again shown. Moderate   loss of height at the superior endplate region of T7 is again demonstrated.   There is interval mild loss of vertebral body height at T5 and T11. The other   thoracic heights are normal. Moderate sized left pleural effusion is again   demonstrated. Patient status post median sternotomy. Moderate cardiac contour   enlargement is again noted. Incidental gallstones are again noted.      Impression   Interval mild T5 and T11 vertebral compression fractures. Unchanged moderate T7   vertebral compression fracture. T6 and T12 vertebroplasty cement again   demonstrated.         Current Outpatient Medications   Medication Sig    apixaban (ELIQUIS) 2.5 MG TABS tablet Take 1 tablet by mouth 2 times daily    colesevelam (WELCHOL) 625 MG tablet TAKE 3 TABLETS BY MOUTH TWICE DAILY WITH MEALS    atorvastatin (LIPITOR) 80 MG tablet TAKE 1 TABLET BY MOUTH EVERY NIGHT    bumetanide (BUMEX) 1 MG tablet 2 mg po QAM and 1 mg po QPM    metoprolol succinate (TOPROL XL) 25 MG extended release tablet TAKE 1 TABLET BY MOUTH DAILY    acetaminophen (TYLENOL) 500 MG tablet Take 1 tablet by mouth every 6 hours as needed    fluticasone-umeclidin-vilant (TRELEGY ELLIPTA) 100-62.5-25 MCG/ACT AEPB inhaler Inhale 1 puff into the lungs daily    ipratropium-albuterol (DUONEB) 0.5-2.5 (3) MG/3ML SOLN nebulizer solution 3 mLs    zoledronic acid (RECLAST) 5 MG/100ML SOLN Infuse 100 mLs intravenously once for 1 dose (Patient not taking: Reported on 10/31/2024)    guaiFENesin (MUCINEX) 600 MG extended release tablet Take 1 tablet by mouth 2 times daily (Patient not taking: Reported on 10/31/2024)    potassium chloride (KLOR-CON M) 20 MEQ extended release tablet TAKE 1 TABLET BY MOUTH DAILY AS NEEDED FOR SWELLING (Patient not taking: Reported on 10/31/2024)     No current facility-administered medications for

## 2024-11-07 NOTE — PROGRESS NOTES
Problem: Wound:  Goal: Will show signs of wound healing; wound closure and no evidence of infection  Description: Will show signs of wound healing; wound closure and no evidence of infection  Outcome: Progressing     Problem: Falls - Risk of:  Goal: Will remain free from falls  Description: Will remain free from falls  Outcome: Progressing      Cardiac Electrophysiology Wound Check Note      Wound Check   Patient is here for wound check s/p pocket revision. No fever, drainage   Physical Exam   Constitutional: well-developed and well-nourished. Skin: Left side pocket is healing without redness, drainage, hematoma. The wound is intact. Dressing removed. ASSESSMENT and PLAN   The incision is healing without redness, drainage, hematoma.    Current treatment plan is effective, no change in therapy   Device check shows proper lead and generator functions    Follow-up Disposition:  Return 3 months I will check via device clinic or remote monitoring in the future

## 2024-11-11 RX ORDER — METOPROLOL SUCCINATE 25 MG/1
TABLET, EXTENDED RELEASE ORAL
Qty: 90 TABLET | Refills: 0 | Status: SHIPPED | OUTPATIENT
Start: 2024-11-11

## 2024-11-12 ENCOUNTER — OFFICE VISIT (OUTPATIENT)
Age: 89
End: 2024-11-12
Payer: MEDICARE

## 2024-11-12 VITALS
BODY MASS INDEX: 18.22 KG/M2 | SYSTOLIC BLOOD PRESSURE: 106 MMHG | RESPIRATION RATE: 21 BRPM | TEMPERATURE: 97.4 F | WEIGHT: 123 LBS | HEIGHT: 69 IN | HEART RATE: 79 BPM | OXYGEN SATURATION: 88 % | DIASTOLIC BLOOD PRESSURE: 64 MMHG

## 2024-11-12 DIAGNOSIS — J90 PLEURAL EFFUSION ON LEFT: ICD-10-CM

## 2024-11-12 DIAGNOSIS — F02.80 DEMENTIA DUE TO ANOTHER GENERAL MEDICAL CONDITION (HCC): Primary | ICD-10-CM

## 2024-11-12 DIAGNOSIS — Z95.0 BIVENTRICULAR CARDIAC PACEMAKER IN SITU: ICD-10-CM

## 2024-11-12 DIAGNOSIS — I50.42 CHRONIC COMBINED SYSTOLIC AND DIASTOLIC CHF, NYHA CLASS 4 (HCC): ICD-10-CM

## 2024-11-12 DIAGNOSIS — Z23 NEEDS FLU SHOT: ICD-10-CM

## 2024-11-12 DIAGNOSIS — R63.4 ABNORMAL WEIGHT LOSS: ICD-10-CM

## 2024-11-12 DIAGNOSIS — H10.30 ACUTE BACTERIAL CONJUNCTIVITIS, UNSPECIFIED LATERALITY: ICD-10-CM

## 2024-11-12 DIAGNOSIS — J44.9 COPD (CHRONIC OBSTRUCTIVE PULMONARY DISEASE) CASE MANAGEMENT PATIENT (HCC): ICD-10-CM

## 2024-11-12 PROCEDURE — 90653 IIV ADJUVANT VACCINE IM: CPT | Performed by: INTERNAL MEDICINE

## 2024-11-12 PROCEDURE — 99215 OFFICE O/P EST HI 40 MIN: CPT | Performed by: INTERNAL MEDICINE

## 2024-11-12 RX ORDER — OFLOXACIN 3 MG/ML
1 SOLUTION/ DROPS OPHTHALMIC 4 TIMES DAILY
Qty: 1 EACH | Refills: 1 | Status: SHIPPED | OUTPATIENT
Start: 2024-11-12 | End: 2024-11-22

## 2024-11-12 RX ORDER — PREDNISONE 20 MG/1
20 TABLET ORAL 2 TIMES DAILY
Qty: 10 TABLET | Refills: 0 | Status: SHIPPED | OUTPATIENT
Start: 2024-11-12 | End: 2024-11-17

## 2024-11-12 NOTE — PROGRESS NOTES
I have reviewed all needed documentation in preparation for visit. Verified patient by name and date of birth  Chief Complaint   Patient presents with    6 Month Follow-Up       There were no vitals filed for this visit.    Health Maintenance Due   Topic Date Due    DTaP/Tdap/Td vaccine (1 - Tdap) Never done    Shingles vaccine (1 of 2) Never done    Respiratory Syncytial Virus (RSV) Pregnant or age 60 yrs+ (1 - 1-dose 60+ series) Never done    Pneumococcal 65+ years Vaccine (2 of 2 - PCV) 01/18/2014    Lipids  11/01/2023    Prostate Specific Antigen (PSA) Screening or Monitoring  04/24/2024    Flu vaccine (1) 08/01/2024    COVID-19 Vaccine (3 - 2023-24 season) 09/01/2024     \"Have you been to the ER, urgent care clinic since your last visit?  Hospitalized since your last visit?\"    NO    “Have you seen or consulted any other health care providers outside of [facility] since your last visit?”    NO            Click Here for Release of Records Request         Sondra Barrios [facility]  
11/12/24 1414 11/12/24 1418 11/12/24 1422   BP: 106/64     Site: Left Upper Arm     Position: Sitting     Cuff Size: Small Adult     Pulse: 79     Resp: 21     Temp: 97.4 °F (36.3 °C)     TempSrc: Temporal     SpO2: (!) 86% (!) 85% (!) 88%   Weight: 55.8 kg (123 lb)     Height: 1.753 m (5' 9\")       Wt Readings from Last 3 Encounters:   11/12/24 55.8 kg (123 lb)   10/31/24 57.2 kg (126 lb)   09/25/24 80.3 kg (177 lb)     Today he is 123 pounds he was 177 pounds 2 months ago he has had major decline in status.  I told the family I think that he is terminal and heading towards end-of-life.  I went ahead and put him on a prednisone burst because of the wheezing I did I put in a hospice referral we discussed in detail what hospice meant in terms of care that they can get at home they will discuss more with the hospice representative when they come out to see if he needs to be enrolled I think the hospice diagnosis would be end-stage heart failure end-stage COPD severe weight loss with general debility and frailty he is on an anticoagulant he probably needs to stop that we will wait to see what hospice thinks when they come out but if they accept him I told him that I would recommend stopping the anticoagulant Eliquis because his risk for falls is higher since he is so weak        Courtney was seen today for 6 month follow-up and eye problem.    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[facility])    Biventricular cardiac pacemaker in situ    Pleural effusion on left    Acute bacterial conjunctivitis, unspecified laterality  -     ofloxacin (OCUFLOX) 0.3 % solution; Place 1 drop into both eyes 4 times daily for 10 days    COPD (chronic obstructive pulmonary disease) case management patient ([facility])  -     [facility] by Akbar Broussard  -     predniSONE (DELTASONE) 20 MG tablet; Take 1 tablet by mouth 2 times daily for 5 days    Abnormal weight loss  -     [facility]

## 2024-11-20 ENCOUNTER — TELEPHONE (OUTPATIENT)
Age: 89
End: 2024-11-20

## 2024-11-20 NOTE — TELEPHONE ENCOUNTER
[facility]  Dr Ulloa wanted to follow up on his referral for this patient.  Per the rep the patient is scheduled for an admission evaluation 11/21/2024 at 3:00 pm.    Sb ZIMMERMAN LPN

## 2024-12-29 ENCOUNTER — CLINICAL DOCUMENTATION (OUTPATIENT)
Age: 88
End: 2024-12-29

## (undated) DEVICE — SUTURE V-LOC 180 SZ 2-0 L9IN ABSRB VLT GS-21 L37MM 1/2 CIR VLOCM0345

## (undated) DEVICE — SYRINGE IRRIG 60ML SFT PLIABLE BLB EZ TO GRP 1 HND USE W/

## (undated) DEVICE — Device

## (undated) DEVICE — AGENT HEMSTAT 3GM PURIFIED PLNT STARCH PWD ABSRB ARISTA AH

## (undated) DEVICE — PEELABLE OUTER GUIDE CATHETER
Type: IMPLANTABLE DEVICE | Status: NON-FUNCTIONAL
Brand: CPS DIRECT™
Removed: 2023-04-28

## (undated) DEVICE — BALLOON WEDGE CATHETER 6 FR 110 CM --

## (undated) DEVICE — SUTURE VCRL SZ 2-0 L36IN ABSRB UD L40MM CT 1/2 CIR J957H

## (undated) DEVICE — MICROPUNCTURE INTRODUCER SET SILHOUETTE TRANSITIONLESS WITH NITINOL WIRE GUIDE: Brand: MICROPUNCTURE

## (undated) DEVICE — PINNACLE INTRODUCER SHEATH: Brand: PINNACLE

## (undated) DEVICE — REM POLYHESIVE ADULT PATIENT RETURN ELECTRODE: Brand: VALLEYLAB

## (undated) DEVICE — DRESSING ANTIMIC FOAM OPTIFOAM POSTOP ADH 4 X 6 IN

## (undated) DEVICE — ELECTRODE DEFIBRILLATOR REDI-PAK QUICK COMBO

## (undated) DEVICE — GAUZE SPNG AVANT 4X4 4PLY NS --

## (undated) DEVICE — ABSORBABLE WOUND CLOSURE DEVICE: Brand: V-LOC 90

## (undated) DEVICE — SUTURE PERMA-HAND 2-0 L30IN NONABSORBABLE BLK MH L36MM 1/2 K843H

## (undated) DEVICE — 3M™ IOBAN™ 2 ANTIMICROBIAL INCISE DRAPE 6640EZ: Brand: IOBAN™ 2

## (undated) DEVICE — ADHESIVE SKIN CLOSURE TOP 36 CC HI VISC DERMBND MINI

## (undated) DEVICE — HEART CATH-SFMC: Brand: MEDLINE INDUSTRIES, INC.

## (undated) DEVICE — INTRODUCER SHTH L13CM OD95FR SHT GRY HUB SEAMLESS SFSHTH

## (undated) DEVICE — GDWIRE WHISPER HITORQ EDS CSJ -- ACUITY SOLD BY BX ONLY 4648

## (undated) DEVICE — TRAY PARACENT 8FR 4.75IN --

## (undated) DEVICE — 6 FOOT DISPOSABLE EXTENSION CABLE WITH SAFE CONNECT / SCREW-DOWN

## (undated) DEVICE — SUTURE ETHBND EXCEL SZ 2 L30IN NONABSORBABLE GRN L40MM V-37 MX69G

## (undated) DEVICE — PADPRO DEFIBRILLATION/PACING/CARDIOVERSION/MONITORING ELECTRODES, ADULT/CHILD GREATER THAN 10 KG RADIOTRANSPARENT ELECTRODE, PHYSIO-CONTROL QUIK-COMBO (M) 60" (152 CM): Brand: PADPRO

## (undated) DEVICE — PREP SKN CHLRAPRP SNGL 1.75ML --

## (undated) DEVICE — SUTURE STRATAFIX SPRL MCRYL + SZ 4-0 L12IN ABSRB UD PS-2 SXMP1B117

## (undated) DEVICE — CATH RMFG EP ABLAT 6FRX92CM --

## (undated) DEVICE — ELECTRODE,RADIOTRANSLUCENT,FOAM,5PK: Brand: MEDLINE

## (undated) DEVICE — SLEEVE CONTAMINATION SFSHTH

## (undated) DEVICE — SUTURE V-LOC 180 SZ 2-0 L12IN ABSRB VLT GS-21 L37MM 1/2 CIR VLOCM0315

## (undated) DEVICE — 3M™ IOBAN™ 2 ANTIMICROBIAL INCISE DRAPE 6650EZ: Brand: IOBAN™ 2

## (undated) DEVICE — INTRODUCER SHTH L13CM OD7FR SH ORNG HUB SEAMLESS SAFSHTH

## (undated) DEVICE — SET ADMIN 16ML TBNG L100IN 2 Y INJ SITE IV PIGGY BK DISP (ORDER IN MULIPLES OF 48)

## (undated) DEVICE — PACEMAKER SETUP: Brand: MEDLINE INDUSTRIES, INC.

## (undated) DEVICE — INTRO SHTH HEMO 9FR 18G 13CM -- PRELUDE SNAP PEEL-AWAY

## (undated) DEVICE — CATHETER DIAG 5FR L100CM AL AL 2.0 CRV SZ DBL BRAID WIRE

## (undated) DEVICE — ELECTRODE PT RET AD L9FT HI MOIST COND ADH HYDRGEL CORDED

## (undated) DEVICE — RADIFOCUS GLIDEWIRE: Brand: GLIDEWIRE

## (undated) DEVICE — ELECTRODE,RADIOTRANSLUCENT,FOAM,3PK: Brand: MEDLINE